# Patient Record
Sex: FEMALE | Race: WHITE | NOT HISPANIC OR LATINO | Employment: UNEMPLOYED | URBAN - METROPOLITAN AREA
[De-identification: names, ages, dates, MRNs, and addresses within clinical notes are randomized per-mention and may not be internally consistent; named-entity substitution may affect disease eponyms.]

---

## 2022-09-25 ENCOUNTER — TRANSFERRED RECORDS (OUTPATIENT)
Dept: HEALTH INFORMATION MANAGEMENT | Facility: CLINIC | Age: 47
End: 2022-09-25

## 2022-09-27 ENCOUNTER — TRANSFERRED RECORDS (OUTPATIENT)
Dept: HEALTH INFORMATION MANAGEMENT | Facility: CLINIC | Age: 47
End: 2022-09-27

## 2022-09-29 ENCOUNTER — TRANSFERRED RECORDS (OUTPATIENT)
Dept: HEALTH INFORMATION MANAGEMENT | Facility: CLINIC | Age: 47
End: 2022-09-29

## 2022-09-29 ENCOUNTER — MEDICAL CORRESPONDENCE (OUTPATIENT)
Dept: HEALTH INFORMATION MANAGEMENT | Facility: CLINIC | Age: 47
End: 2022-09-29

## 2022-10-03 ENCOUNTER — CARE COORDINATION (OUTPATIENT)
Dept: CARDIOLOGY | Facility: CLINIC | Age: 47
End: 2022-10-03

## 2022-10-03 DIAGNOSIS — I50.21 ACUTE SYSTOLIC HEART FAILURE (H): ICD-10-CM

## 2022-10-03 PROBLEM — I21.3 STEMI (ST ELEVATION MYOCARDIAL INFARCTION) (H): Status: ACTIVE | Noted: 2022-09-25

## 2022-10-03 PROBLEM — E04.9 ENLARGED THYROID: Status: ACTIVE | Noted: 2020-10-08

## 2022-10-03 PROBLEM — M54.42 CHRONIC LEFT-SIDED LOW BACK PAIN WITH LEFT-SIDED SCIATICA: Status: ACTIVE | Noted: 2021-01-20

## 2022-10-03 PROBLEM — G89.29 CHRONIC LEFT-SIDED LOW BACK PAIN WITH LEFT-SIDED SCIATICA: Status: ACTIVE | Noted: 2021-01-20

## 2022-10-03 PROBLEM — F17.200 TOBACCO USE DISORDER: Status: ACTIVE | Noted: 2020-10-08

## 2022-10-03 PROBLEM — S76.011A STRAIN OF FLEXOR MUSCLE OF HIP, RIGHT, INITIAL ENCOUNTER: Status: ACTIVE | Noted: 2021-10-13

## 2022-10-03 RX ORDER — FENOFIBRATE 160 MG/1
160 TABLET ORAL DAILY
COMMUNITY
Start: 2022-09-30 | End: 2024-07-02

## 2022-10-03 RX ORDER — ATORVASTATIN CALCIUM 80 MG/1
1 TABLET, FILM COATED ORAL DAILY
COMMUNITY
Start: 2022-01-12

## 2022-10-03 RX ORDER — NITROGLYCERIN 0.4 MG/1
0.4 TABLET SUBLINGUAL EVERY 5 MIN PRN
COMMUNITY
Start: 2022-09-29 | End: 2024-02-05

## 2022-10-03 RX ORDER — LOSARTAN POTASSIUM 25 MG/1
25 TABLET ORAL DAILY
Status: ON HOLD | COMMUNITY
Start: 2022-09-30 | End: 2022-12-09

## 2022-10-03 RX ORDER — ACETAMINOPHEN AND CODEINE PHOSPHATE 300; 30 MG/1; MG/1
1 TABLET ORAL EVERY 6 HOURS PRN
COMMUNITY
Start: 2022-09-29 | End: 2022-10-13

## 2022-10-03 RX ORDER — METOPROLOL SUCCINATE 25 MG/1
25 TABLET, EXTENDED RELEASE ORAL DAILY
COMMUNITY
Start: 2022-09-29 | End: 2023-01-09

## 2022-10-03 RX ORDER — LIRAGLUTIDE 6 MG/ML
INJECTION SUBCUTANEOUS
COMMUNITY
Start: 2022-09-29 | End: 2024-02-10

## 2022-10-03 RX ORDER — VENLAFAXINE 100 MG/1
1 TABLET ORAL 2 TIMES DAILY
COMMUNITY
Start: 2022-01-12

## 2022-10-03 RX ORDER — BUSPIRONE HYDROCHLORIDE 10 MG/1
10 TABLET ORAL 2 TIMES DAILY
COMMUNITY
Start: 2022-04-13 | End: 2024-07-02

## 2022-10-03 RX ORDER — CYCLOBENZAPRINE HCL 10 MG
10 TABLET ORAL 3 TIMES DAILY PRN
Status: ON HOLD | COMMUNITY
Start: 2022-09-16 | End: 2023-05-11

## 2022-10-03 RX ORDER — NICOTINE 21 MG/24HR
21 PATCH, TRANSDERMAL 24 HOURS TRANSDERMAL DAILY
COMMUNITY
Start: 2022-09-29 | End: 2023-10-04

## 2022-10-03 RX ORDER — FUROSEMIDE 20 MG
20 TABLET ORAL DAILY
COMMUNITY
Start: 2022-09-29 | End: 2024-07-02

## 2022-10-03 NOTE — PROGRESS NOTES
S/B:  Patient referred by Betsy Tran CNP [facility].    Patient was recently hospitalized at [facility]. REferring reached out to Dr. Guerin for a consult and Dr. Guerin requested that a video appt be made soon to discuss options. Sending to HF RN to review    Updated demographics and pt will call back with insurance info, she said she has [employer].       Plan:  Checking with Dr. Guerin's care team for availability -  sent  October 6, 2022 - Sent email for scheduling recommendations.     Offer 10/18 at noon per Truong garcia.

## 2022-10-10 ENCOUNTER — TELEPHONE (OUTPATIENT)
Dept: CARDIOLOGY | Facility: CLINIC | Age: 47
End: 2022-10-10

## 2022-10-10 ENCOUNTER — TRANSCRIBE ORDERS (OUTPATIENT)
Dept: OTHER | Age: 47
End: 2022-10-10

## 2022-10-10 DIAGNOSIS — Z79.4 TYPE 2 DIABETES MELLITUS WITHOUT COMPLICATION, WITH LONG-TERM CURRENT USE OF INSULIN (H): ICD-10-CM

## 2022-10-10 DIAGNOSIS — I21.3 STEMI (ST ELEVATION MYOCARDIAL INFARCTION) (H): Primary | ICD-10-CM

## 2022-10-10 DIAGNOSIS — I50.21 ACUTE SYSTOLIC HEART FAILURE (H): ICD-10-CM

## 2022-10-10 DIAGNOSIS — Z95.5 STENTED CORONARY ARTERY: ICD-10-CM

## 2022-10-10 DIAGNOSIS — G89.29 CHRONIC LEFT-SIDED LOW BACK PAIN WITH LEFT-SIDED SCIATICA: ICD-10-CM

## 2022-10-10 DIAGNOSIS — M54.42 CHRONIC LEFT-SIDED LOW BACK PAIN WITH LEFT-SIDED SCIATICA: ICD-10-CM

## 2022-10-10 DIAGNOSIS — E11.9 TYPE 2 DIABETES MELLITUS WITHOUT COMPLICATION, WITH LONG-TERM CURRENT USE OF INSULIN (H): ICD-10-CM

## 2022-10-10 DIAGNOSIS — I20.9 ANGINA PECTORIS (H): ICD-10-CM

## 2022-10-10 NOTE — TELEPHONE ENCOUNTER
Health Call Center    Phone Message    May a detailed message be left on voicemail: yes     Reason for Call: Appointment Intake    Referring Provider Name: Dr Desi Orozco @ [place]  Diagnosis and/or Symptoms: STEMI (ST elevation myocardial infarction) (H) [I21.3]  Acute systolic heart failure (H) [I50.21]  Chronic left-sided low back pain with left-sided sciatica [M54.42, G89.29]  Angina pectoris (H) [I20.9]  Type 2 diabetes mellitus without complication, with long-term current use of ins...  Stented coronary artery [Z95.5]      Per order req for Dr. Guerin    Action Taken: Message routed to:  [facility] ([facility] Cardiology    Travel Screening: Not Applicable

## 2022-10-12 NOTE — TELEPHONE ENCOUNTER
New heart failure care coordination encounter has been opened and referral is being reviewed by heart failure referral team.

## 2022-10-17 NOTE — PROGRESS NOTES
Unfortunately patient was not able to make the video working this as this was a phone discussion with her.  Platform was [employer]  October 18, 2022  Dear Colleagues.  I had the pleasure of talking to Cailin Burns  in the [facility]. As you know she is a 47 y/o F with DM, HLD, obesity, tobacco use disorder, sciatica who was recently admitted to [facility] with STEMI. She initially presented to urgent care with new onset chest pain, was found to have STEMI and was transferred to [facility] for immediate further evaluation and angiogram on 9/25/22. It was significant for 3 vessel disease. Case was immediately discussed with Dr Leal for potential urgent CABG considerations but ultimately the decision was made to proceed with PCI due to poor bypass targets. Intervention to LCx was made. Prior to further PCI PET viability was pursued prior to considering further interventions. Unfortunately viability did not show enough and thus medical management was recommended. She did have short runs of NSVT while inpatient. ECHO showed EF 20% and thus she was discharged with LifeVest. She was discharged on mimimal medical therapy due to associated hypotension and not being able to tolerate higher doses. She also has PRN lasix for weight gain and PRN nitro. She is following up at the CHF clinic at [place] and was also referred to [facility] for potential future advanced heart failure therapies evaluation.   Today I am talking to her over video due to the pandemic and patient distance from our center.  Over the phone she did confirm the above story and noted that she did have episodes of back pain and lower extremity pain for quite some time and has been seen in the emergency room and urgent cares quite a few times.  However last time she went to the urgent care she did have somewhat different complaints with numbness in both arms.  This is when the STEMI was diagnosed.  Since interventions she feels better she does not  have any further episodes of chest pain or similar equivalents.  She takes her medication as prescribed however has not seen any cardiologist since due to scheduling difficulties.  No medication titration could be made yet.  No other complaints     PAST MEDICAL HISTORY:    Diabetes mellitus without complication (HCC)     Depression with anxiety     Dyslipidemia     Preventative health care     Obesity (BMI 30.0-34.9)     Hyperlipidemia     Tobacco use disorder     Enlarged thyroid     Leg pain, lateral, left     Chronic left-sided low back pain with left-sided sciatica     PND (post-nasal drip)     Strain of flexor muscle of hip, right, initial encounter     STEMI (ST elevation myocardial infarction) ([facility]) 9/25/2022    FAMILY HISTORY:  No relevant past family history    SOCIAL HISTORY:     CURRENT MEDICATIONS:  Current Outpatient Medications   Medication     aspirin (ASA) 81 MG EC tablet     atorvastatin (LIPITOR) 80 MG tablet     blood glucose (CONTOUR NEXT TEST) test strip     busPIRone (BUSPAR) 10 MG tablet     cyclobenzaprine (FLEXERIL) 10 MG tablet     fenofibrate (TRIGLIDE/LOFIBRA) 160 MG tablet     furosemide (LASIX) 20 MG tablet     insulin glargine (LANTUS PEN) 100 UNIT/ML pen     liraglutide (VICTOZA PEN) 18 MG/3ML solution     losartan (COZAAR) 25 MG tablet     metFORMIN (GLUCOPHAGE) 1000 MG tablet     metoprolol succinate ER (TOPROL XL) 25 MG 24 hr tablet     nicotine (NICODERM CQ) 21 MG/24HR 24 hr patch     nicotine (NICORETTE) 4 MG lozenge     nicotine (NICOTROL) 10 MG inhaler     nitroGLYcerin (NITROSTAT) 0.4 MG sublingual tablet     ticagrelor (BRILINTA) 90 MG tablet     venlafaxine (EFFEXOR) 100 MG tablet     No current facility-administered medications for this visit.     ROS:   Constitutional: No fever, chills, or sweats.   ENT: No visual disturbance, ear ache, epistaxis, sore throat.   Allergies/Immunologic: Negative.   Respiratory: No cough, hemoptysis.   Cardiovascular: As per HPI.   GI: No  nausea, vomiting, hematemesis, melena, or hematochezia.   : No urinary frequency, dysuria, or hematuria.   Integument: Negative.   Psychiatric: Pleasant, no major depression noted  Neuro: No focal neurological deficits noted  Endocrinology: Negative.   Musculoskeletal: As per HPI.      EXAM:  Unable to do a thorough physical examination given that this was a phone encounter primarily.     Labs: All available osh labs reviewed. Renal function normal. Troonin peak at 64, normal sensitivity. Blood group 0. A1C 7.9.     [facility] - 9/25/22: significant 3VD,  - LCx - mid, 99%, culprit - s/p PCI with a 2.75*26 mm Israel MOHAN  - LAD - diffusely diseased, small caliber, 90% mid & distal - not amenable to PCI due to small caliber  - RCA - mid, 100%, , fills distally with R-R collaterals, small caliber.   Discussed presentation with Dr Leal for consideration for CABG, before proceeding with PCI, it was decided to proceed with PCI-LCx & assess for CABG later.    TTE 9/25/22:     Left Ventricle: The left ventricle is dilated. Severely reduced left  ventricular systolic function. The EF is visually estimated to be 20%. No  thrombus seen -contrast images also reviewed See diagram for wall motion details.      Right Ventricle: The right ventricle appears normal in size. The tricuspid jet envelope definition is inadequate for estimation of RV systolic pressure.      Mitral Valve: The leaflets are thickened. There is mild regurgitation.      Tricuspid Valve: There is no regurgitation.      This is an abnormal study.     PET viability 9/28/22:     Metabolic Viability Conclusion: The left ventricle has potentially viable myocardium.  The total viable myocardium is 39%, total scar burden is 32%      Resting Perfusion Details: Large perfusion defect at rest involving the entire anteroseptal, inferoseptal, inferior and inferolateral segments.   This includes the entire RCA territory and parts of the LAD and possibly LCx territories.       Metabolic Viability Defect: The mid inferoseptal, mid inferior, mid inferolateral, distal septal and distal inferior segments are nonviable. The LCx territory appears to be mostly viable at 95% total viability. The LAD territory is predominantly viable with 75% viability. The RCA territory appears to be 50% viable with no perfusion at rest    ASSESSMENT AND PLAN:  In summary, patient is a 46 year old lady with a past medical history including diabetes which is reasonably better controlled at the moment as well as recent STEMI back in September 2022 in the setting of atypical symptoms with high peak and troponin at around 63.  She does have multivessel coronary artery disease only the LCx vessel was intervened however she has significant disease in the small LAD as well as  of the RCA.  She was found to be not a surgical candidate in [place].  She was started on medications now the blood pressure is better at 120/80.  Patient at home and as such losartan was started.  At this time we will not change her medications however as per our discussion we will have our cardiac surgeons and interventional cardiologist reviewed images whether there is anything we can do.  She does have significant viability in all territories and as such it might be reasonable to try to intervene on some of these vessels.  We might recommend doing a cardiac MRI just to see and further define viability in every single diarrhea.  She is willing to do that down the road.  I will have again team look at the images and make any recommendations whether surgery would be feasible.  I think that would be in her best interest if not then we can consider P further PCI's to protect the myocardium however she is asymptomatic and seems to be asymptomatic at this time.  She can also titrate medications afterwards however given that he might of surgery I did not want to adjust this.  [facility] would [facility] the losartan in the near  future we will start at low-dose.  In addition we can further titrate her beta-blocker as well as add a SGLT2 inhibitor.    Will get images as soon as possible hopefully later this week and get back to her.  She was also asking that she has a tooth infection at this time and was wondering how the tooth could be pulled or how this could be fixed.  From my standpoint it is reasonable to pull the tooth however the Brilinta she is on at the moment for platelet inhibition should not be stopped given the significant underlying coronary artery disease and recent stent placement in the setting of significant STEMI.  As long as the aspirin and Brilinta can be continued she is okay to have the tooth pulled.  In fact this might be actually reasonable to do especially if we are considering bypass surgery.     I appreciate the opportunity to participate in the care of Cailin Burns . Please do not hesitate to contact me with any further questions.    Sincerely,   Kurt Guerin MD  [facility] Division of Cardiology

## 2022-10-18 ENCOUNTER — VIRTUAL VISIT (OUTPATIENT)
Dept: CARDIOLOGY | Facility: CLINIC | Age: 47
End: 2022-10-18
Attending: INTERNAL MEDICINE
Payer: COMMERCIAL

## 2022-10-18 DIAGNOSIS — E78.2 MIXED HYPERLIPIDEMIA: ICD-10-CM

## 2022-10-18 DIAGNOSIS — I50.20 HEART FAILURE WITH REDUCED EJECTION FRACTION, NYHA CLASS III (H): ICD-10-CM

## 2022-10-18 DIAGNOSIS — I25.5 ISCHEMIC CARDIOMYOPATHY: ICD-10-CM

## 2022-10-18 DIAGNOSIS — I10 BENIGN ESSENTIAL HYPERTENSION: Primary | ICD-10-CM

## 2022-10-18 PROCEDURE — 99205 OFFICE O/P NEW HI 60 MIN: CPT | Mod: 95 | Performed by: INTERNAL MEDICINE

## 2022-10-18 RX ORDER — NICOTINE 21 MG/24HR
1 PATCH, TRANSDERMAL 24 HOURS TRANSDERMAL EVERY 24 HOURS
COMMUNITY
Start: 2022-10-14

## 2022-10-18 NOTE — LETTER
10/18/2022      RE: Cailin Burns  [address]  [address]  [place] [address]       Dear Colleague,    Thank you for the opportunity to participate in the care of your patient, Cailin Burns, at the [facility] [place] at [facility]. Please see a copy of my visit note below.    Unfortunately patient was not able to make the video working this as this was a phone discussion with her.  Platform was [employer]  October 18, 2022  Dear Colleagues.  I had the pleasure of talking to Cailin Burns  in the [facility] Failure [facility]. As you know she is a 47 y/o F with DM, HLD, obesity, tobacco use disorder, sciatica who was recently admitted to [place] ICU with STEMI. She initially presented to urgent care with new onset chest pain, was found to have STEMI and was transferred to [facility] for immediate further evaluation and angiogram on 9/25/22. It was significant for 3 vessel disease. Case was immediately discussed with Dr Leal for potential urgent CABG considerations but ultimately the decision was made to proceed with PCI due to poor bypass targets. Intervention to LCx was made. Prior to further PCI PET viability was pursued prior to considering further interventions. Unfortunately viability did not show enough and thus medical management was recommended. She did have short runs of NSVT while inpatient. ECHO showed EF 20% and thus she was discharged with LifeVest. She was discharged on mimimal medical therapy due to associated hypotension and not being able to tolerate higher doses. She also has PRN lasix for weight gain and PRN nitro. She is following up at the CHF clinic at [place] and was also referred to [facility] for potential future advanced heart failure therapies evaluation.   Today I am talking to her over video due to the pandemic and patient distance from our center.  Over the phone she did confirm the above story and noted that she did  have episodes of back pain and lower extremity pain for quite some time and has been seen in the emergency room and urgent cares quite a few times.  However last time she went to the urgent care she did have somewhat different complaints with numbness in both arms.  This is when the STEMI was diagnosed.  Since interventions she feels better she does not have any further episodes of chest pain or similar equivalents.  She takes her medication as prescribed however has not seen any cardiologist since due to scheduling difficulties.  No medication titration could be made yet.  No other complaints     PAST MEDICAL HISTORY:    Diabetes mellitus without complication (HCC)     Depression with anxiety     Dyslipidemia     Preventative health care     Obesity (BMI 30.0-34.9)     Hyperlipidemia     Tobacco use disorder     Enlarged thyroid     Leg pain, lateral, left     Chronic left-sided low back pain with left-sided sciatica     PND (post-nasal drip)     Strain of flexor muscle of hip, right, initial encounter     STEMI (ST elevation myocardial infarction) ([facility]) 9/25/2022    FAMILY HISTORY:  No relevant past family history    SOCIAL HISTORY:     CURRENT MEDICATIONS:  Current Outpatient Medications   Medication     aspirin (ASA) 81 MG EC tablet     atorvastatin (LIPITOR) 80 MG tablet     blood glucose (CONTOUR NEXT TEST) test strip     busPIRone (BUSPAR) 10 MG tablet     cyclobenzaprine (FLEXERIL) 10 MG tablet     fenofibrate (TRIGLIDE/LOFIBRA) 160 MG tablet     furosemide (LASIX) 20 MG tablet     insulin glargine (LANTUS PEN) 100 UNIT/ML pen     liraglutide (VICTOZA PEN) 18 MG/3ML solution     losartan (COZAAR) 25 MG tablet     metFORMIN (GLUCOPHAGE) 1000 MG tablet     metoprolol succinate ER (TOPROL XL) 25 MG 24 hr tablet     nicotine (NICODERM CQ) 21 MG/24HR 24 hr patch     nicotine (NICORETTE) 4 MG lozenge     nicotine (NICOTROL) 10 MG inhaler     nitroGLYcerin (NITROSTAT) 0.4 MG sublingual tablet     ticagrelor  (BRILINTA) 90 MG tablet     venlafaxine (EFFEXOR) 100 MG tablet     No current facility-administered medications for this visit.     ROS:   Constitutional: No fever, chills, or sweats.   ENT: No visual disturbance, ear ache, epistaxis, sore throat.   Allergies/Immunologic: Negative.   Respiratory: No cough, hemoptysis.   Cardiovascular: As per HPI.   GI: No nausea, vomiting, hematemesis, melena, or hematochezia.   : No urinary frequency, dysuria, or hematuria.   Integument: Negative.   Psychiatric: Pleasant, no major depression noted  Neuro: No focal neurological deficits noted  Endocrinology: Negative.   Musculoskeletal: As per HPI.      EXAM:  Unable to do a thorough physical examination given that this was a phone encounter primarily.     Labs: All available osh labs reviewed. Renal function normal. Troonin peak at 64, normal sensitivity. Blood group 0. A1C 7.9.     [facility] - 9/25/22: significant 3VD,  - LCx - mid, 99%, culprit - s/p PCI with a 2.75*26 mm Israel MOHAN  - LAD - diffusely diseased, small caliber, 90% mid & distal - not amenable to PCI due to small caliber  - RCA - mid, 100%, , fills distally with R-R collaterals, small caliber.   Discussed presentation with Dr Leal for consideration for CABG, before proceeding with PCI, it was decided to proceed with PCI-LCx & assess for CABG later.    TTE 9/25/22:     Left Ventricle: The left ventricle is dilated. Severely reduced left  ventricular systolic function. The EF is visually estimated to be 20%. No  thrombus seen -contrast images also reviewed See diagram for wall motion details.      Right Ventricle: The right ventricle appears normal in size. The tricuspid jet envelope definition is inadequate for estimation of RV systolic pressure.      Mitral Valve: The leaflets are thickened. There is mild regurgitation.      Tricuspid Valve: There is no regurgitation.      This is an abnormal study.     PET viability 9/28/22:     Metabolic Viability Conclusion: The  left ventricle has potentially viable myocardium.  The total viable myocardium is 39%, total scar burden is 32%      Resting Perfusion Details: Large perfusion defect at rest involving the entire anteroseptal, inferoseptal, inferior and inferolateral segments.   This includes the entire RCA territory and parts of the LAD and possibly LCx territories.      Metabolic Viability Defect: The mid inferoseptal, mid inferior, mid inferolateral, distal septal and distal inferior segments are nonviable. The LCx territory appears to be mostly viable at 95% total viability. The LAD territory is predominantly viable with 75% viability. The RCA territory appears to be 50% viable with no perfusion at rest    ASSESSMENT AND PLAN:  In summary, patient is a 46 year old lady with a past medical history including diabetes which is reasonably better controlled at the moment as well as recent STEMI back in September 2022 in the setting of atypical symptoms with high peak and troponin at around 63.  She does have multivessel coronary artery disease only the LCx vessel was intervened however she has significant disease in the small LAD as well as  of the RCA.  She was found to be not a surgical candidate in [place].  She was started on medications now the blood pressure is better at 120/80.  Patient at home and as such losartan was started.  At this time we will not change her medications however as per our discussion we will have our cardiac surgeons and interventional cardiologist reviewed images whether there is anything we can do.  She does have significant viability in all territories and as such it might be reasonable to try to intervene on some of these vessels.  We might recommend doing a cardiac MRI just to see and further define viability in every single diarrhea.  She is willing to do that down the road.  I will have again team look at the images and make any recommendations whether surgery would be feasible.  I think that  would be in her best interest if not then we can consider P further PCI's to protect the myocardium however she is asymptomatic and seems to be asymptomatic at this time.  She can also titrate medications afterwards however given that he might of surgery I did not want to adjust this.  She would benefit going over to [facility] from the losartan in the near future we will start at low-dose.  In addition we can further titrate her beta-blocker as well as add a SGLT2 inhibitor.    Will get images as soon as possible hopefully later this week and get back to her.  She was also asking that she has a tooth infection at this time and was wondering how the tooth could be pulled or how this could be fixed.  From my standpoint it is reasonable to pull the tooth however the Brilinta she is on at the moment for platelet inhibition should not be stopped given the significant underlying coronary artery disease and recent stent placement in the setting of significant STEMI.  As long as the aspirin and Brilinta can be continued she is okay to have the tooth pulled.  In fact this might be actually reasonable to do especially if we are considering bypass surgery.     I appreciate the opportunity to participate in the care of Cailin Burns . Please do not hesitate to contact me with any further questions.    Sincerely,   Kurt Guerin MD  [facility] Division of Cardiology

## 2022-10-31 ENCOUNTER — TELEPHONE (OUTPATIENT)
Dept: CARDIOLOGY | Facility: CLINIC | Age: 47
End: 2022-10-31

## 2022-10-31 NOTE — TELEPHONE ENCOUNTER
"Jaqui sent me a message asking for someone to call her.     She stated she is waiting to here back from Dr. Guerin regarding the possibility of a  \"Surgery that will help\". She thought womeone would get back to her last week.    "

## 2022-11-17 ENCOUNTER — CARE COORDINATION (OUTPATIENT)
Dept: CARDIOLOGY | Facility: CLINIC | Age: 47
End: 2022-11-17

## 2022-11-17 DIAGNOSIS — I25.10 CAD (CORONARY ARTERY DISEASE): ICD-10-CM

## 2022-11-17 DIAGNOSIS — I50.20 HEART FAILURE WITH REDUCED EJECTION FRACTION, NYHA CLASS III (H): ICD-10-CM

## 2022-11-17 DIAGNOSIS — I50.21 ACUTE SYSTOLIC HEART FAILURE (H): Primary | ICD-10-CM

## 2022-11-17 RX ORDER — LIDOCAINE 40 MG/G
CREAM TOPICAL
Status: CANCELLED | OUTPATIENT
Start: 2022-11-17

## 2022-11-17 RX ORDER — POTASSIUM CHLORIDE 1500 MG/1
40 TABLET, EXTENDED RELEASE ORAL
Status: CANCELLED | OUTPATIENT
Start: 2022-11-17

## 2022-11-17 RX ORDER — ASPIRIN 81 MG/1
243 TABLET, CHEWABLE ORAL ONCE
Status: CANCELLED | OUTPATIENT
Start: 2022-11-17

## 2022-11-17 RX ORDER — SODIUM CHLORIDE 9 MG/ML
INJECTION, SOLUTION INTRAVENOUS CONTINUOUS
Status: CANCELLED | OUTPATIENT
Start: 2022-11-17

## 2022-11-17 RX ORDER — POTASSIUM CHLORIDE 1500 MG/1
20 TABLET, EXTENDED RELEASE ORAL
Status: CANCELLED | OUTPATIENT
Start: 2022-11-17

## 2022-11-17 RX ORDER — ASPIRIN 325 MG
325 TABLET ORAL ONCE
Status: CANCELLED | OUTPATIENT
Start: 2022-11-17 | End: 2022-11-17

## 2022-11-17 NOTE — PROGRESS NOTES
Date: 11/17/2022    Time of Call: 12:33 PM     Diagnosis:  Cad     [ VORB ] Ordering provider: Dr. Guerin  Order: coronary angio and RCA  with Dr. Bernardo       Order received by: Donovan Rendon RN     Follow-up/additional notes: Orders placed, procedure scheduled. Patient called and notified and preparation reviewed with patient. Reviewed bellow information:  Patient Education    1. Your arrival time is 8:30 on December 9th..  Location is [address]  2. Please plan on being at the hospital all day.  3. At any time, emergencies and/or urgent cases may come up which could delay the start of your procedure.    COVID Testing Instructions  *Mandatory COVID Testing:   We require COVID testing prior to their procedure regardless of vaccination status.     This can be completed via PCR or Rapid (at lab or at home).  If you are doing an at home test please complete 1-2 days prior.  If you are completing a PCR Lab, it must be completed no more than 4 days prior.     If you are staying overnight a PCR completed at a LAB is required and a Rapid will not be accepted.    To schedule COVID testing at a [place] lab, please call [phone] ([phone])    If completing the COVID Test at an Outside facility please have them fax the results to [phone]    Please bring in a picture of your results if a rapid is completed at home.    If you are running into and issues please call us.     Pre-procedure instructions - Coronary Angiogram  - Shower in the evening before or the morning of the procedure  - Take your temperature in the morning prior to coming in.  If your temperature is 100 F please call [phone] (Opt[address] 1) and notify them.  If you do not have access to a thermometer at home, please come in for testing.  If you are running a temperature your procedure may be rescheduled.  - Nothing to eat or drink after  midnight  - You can take your morning medications (except for diabetic and blood thinners) with sips of water.  - Take 325 mg of Asprin 24 hours prior to the procedure and the morning of procedure.   - You will need to arrange a ride to drop you off and pick you up, as you will be unable to drive home.  Prior to discharge you may be required to lay flat for approximately 2-4 hours in the recovery unit to ensure proper clotting of the artery.              Diabetic Medication Instructions  ? DO NOT take any oral diabetic medication, short-acting diabetes medications/insulin, humalog or regular insulin the morning of your test  ? Take   dose of long-acting insulin (Lantus, Levemir) the day of your test  ? Hold Oral Diabetic on the day of the procedure and for 48 hours after IV contrast given  ? Remember to  bring your glucometer and insulin with you to take after your test if needed              Anticoagulation Medication Instructions   enoxaparin (LOVENOX) - Hold 12 hours prior to procedure if on every 12 hour dosing and NA

## 2022-12-09 ENCOUNTER — APPOINTMENT (OUTPATIENT)
Dept: MEDSURG UNIT | Facility: CLINIC | Age: 47
End: 2022-12-09
Attending: INTERNAL MEDICINE
Payer: COMMERCIAL

## 2022-12-09 ENCOUNTER — APPOINTMENT (OUTPATIENT)
Dept: LAB | Facility: CLINIC | Age: 47
End: 2022-12-09
Attending: INTERNAL MEDICINE
Payer: COMMERCIAL

## 2022-12-09 ENCOUNTER — HOSPITAL ENCOUNTER (OUTPATIENT)
Facility: CLINIC | Age: 47
Discharge: HOME | End: 2022-12-09
Attending: INTERNAL MEDICINE | Admitting: INTERNAL MEDICINE
Payer: COMMERCIAL

## 2022-12-09 VITALS
WEIGHT: 172.4 LBS | OXYGEN SATURATION: 98 % | HEART RATE: 77 BPM | SYSTOLIC BLOOD PRESSURE: 109 MMHG | TEMPERATURE: 97.8 F | DIASTOLIC BLOOD PRESSURE: 57 MMHG | BODY MASS INDEX: 31.73 KG/M2 | HEIGHT: 62 IN | RESPIRATION RATE: 14 BRPM

## 2022-12-09 DIAGNOSIS — I50.20 HEART FAILURE WITH REDUCED EJECTION FRACTION, NYHA CLASS III (H): ICD-10-CM

## 2022-12-09 DIAGNOSIS — I25.10 CAD (CORONARY ARTERY DISEASE): ICD-10-CM

## 2022-12-09 DIAGNOSIS — I50.21 ACUTE SYSTOLIC HEART FAILURE (H): ICD-10-CM

## 2022-12-09 DIAGNOSIS — I25.10 CORONARY ARTERY DISEASE INVOLVING NATIVE CORONARY ARTERY OF NATIVE HEART WITHOUT ANGINA PECTORIS: Primary | ICD-10-CM

## 2022-12-09 LAB
ACT BLD: 360 SECONDS (ref 74–150)
ACT BLD: 377 SECONDS (ref 74–150)
ANION GAP SERPL CALCULATED.3IONS-SCNC: 8 MMOL/L (ref 7–15)
APTT PPP: 25 SECONDS (ref 22–38)
BUN SERPL-MCNC: 10.2 MG/DL (ref 6–20)
CALCIUM SERPL-MCNC: 8.3 MG/DL (ref 8.6–10)
CHLORIDE SERPL-SCNC: 108 MMOL/L (ref 98–107)
CREAT SERPL-MCNC: 0.65 MG/DL (ref 0.51–0.95)
DEPRECATED HCO3 PLAS-SCNC: 22 MMOL/L (ref 22–29)
ERYTHROCYTE [DISTWIDTH] IN BLOOD BY AUTOMATED COUNT: 14.6 % (ref 10–15)
GFR SERPL CREATININE-BSD FRML MDRD: >90 ML/MIN/1.73M2
GLUCOSE BLDC GLUCOMTR-MCNC: 105 MG/DL (ref 70–99)
GLUCOSE SERPL-MCNC: 110 MG/DL (ref 70–99)
HCG INTACT+B SERPL-ACNC: <1 MIU/ML
HCT VFR BLD AUTO: 40.9 % (ref 35–47)
HGB BLD-MCNC: 13.3 G/DL (ref 11.7–15.7)
INR PPP: 0.9 (ref 0.85–1.15)
MCH RBC QN AUTO: 29 PG (ref 26.5–33)
MCHC RBC AUTO-ENTMCNC: 32.5 G/DL (ref 31.5–36.5)
MCV RBC AUTO: 89 FL (ref 78–100)
PLATELET # BLD AUTO: 269 10E3/UL (ref 150–450)
POTASSIUM SERPL-SCNC: 4.5 MMOL/L (ref 3.4–5.3)
RBC # BLD AUTO: 4.59 10E6/UL (ref 3.8–5.2)
SODIUM SERPL-SCNC: 138 MMOL/L (ref 136–145)
WBC # BLD AUTO: 7.7 10E3/UL (ref 4–11)

## 2022-12-09 PROCEDURE — 250N000011 HC RX IP 250 OP 636: Performed by: INTERNAL MEDICINE

## 2022-12-09 PROCEDURE — C1725 CATH, TRANSLUMIN NON-LASER: HCPCS | Performed by: INTERNAL MEDICINE

## 2022-12-09 PROCEDURE — C1769 GUIDE WIRE: HCPCS | Performed by: INTERNAL MEDICINE

## 2022-12-09 PROCEDURE — 258N000003 HC RX IP 258 OP 636: Performed by: INTERNAL MEDICINE

## 2022-12-09 PROCEDURE — 250N000009 HC RX 250: Performed by: INTERNAL MEDICINE

## 2022-12-09 PROCEDURE — 36415 COLL VENOUS BLD VENIPUNCTURE: CPT | Performed by: INTERNAL MEDICINE

## 2022-12-09 PROCEDURE — C1874 STENT, COATED/COV W/DEL SYS: HCPCS | Performed by: INTERNAL MEDICINE

## 2022-12-09 PROCEDURE — 93005 ELECTROCARDIOGRAM TRACING: CPT

## 2022-12-09 PROCEDURE — 85730 THROMBOPLASTIN TIME PARTIAL: CPT | Performed by: INTERNAL MEDICINE

## 2022-12-09 PROCEDURE — 272N000002 HC OR SUPPLY OTHER OPNP: Performed by: INTERNAL MEDICINE

## 2022-12-09 PROCEDURE — 250N000013 HC RX MED GY IP 250 OP 250 PS 637: Performed by: INTERNAL MEDICINE

## 2022-12-09 PROCEDURE — 99152 MOD SED SAME PHYS/QHP 5/>YRS: CPT | Mod: GC | Performed by: INTERNAL MEDICINE

## 2022-12-09 PROCEDURE — 93010 ELECTROCARDIOGRAM REPORT: CPT | Mod: 76 | Performed by: INTERNAL MEDICINE

## 2022-12-09 PROCEDURE — 999N000142 HC STATISTIC PROCEDURE PREP ONLY

## 2022-12-09 PROCEDURE — 99152 MOD SED SAME PHYS/QHP 5/>YRS: CPT | Performed by: INTERNAL MEDICINE

## 2022-12-09 PROCEDURE — 93454 CORONARY ARTERY ANGIO S&I: CPT | Mod: 26 | Performed by: INTERNAL MEDICINE

## 2022-12-09 PROCEDURE — 250N000013 HC RX MED GY IP 250 OP 250 PS 637: Performed by: STUDENT IN AN ORGANIZED HEALTH CARE EDUCATION/TRAINING PROGRAM

## 2022-12-09 PROCEDURE — 82310 ASSAY OF CALCIUM: CPT | Performed by: INTERNAL MEDICINE

## 2022-12-09 PROCEDURE — C9607 PERC D-E COR REVASC CHRO SIN: HCPCS | Performed by: INTERNAL MEDICINE

## 2022-12-09 PROCEDURE — 85014 HEMATOCRIT: CPT | Performed by: INTERNAL MEDICINE

## 2022-12-09 PROCEDURE — 99153 MOD SED SAME PHYS/QHP EA: CPT | Performed by: INTERNAL MEDICINE

## 2022-12-09 PROCEDURE — 84702 CHORIONIC GONADOTROPIN TEST: CPT | Performed by: STUDENT IN AN ORGANIZED HEALTH CARE EDUCATION/TRAINING PROGRAM

## 2022-12-09 PROCEDURE — C1760 CLOSURE DEV, VASC: HCPCS | Performed by: INTERNAL MEDICINE

## 2022-12-09 PROCEDURE — 82962 GLUCOSE BLOOD TEST: CPT

## 2022-12-09 PROCEDURE — 272N000001 HC OR GENERAL SUPPLY STERILE: Performed by: INTERNAL MEDICINE

## 2022-12-09 PROCEDURE — C1894 INTRO/SHEATH, NON-LASER: HCPCS | Performed by: INTERNAL MEDICINE

## 2022-12-09 PROCEDURE — 999N000134 HC STATISTIC PP CARE STAGE 3

## 2022-12-09 PROCEDURE — 85610 PROTHROMBIN TIME: CPT | Performed by: INTERNAL MEDICINE

## 2022-12-09 PROCEDURE — 999N000054 HC STATISTIC EKG NON-CHARGEABLE

## 2022-12-09 PROCEDURE — 85347 COAGULATION TIME ACTIVATED: CPT

## 2022-12-09 PROCEDURE — 92943 PRQ TRLUML REVSC CH OCC ANT: CPT | Mod: RC | Performed by: INTERNAL MEDICINE

## 2022-12-09 PROCEDURE — C1887 CATHETER, GUIDING: HCPCS | Performed by: INTERNAL MEDICINE

## 2022-12-09 PROCEDURE — 92929 PR PRQ TRLUML CORONARY BM STENT W/ANGIO ADDL ART/BRNCH: CPT | Mod: XS | Performed by: INTERNAL MEDICINE

## 2022-12-09 DEVICE — STENT CORONARY SYNERGY XD MR US 2.75X48MM H7493941848270: Type: IMPLANTABLE DEVICE | Status: FUNCTIONAL

## 2022-12-09 DEVICE — STENT CORONARY DES SYNERGY XD MR US 2.25X38MM H7493941838220: Type: IMPLANTABLE DEVICE | Status: FUNCTIONAL

## 2022-12-09 DEVICE — STENT RESOLUTE ONYX DE 2.7FR 2.00X18MM RONYX DE20018UX: Type: IMPLANTABLE DEVICE | Status: FUNCTIONAL

## 2022-12-09 RX ORDER — NALOXONE HYDROCHLORIDE 0.4 MG/ML
0.4 INJECTION, SOLUTION INTRAMUSCULAR; INTRAVENOUS; SUBCUTANEOUS
Status: DISCONTINUED | OUTPATIENT
Start: 2022-12-09 | End: 2022-12-09 | Stop reason: HOSPADM

## 2022-12-09 RX ORDER — ACETAMINOPHEN 325 MG/1
325 TABLET ORAL EVERY 4 HOURS PRN
Status: DISCONTINUED | OUTPATIENT
Start: 2022-12-09 | End: 2022-12-09 | Stop reason: HOSPADM

## 2022-12-09 RX ORDER — ASPIRIN 81 MG/1
243 TABLET, CHEWABLE ORAL ONCE
Status: COMPLETED | OUTPATIENT
Start: 2022-12-09 | End: 2022-12-09

## 2022-12-09 RX ORDER — ONDANSETRON 2 MG/ML
4 INJECTION INTRAMUSCULAR; INTRAVENOUS EVERY 6 HOURS PRN
Status: DISCONTINUED | OUTPATIENT
Start: 2022-12-09 | End: 2022-12-09 | Stop reason: HOSPADM

## 2022-12-09 RX ORDER — NALOXONE HYDROCHLORIDE 0.4 MG/ML
0.2 INJECTION, SOLUTION INTRAMUSCULAR; INTRAVENOUS; SUBCUTANEOUS
Status: DISCONTINUED | OUTPATIENT
Start: 2022-12-09 | End: 2022-12-09 | Stop reason: HOSPADM

## 2022-12-09 RX ORDER — ATROPINE SULFATE 0.1 MG/ML
0.5 INJECTION INTRAVENOUS
Status: DISCONTINUED | OUTPATIENT
Start: 2022-12-09 | End: 2022-12-09 | Stop reason: HOSPADM

## 2022-12-09 RX ORDER — NITROGLYCERIN 5 MG/ML
VIAL (ML) INTRAVENOUS
Status: DISCONTINUED | OUTPATIENT
Start: 2022-12-09 | End: 2022-12-09 | Stop reason: HOSPADM

## 2022-12-09 RX ORDER — HYDRALAZINE HYDROCHLORIDE 20 MG/ML
10 INJECTION INTRAMUSCULAR; INTRAVENOUS EVERY 4 HOURS PRN
Status: DISCONTINUED | OUTPATIENT
Start: 2022-12-09 | End: 2022-12-09 | Stop reason: HOSPADM

## 2022-12-09 RX ORDER — SODIUM CHLORIDE 9 MG/ML
INJECTION, SOLUTION INTRAVENOUS CONTINUOUS
Status: DISCONTINUED | OUTPATIENT
Start: 2022-12-09 | End: 2022-12-09 | Stop reason: HOSPADM

## 2022-12-09 RX ORDER — ASPIRIN 81 MG/1
81 TABLET ORAL DAILY
Status: DISCONTINUED | OUTPATIENT
Start: 2022-12-10 | End: 2022-12-09 | Stop reason: HOSPADM

## 2022-12-09 RX ORDER — ASPIRIN 325 MG
325 TABLET ORAL ONCE
Status: COMPLETED | OUTPATIENT
Start: 2022-12-09 | End: 2022-12-09

## 2022-12-09 RX ORDER — OXYCODONE HYDROCHLORIDE 10 MG/1
10 TABLET ORAL EVERY 4 HOURS PRN
Status: DISCONTINUED | OUTPATIENT
Start: 2022-12-09 | End: 2022-12-09 | Stop reason: HOSPADM

## 2022-12-09 RX ORDER — IOPAMIDOL 755 MG/ML
INJECTION, SOLUTION INTRAVASCULAR
Status: DISCONTINUED | OUTPATIENT
Start: 2022-12-09 | End: 2022-12-09 | Stop reason: HOSPADM

## 2022-12-09 RX ORDER — HEPARIN SODIUM 1000 [USP'U]/ML
INJECTION, SOLUTION INTRAVENOUS; SUBCUTANEOUS
Status: DISCONTINUED | OUTPATIENT
Start: 2022-12-09 | End: 2022-12-09 | Stop reason: HOSPADM

## 2022-12-09 RX ORDER — FENTANYL CITRATE 50 UG/ML
INJECTION, SOLUTION INTRAMUSCULAR; INTRAVENOUS
Status: DISCONTINUED | OUTPATIENT
Start: 2022-12-09 | End: 2022-12-09 | Stop reason: HOSPADM

## 2022-12-09 RX ORDER — POTASSIUM CHLORIDE 750 MG/1
20 TABLET, EXTENDED RELEASE ORAL
Status: DISCONTINUED | OUTPATIENT
Start: 2022-12-09 | End: 2022-12-09 | Stop reason: HOSPADM

## 2022-12-09 RX ORDER — FENTANYL CITRATE 50 UG/ML
25 INJECTION, SOLUTION INTRAMUSCULAR; INTRAVENOUS
Status: DISCONTINUED | OUTPATIENT
Start: 2022-12-09 | End: 2022-12-09 | Stop reason: HOSPADM

## 2022-12-09 RX ORDER — OXYCODONE HYDROCHLORIDE 5 MG/1
5 TABLET ORAL EVERY 4 HOURS PRN
Status: DISCONTINUED | OUTPATIENT
Start: 2022-12-09 | End: 2022-12-09 | Stop reason: HOSPADM

## 2022-12-09 RX ORDER — ONDANSETRON 4 MG/1
4 TABLET, ORALLY DISINTEGRATING ORAL EVERY 6 HOURS PRN
Status: DISCONTINUED | OUTPATIENT
Start: 2022-12-09 | End: 2022-12-09 | Stop reason: HOSPADM

## 2022-12-09 RX ORDER — NITROGLYCERIN 0.4 MG/1
0.4 TABLET SUBLINGUAL EVERY 5 MIN PRN
Status: DISCONTINUED | OUTPATIENT
Start: 2022-12-09 | End: 2022-12-09 | Stop reason: HOSPADM

## 2022-12-09 RX ORDER — ASPIRIN 81 MG/1
81 TABLET, CHEWABLE ORAL ONCE
Status: DISCONTINUED | OUTPATIENT
Start: 2022-12-09 | End: 2022-12-09 | Stop reason: HOSPADM

## 2022-12-09 RX ORDER — FLUMAZENIL 0.1 MG/ML
0.2 INJECTION, SOLUTION INTRAVENOUS
Status: DISCONTINUED | OUTPATIENT
Start: 2022-12-09 | End: 2022-12-09 | Stop reason: HOSPADM

## 2022-12-09 RX ORDER — LIDOCAINE 40 MG/G
CREAM TOPICAL
Status: DISCONTINUED | OUTPATIENT
Start: 2022-12-09 | End: 2022-12-09 | Stop reason: HOSPADM

## 2022-12-09 RX ORDER — POTASSIUM CHLORIDE 750 MG/1
40 TABLET, EXTENDED RELEASE ORAL
Status: DISCONTINUED | OUTPATIENT
Start: 2022-12-09 | End: 2022-12-09 | Stop reason: HOSPADM

## 2022-12-09 RX ORDER — METOPROLOL TARTRATE 1 MG/ML
5 INJECTION, SOLUTION INTRAVENOUS
Status: DISCONTINUED | OUTPATIENT
Start: 2022-12-09 | End: 2022-12-09 | Stop reason: HOSPADM

## 2022-12-09 RX ADMIN — ACETAMINOPHEN 325 MG: 325 TABLET, FILM COATED ORAL at 17:21

## 2022-12-09 RX ADMIN — SODIUM CHLORIDE: 9 INJECTION, SOLUTION INTRAVENOUS at 10:29

## 2022-12-09 ASSESSMENT — ACTIVITIES OF DAILY LIVING (ADL)
ADLS_ACUITY_SCORE: 35

## 2022-12-09 NOTE — Clinical Note
Prepped: groin and left radial. Prepped with: ChloraPrep. The patient was draped. .Pre-procedure site marking:N/A

## 2022-12-09 NOTE — Clinical Note
dry, intact, no bleeding and no hematoma. 8Fr sheath removed from RFA. 8Fr angioseal placed. CDI with tegaderm over site.   6Fr sheath removed from RRA. TR band in place with 13cc in balloon.

## 2022-12-09 NOTE — PROGRESS NOTES
D/I/A: Pt roomed on 3C in bay 31.  Arrived via litter and accompanied by cath lab RN on monitor.  VSSA.  Rhythm upon arrival NSR on monitor.  Denies pain or sob.  Reviewed activity restrictions and when to notify RN, ie-changes to breathing or increased chest pressure or chest pain.  CCL access:  R groin covered with primapore, site c/d/i with no bleeding or hematoma. R radial site with TR band in place, will start deflating at 1600. Pt on bedrest for 4 hours until 1800.  will  pt closer to discharge time.   P: Continue to monitor status.  Discharge to home once meeting criteria.

## 2022-12-09 NOTE — Clinical Note
The first balloon was inserted into the right coronary artery and proximal right coronary artery.Max pressure = 12 kathleen.

## 2022-12-09 NOTE — Clinical Note
The first balloon was inserted into the right coronary artery and distal right coronary artery.Max pressure = 8 kathleen.

## 2022-12-09 NOTE — Clinical Note
The first balloon was inserted into the right coronary artery and middle right coronary artery.Max pressure = 12 kathleen.     Max pressure = 12 kathleen.

## 2022-12-09 NOTE — PROGRESS NOTES
Patient arrives to 2A in room nine. Patient arrives alone,  will transport patient home after procedure. VSS, AOx4. Awaiting hcg result and consent, prep otherwise completed. Will continue to monitor patient until take to procedure.

## 2022-12-09 NOTE — Clinical Note
Report called to Ramakrishna BLANCO on 3C. VSS. All belongings and paperwork sent up to 3C with pt.

## 2022-12-09 NOTE — Clinical Note
Potential access sites were evaluated for patency using ultrasound.   The left femoral artery was selected. Access was obtained under with Sonosite and Fluoroscopic guidance using a standard 18 guage needle with direct visualization of needle entry.

## 2022-12-09 NOTE — Clinical Note
The first balloon was inserted into the right coronary artery and distal right coronary artery.Max pressure = 9 kathleen.     Max pressure = 12 kathleen. Balloon reinflated a fourth time: Max pressure = 12 kathleen.

## 2022-12-09 NOTE — Clinical Note
The first balloon was inserted into the right coronary artery and proximal right coronary artery.Max pressure = 12 kathleen.     Max pressure = 12 kathleen.

## 2022-12-09 NOTE — DISCHARGE INSTRUCTIONS
Going Home after an Angioplasty or Stent Placement (Cardiac)        After you go home:  Have an adult stay with you for 24 hours.  Drink plenty of fluids.  You may eat your normal diet, unless your doctor tells you otherwise.  For 24 hours:  - Relax and take it easy.  - Do NOT smoke.  - Do NOT make any important or legal decisions.  - Do NOT drive or operate machines at home or at work.  - Do NOT drink alcohol.    Remove the Band-Aid after 24 hours. If there is minor oozing, apply another Band-aid and remove it after 12 hours.  For 2 days, do NOT have sex or do any heavy exercise.  Do NOT take a bath, or use a hot tub or pool for at least 3 days. You may shower.    Care of groin site  It is normal to have a small bruise or lump at the site.  Do NOT scrub the site.  For the first 2 days, when you cough, sneeze or move your bowels, hold your hand over the puncture site and press gently.  Do NOT lift more than 10 pounds for at least 3 to 5 days.  Do not use lotion or powder near the puncture site for 3 days.  If you start bleeding from the site in your groin, lie down flat and press firmly on the site. Call  your doctor as soon as you can.    Care of wrist or arm site  It is normal to have soreness at the puncture site and mild tingling in your hand for up to 3 days.  For 2 days, do not use your hand or arm to support your weight (such as rising from a chair) or bend your wrist (such as lifting a garage door).  For 2 days, do not lift more than 5 pounds or exercise your arm (tennis, golf or bowling).      If you start bleeding from the site in your arm:  Sit down and press firmly on the site with your fingers for 10 minutes. Call your doctor as soon as you can.  If the bleeding stops, sit still and keep your wrist straight for 2 hours.  Medicines  If you have begun Plavix or Effient (with a stent), do not stop taking it until you talk to your heart doctor (cardiologist).  If you are on metformin (Glucophage), do not  restart it until you have blood tests (within 2 to 3 days after discharge). When your doctor tells you it is safe, you may restart the metformin.  Call your doctor if:  You have a large or growing hard lump around the site.    The site is red, swollen, hot or tender.  Blood or fluid is draining from the site.  You have chills or a fever greater than 101 F (38 C).  Your leg or arm turns bluish, feels numb or cool.  You have hives, a rash or unusual itching.  Call 911 right away if you have:   Bleeding that does not stop.   Heavy bleeding.  [facility] [place]:  [phone] (7 days a week)

## 2022-12-09 NOTE — Clinical Note
Stent deployed in the distal right coronary artery. Max pressure = 12 kathleen. Total duration = 5 seconds. Balloon reinflated a second time: Max pressure = 12 kathleen. Total duration = 4 seconds. Balloon reinflated a third time: Max pressure = 12 kathleen. Total duration = 5 seconds.

## 2022-12-09 NOTE — Clinical Note
The first balloon was inserted into the right coronary artery and middle right coronary artery.Max pressure = 12 kathleen.

## 2022-12-09 NOTE — Clinical Note
8 Fr Angio-Seal utilized for closure of site.  Manual pressure applied by scrub person; hemostasis achieved.

## 2022-12-10 NOTE — PROGRESS NOTES
Pt completed 4 hours bedrest. R radial and R groin sites c/d/i with no bleeding or hematoma. CMS intact. Pt tolerated po intake, voided and ambulated in the paz. VSS. R radial site mildly sore, tylenol given. Pt declined anything stronger. Discharge instructions reviewed with pt. Pt has no further questions. Pt put her lifeVest back on. IV removed. Discharged to her 's car via wheelchair accompanied by RN.

## 2022-12-12 LAB
ATRIAL RATE - MUSE: 66 BPM
ATRIAL RATE - MUSE: 82 BPM
DIASTOLIC BLOOD PRESSURE - MUSE: NORMAL MMHG
DIASTOLIC BLOOD PRESSURE - MUSE: NORMAL MMHG
INTERPRETATION ECG - MUSE: NORMAL
INTERPRETATION ECG - MUSE: NORMAL
P AXIS - MUSE: -24 DEGREES
P AXIS - MUSE: -4 DEGREES
PR INTERVAL - MUSE: 156 MS
PR INTERVAL - MUSE: 170 MS
QRS DURATION - MUSE: 118 MS
QRS DURATION - MUSE: 122 MS
QT - MUSE: 402 MS
QT - MUSE: 436 MS
QTC - MUSE: 457 MS
QTC - MUSE: 469 MS
R AXIS - MUSE: 65 DEGREES
R AXIS - MUSE: 74 DEGREES
SYSTOLIC BLOOD PRESSURE - MUSE: NORMAL MMHG
SYSTOLIC BLOOD PRESSURE - MUSE: NORMAL MMHG
T AXIS - MUSE: 76 DEGREES
T AXIS - MUSE: 79 DEGREES
VENTRICULAR RATE- MUSE: 66 BPM
VENTRICULAR RATE- MUSE: 82 BPM

## 2022-12-13 DIAGNOSIS — I50.21 ACUTE SYSTOLIC HEART FAILURE (H): Primary | ICD-10-CM

## 2022-12-13 DIAGNOSIS — I25.10 CAD (CORONARY ARTERY DISEASE): ICD-10-CM

## 2022-12-13 DIAGNOSIS — I25.5 ISCHEMIC CARDIOMYOPATHY: ICD-10-CM

## 2022-12-13 RX ORDER — POTASSIUM CHLORIDE 1500 MG/1
20 TABLET, EXTENDED RELEASE ORAL
Status: CANCELLED | OUTPATIENT
Start: 2022-12-13

## 2022-12-13 RX ORDER — POTASSIUM CHLORIDE 1500 MG/1
40 TABLET, EXTENDED RELEASE ORAL
Status: CANCELLED | OUTPATIENT
Start: 2022-12-13

## 2022-12-13 RX ORDER — ASPIRIN 325 MG
325 TABLET ORAL ONCE
Status: CANCELLED | OUTPATIENT
Start: 2022-12-13 | End: 2022-12-13

## 2022-12-13 RX ORDER — ASPIRIN 81 MG/1
243 TABLET, CHEWABLE ORAL ONCE
Status: CANCELLED | OUTPATIENT
Start: 2022-12-13

## 2022-12-13 RX ORDER — SODIUM CHLORIDE 9 MG/ML
INJECTION, SOLUTION INTRAVENOUS CONTINUOUS
Status: CANCELLED | OUTPATIENT
Start: 2022-12-13

## 2022-12-19 ENCOUNTER — TELEPHONE (OUTPATIENT)
Dept: CARDIOLOGY | Facility: CLINIC | Age: 47
End: 2022-12-19

## 2022-12-19 NOTE — LETTER
December 19, 2022      TO: Whom it may concern         In regards to:  Cailin Burns  [address]  [address]  [facility] [address]    Cailin Burns is scheduled for a coronary angiogram and stent placement to her left circumflex and left anterior descending arteries with Dr. Liam Bernardo on January 2, 2023. Her arrival time is at 11:00 am. Patient will stay over night after the procedure prior to heading home the following day.    Sincerely,      Anny Feng RN, BSN, CVN  Interventional Cardiology Coordinator for Dr. Liam Bernardo  [phone]

## 2022-12-19 NOTE — TELEPHONE ENCOUNTER
Pre-procedure instructions - Coronary Angiogram  Patient Education    1. Your arrival time is 11:00.  Location is [facility] - [address] [place] [address] Waiting Room  2. Please plan on being at the hospital all day.  3. At any time, emergencies and/or urgent cases may come up which could delay the start of your procedure.    Pre-procedure instructions - Coronary Angiogram  - Shower in the evening before or the morning of the procedure  - Nothing to eat or drink after midnight  - You can take your morning medications (except for diabetic and blood thinners) with sips of water.  - Take 325 mg of Asprin 24 hours prior to the procedure and the morning of procedure.   - You will need to arrange a ride to drop you off and pick you up, as you will be unable to drive home.  Prior to discharge you may be required to lay flat for approximately 2-4 hours in the recovery unit to ensure proper clotting of the artery.              Diabetic Medication Instructions  ? Hold oral diabetic medication in morning of your procedure and for 48 hours after IV contrast is given  ? Typical instructions for insulin diabetic medication holding are below. However, please reach out to your Primary Care Provider or Endocrinologist for specific instructions  ? DO NOT take any oral diabetic medication, short-acting diabetes medications/insulin, humalog or regular insulin the morning of your test  ? Take   dose of long-acting insulin (Lantus, Levemir) the day of your test  ? Remember to bring your glucometer and insulin with you to take after your test if needed              Anticoagulation Medication Instructions   NA

## 2022-12-24 ENCOUNTER — HEALTH MAINTENANCE LETTER (OUTPATIENT)
Age: 47
End: 2022-12-24

## 2023-01-02 ENCOUNTER — APPOINTMENT (OUTPATIENT)
Dept: MEDSURG UNIT | Facility: CLINIC | Age: 48
End: 2023-01-02
Attending: INTERNAL MEDICINE
Payer: COMMERCIAL

## 2023-01-02 ENCOUNTER — APPOINTMENT (OUTPATIENT)
Dept: LAB | Facility: CLINIC | Age: 48
End: 2023-01-02
Attending: INTERNAL MEDICINE
Payer: COMMERCIAL

## 2023-01-02 ENCOUNTER — HOSPITAL ENCOUNTER (OUTPATIENT)
Facility: CLINIC | Age: 48
Discharge: HOME | End: 2023-01-02
Attending: INTERNAL MEDICINE | Admitting: INTERNAL MEDICINE
Payer: COMMERCIAL

## 2023-01-02 VITALS
OXYGEN SATURATION: 98 % | RESPIRATION RATE: 16 BRPM | SYSTOLIC BLOOD PRESSURE: 96 MMHG | WEIGHT: 168 LBS | TEMPERATURE: 97.9 F | HEIGHT: 62 IN | DIASTOLIC BLOOD PRESSURE: 56 MMHG | HEART RATE: 82 BPM | BODY MASS INDEX: 30.91 KG/M2

## 2023-01-02 DIAGNOSIS — Z98.890 STATUS POST CORONARY ANGIOGRAM: Primary | ICD-10-CM

## 2023-01-02 DIAGNOSIS — Z95.5 STATUS POST INSERTION OF DRUG ELUTING CORONARY ARTERY STENT: ICD-10-CM

## 2023-01-02 DIAGNOSIS — I50.21 ACUTE SYSTOLIC HEART FAILURE (H): ICD-10-CM

## 2023-01-02 DIAGNOSIS — I25.10 CAD (CORONARY ARTERY DISEASE): ICD-10-CM

## 2023-01-02 DIAGNOSIS — I25.5 ISCHEMIC CARDIOMYOPATHY: ICD-10-CM

## 2023-01-02 LAB
ACT BLD: 275 SECONDS (ref 74–150)
ACT BLD: 356 SECONDS (ref 74–150)
ANION GAP SERPL CALCULATED.3IONS-SCNC: 10 MMOL/L (ref 7–15)
APTT PPP: 25 SECONDS (ref 22–38)
BUN SERPL-MCNC: 13.3 MG/DL (ref 6–20)
CALCIUM SERPL-MCNC: 8.7 MG/DL (ref 8.6–10)
CHLORIDE SERPL-SCNC: 107 MMOL/L (ref 98–107)
CHOLEST SERPL-MCNC: 134 MG/DL
CREAT SERPL-MCNC: 0.57 MG/DL (ref 0.51–0.95)
DEPRECATED HCO3 PLAS-SCNC: 18 MMOL/L (ref 22–29)
ERYTHROCYTE [DISTWIDTH] IN BLOOD BY AUTOMATED COUNT: 14.4 % (ref 10–15)
GFR SERPL CREATININE-BSD FRML MDRD: >90 ML/MIN/1.73M2
GLUCOSE BLDC GLUCOMTR-MCNC: 104 MG/DL (ref 70–99)
GLUCOSE BLDC GLUCOMTR-MCNC: 117 MG/DL (ref 70–99)
GLUCOSE SERPL-MCNC: 120 MG/DL (ref 70–99)
HCG INTACT+B SERPL-ACNC: <1 MIU/ML
HCT VFR BLD AUTO: 41.5 % (ref 35–47)
HDLC SERPL-MCNC: 36 MG/DL
HGB BLD-MCNC: 12.7 G/DL (ref 11.7–15.7)
INR PPP: 0.89 (ref 0.85–1.15)
LDLC SERPL CALC-MCNC: 77 MG/DL
MCH RBC QN AUTO: 28.2 PG (ref 26.5–33)
MCHC RBC AUTO-ENTMCNC: 30.6 G/DL (ref 31.5–36.5)
MCV RBC AUTO: 92 FL (ref 78–100)
NONHDLC SERPL-MCNC: 98 MG/DL
PLATELET # BLD AUTO: 349 10E3/UL (ref 150–450)
POTASSIUM SERPL-SCNC: 4.5 MMOL/L (ref 3.4–5.3)
RBC # BLD AUTO: 4.51 10E6/UL (ref 3.8–5.2)
SODIUM SERPL-SCNC: 135 MMOL/L (ref 136–145)
TRIGL SERPL-MCNC: 107 MG/DL
WBC # BLD AUTO: 9.8 10E3/UL (ref 4–11)

## 2023-01-02 PROCEDURE — 999N000054 HC STATISTIC EKG NON-CHARGEABLE

## 2023-01-02 PROCEDURE — 272N000001 HC OR GENERAL SUPPLY STERILE: Performed by: INTERNAL MEDICINE

## 2023-01-02 PROCEDURE — 250N000011 HC RX IP 250 OP 636: Performed by: INTERNAL MEDICINE

## 2023-01-02 PROCEDURE — 80048 BASIC METABOLIC PNL TOTAL CA: CPT | Performed by: INTERNAL MEDICINE

## 2023-01-02 PROCEDURE — 99153 MOD SED SAME PHYS/QHP EA: CPT | Performed by: INTERNAL MEDICINE

## 2023-01-02 PROCEDURE — 999N000134 HC STATISTIC PP CARE STAGE 3

## 2023-01-02 PROCEDURE — 85730 THROMBOPLASTIN TIME PARTIAL: CPT | Performed by: INTERNAL MEDICINE

## 2023-01-02 PROCEDURE — 99152 MOD SED SAME PHYS/QHP 5/>YRS: CPT | Performed by: INTERNAL MEDICINE

## 2023-01-02 PROCEDURE — 92921 PR PRQ TRLUML CORONARY ANGIOPLASTY ADDL BRANCH: CPT | Mod: LD | Performed by: INTERNAL MEDICINE

## 2023-01-02 PROCEDURE — 80061 LIPID PANEL: CPT | Performed by: INTERNAL MEDICINE

## 2023-01-02 PROCEDURE — 93005 ELECTROCARDIOGRAM TRACING: CPT

## 2023-01-02 PROCEDURE — 250N000009 HC RX 250: Performed by: INTERNAL MEDICINE

## 2023-01-02 PROCEDURE — 999N000142 HC STATISTIC PROCEDURE PREP ONLY

## 2023-01-02 PROCEDURE — 85014 HEMATOCRIT: CPT | Performed by: INTERNAL MEDICINE

## 2023-01-02 PROCEDURE — C1874 STENT, COATED/COV W/DEL SYS: HCPCS | Performed by: INTERNAL MEDICINE

## 2023-01-02 PROCEDURE — 82962 GLUCOSE BLOOD TEST: CPT

## 2023-01-02 PROCEDURE — 85347 COAGULATION TIME ACTIVATED: CPT

## 2023-01-02 PROCEDURE — 93454 CORONARY ARTERY ANGIO S&I: CPT | Performed by: INTERNAL MEDICINE

## 2023-01-02 PROCEDURE — C1887 CATHETER, GUIDING: HCPCS | Performed by: INTERNAL MEDICINE

## 2023-01-02 PROCEDURE — C9600 PERC DRUG-EL COR STENT SING: HCPCS | Performed by: INTERNAL MEDICINE

## 2023-01-02 PROCEDURE — 92921 HC PRQ TRLUML CORONARY ANGIOPLASTY ADDL BRANCH: CPT | Mod: LD

## 2023-01-02 PROCEDURE — 36415 COLL VENOUS BLD VENIPUNCTURE: CPT | Performed by: INTERNAL MEDICINE

## 2023-01-02 PROCEDURE — C1769 GUIDE WIRE: HCPCS | Performed by: INTERNAL MEDICINE

## 2023-01-02 PROCEDURE — 93010 ELECTROCARDIOGRAM REPORT: CPT | Mod: 76 | Performed by: INTERNAL MEDICINE

## 2023-01-02 PROCEDURE — C1725 CATH, TRANSLUMIN NON-LASER: HCPCS | Performed by: INTERNAL MEDICINE

## 2023-01-02 PROCEDURE — C1894 INTRO/SHEATH, NON-LASER: HCPCS | Performed by: INTERNAL MEDICINE

## 2023-01-02 PROCEDURE — 84702 CHORIONIC GONADOTROPIN TEST: CPT | Performed by: INTERNAL MEDICINE

## 2023-01-02 PROCEDURE — 99152 MOD SED SAME PHYS/QHP 5/>YRS: CPT | Mod: GC | Performed by: INTERNAL MEDICINE

## 2023-01-02 PROCEDURE — 92928 PRQ TCAT PLMT NTRAC ST 1 LES: CPT | Mod: 76 | Performed by: INTERNAL MEDICINE

## 2023-01-02 PROCEDURE — 85610 PROTHROMBIN TIME: CPT | Performed by: INTERNAL MEDICINE

## 2023-01-02 DEVICE — STENT CORONARY DES SYNERGY XD MR US 2.75X16MM H7493941816270: Type: IMPLANTABLE DEVICE | Status: FUNCTIONAL

## 2023-01-02 DEVICE — STENT CORONARY DES SYNERGY XD MR US 2.50X32MM H7493941832250: Type: IMPLANTABLE DEVICE | Status: FUNCTIONAL

## 2023-01-02 RX ORDER — POTASSIUM CHLORIDE 750 MG/1
40 TABLET, EXTENDED RELEASE ORAL
Status: DISCONTINUED | OUTPATIENT
Start: 2023-01-02 | End: 2023-01-02 | Stop reason: HOSPADM

## 2023-01-02 RX ORDER — NITROGLYCERIN 5 MG/ML
VIAL (ML) INTRAVENOUS
Status: DISCONTINUED | OUTPATIENT
Start: 2023-01-02 | End: 2023-01-02 | Stop reason: HOSPADM

## 2023-01-02 RX ORDER — NITROGLYCERIN 0.4 MG/1
0.4 TABLET SUBLINGUAL EVERY 5 MIN PRN
Status: DISCONTINUED | OUTPATIENT
Start: 2023-01-02 | End: 2023-01-02 | Stop reason: HOSPADM

## 2023-01-02 RX ORDER — POTASSIUM CHLORIDE 750 MG/1
20 TABLET, EXTENDED RELEASE ORAL
Status: DISCONTINUED | OUTPATIENT
Start: 2023-01-02 | End: 2023-01-02 | Stop reason: HOSPADM

## 2023-01-02 RX ORDER — METOPROLOL TARTRATE 1 MG/ML
5 INJECTION, SOLUTION INTRAVENOUS
Status: DISCONTINUED | OUTPATIENT
Start: 2023-01-02 | End: 2023-01-02 | Stop reason: HOSPADM

## 2023-01-02 RX ORDER — ASPIRIN 81 MG/1
243 TABLET, CHEWABLE ORAL ONCE
Status: COMPLETED | OUTPATIENT
Start: 2023-01-02 | End: 2023-01-02

## 2023-01-02 RX ORDER — HEPARIN SODIUM 1000 [USP'U]/ML
INJECTION, SOLUTION INTRAVENOUS; SUBCUTANEOUS
Status: DISCONTINUED | OUTPATIENT
Start: 2023-01-02 | End: 2023-01-02 | Stop reason: HOSPADM

## 2023-01-02 RX ORDER — ASPIRIN 325 MG
325 TABLET ORAL ONCE
Status: COMPLETED | OUTPATIENT
Start: 2023-01-02 | End: 2023-01-02

## 2023-01-02 RX ORDER — SODIUM CHLORIDE 9 MG/ML
INJECTION, SOLUTION INTRAVENOUS CONTINUOUS
Status: DISCONTINUED | OUTPATIENT
Start: 2023-01-02 | End: 2023-01-02 | Stop reason: HOSPADM

## 2023-01-02 RX ORDER — ACETAMINOPHEN 325 MG/1
650 TABLET ORAL EVERY 4 HOURS PRN
Status: DISCONTINUED | OUTPATIENT
Start: 2023-01-02 | End: 2023-01-02 | Stop reason: HOSPADM

## 2023-01-02 RX ORDER — FENTANYL CITRATE 50 UG/ML
INJECTION, SOLUTION INTRAMUSCULAR; INTRAVENOUS
Status: DISCONTINUED | OUTPATIENT
Start: 2023-01-02 | End: 2023-01-02 | Stop reason: HOSPADM

## 2023-01-02 RX ORDER — ASPIRIN 81 MG/1
81 TABLET ORAL DAILY
Status: DISCONTINUED | OUTPATIENT
Start: 2023-01-03 | End: 2023-01-02 | Stop reason: HOSPADM

## 2023-01-02 RX ORDER — IOPAMIDOL 755 MG/ML
INJECTION, SOLUTION INTRAVASCULAR
Status: DISCONTINUED | OUTPATIENT
Start: 2023-01-02 | End: 2023-01-02 | Stop reason: HOSPADM

## 2023-01-02 RX ORDER — ASPIRIN 81 MG/1
81 TABLET, CHEWABLE ORAL ONCE
Status: DISCONTINUED | OUTPATIENT
Start: 2023-01-02 | End: 2023-01-02 | Stop reason: HOSPADM

## 2023-01-02 RX ORDER — HYDRALAZINE HYDROCHLORIDE 20 MG/ML
10 INJECTION INTRAMUSCULAR; INTRAVENOUS EVERY 4 HOURS PRN
Status: DISCONTINUED | OUTPATIENT
Start: 2023-01-02 | End: 2023-01-02 | Stop reason: HOSPADM

## 2023-01-02 RX ORDER — NOREPINEPHRINE BITARTRATE 0.06 MG/ML
INJECTION, SOLUTION INTRAVENOUS CONTINUOUS PRN
Status: DISCONTINUED | OUTPATIENT
Start: 2023-01-02 | End: 2023-01-02 | Stop reason: HOSPADM

## 2023-01-02 RX ORDER — PHENYLEPHRINE HYDROCHLORIDE 10 MG/ML
INJECTION INTRAVENOUS
Status: DISCONTINUED | OUTPATIENT
Start: 2023-01-02 | End: 2023-01-02 | Stop reason: HOSPADM

## 2023-01-02 ASSESSMENT — ACTIVITIES OF DAILY LIVING (ADL)
ADLS_ACUITY_SCORE: 35

## 2023-01-02 NOTE — PROGRESS NOTES
D/I/A: Pt roomed on 3C in bay 32.  Arrived via litter and accompanied by CL RN. On/Off: On monitor.  VSSA.  Rhythm upon arrival SR on monitor.  Denies pain or sob.  Reviewed activity restrictions and when to notify RN, ie-changes to breathing or increased chest pressure or chest pain.  CCL access:  R TR band with 13 ml of air. CMS intact.   P: Continue to monitor status.  Discharge to home once meeting criteria.

## 2023-01-02 NOTE — Clinical Note
Potential access sites were evaluated for patency using ultrasound.   The right radial artery  was selected. Access was obtained under with Sonosite and Fluoroscopic guidance using a micropuncture 21 gauge needle with direct visualization of needle entry.

## 2023-01-02 NOTE — PROGRESS NOTES
Arrived from home for a CORS with intervention.  VSS.  Denies pain currently; did need a nitroglycerin tablet at 1am today.  PIV placed.  Labs resulted.  Needs consent.

## 2023-01-02 NOTE — Clinical Note
The first balloon was inserted into the circumflex and middle circumflex.Max pressure = 12 kathleen.

## 2023-01-02 NOTE — Clinical Note
The first balloon was inserted into the circumflex and middle circumflex.Max pressure = 12 kathleen.     Max pressure = 12 kathleen.    Balloon reinflated a second time: Max pressure = 12 kathleen.

## 2023-01-02 NOTE — Clinical Note
The first balloon was inserted into the left anterior descending and ostium left anterior descending diagonal.Max pressure = 12 kathleen.

## 2023-01-02 NOTE — Clinical Note
The first balloon was inserted into the left anterior descending and proximal left anterior descending.Max pressure = 12 kathleen.

## 2023-01-02 NOTE — DISCHARGE INSTRUCTIONS
Going Home after an Angiogram/Angioplasty (Cardiac)  ______________________________________________      After you go home:  Have an adult stay with you for 24 hours.  Drink plenty of fluids.  You may eat your normal diet, unless your doctor tells you otherwise.  For 24 hours:  Relax and take it easy.  Do NOT smoke.  Do NOT make any important or legal decisions.  Do NOT drive or operate machines at home or at work.  Do NOT drink alcohol.  Remove the Band-Aid after 24 hours. If there is minor oozing, apply another Band-aid and remove it after 12 hours.  For 2 days, do NOT have sex or do any heavy exercise.  Do NOT take a bath, or use a hot tub or pool for at least 3 days. You may shower.    Care of wrist or arm site  It is normal to have soreness at the puncture site and mild tingling in your hand for up to 3 days.  For 2 days, do not use your hand or arm to support your weight (such as rising from a chair) or bend your wrist (such as lifting a garage door).  For 2 days, do not lift more than 5 pounds or exercise your arm (tennis, golf or bowling).    If you start bleeding from the site in your arm:  Sit down and press firmly on the site with your fingers for 10 minutes. Call your doctor as soon as you can.  If the bleeding stops, sit still and keep your wrist straight for 2 hours.    Medicines  If you have started taking Plavix or Effient, do not stop taking it until you talk to your heart doctor (cardiologist).  If you are on metformin (Glucophage), do not restart it until you have blood tests (within 2 to 3 days after discharge). When your doctor tells you it is safe, you may restart the metformin.  If you have stopped any other medicines, check with your nurse or provider about when to restart them.    Call 911 right away if you have bleeding that is heavy or does not stop.    Call your doctor if:  You have a large or growing hard lump around the site.  The site is red, swollen, hot or tender.  Blood or fluid  is draining from the site.  You have chills or a fever greater than 101 F (38 C).  Your arm feels numb or cool.  You have hives, a rash or unusual itching.      [facility] at [place]:  [phone] (7 days a week)

## 2023-01-02 NOTE — Clinical Note
Multiple balloon inflation. The first balloon was inserted into the left anterior descending and proximal left anterior descending.Max pressure = 12 kathleen.     The second balloon was inserted into the Left Anterior Descending and ostium left anterior descending diagonal. Max pressure = 12 kathleen.   Max pressure = 12 kathleen.

## 2023-01-03 LAB
ATRIAL RATE - MUSE: 80 BPM
DIASTOLIC BLOOD PRESSURE - MUSE: NORMAL MMHG
INTERPRETATION ECG - MUSE: NORMAL
P AXIS - MUSE: 16 DEGREES
PR INTERVAL - MUSE: 184 MS
QRS DURATION - MUSE: 130 MS
QT - MUSE: 394 MS
QTC - MUSE: 454 MS
R AXIS - MUSE: 73 DEGREES
SYSTOLIC BLOOD PRESSURE - MUSE: NORMAL MMHG
T AXIS - MUSE: 127 DEGREES
VENTRICULAR RATE- MUSE: 80 BPM

## 2023-01-03 NOTE — PROGRESS NOTES
Right wrist site remains soft to palpation with no bleeding or hematoma. Previous RN mentioned that radial pulse felt weak to palpation but good with doppler. Upon reassessing radial pulse with arm board and coban off, radial pulse was palpable. Cailin is alert and oriented x 4 and able to make needs known, she is eager to discharge, discharge instructions reviewed by previous RN; this RN asked Cailin if she has any questions, she denies having any. She has had this procedure done before and remembers the wrist restrictions. PIV removed. Ambulated to main entrance with all belongings and accompanied by this RN to meet  with car for discharge back to the hotel.

## 2023-01-04 LAB
ATRIAL RATE - MUSE: 77 BPM
DIASTOLIC BLOOD PRESSURE - MUSE: NORMAL MMHG
INTERPRETATION ECG - MUSE: NORMAL
P AXIS - MUSE: -2 DEGREES
PR INTERVAL - MUSE: 176 MS
QRS DURATION - MUSE: 128 MS
QT - MUSE: 416 MS
QTC - MUSE: 470 MS
R AXIS - MUSE: 70 DEGREES
SYSTOLIC BLOOD PRESSURE - MUSE: NORMAL MMHG
T AXIS - MUSE: 101 DEGREES
VENTRICULAR RATE- MUSE: 77 BPM

## 2023-01-09 ENCOUNTER — VIRTUAL VISIT (OUTPATIENT)
Dept: CARDIOLOGY | Facility: CLINIC | Age: 48
End: 2023-01-09
Attending: INTERNAL MEDICINE
Payer: COMMERCIAL

## 2023-01-09 DIAGNOSIS — I10 BENIGN ESSENTIAL HYPERTENSION: Primary | ICD-10-CM

## 2023-01-09 DIAGNOSIS — E78.2 MIXED HYPERLIPIDEMIA: ICD-10-CM

## 2023-01-09 DIAGNOSIS — I25.5 ISCHEMIC CARDIOMYOPATHY: ICD-10-CM

## 2023-01-09 DIAGNOSIS — I50.20 HEART FAILURE WITH REDUCED EJECTION FRACTION, NYHA CLASS III (H): ICD-10-CM

## 2023-01-09 DIAGNOSIS — I25.10 CORONARY ARTERY DISEASE INVOLVING NATIVE CORONARY ARTERY OF NATIVE HEART WITHOUT ANGINA PECTORIS: ICD-10-CM

## 2023-01-09 DIAGNOSIS — Z95.5 STATUS POST INSERTION OF DRUG ELUTING CORONARY ARTERY STENT: ICD-10-CM

## 2023-01-09 PROCEDURE — G0463 HOSPITAL OUTPT CLINIC VISIT: HCPCS | Mod: PN,GT | Performed by: INTERNAL MEDICINE

## 2023-01-09 PROCEDURE — 99214 OFFICE O/P EST MOD 30 MIN: CPT | Mod: 95 | Performed by: INTERNAL MEDICINE

## 2023-01-09 RX ORDER — METOPROLOL SUCCINATE 25 MG/1
25 TABLET, EXTENDED RELEASE ORAL DAILY
Qty: 90 TABLET | Refills: 3 | Status: SHIPPED | OUTPATIENT
Start: 2023-01-09 | End: 2023-07-25

## 2023-01-09 ASSESSMENT — ENCOUNTER SYMPTOMS
PANIC: 1
PARALYSIS: 0
SEIZURES: 0
SPEECH CHANGE: 0
TREMORS: 0
PALPITATIONS: 0
DIZZINESS: 1
TINGLING: 1
LIGHT-HEADEDNESS: 1
INSOMNIA: 1
HYPOTENSION: 0
SLEEP DISTURBANCES DUE TO BREATHING: 0
EXERCISE INTOLERANCE: 0
MEMORY LOSS: 1
WEAKNESS: 1
LEG PAIN: 1
SYNCOPE: 0
HEADACHES: 1
DECREASED CONCENTRATION: 1
NUMBNESS: 1
LOSS OF CONSCIOUSNESS: 0
DISTURBANCES IN COORDINATION: 0
DEPRESSION: 1
NERVOUS/ANXIOUS: 1
ORTHOPNEA: 0
HYPERTENSION: 0

## 2023-01-09 NOTE — NURSING NOTE
Chief Complaint   Patient presents with     Follow Up     Pt states having some dizziness and feels like HR is slow, has also had to take some nitroglycerin      Patient declined individual allergy and medication review by support staff because patient denies any changes since echeck-in completion and states all information entered during echeck-in remains accurate.    Krystal Anguiano, VF/CMA

## 2023-01-09 NOTE — PROGRESS NOTES
Cailin is a 47 year old who is being evaluated via a billable video visit.      Pt states in [place] for visit.     How would you like to obtain your AVS? MyChart  If the video visit is dropped, the invitation should be resent by: Text to cell phone: [phone]  Will anyone else be joining your video visit?   pts    Krystal Anguiano, VF/CMA      Video-Visit Details    Type of service:  Video Visit   Video Start Time: 3:42pm  Video End Time:4:05pm    Originating Location (pt. Location): Home    Distant Location (provider location):  On-site  Platform used for Video Visit: Amanda    January 9, 2023  Dear Colleagues,  I had the pleasure of talking to Cailin Burns  in the [facility]. As you know she is a 46 y/o F with DM, HLD, obesity, tobacco use disorder, sciatica who was recently admitted to [facility] with STEMI. She initially presented to urgent care with new onset chest pain, was found to have STEMI and was transferred to [facility] for immediate further evaluation and angiogram on 9/25/22. It was significant for 3 vessel disease. Case was immediately discussed with Dr Leal for potential urgent CABG considerations but ultimately the decision was made to proceed with PCI due to poor bypass targets. Intervention to LCx was made. Prior to further PCI PET viability was pursued prior to considering further interventions. Unfortunately viability did not show enough and thus medical management was recommended. She did have short runs of NSVT while inpatient. ECHO showed EF 20% and thus she was discharged with LifeVest. She was discharged on mimimal medical therapy due to associated hypotension and not being able to tolerate higher doses. She also has PRN lasix for weight gain and PRN nitro. She is following up at the CHF clinic at [place] and was also referred to [facility] for potential future advanced heart failure therapies evaluation.   After discussion with her and the interventional and surgical  teams at [facility], the decision was made to proceed with staged PCI of the CTOs. These were performed recently and she presents for follow-up through video visit.  Overall she continues to do relatively well without any new complaints.  She tolerated the procedures well and exercise and feels well without any issues.  Takes her medications as prescribed.  No further episodes of chest pain dizziness lightheadedness palpitations.  She thinks her heart rate is on regular bit low and was wondering whether she can decrease the metoprolol XL from 50 mg to 25 mg daily.  No other issues.     PAST MEDICAL HISTORY:    Diabetes mellitus without complication (HCC)     Depression with anxiety     Dyslipidemia     Preventative health care     Obesity (BMI 30.0-34.9)     Hyperlipidemia     Tobacco use disorder     Enlarged thyroid     Leg pain, lateral, left     Chronic left-sided low back pain with left-sided sciatica     PND (post-nasal drip)     Strain of flexor muscle of hip, right, initial encounter     STEMI (ST elevation myocardial infarction) ([facility]) 2022     FAMILY HISTORY:  No relevant past family history    SOCIAL HISTORY:  Social History     Socioeconomic History     Marital status:    Tobacco Use     Smoking status: Former     Types: Cigarettes     Quit date: 2022     Years since quittin.3     Smokeless tobacco: Never   Substance and Sexual Activity     Alcohol use: Not Currently     Drug use: Never     CURRENT MEDICATIONS:  Current Outpatient Medications   Medication     Acetaminophen-Codeine (TYLENOL WITH CODEINE #3 PO)     ANTIPLATELET MEDICATION NOT PRESCRIBED, INTENTIONAL,     ANTIPLATELET MEDICATION NOT PRESCRIBED, INTENTIONAL,     aspirin (ASA) 81 MG EC tablet     atorvastatin (LIPITOR) 80 MG tablet     blood glucose (CONTOUR NEXT TEST) test strip     busPIRone (BUSPAR) 10 MG tablet     cyclobenzaprine (FLEXERIL) 10 MG tablet     dapagliflozin (FARXIGA) tablet     fenofibrate (TRIGLIDE/LOFIBRA)  160 MG tablet     furosemide (LASIX) 20 MG tablet     insulin glargine (LANTUS PEN) 100 UNIT/ML pen     liraglutide (VICTOZA PEN) 18 MG/3ML solution     metFORMIN (GLUCOPHAGE) 1000 MG tablet     metoprolol succinate ER (TOPROL XL) 25 MG 24 hr tablet     nicotine (NICODERM CQ) 21 MG/24HR 24 hr patch     nicotine (NICODERM CQ) 7 MG/24HR 24 hr patch     nicotine (NICORETTE) 4 MG lozenge     nicotine (NICOTROL) 10 MG inhaler     nitroGLYcerin (NITROSTAT) 0.4 MG sublingual tablet     sacubitril-valsartan (ENTRESTO) 24-26 MG per tablet     ticagrelor (BRILINTA) 90 MG tablet     venlafaxine (EFFEXOR) 100 MG tablet     No current facility-administered medications for this visit.     ROS:   Constitutional: No fever, chills, or sweats.   ENT: No visual disturbance, ear ache, epistaxis, sore throat.   Allergies/Immunologic: Negative.   Respiratory: No cough, hemoptysis.   Cardiovascular: As per HPI.   GI: No nausea, vomiting, hematemesis, melena, or hematochezia.   : No urinary frequency, dysuria, or hematuria.   Integument: Negative.   Psychiatric: Pleasant, no major depression noted  Neuro: No focal neurological deficits noted  Endocrinology: Negative.   Musculoskeletal: As per HPI.      EXAM:  She notes that her blood pressure is running around 100 mmHg systolic and her heart rate is in the low 50s.  General: appears comfortable, alert and oriented  Head: normocephalic, atraumatic  Eyes: anicteric sclera, EOMI , PERRL  Neck: no adenopathy  Orophyarynx: moist mucosa, no lesions noted  Heart: regular, S1/S2, no murmurs, rubs or gallop. Estimated JVP at 5 cmH2O  Lungs: CTAB, No wheezing.   Abdomen: soft, non-tender, bowel sounds present, no hepatosplenomegaly  Extremities: No LE edema today  Skin: no open lesions noted  Neuro: grossly non-focal     Labs:  Lab Results   Component Value Date    WBC 9.8 01/02/2023    HGB 12.7 01/02/2023    HCT 41.5 01/02/2023     01/02/2023     (L) 01/02/2023    POTASSIUM 4.5  01/02/2023    CHLORIDE 107 01/02/2023    CO2 18 (L) 01/02/2023    BUN 13.3 01/02/2023    CR 0.57 01/02/2023     (H) 01/02/2023    INR 0.89 01/02/2023     Labs: All available osh labs reviewed. Renal function normal. Troonin peak at 64, normal sensitivity. Blood group 0. A1C 7.9.      [facility] - 9/25/22: significant 3VD,  - LCx - mid, 99%, culprit - s/p PCI with a 2.75*26 mm [place] MOHAN  - LAD - diffusely diseased, small caliber, 90% mid & distal - not amenable to PCI due to small caliber  - RCA - mid, 100%, , fills distally with R-R collaterals, small caliber.   Discussed presentation with Dr Leal for consideration for CABG, before proceeding with PCI, it was decided to proceed with PCI-LCx & assess for CABG later.     TTE 9/25/22:     Left Ventricle: The left ventricle is dilated. Severely reduced left  ventricular systolic function. The EF is visually estimated to be 20%. No  thrombus seen -contrast images also reviewed See diagram for wall motion details.      Right Ventricle: The right ventricle appears normal in size. The tricuspid jet envelope definition is inadequate for estimation of RV systolic pressure.      Mitral Valve: The leaflets are thickened. There is mild regurgitation.      Tricuspid Valve: There is no regurgitation.      This is an abnormal study.      PET viability 9/28/22:     Metabolic Viability Conclusion: The left ventricle has potentially viable myocardium.  The total viable myocardium is 39%, total scar burden is 32%      Resting Perfusion Details: Large perfusion defect at rest involving the entire anteroseptal, inferoseptal, inferior and inferolateral segments.   This includes the entire RCA territory and parts of the LAD and possibly LCx territories.      Metabolic Viability Defect: The mid inferoseptal, mid inferior, mid inferolateral, distal septal and distal inferior segments are nonviable. The LCx territory appears to be mostly viable at 95% total viability. The LAD territory is  predominantly viable with 75% viability. The RCA territory appears to be 50% viable with no perfusion at rest    Coronary angiogram 12/9//2022:  Severe three vessel coronary artery disease (mRCA , pLAD, mLcx stenosis)    Coronary angiogram 1/2/2023:  Severe three vessel CAD status post PCI to the RCA  and residual LAD and LCx disease.  Successful PCI of midLAD with MOHAN x1 (2.5 x 32 mm Synergy MOHAN) and mid-LCx (2.75 x 16 mm Synergy MOHAN).    ASSESSMENT AND PLAN:  In summary, patient is a 47 year old lady with a past medical history including diabetes which is reasonably better controlled at the moment as well as recent STEMI back in September 2022 in the setting of atypical symptoms with high peak and troponin at around 63.  She does have multivessel coronary artery disease only the LCx vessel was intervened however she has significant disease in the small LAD as well as  of the RCA.  She was found to be not a surgical candidate in [place].  She was started on medications now the blood pressure is better at 120/80.  Patient at home and as such losartan was started.  Images were reviewed and options discussed extensively with surgical as well as interventional colleagues at [facility]. Ultimately the decision was made to proceed with staged PCI of the CTOs. She did have this done early December 2022 and then January 2023 and tolerated the procedures well. Today she is here for follow-up.  Overall she is feeling well without any issues.  She takes all medications as prescribed.  Tolerated the procedures well and there is no evidence of bleeding or any hematomas.  She feels well continues to use the LifeVest at this time.  I agree that it is reasonable to decrease her metoprolol dosing to 25 mg daily and as such we will decrease the metoprolol XL to 25 mg once a day.  We will make no other medication changes.  She is going to continue aspirin as well as the Brilinta 90 mg, potentially lifelong unless there is a  complication.  Her blood pressure is well controlled and will not make any adjustment at this time.  I do think it is reasonable to postpone the echocardiogram for a couple of weeks given that she just had revascularization.  I would suggest a repeat echocardiogram end of month so she has about 3-month after the procedures.  I will see her back over video visit after that.  We will make a decision with regards to ICD/cardiac MRI to reevaluate viability/LifeVest at the time     I appreciate the opportunity to participate in the care of Cailin Burns . Please do not hesitate to contact me with any further questions.     Sincerely,   Kurt Guerin MD  [facility] Division of Cardiology

## 2023-01-09 NOTE — LETTER
Date:January 10, 2023      Patient was self referred, no letter generated. Do not send.        [facility] Health Information

## 2023-01-09 NOTE — PATIENT INSTRUCTIONS
Dr. Guerin recommends:    Decrease Metoprolol XL to 25 MG once daily.    Echo to be done locally in 3 months.    Follow up virtual visit with Dr. Guerin at the end of March.    Thank you for your visit today.  Please call me with any questions or concerns.   Donovan Rendon RN  Cardiology Care Coordinator  [phone]

## 2023-01-09 NOTE — LETTER
1/9/2023      RE: Cailin Burns  [address]  [address]  [place] [address]       Dear Colleague,    Thank you for the opportunity to participate in the care of your patient, Cailin Burns, at the [facility] [place] [facility]. Please see a copy of my visit note below.    Cailin is a 47 year old who is being evaluated via a billable video visit.      [place] in [place] for [place].     How would you like to obtain your AVS? MyChart  If the video visit is dropped, the invitation should be resent by: Text to cell phone: [phone]  Will anyone else be joining your video visit?   pts    Krystal Anguiano, GIORGIO/MALENA      Video-Visit Details    Type of service:  Video Visit   Video Start Time: 3:42pm  Video End Time:4:05pm    Originating Location (pt. Location): Home    Distant Location (provider location):  On-site  Platform used for Video Visit: Amanda    January 9, 2023  Dear Colleagues,  I had the pleasure of talking to Cailin Burns  in the [facility]. As you know she is a 48 y/o F with DM, HLD, obesity, tobacco use disorder, sciatica who was recently admitted to [place] ICU with STEMI. She initially presented to urgent care with new onset chest pain, was found to have STEMI and was transferred to [facility] for immediate further evaluation and angiogram on 9/25/22. It was significant for 3 vessel disease. Case was immediately discussed with Dr Leal for potential urgent CABG considerations but ultimately the decision was made to proceed with PCI due to poor bypass targets. Intervention to LCx was made. Prior to further PCI PET viability was pursued prior to considering further interventions. Unfortunately viability did not show enough and thus medical management was recommended. She did have short runs of NSVT while inpatient. ECHO showed EF 20% and thus she was discharged with LifeVest. She was discharged on  mimimal medical therapy due to associated hypotension and not being able to tolerate higher doses. She also has PRN lasix for weight gain and PRN nitro. She is following up at the CHF clinic at [place] and was also referred to [facility] for potential future advanced heart failure therapies evaluation.   After discussion with her and the interventional and surgical teams at [facility], the decision was made to proceed with staged PCI of the CTOs. These were performed recently and she presents for follow-up through video visit.  Overall she continues to do relatively well without any new complaints.  She tolerated the procedures well and exercise and feels well without any issues.  Takes her medications as prescribed.  No further episodes of chest pain dizziness lightheadedness palpitations.  She thinks her heart rate is on regular bit low and was wondering whether she can decrease the metoprolol XL from 50 mg to 25 mg daily.  No other issues.     PAST MEDICAL HISTORY:    Diabetes mellitus without complication (HCC)     Depression with anxiety     Dyslipidemia     Preventative health care     Obesity (BMI 30.0-34.9)     Hyperlipidemia     Tobacco use disorder     Enlarged thyroid     Leg pain, lateral, left     Chronic left-sided low back pain with left-sided sciatica     PND (post-nasal drip)     Strain of flexor muscle of hip, right, initial encounter     STEMI (ST elevation myocardial infarction) (HCC) 2022     FAMILY HISTORY:  No relevant past family history    SOCIAL HISTORY:  Social History     Socioeconomic History     Marital status:    Tobacco Use     Smoking status: Former     Types: Cigarettes     Quit date: 2022     Years since quittin.3     Smokeless tobacco: Never   Substance and Sexual Activity     Alcohol use: Not Currently     Drug use: Never     CURRENT MEDICATIONS:  Current Outpatient Medications   Medication     Acetaminophen-Codeine (TYLENOL WITH CODEINE #3 PO)     ANTIPLATELET  MEDICATION NOT PRESCRIBED, INTENTIONAL,     ANTIPLATELET MEDICATION NOT PRESCRIBED, INTENTIONAL,     aspirin (ASA) 81 MG EC tablet     atorvastatin (LIPITOR) 80 MG tablet     blood glucose (CONTOUR NEXT TEST) test strip     busPIRone (BUSPAR) 10 MG tablet     cyclobenzaprine (FLEXERIL) 10 MG tablet     dapagliflozin (FARXIGA) tablet     fenofibrate (TRIGLIDE/LOFIBRA) 160 MG tablet     furosemide (LASIX) 20 MG tablet     insulin glargine (LANTUS PEN) 100 UNIT/ML pen     liraglutide (VICTOZA PEN) 18 MG/3ML solution     metFORMIN (GLUCOPHAGE) 1000 MG tablet     metoprolol succinate ER (TOPROL XL) 25 MG 24 hr tablet     nicotine (NICODERM CQ) 21 MG/24HR 24 hr patch     nicotine (NICODERM CQ) 7 MG/24HR 24 hr patch     nicotine (NICORETTE) 4 MG lozenge     nicotine (NICOTROL) 10 MG inhaler     nitroGLYcerin (NITROSTAT) 0.4 MG sublingual tablet     sacubitril-valsartan (ENTRESTO) 24-26 MG per tablet     ticagrelor (BRILINTA) 90 MG tablet     venlafaxine (EFFEXOR) 100 MG tablet     No current facility-administered medications for this visit.     ROS:   Constitutional: No fever, chills, or sweats.   ENT: No visual disturbance, ear ache, epistaxis, sore throat.   Allergies/Immunologic: Negative.   Respiratory: No cough, hemoptysis.   Cardiovascular: As per HPI.   GI: No nausea, vomiting, hematemesis, melena, or hematochezia.   : No urinary frequency, dysuria, or hematuria.   Integument: Negative.   Psychiatric: Pleasant, no major depression noted  Neuro: No focal neurological deficits noted  Endocrinology: Negative.   Musculoskeletal: As per HPI.      EXAM:  She notes that her blood pressure is running around 100 mmHg systolic and her heart rate is in the low 50s.  General: appears comfortable, alert and oriented  Head: normocephalic, atraumatic  Eyes: anicteric sclera, EOMI , PERRL  Neck: no adenopathy  Orophyarynx: moist mucosa, no lesions noted  Heart: regular, S1/S2, no murmurs, rubs or gallop. Estimated JVP at 5  cmH2O  Lungs: CTAB, No wheezing.   Abdomen: soft, non-tender, bowel sounds present, no hepatosplenomegaly  Extremities: No LE edema today  Skin: no open lesions noted  Neuro: grossly non-focal     Labs:  Lab Results   Component Value Date    WBC 9.8 01/02/2023    HGB 12.7 01/02/2023    HCT 41.5 01/02/2023     01/02/2023     (L) 01/02/2023    POTASSIUM 4.5 01/02/2023    CHLORIDE 107 01/02/2023    CO2 18 (L) 01/02/2023    BUN 13.3 01/02/2023    CR 0.57 01/02/2023     (H) 01/02/2023    INR 0.89 01/02/2023     Labs: All available osh labs reviewed. Renal function normal. Troonin peak at 64, normal sensitivity. Blood group 0. A1C 7.9.      [facility] - 9/25/22: significant 3VD,  - LCx - mid, 99%, culprit - s/p PCI with a 2.75*26 mm [place] MOHAN  - LAD - diffusely diseased, small caliber, 90% mid & distal - not amenable to PCI due to small caliber  - RCA - mid, 100%, , fills distally with R-R collaterals, small caliber.   Discussed presentation with Dr Leal for consideration for CABG, before proceeding with PCI, it was decided to proceed with PCI-LCx & assess for CABG later.     TTE 9/25/22:     Left Ventricle: The left ventricle is dilated. Severely reduced left  ventricular systolic function. The EF is visually estimated to be 20%. No  thrombus seen -contrast images also reviewed See diagram for wall motion details.      Right Ventricle: The right ventricle appears normal in size. The tricuspid jet envelope definition is inadequate for estimation of RV systolic pressure.      Mitral Valve: The leaflets are thickened. There is mild regurgitation.      Tricuspid Valve: There is no regurgitation.      This is an abnormal study.      PET viability 9/28/22:     Metabolic Viability Conclusion: The left ventricle has potentially viable myocardium.  The total viable myocardium is 39%, total scar burden is 32%      Resting Perfusion Details: Large perfusion defect at rest involving the entire anteroseptal,  inferoseptal, inferior and inferolateral segments.   This includes the entire RCA territory and parts of the LAD and possibly LCx territories.      Metabolic Viability Defect: The mid inferoseptal, mid inferior, mid inferolateral, distal septal and distal inferior segments are nonviable. The LCx territory appears to be mostly viable at 95% total viability. The LAD territory is predominantly viable with 75% viability. The RCA territory appears to be 50% viable with no perfusion at rest    Coronary angiogram 12/9//2022:  Severe three vessel coronary artery disease (mRCA , pLAD, mLcx stenosis)    Coronary angiogram 1/2/2023:  Severe three vessel CAD status post PCI to the RCA  and residual LAD and LCx disease.  Successful PCI of midLAD with MOHAN x1 (2.5 x 32 mm Synergy MOHAN) and mid-LCx (2.75 x 16 mm Synergy MOHAN).    ASSESSMENT AND PLAN:  In summary, patient is a 47 year old lady with a past medical history including diabetes which is reasonably better controlled at the moment as well as recent STEMI back in September 2022 in the setting of atypical symptoms with high peak and troponin at around 63.  She does have multivessel coronary artery disease only the LCx vessel was intervened however she has significant disease in the small LAD as well as  of the RCA.  She was found to be not a surgical candidate in [place].  She was started on medications now the blood pressure is better at 120/80.  Patient at home and as such losartan was started.  Images were reviewed and options discussed extensively with surgical as well as interventional colleagues at [facility]. Ultimately the decision was made to proceed with staged PCI of the CTOs. She did have this done early December 2022 and then January 2023 and tolerated the procedures well. Today she is here for follow-up.  Overall she is feeling well without any issues.  She takes all medications as prescribed.  Tolerated the procedures well and there is no evidence of bleeding  or any hematomas.  She feels well continues to use the LifeVest at this time.  I agree that it is reasonable to decrease her metoprolol dosing to 25 mg daily and as such we will decrease the metoprolol XL to 25 mg once a day.  We will make no other medication changes.  She is going to continue aspirin as well as the Brilinta 90 mg, potentially lifelong unless there is a complication.  Her blood pressure is well controlled and will not make any adjustment at this time.  I do think it is reasonable to postpone the echocardiogram for a couple of weeks given that she just had revascularization.  I would suggest a repeat echocardiogram end of month so she has about 3-month after the procedures.  I will see her back over video visit after that.  We will make a decision with regards to ICD/cardiac MRI to reevaluate viability/LifeVest at the time     I appreciate the opportunity to participate in the care of Cailin Burns . Please do not hesitate to contact me with any further questions.     Sincerely,   Kurt Guerin MD  [facility] Division of Cardiology       Please do not hesitate to contact me if you have any questions/concerns.     Sincerely,     Kurt Guerin MD

## 2023-04-11 ASSESSMENT — ENCOUNTER SYMPTOMS
INCREASED ENERGY: 1
COUGH DISTURBING SLEEP: 1
BRUISES/BLEEDS EASILY: 1
HEARTBURN: 1
HYPOTENSION: 1
MUSCLE WEAKNESS: 1
MEMORY LOSS: 1
SPEECH CHANGE: 0
BACK PAIN: 1
DIFFICULTY URINATING: 0
DEPRESSION: 1
FATIGUE: 1
EXERCISE INTOLERANCE: 0
NECK PAIN: 0
RECTAL PAIN: 0
POLYDIPSIA: 0
SYNCOPE: 0
PALPITATIONS: 0
WEAKNESS: 1
SHORTNESS OF BREATH: 1
FLANK PAIN: 0
ORTHOPNEA: 1
INSOMNIA: 1
DIZZINESS: 1
BOWEL INCONTINENCE: 0
ALTERED TEMPERATURE REGULATION: 1
COUGH: 1
FEVER: 0
SLEEP DISTURBANCES DUE TO BREATHING: 1
CHILLS: 1
TREMORS: 0
SEIZURES: 0
SWOLLEN GLANDS: 0
PANIC: 1
HEMOPTYSIS: 0
BLOATING: 1
NUMBNESS: 1
JOINT SWELLING: 0
ARTHRALGIAS: 0
WHEEZING: 1
LOSS OF CONSCIOUSNESS: 0
POLYPHAGIA: 0
NIGHT SWEATS: 0
HEADACHES: 0
DYSPNEA ON EXERTION: 1
HEMATURIA: 0
HALLUCINATIONS: 0
ABDOMINAL PAIN: 0
POOR WOUND HEALING: 1
JAUNDICE: 0
POSTURAL DYSPNEA: 1
CONSTIPATION: 1
DIARRHEA: 0
VOMITING: 0
SPUTUM PRODUCTION: 0
SNORES LOUDLY: 0
NERVOUS/ANXIOUS: 1
TINGLING: 1
WEIGHT LOSS: 1
BLOOD IN STOOL: 0
DECREASED APPETITE: 0
NAUSEA: 0
MUSCLE CRAMPS: 1
DECREASED CONCENTRATION: 1
LEG PAIN: 1
STIFFNESS: 0
HYPERTENSION: 0
WEIGHT GAIN: 1
LIGHT-HEADEDNESS: 1
DYSURIA: 1
PARALYSIS: 0
DISTURBANCES IN COORDINATION: 1
MYALGIAS: 1

## 2023-04-15 ENCOUNTER — HEALTH MAINTENANCE LETTER (OUTPATIENT)
Age: 48
End: 2023-04-15

## 2023-04-17 NOTE — PROGRESS NOTES
Virtual Visit Details    Type of service:  Video Visit   Video Start Time: 3p  Video End Time: 3:25p    Originating Location (pt. Location): Home  Distant Location (provider location):  On-site  Platform used for Video Visit: Amanda    April 18, 2023     Dear Colleagues,     I had the pleasure of talking to Cailin Burns  in the [facility]. As you know she is a 46 y/o F with DM, HLD, obesity, tobacco use disorder, sciatica who was recently admitted to [place] ICU with STEMI. She initially presented to urgent care with new onset chest pain, was found to have STEMI and was transferred to [place] for immediate further evaluation and angiogram on 9/25/22. It was significant for 3 vessel disease. Case was immediately discussed with Dr Leal for potential urgent CABG considerations but ultimately the decision was made to proceed with PCI due to poor bypass targets. Intervention to LCx was made. Prior to further PCI PET viability was pursued prior to considering further interventions. Unfortunately viability did not show enough and thus medical management was recommended. She did have short runs of NSVT while inpatient. ECHO showed EF 20% and thus she was discharged with LifeVest. She was discharged on mimimal medical therapy due to associated hypotension and not being able to tolerate higher doses. She also has PRN lasix for weight gain and PRN nitro. She is following up at the CHF clinic at [place] and was also referred to [facility] for potential future advanced heart failure therapies evaluation.      After discussion with her and the interventional and surgical teams at [facility], the decision was made to proceed with staged PCI of the CTOs. These were performed recently. She also follows in [place] and recently attempts were made to increase XL to 50mg daily. In addition, she continued to have the LifeVest and potential referral for device was discussed.  Overall she continues to feel well without any new  complaints.  She follows closely with cardiology  very appropriate medication changes have been made recently.  She tolerated this well blood pressure is stable and labs look stable as well.  Primary prevention ICD has been brought up to her.  She was told that the bedtime is about 6 months and was wondering if he could do potentially sooner.  Otherwise no complaints.  No dizziness lightheadedness chest pain palpitations.  Tolerates her medications well     PAST MEDICAL HISTORY:    Diabetes mellitus without complication (HCC)     Depression with anxiety     Dyslipidemia     Preventative health care     Obesity (BMI 30.0-34.9)     Hyperlipidemia     Tobacco use disorder     Enlarged thyroid     Leg pain, lateral, left     Chronic left-sided low back pain with left-sided sciatica     PND (post-nasal drip)     Strain of flexor muscle of hip, right, initial encounter     STEMI (ST elevation myocardial infarction) ([facility]) 2022     FAMILY HISTORY:  No relevant past family history    SOCIAL HISTORY:  Social History     Socioeconomic History     Marital status:    Tobacco Use     Smoking status: Some Days     Types: Cigarettes     Last attempt to quit: 2022     Years since quittin.6     Smokeless tobacco: Never     Tobacco comments:     Trying to quit    Substance and Sexual Activity     Alcohol use: Not Currently     Drug use: Never     CURRENT MEDICATIONS:  Current Outpatient Medications   Medication     Acetaminophen-Codeine (TYLENOL WITH CODEINE #3 PO)     ANTIPLATELET MEDICATION NOT PRESCRIBED, INTENTIONAL,     ANTIPLATELET MEDICATION NOT PRESCRIBED, INTENTIONAL,     aspirin (ASA) 81 MG EC tablet     atorvastatin (LIPITOR) 80 MG tablet     blood glucose (CONTOUR NEXT TEST) test strip     busPIRone (BUSPAR) 10 MG tablet     cyclobenzaprine (FLEXERIL) 10 MG tablet     dapagliflozin (FARXIGA) tablet     fenofibrate (TRIGLIDE/LOFIBRA) 160 MG tablet     furosemide (LASIX) 20 MG tablet     insulin glargine  (LANTUS PEN) 100 UNIT/ML pen     liraglutide (VICTOZA PEN) 18 MG/3ML solution     metFORMIN (GLUCOPHAGE) 1000 MG tablet     metoprolol succinate ER (TOPROL XL) 25 MG 24 hr tablet     nicotine (NICODERM CQ) 21 MG/24HR 24 hr patch     nicotine (NICODERM CQ) 7 MG/24HR 24 hr patch     nicotine (NICORETTE) 4 MG lozenge     nicotine (NICOTROL) 10 MG inhaler     nitroGLYcerin (NITROSTAT) 0.4 MG sublingual tablet     sacubitril-valsartan (ENTRESTO) 24-26 MG per tablet     ticagrelor (BRILINTA) 90 MG tablet     venlafaxine (EFFEXOR) 100 MG tablet     No current facility-administered medications for this visit.     ROS:   Constitutional: No fever, chills, or sweats.   ENT: No visual disturbance, ear ache, epistaxis, sore throat.   Allergies/Immunologic: Negative.   Respiratory: No cough, hemoptysis.   Cardiovascular: As per HPI.   GI: No nausea, vomiting, hematemesis, melena, or hematochezia.   : No urinary frequency, dysuria, or hematuria.   Integument: Negative.   Psychiatric: Pleasant, no major depression noted  Neuro: No focal neurological deficits noted  Endocrinology: Negative.   Musculoskeletal: As per HPI.      EXAM:  General: appears comfortable, alert and oriented  Head: normocephalic, atraumatic  Eyes: anicteric sclera, EOMI , PERRL  Neck: no adenopathy  Orophyarynx: moist mucosa, no lesions noted  Heart: regular, S1/S2, no murmurs, rubs or gallop. Estimated JVP at 5 cmH2O  Lungs: CTAB, No wheezing.   Abdomen: soft, non-tender, bowel sounds present, no hepatosplenomegaly  Extremities: No LE edema today  Skin: no open lesions noted  Neuro: grossly non-focal     Labs:  Lab Results   Component Value Date    WBC 9.8 01/02/2023    HGB 12.7 01/02/2023    HCT 41.5 01/02/2023     01/02/2023     (L) 01/02/2023    POTASSIUM 4.5 01/02/2023    CHLORIDE 107 01/02/2023    CO2 18 (L) 01/02/2023    BUN 13.3 01/02/2023    CR 0.57 01/02/2023     (H) 01/02/2023    INR 0.89 01/02/2023     Labs: All available osh  labs reviewed. Renal function normal. Troonin peak at 64, normal sensitivity. Blood group 0. A1C 7.9.      [facility] - 9/25/22: significant 3VD,  - LCx - mid, 99%, culprit - s/p PCI with a 2.75*26 mm Israel MOHAN  - LAD - diffusely diseased, small caliber, 90% mid & distal - not amenable to PCI due to small caliber  - RCA - mid, 100%, , fills distally with R-R collaterals, small caliber.   Discussed presentation with Dr Leal for consideration for CABG, before proceeding with PCI, it was decided to proceed with PCI-LCx & assess for CABG later.     TTE 9/25/22:     Left Ventricle: The left ventricle is dilated. Severely reduced left  ventricular systolic function. The EF is visually estimated to be 20%. No  thrombus seen -contrast images also reviewed See diagram for wall motion details.      Right Ventricle: The right ventricle appears normal in size. The tricuspid jet envelope definition is inadequate for estimation of RV systolic pressure.      Mitral Valve: The leaflets are thickened. There is mild regurgitation.      Tricuspid Valve: There is no regurgitation.      This is an abnormal study.      PET viability 9/28/22:     Metabolic Viability Conclusion: The left ventricle has potentially viable myocardium.  The total viable myocardium is 39%, total scar burden is 32%      Resting Perfusion Details: Large perfusion defect at rest involving the entire anteroseptal, inferoseptal, inferior and inferolateral segments.   This includes the entire RCA territory and parts of the LAD and possibly LCx territories.      Metabolic Viability Defect: The mid inferoseptal, mid inferior, mid inferolateral, distal septal and distal inferior segments are nonviable. The LCx territory appears to be mostly viable at 95% total viability. The LAD territory is predominantly viable with 75% viability. The RCA territory appears to be 50% viable with no perfusion at rest     Coronary angiogram 12/9//2022:  Severe three vessel coronary artery  disease (mRCA , pLAD, mLcx stenosis)     Coronary angiogram 1/2/2023:  Severe three vessel CAD status post PCI to the RCA  and residual LAD and LCx disease.  Successful PCI of midLAD with MOHAN x1 (2.5 x 32 mm Synergy MOHAN) and mid-LCx (2.75 x 16 mm Synergy MOHAN).     TTE 4/11/23     Left Ventricle: The left ventricle is dilated. Severely reduced left  ventricular systolic function. The EF is visually estimated to be 25-30%. Global hypokinesis of the left ventricle with akinetic apical segments.      Right Ventricle: The right ventricle appears normal in size. Systolic function is normal.      Aortic Valve: There is mild-to-moderate regurgitation.     ASSESSMENT AND PLAN:  In summary, patient is a 47 year old lady with a past medical history including diabetes which is reasonably better controlled at the moment as well as recent STEMI back in September 2022 in the setting of atypical symptoms with high peak and troponin at around 64.  She does have multivessel coronary artery disease only the LCx vessel was intervened however she has significant disease in the small LAD as well as  of the RCA.  She was found to be not a surgical candidate in [place].  She was started on medications now the blood pressure is better at 120/80.  Patient at home and as such losartan was started.  Images were reviewed and options discussed extensively with surgical as well as interventional colleagues at [facility]. Ultimately the decision was made to proceed with staged PCI of the CTOs. She did have this done early December 2022 and then January 2023 and tolerated the procedures well.   Overall continues to feel well.  No new issues.  I do agree with continued prevention ICD given that the ejection fraction remains low at this point.  She is on good medical therapy.  I would continue medication titration as able and I would also go ahead with the ICD.  She does have a 6-month plus wait time.  Patient informed 1 was wondering if we could do  this potentially sooner.  I am looking to this and will let her know whether we could potentially implant an ICD earlier.  We will let her know.  No other medication changes will be with titration.  We will see her back in 3 months or sooner as needed.  Virtual is fine.     I appreciate the opportunity to participate in the care of Cailin Burns . Please do not hesitate to contact me with any further questions.     Sincerely,   Kurt Guerin MD  [facility] Division of Cardiology

## 2023-04-18 ENCOUNTER — CARE COORDINATION (OUTPATIENT)
Dept: CARDIOLOGY | Facility: CLINIC | Age: 48
End: 2023-04-18

## 2023-04-18 ENCOUNTER — VIRTUAL VISIT (OUTPATIENT)
Dept: CARDIOLOGY | Facility: CLINIC | Age: 48
End: 2023-04-18
Attending: INTERNAL MEDICINE
Payer: COMMERCIAL

## 2023-04-18 DIAGNOSIS — I25.10 CORONARY ARTERY DISEASE INVOLVING NATIVE CORONARY ARTERY OF NATIVE HEART WITHOUT ANGINA PECTORIS: ICD-10-CM

## 2023-04-18 DIAGNOSIS — M54.42 CHRONIC LEFT-SIDED LOW BACK PAIN WITH LEFT-SIDED SCIATICA: ICD-10-CM

## 2023-04-18 DIAGNOSIS — G89.29 CHRONIC LEFT-SIDED LOW BACK PAIN WITH LEFT-SIDED SCIATICA: ICD-10-CM

## 2023-04-18 DIAGNOSIS — Z95.5 STENTED CORONARY ARTERY: ICD-10-CM

## 2023-04-18 DIAGNOSIS — I50.21 ACUTE SYSTOLIC HEART FAILURE (H): ICD-10-CM

## 2023-04-18 DIAGNOSIS — I20.9 ANGINA PECTORIS (H): ICD-10-CM

## 2023-04-18 DIAGNOSIS — I25.5 ISCHEMIC CARDIOMYOPATHY: Primary | ICD-10-CM

## 2023-04-18 DIAGNOSIS — E11.9 TYPE 2 DIABETES MELLITUS WITHOUT COMPLICATION, WITH LONG-TERM CURRENT USE OF INSULIN (H): ICD-10-CM

## 2023-04-18 DIAGNOSIS — Z79.4 TYPE 2 DIABETES MELLITUS WITHOUT COMPLICATION, WITH LONG-TERM CURRENT USE OF INSULIN (H): ICD-10-CM

## 2023-04-18 DIAGNOSIS — I21.3 ST ELEVATION MYOCARDIAL INFARCTION (STEMI), UNSPECIFIED ARTERY (H): ICD-10-CM

## 2023-04-18 DIAGNOSIS — Z95.5 STATUS POST INSERTION OF DRUG ELUTING CORONARY ARTERY STENT: ICD-10-CM

## 2023-04-18 DIAGNOSIS — Z95.810 ICD (IMPLANTABLE CARDIOVERTER-DEFIBRILLATOR), SINGLE, IN SITU: ICD-10-CM

## 2023-04-18 PROCEDURE — G0463 HOSPITAL OUTPT CLINIC VISIT: HCPCS | Mod: PN,GT | Performed by: INTERNAL MEDICINE

## 2023-04-18 PROCEDURE — 99214 OFFICE O/P EST MOD 30 MIN: CPT | Mod: VID | Performed by: INTERNAL MEDICINE

## 2023-04-18 RX ORDER — LIDOCAINE 40 MG/G
CREAM TOPICAL
Status: CANCELLED | OUTPATIENT
Start: 2023-04-18

## 2023-04-18 RX ORDER — CEFAZOLIN SODIUM 2 G/50ML
2 SOLUTION INTRAVENOUS
Status: CANCELLED | OUTPATIENT
Start: 2023-04-18

## 2023-04-18 RX ORDER — SODIUM CHLORIDE 9 MG/ML
INJECTION, SOLUTION INTRAVENOUS CONTINUOUS
Status: CANCELLED | OUTPATIENT
Start: 2023-04-18

## 2023-04-18 NOTE — NURSING NOTE
Is the patient currently in the state of [place]? YES    Visit mode:VIDEO    If the visit is dropped, the patient can be reconnected by: VIDEO VISIT: Text to cell phone: [phone]    Will anyone else be joining the visit? Pts        How would you like to obtain your AVS? MyChart    Are changes needed to the allergy or medication list? NO    Reason for visit: Follow Up (Additional pt reported vitals placed in nursing note/)    Additional pt reported vitals from last week:     Sunday: 69/53 HR: 74    169 lbs  Monday: 103/62 HR: 82  Tuesday: 99/60 HR: 84  Wednesday: 88/62  HR: 81  Thursday: 116/79 HR: 93      GIORGIO Jain/CMA

## 2023-04-18 NOTE — PATIENT INSTRUCTIONS
Dr. Guerin recommends:    Follow up virtual visit with Dr. Guerin in 3 months.    Thank you for your visit today.  Please call me with any questions or concerns.   Donovan Rendon RN  Cardiology Care Coordinator  [phone]

## 2023-04-18 NOTE — PROGRESS NOTES
Date: 4/18/2023    Time of Call: 4:47 PM     Diagnosis:  ICM on lifevest, continued low EF despite revascularization in 1/2023.     [ TORB ] Ordering provider: Leanne Bearden NP   Order:   See Dr Garcia to discuss ICD implant per Dr Guerin     Order received by: Ailyn Mcgregor RN     Follow-up/additional notes:   Scheduled clinic visit on 4/26 at noon (virtual). Tentatively schedule ICD implant 5/11.   EP  to arrange.

## 2023-04-18 NOTE — Clinical Note
4/18/2023      RE: Cailin Burns  [address]  [address]  [place] [address]       Dear Colleague,    Thank you for the opportunity to participate in the care of your patient, Cailin Burns, at the [facility] [place] at [facility]. Please see a copy of my visit note below.    No notes on file    Please do not hesitate to contact me if you have any questions/concerns.     Sincerely,     Kurt Guerin MD

## 2023-04-20 NOTE — TELEPHONE ENCOUNTER
RECORDS RECEIVED FROM:    DATE RECEIVED:    NOTES STATUS DETAILS   OFFICE NOTE from referring provider  Internal Dr. Guerin 4-18-23   OFFICE NOTE from other cardiologists  Internal Dr. Guerin 4-18-23   RECORDS from hospital/ED Internal 12/9/22   MEDICATION LIST Internal    GENERAL CARDIO RECORDS   (ALL APPOINTMENT TYPES)     LABS (CBC,BMP,CMP, TSH) Internal    EKG (STRIPS & REPORTS) Internal 4-18-23    MONITORS (STRIPS & REPORTS) N/A    ECHOS (IMAGES AND REPORTS) In process 4-11-23 Requested   STRESS TESTS (IMAGES AND REPORTS) N/A    cMRI (IMAGES AND REPORTS) N/A    CT/CTA (IMAGES AND REPORTS) Received 9-28-22 PETCT   NEW EP     ICD/PACEMAKER IMPLANT No    CARDIOVERSION N/A    TILT TABLE STUDIES N/A      Action 4/20/23 [place]  Fax #[phone]   Action Taken Requested echo 4-11-23, all EKGs viewable in CE    Resolved echo in PACS 4/21

## 2023-04-24 NOTE — PROGRESS NOTES
Virtual Visit Details    Type of service:  Video Visit   Video Start Time: 12:30  Video End Time:1:00    Originating Location (pt. Location): Home    Distant Location (provider location):  On-site  Platform used for Video Visit: [facility]        ELECTROPHYSIOLOGY VIRTUAL CLINIC VISIT    Assessment/Recommendations   Assessment/Plan:    Ms. Burns is a 47 year old female who has a medical history significant for STEMI, CAD s/p PCI RCA 12/9/22 and mLAD 1/2/2023, ICM LVEF 25-30%, DM , HLD, obesity, sciatica, and tobacco abuse.     The patient has ischemic cardiomyopathy with an ejection fraction less than 35% despite optimal medical therapy.  She does have a class I indication for an ICD for primary prevention of sudden cardiac arrest.  Her EKG shows an IVCD with a QRS of 130 ms.  She does not qualify currently for a CRT-D.  We explained the procedure to the patient in details and answered all questions.  Potential complications include pericardial effusion and tamponade, hemothorax or pneumothorax, pocket hematoma, or infection.  We discussed with the patient the rationale for ICD placement, alternative therapies,  technical aspects of the surgical procedure, risks/benefits of therapy and need for long-term follow-up in the Device Clinic.   All question/concerns were addressed. After our discussion, the patient verbalized understanding and wishes to proceed with ICD implant.      Follow up 3 months after implant.       History of Present Illness/Subjective    Ms. Cailin Burns is a 47 year old female who comes in today for EP consultation of consideration for ICD.    Ms. Burns is a 47 year old female who has a medical history significant for STEMI, CAD s/p PCI RCA 12/9/22 and mLAD 1/2/2023, ICM LVEF 25-30%, DM , HLD, obesity, sciatica, and tobacco abuse.     She initially presented to an Urgent care with chest pain and was found to be having a STEMI. She was urgently transferred to [place] and had coronary angiogram  which demonstrated significant 3V CAD. Discussion was had about possible urgent CABG,but she was felt to have poor bypass targets and she had PCI to LCx. Prior to further interventions, she had PET viability which did not demonstrate significant viability and medical management was pursued. She had some short runs of NSVT while inpatient. Echo at that time showed LVEF 20%. She was discharged on a LifeVest and with GMDT. She had somy hypotension which limited up titration of her neurohormonal agents. She was referred to [facility] for advanced HF therapies and established with Dr. Guerin. After discussion with interventional and surgical teams here at [facility], decision was made to proceed with staged PCI of CTOs. She ahd first procedure on 12/2/22 and then the other on 1/2/23. An echo from 4/11/23 showed LVEF 25-30% with global hypokinesis and akinetic apical segments, normal RV size/function, mild/moderate AR, and mild MR. She was seen locally for consideration for ICD and was told wait times were 6 months or so. She requested referral here for consideration of ICD.     She reports feeling well. She denies chest discomfort, palpitations, abdominal fullness/bloating or peripheral edema, paroxysmal nocturnal dyspnea, orthopnea, lightheadedness, dizziness, pre-syncope, or syncope. Her last 12 lead ECG shows NSR Vent Rate 80 bpm,  ms,  ms IVCD, QTc 450 ms. Current cardiac medications include: Toprol XL, ASA, Fenofibrate, Lasix, Tricagrelor, Lipitor, Entresto, and Farxiga.     I have reviewed and updated the patient's Past Medical History, Social History, Family History and Medication List.     Cardiographics (Personally Reviewed) :   4/11/23 Echo:      Left Ventricle: The left ventricle is dilated. Severely reduced left   ventricular systolic function. The EF is visually estimated to be 25-30%.   Global hypokinesis of the left ventricle with akinetic apical segments.      Right Ventricle: The right ventricle appears  normal in size. Systolic   function is normal.      Aortic Valve: There is mild-to-moderate regurgitation.     9/2022 PET Viability:      Metabolic Viability Conclusion: The left ventricle has potentially   viable myocardium.  The total viable myocardium is 39%, total scar burden   is 32%      Resting Perfusion Details: Large perfusion defect at rest involving the   entire anteroseptal, inferoseptal, inferior and inferolateral segments.     This includes the entire RCA territory and parts of the LAD and possibly   LCx territories.      Metabolic Viability Defect: The mid inferoseptal, mid inferior, mid   inferolateral, distal septal and distal inferior segments are nonviable.   The LCx territory appears to be mostly viable at 95% total viability. The   LAD territory is predominantly viable with 75% viability. The RCA   territory appears to be 50% viable with no perfusion at rest    1/2/23 Coronary Angiogram:  Conclusion    Severe three vessel CAD status post PCI to the RCA  and residual LAD and LCx disease.  Successful PCI of midLAD with MOHAN x1 (2.5 x 32 mm Synergy MOHAN) and mid-LCx (2.75 x 16 mm Synergy MOHAN).  12/9/22 Coronary Angiogram:  Conclusion    Severe three vessel coronary artery disease (mRCA , pLAD, mLcx stenosis)   PCI of RCA with DESx3 (Synergy 2.75x48 mm , Synergy 2.25x38 mm, Resolute [place] 2.00x18 mm)           Physical Examination   There were no vitals taken for this visit.  Wt Readings from Last 3 Encounters:   01/02/23 76.2 kg (168 lb)   12/09/22 78.2 kg (172 lb 6.4 oz)     The rest of a comprehensive physical examination is deferred due to nature of video visit restrictions.  CONSITUTIONAL: no acute distress  HEENT: no icterus, no redness or discharge, neck supple  CV: no visible edema of visualized extremities. No JVD.   RESPIRATORY: respirations nonlabored, no cough  NEURO: AA&Ox3, speech fluent/appropriate, motor grossly nonfocal  PSYCH: cooperative, affect appropriate  DERM: no rashes  on visualized face/neck/upper extremities       Medications  Allergies   Current Outpatient Medications   Medication Sig Dispense Refill     Acetaminophen-Codeine (TYLENOL WITH CODEINE #3 PO) Take by mouth as needed (Patient not taking: Reported on 4/18/2023)       ANTIPLATELET MEDICATION NOT PRESCRIBED, INTENTIONAL, Please choose reason not prescribed from choices below.       ANTIPLATELET MEDICATION NOT PRESCRIBED, INTENTIONAL, Please choose reason not prescribed from choices below.       aspirin (ASA) 81 MG EC tablet Take 81 mg by mouth daily       atorvastatin (LIPITOR) 80 MG tablet Take 1 tablet by mouth daily       blood glucose (CONTOUR NEXT TEST) test strip To test BG values three times daily - uses insulin       busPIRone (BUSPAR) 10 MG tablet Take 10 mg by mouth 2 times daily       cyclobenzaprine (FLEXERIL) 10 MG tablet Take 10 mg by mouth 3 times daily as needed for muscle spasms       dapagliflozin (FARXIGA) tablet Take by mouth daily       fenofibrate (TRIGLIDE/LOFIBRA) 160 MG tablet Take 160 mg by mouth daily       furosemide (LASIX) 20 MG tablet Take 10 mg by mouth daily as needed weight gain of >2lbs in a day or >5lbs in a week       insulin glargine (LANTUS PEN) 100 UNIT/ML pen Inject 30 Units Subcutaneous daily       liraglutide (VICTOZA PEN) 18 MG/3ML solution INJECT 0.6MG SUBCUTANEOUSLY ONCE DAILY for 1 week, then 1.8 mg subcutaneously daily       metFORMIN (GLUCOPHAGE) 1000 MG tablet Take 1,000 mg by mouth 2 times daily (with meals)       metoprolol succinate ER (TOPROL XL) 25 MG 24 hr tablet Take 1 tablet (25 mg) by mouth daily 90 tablet 3     nicotine (NICODERM CQ) 21 MG/24HR 24 hr patch Place 21 mg onto the skin daily       nicotine (NICODERM CQ) 7 MG/24HR 24 hr patch To use it along with Nicotine 21 mg patch daily.       nicotine (NICORETTE) 4 MG lozenge Take 4 mg by mouth every 2 hours as needed For smoking cessation       nicotine (NICOTROL) 10 MG inhaler Recommended usage is 6 to 16  cartridges per day. Insert cartridge into inhaler and push hard until it pops into place. Replace mouthpiece and twist the top and bottom so that markings do not line up. Inhale deeply into the back of the throat or puff in short breaths. Nicotine in cartridge is used up after about 20 minutes of active puffing. Clean mouthpiece regularly with soap and water. (Patient not taking: Reported on 4/18/2023)       nitroGLYcerin (NITROSTAT) 0.4 MG sublingual tablet Place 0.4 mg under the tongue every 5 minutes as needed       omeprazole (PRILOSEC) 20 MG DR capsule Take 20 mg by mouth       sacubitril-valsartan (ENTRESTO) 24-26 MG per tablet Take 1 tablet by mouth 2 times daily       ticagrelor (BRILINTA) 90 MG tablet Take 90 mg by mouth 2 times daily       venlafaxine (EFFEXOR) 100 MG tablet Take 1 tablet by mouth 2 times daily      Allergies   Allergen Reactions     Phenytoin Nausea and Vomiting     Rosuvastatin Muscle Pain (Myalgia) and Other (See Comments)     Myalgias       Valproic Acid Unknown         Lab Results (Personally Reviewed)    Chemistry/lipid CBC Cardiac Enzymes/BNP/TSH/INR   Lab Results   Component Value Date    BUN 13.3 01/02/2023     (L) 01/02/2023    CO2 18 (L) 01/02/2023     Creatinine   Date Value Ref Range Status   01/02/2023 0.57 0.51 - 0.95 mg/dL Final       Lab Results   Component Value Date    CHOL 134 01/02/2023    HDL 36 (L) 01/02/2023    LDL 77 01/02/2023      Lab Results   Component Value Date    WBC 9.8 01/02/2023    HGB 12.7 01/02/2023    HCT 41.5 01/02/2023    MCV 92 01/02/2023     01/02/2023    Lab Results   Component Value Date    INR 0.89 01/02/2023            Miky Garcia MD [facility]  Cardiology - Electrophysiology      Total time spent on patient visit, reviewing notes, imaging, labs, orders, and completing necessary documentation: 60 minutes.

## 2023-04-26 ENCOUNTER — VIRTUAL VISIT (OUTPATIENT)
Dept: CARDIOLOGY | Facility: CLINIC | Age: 48
End: 2023-04-26
Attending: INTERNAL MEDICINE
Payer: COMMERCIAL

## 2023-04-26 ENCOUNTER — PRE VISIT (OUTPATIENT)
Dept: CARDIOLOGY | Facility: CLINIC | Age: 48
End: 2023-04-26
Payer: COMMERCIAL

## 2023-04-26 DIAGNOSIS — I25.5 ISCHEMIC CARDIOMYOPATHY: Primary | ICD-10-CM

## 2023-04-26 DIAGNOSIS — I21.3 ST ELEVATION MYOCARDIAL INFARCTION (STEMI), UNSPECIFIED ARTERY (H): ICD-10-CM

## 2023-04-26 DIAGNOSIS — I50.20 HEART FAILURE WITH REDUCED EJECTION FRACTION, NYHA CLASS III (H): ICD-10-CM

## 2023-04-26 PROCEDURE — 99215 OFFICE O/P EST HI 40 MIN: CPT | Mod: 95 | Performed by: INTERNAL MEDICINE

## 2023-04-26 PROCEDURE — G0463 HOSPITAL OUTPT CLINIC VISIT: HCPCS | Mod: GT,PN | Performed by: INTERNAL MEDICINE

## 2023-04-26 NOTE — NURSING NOTE
Is the patient currently in the state of [place]? YES    Visit mode:VIDEO    If the visit is dropped, the patient can be reconnected by: VIDEO VISIT: Text to cell phone: [phone]    Will anyone else be joining the visit? Possibly pts       How would you like to obtain your AVS? MyChart    Are changes needed to the allergy or medication list? YES: Pt flagged medications for removal during e check in, please remove, pt states also not taking tylenol with codeine      Patient declined individual allergy and medication review by support staff because patient denies any changes since echeck-in completion and states all information entered during echeck-in remains accurate.    Additional pt reported vitals:     4/24: 103/62 HR: 82  4/25: 99/60 HR: 84      Reason for visit: Consult    GIORGIO Jain/MALENA

## 2023-04-26 NOTE — LETTER
4/26/2023      RE: Cailin Burns  [address]  [address]  [facility] [address]       Dear Colleague,    Thank you for the opportunity to participate in the care of your patient, Cailin Burns, at the [facility] [place] at [facility]. Please see a copy of my visit note below.    Virtual Visit Details    Type of service:  Video Visit   Video Start Time: 12:30  Video End Time:1:00    Originating Location (pt. Location): Home    Distant Location (provider location):  On-site  Platform used for Video Visit: [facility]        ELECTROPHYSIOLOGY VIRTUAL CLINIC VISIT    Assessment/Recommendations   Assessment/Plan:    Ms. Burns is a 47 year old female who has a medical history significant for STEMI, CAD s/p PCI RCA 12/9/22 and mLAD 1/2/2023, ICM LVEF 25-30%, DM , HLD, obesity, sciatica, and tobacco abuse.     The patient has ischemic cardiomyopathy with an ejection fraction less than 35% despite optimal medical therapy.  She does have a class I indication for an ICD for primary prevention of sudden cardiac arrest.  Her EKG shows an IVCD with a QRS of 130 ms.  She does not qualify currently for a CRT-D.  We explained the procedure to the patient in details and answered all questions.  Potential complications include pericardial effusion and tamponade, hemothorax or pneumothorax, pocket hematoma, or infection.  We discussed with the patient the rationale for ICD placement, alternative therapies,  technical aspects of the surgical procedure, risks/benefits of therapy and need for long-term follow-up in the Device Clinic.   All question/concerns were addressed. After our discussion, the patient verbalized understanding and wishes to proceed with ICD implant.      Follow up 3 months after implant.       History of Present Illness/Subjective    Ms. Cailin Burns is a 47 year old female who comes in today for EP consultation of consideration for  ICD.    Ms. Burns is a 47 year old female who has a medical history significant for STEMI, CAD s/p PCI RCA 12/9/22 and mLAD 1/2/2023, ICM LVEF 25-30%, DM , HLD, obesity, sciatica, and tobacco abuse.     She initially presented to an Urgent care with chest pain and was found to be having a STEMI. She was urgently transferred to [facility] and had coronary angiogram which demonstrated significant 3V CAD. Discussion was had about possible urgent CABG,but she was felt to have poor bypass targets and she had PCI to LCx. Prior to further interventions, she had PET viability which did not demonstrate significant viability and medical management was pursued. She had some short runs of NSVT while inpatient. Echo at that time showed LVEF 20%. She was discharged on a LifeVest and with GMDT. She had somy hypotension which limited up titration of her neurohormonal agents. She was referred to [facility] for advanced HF therapies and established with Dr. Guerin. After discussion with interventional and surgical teams here at [facility], decision was made to proceed with staged PCI of CTOs. She ahd first procedure on 12/2/22 and then the other on 1/2/23. An echo from 4/11/23 showed LVEF 25-30% with global hypokinesis and akinetic apical segments, normal RV size/function, mild/moderate AR, and mild MR. She was seen locally for consideration for ICD and was told wait times were 6 months or so. She requested referral here for consideration of ICD.     She reports feeling well. She denies chest discomfort, palpitations, abdominal fullness/bloating or peripheral edema, paroxysmal nocturnal dyspnea, orthopnea, lightheadedness, dizziness, pre-syncope, or syncope. Her last 12 lead ECG shows NSR Vent Rate 80 bpm,  ms,  ms IVCD, QTc 450 ms. Current cardiac medications include: Toprol XL, ASA, Fenofibrate, Lasix, Tricagrelor, Lipitor, Entresto, and Farxiga.     I have reviewed and updated the patient's Past Medical History, Social History,  Family History and Medication List.     Cardiographics (Personally Reviewed) :   4/11/23 Echo:      Left Ventricle: The left ventricle is dilated. Severely reduced left   ventricular systolic function. The EF is visually estimated to be 25-30%.   Global hypokinesis of the left ventricle with akinetic apical segments.      Right Ventricle: The right ventricle appears normal in size. Systolic   function is normal.      Aortic Valve: There is mild-to-moderate regurgitation.     9/2022 PET Viability:      Metabolic Viability Conclusion: The left ventricle has potentially   viable myocardium.  The total viable myocardium is 39%, total scar burden   is 32%      Resting Perfusion Details: Large perfusion defect at rest involving the   entire anteroseptal, inferoseptal, inferior and inferolateral segments.     This includes the entire RCA territory and parts of the LAD and possibly   LCx territories.      Metabolic Viability Defect: The mid inferoseptal, mid inferior, mid   inferolateral, distal septal and distal inferior segments are nonviable.   The LCx territory appears to be mostly viable at 95% total viability. The   LAD territory is predominantly viable with 75% viability. The RCA   territory appears to be 50% viable with no perfusion at rest    1/2/23 Coronary Angiogram:  Conclusion    Severe three vessel CAD status post PCI to the RCA  and residual LAD and LCx disease.  Successful PCI of midLAD with MOHAN x1 (2.5 x 32 mm Synergy MOHAN) and mid-LCx (2.75 x 16 mm Synergy MOHAN).  12/9/22 Coronary Angiogram:  Conclusion    Severe three vessel coronary artery disease (mRCA , pLAD, mLcx stenosis)   PCI of RCA with DESx3 (Synergy 2.75x48 mm , Synergy 2.25x38 mm, Resolute [place] 2.00x18 mm)           Physical Examination   There were no vitals taken for this visit.  Wt Readings from Last 3 Encounters:   01/02/23 76.2 kg (168 lb)   12/09/22 78.2 kg (172 lb 6.4 oz)     The rest of a comprehensive physical examination is  deferred due to nature of video visit restrictions.  CONSITUTIONAL: no acute distress  HEENT: no icterus, no redness or discharge, neck supple  CV: no visible edema of visualized extremities. No JVD.   RESPIRATORY: respirations nonlabored, no cough  NEURO: AA&Ox3, speech fluent/appropriate, motor grossly nonfocal  PSYCH: cooperative, affect appropriate  DERM: no rashes on visualized face/neck/upper extremities       Medications  Allergies   Current Outpatient Medications   Medication Sig Dispense Refill     Acetaminophen-Codeine (TYLENOL WITH CODEINE #3 PO) Take by mouth as needed (Patient not taking: Reported on 4/18/2023)       ANTIPLATELET MEDICATION NOT PRESCRIBED, INTENTIONAL, Please choose reason not prescribed from choices below.       ANTIPLATELET MEDICATION NOT PRESCRIBED, INTENTIONAL, Please choose reason not prescribed from choices below.       aspirin (ASA) 81 MG EC tablet Take 81 mg by mouth daily       atorvastatin (LIPITOR) 80 MG tablet Take 1 tablet by mouth daily       blood glucose (CONTOUR NEXT TEST) test strip To test BG values three times daily - uses insulin       busPIRone (BUSPAR) 10 MG tablet Take 10 mg by mouth 2 times daily       cyclobenzaprine (FLEXERIL) 10 MG tablet Take 10 mg by mouth 3 times daily as needed for muscle spasms       dapagliflozin (FARXIGA) tablet Take by mouth daily       fenofibrate (TRIGLIDE/LOFIBRA) 160 MG tablet Take 160 mg by mouth daily       furosemide (LASIX) 20 MG tablet Take 10 mg by mouth daily as needed weight gain of >2lbs in a day or >5lbs in a week       insulin glargine (LANTUS PEN) 100 UNIT/ML pen Inject 30 Units Subcutaneous daily       liraglutide (VICTOZA PEN) 18 MG/3ML solution INJECT 0.6MG SUBCUTANEOUSLY ONCE DAILY for 1 week, then 1.8 mg subcutaneously daily       metFORMIN (GLUCOPHAGE) 1000 MG tablet Take 1,000 mg by mouth 2 times daily (with meals)       metoprolol succinate ER (TOPROL XL) 25 MG 24 hr tablet Take 1 tablet (25 mg) by mouth daily  90 tablet 3     nicotine (NICODERM CQ) 21 MG/24HR 24 hr patch Place 21 mg onto the skin daily       nicotine (NICODERM CQ) 7 MG/24HR 24 hr patch To use it along with Nicotine 21 mg patch daily.       nicotine (NICORETTE) 4 MG lozenge Take 4 mg by mouth every 2 hours as needed For smoking cessation       nicotine (NICOTROL) 10 MG inhaler Recommended usage is 6 to 16 cartridges per day. Insert cartridge into inhaler and push hard until it pops into place. Replace mouthpiece and twist the top and bottom so that markings do not line up. Inhale deeply into the back of the throat or puff in short breaths. Nicotine in cartridge is used up after about 20 minutes of active puffing. Clean mouthpiece regularly with soap and water. (Patient not taking: Reported on 4/18/2023)       nitroGLYcerin (NITROSTAT) 0.4 MG sublingual tablet Place 0.4 mg under the tongue every 5 minutes as needed       omeprazole (PRILOSEC) 20 MG DR capsule Take 20 mg by mouth       sacubitril-valsartan (ENTRESTO) 24-26 MG per tablet Take 1 tablet by mouth 2 times daily       ticagrelor (BRILINTA) 90 MG tablet Take 90 mg by mouth 2 times daily       venlafaxine (EFFEXOR) 100 MG tablet Take 1 tablet by mouth 2 times daily      Allergies   Allergen Reactions     Phenytoin Nausea and Vomiting     Rosuvastatin Muscle Pain (Myalgia) and Other (See Comments)     Myalgias       Valproic Acid Unknown         Lab Results (Personally Reviewed)    Chemistry/lipid CBC Cardiac Enzymes/BNP/TSH/INR   Lab Results   Component Value Date    BUN 13.3 01/02/2023     (L) 01/02/2023    CO2 18 (L) 01/02/2023     Creatinine   Date Value Ref Range Status   01/02/2023 0.57 0.51 - 0.95 mg/dL Final       Lab Results   Component Value Date    CHOL 134 01/02/2023    HDL 36 (L) 01/02/2023    LDL 77 01/02/2023      Lab Results   Component Value Date    WBC 9.8 01/02/2023    HGB 12.7 01/02/2023    HCT 41.5 01/02/2023    MCV 92 01/02/2023     01/02/2023    Lab Results    Component Value Date    INR 0.89 01/02/2023            Miky Garcia MD [facility]  Cardiology - Electrophysiology      Total time spent on patient visit, reviewing notes, imaging, labs, orders, and completing necessary documentation: 60 minutes.                      Please do not hesitate to contact me if you have any questions/concerns.     Sincerely,     Miky Garcia MD

## 2023-04-26 NOTE — PATIENT INSTRUCTIONS
Plan:    ICD implant as scheduled      Your Care Team:  EP Cardiology   Telephone Number     Ailyn Mcgregor RN [phone]    After business hours: [phone], ask for cardiologist on-call   Non-procedure scheduling:    Letha DEVINE   [phone]   Procedure scheduling:    Herminia Lockett   [phone]   Device Clinic (Pacemakers, ICDs, Loop Recorders)    During business hours: [phone]  After business hours:   [phone]- select option 4 and ask for job code [id number].       Cardiovascular Clinic:   [address] [place] [address]. [place] [address]      As always, thank you for trusting us with your health care needs!

## 2023-05-09 ENCOUNTER — PATIENT OUTREACH (OUTPATIENT)
Dept: CARDIOLOGY | Facility: CLINIC | Age: 48
End: 2023-05-09
Payer: COMMERCIAL

## 2023-05-09 NOTE — PROGRESS NOTES
Called and spoke to patient to review pre-procedure instructions for upcoming ICD implant procedure on 5/11/23. Patient received letter, writer reviewed medication holds and how to use SEE wipes. Patient verbalized understanding and is in agreement to the plan.

## 2023-05-11 ENCOUNTER — APPOINTMENT (OUTPATIENT)
Dept: GENERAL RADIOLOGY | Facility: CLINIC | Age: 48
End: 2023-05-11
Attending: INTERNAL MEDICINE
Payer: COMMERCIAL

## 2023-05-11 ENCOUNTER — HOSPITAL ENCOUNTER (OUTPATIENT)
Facility: CLINIC | Age: 48
Discharge: HOME | End: 2023-05-11
Attending: INTERNAL MEDICINE | Admitting: INTERNAL MEDICINE
Payer: COMMERCIAL

## 2023-05-11 ENCOUNTER — APPOINTMENT (OUTPATIENT)
Dept: LAB | Facility: CLINIC | Age: 48
End: 2023-05-11
Attending: INTERNAL MEDICINE
Payer: COMMERCIAL

## 2023-05-11 ENCOUNTER — APPOINTMENT (OUTPATIENT)
Dept: MEDSURG UNIT | Facility: CLINIC | Age: 48
End: 2023-05-11
Attending: INTERNAL MEDICINE
Payer: COMMERCIAL

## 2023-05-11 VITALS
OXYGEN SATURATION: 96 % | SYSTOLIC BLOOD PRESSURE: 78 MMHG | HEIGHT: 62 IN | RESPIRATION RATE: 13 BRPM | BODY MASS INDEX: 31.83 KG/M2 | TEMPERATURE: 98.5 F | WEIGHT: 173 LBS | DIASTOLIC BLOOD PRESSURE: 54 MMHG | HEART RATE: 80 BPM

## 2023-05-11 DIAGNOSIS — Z95.810 ICD (IMPLANTABLE CARDIOVERTER-DEFIBRILLATOR) IN PLACE: Primary | ICD-10-CM

## 2023-05-11 DIAGNOSIS — I25.5 ISCHEMIC CARDIOMYOPATHY: ICD-10-CM

## 2023-05-11 LAB
ANION GAP SERPL CALCULATED.3IONS-SCNC: 11 MMOL/L (ref 7–15)
BUN SERPL-MCNC: 12.1 MG/DL (ref 6–20)
CALCIUM SERPL-MCNC: 8.8 MG/DL (ref 8.6–10)
CHLORIDE SERPL-SCNC: 105 MMOL/L (ref 98–107)
CREAT SERPL-MCNC: 0.59 MG/DL (ref 0.51–0.95)
DEPRECATED HCO3 PLAS-SCNC: 20 MMOL/L (ref 22–29)
ERYTHROCYTE [DISTWIDTH] IN BLOOD BY AUTOMATED COUNT: 14.9 % (ref 10–15)
GFR SERPL CREATININE-BSD FRML MDRD: >90 ML/MIN/1.73M2
GLUCOSE SERPL-MCNC: 146 MG/DL (ref 70–99)
HCG INTACT+B SERPL-ACNC: <1 MIU/ML
HCT VFR BLD AUTO: 43.5 % (ref 35–47)
HGB BLD-MCNC: 14.1 G/DL (ref 11.7–15.7)
MCH RBC QN AUTO: 28.8 PG (ref 26.5–33)
MCHC RBC AUTO-ENTMCNC: 32.4 G/DL (ref 31.5–36.5)
MCV RBC AUTO: 89 FL (ref 78–100)
PLATELET # BLD AUTO: 368 10E3/UL (ref 150–450)
POTASSIUM SERPL-SCNC: 4.6 MMOL/L (ref 3.4–5.3)
RBC # BLD AUTO: 4.89 10E6/UL (ref 3.8–5.2)
SODIUM SERPL-SCNC: 136 MMOL/L (ref 136–145)
WBC # BLD AUTO: 10.6 10E3/UL (ref 4–11)

## 2023-05-11 PROCEDURE — C1722 AICD, SINGLE CHAMBER: HCPCS | Performed by: INTERNAL MEDICINE

## 2023-05-11 PROCEDURE — 80048 BASIC METABOLIC PNL TOTAL CA: CPT | Performed by: INTERNAL MEDICINE

## 2023-05-11 PROCEDURE — 999N000134 HC STATISTIC PP CARE STAGE 3

## 2023-05-11 PROCEDURE — 93010 ELECTROCARDIOGRAM REPORT: CPT | Mod: XU | Performed by: INTERNAL MEDICINE

## 2023-05-11 PROCEDURE — 99152 MOD SED SAME PHYS/QHP 5/>YRS: CPT | Mod: GC | Performed by: INTERNAL MEDICINE

## 2023-05-11 PROCEDURE — 258N000003 HC RX IP 258 OP 636: Performed by: INTERNAL MEDICINE

## 2023-05-11 PROCEDURE — 999N000142 HC STATISTIC PROCEDURE PREP ONLY

## 2023-05-11 PROCEDURE — 85018 HEMOGLOBIN: CPT | Performed by: INTERNAL MEDICINE

## 2023-05-11 PROCEDURE — 999N000054 HC STATISTIC EKG NON-CHARGEABLE

## 2023-05-11 PROCEDURE — 93005 ELECTROCARDIOGRAM TRACING: CPT

## 2023-05-11 PROCEDURE — 999N000065 XR CHEST PORT 1 VIEW

## 2023-05-11 PROCEDURE — C1894 INTRO/SHEATH, NON-LASER: HCPCS | Performed by: INTERNAL MEDICINE

## 2023-05-11 PROCEDURE — C1779 LEAD, PMKR, TRANSVENOUS VDD: HCPCS | Performed by: INTERNAL MEDICINE

## 2023-05-11 PROCEDURE — 99153 MOD SED SAME PHYS/QHP EA: CPT | Performed by: INTERNAL MEDICINE

## 2023-05-11 PROCEDURE — 250N000011 HC RX IP 250 OP 636: Performed by: INTERNAL MEDICINE

## 2023-05-11 PROCEDURE — 250N000009 HC RX 250: Performed by: INTERNAL MEDICINE

## 2023-05-11 PROCEDURE — 272N000001 HC OR GENERAL SUPPLY STERILE: Performed by: INTERNAL MEDICINE

## 2023-05-11 PROCEDURE — 250N000013 HC RX MED GY IP 250 OP 250 PS 637

## 2023-05-11 PROCEDURE — 99152 MOD SED SAME PHYS/QHP 5/>YRS: CPT | Performed by: INTERNAL MEDICINE

## 2023-05-11 PROCEDURE — 33249 INSJ/RPLCMT DEFIB W/LEAD(S): CPT | Performed by: INTERNAL MEDICINE

## 2023-05-11 PROCEDURE — 84702 CHORIONIC GONADOTROPIN TEST: CPT | Performed by: INTERNAL MEDICINE

## 2023-05-11 PROCEDURE — 71045 X-RAY EXAM CHEST 1 VIEW: CPT | Mod: 26 | Performed by: RADIOLOGY

## 2023-05-11 PROCEDURE — 33249 INSJ/RPLCMT DEFIB W/LEAD(S): CPT | Mod: GC | Performed by: INTERNAL MEDICINE

## 2023-05-11 PROCEDURE — 36415 COLL VENOUS BLD VENIPUNCTURE: CPT | Performed by: INTERNAL MEDICINE

## 2023-05-11 DEVICE — DEFIB ICD COBALT XT TITANIUM 64X51X13MM DVPA2D4: Type: IMPLANTABLE DEVICE | Status: FUNCTIONAL

## 2023-05-11 DEVICE — LEAD SPRINT QUATTRO SECURE S 55 6935M55: Type: IMPLANTABLE DEVICE | Status: FUNCTIONAL

## 2023-05-11 RX ORDER — OXYCODONE AND ACETAMINOPHEN 5; 325 MG/1; MG/1
1 TABLET ORAL EVERY 4 HOURS PRN
Status: DISCONTINUED | OUTPATIENT
Start: 2023-05-11 | End: 2023-05-11 | Stop reason: HOSPADM

## 2023-05-11 RX ORDER — CEFAZOLIN SODIUM 2 G/100ML
2 INJECTION, SOLUTION INTRAVENOUS
Status: DISCONTINUED | OUTPATIENT
Start: 2023-05-11 | End: 2023-05-11 | Stop reason: HOSPADM

## 2023-05-11 RX ORDER — LIDOCAINE 40 MG/G
CREAM TOPICAL
Status: DISCONTINUED | OUTPATIENT
Start: 2023-05-11 | End: 2023-05-11 | Stop reason: HOSPADM

## 2023-05-11 RX ORDER — CEFAZOLIN SODIUM 2 G/100ML
INJECTION, SOLUTION INTRAVENOUS CONTINUOUS PRN
Status: COMPLETED | OUTPATIENT
Start: 2023-05-11 | End: 2023-05-11

## 2023-05-11 RX ORDER — DIPHENHYDRAMINE HYDROCHLORIDE 50 MG/ML
INJECTION INTRAMUSCULAR; INTRAVENOUS
Status: DISCONTINUED | OUTPATIENT
Start: 2023-05-11 | End: 2023-05-11 | Stop reason: HOSPADM

## 2023-05-11 RX ORDER — NALOXONE HYDROCHLORIDE 0.4 MG/ML
0.4 INJECTION, SOLUTION INTRAMUSCULAR; INTRAVENOUS; SUBCUTANEOUS
Status: DISCONTINUED | OUTPATIENT
Start: 2023-05-11 | End: 2023-05-11 | Stop reason: HOSPADM

## 2023-05-11 RX ORDER — NALOXONE HYDROCHLORIDE 0.4 MG/ML
0.2 INJECTION, SOLUTION INTRAMUSCULAR; INTRAVENOUS; SUBCUTANEOUS
Status: DISCONTINUED | OUTPATIENT
Start: 2023-05-11 | End: 2023-05-11 | Stop reason: HOSPADM

## 2023-05-11 RX ORDER — FLUMAZENIL 0.1 MG/ML
0.2 INJECTION, SOLUTION INTRAVENOUS
Status: DISCONTINUED | OUTPATIENT
Start: 2023-05-11 | End: 2023-05-11 | Stop reason: HOSPADM

## 2023-05-11 RX ORDER — SODIUM CHLORIDE 9 MG/ML
INJECTION, SOLUTION INTRAVENOUS CONTINUOUS
Status: DISCONTINUED | OUTPATIENT
Start: 2023-05-11 | End: 2023-05-11 | Stop reason: HOSPADM

## 2023-05-11 RX ORDER — CEPHALEXIN 500 MG/1
500 CAPSULE ORAL 3 TIMES DAILY
Qty: 15 CAPSULE | Refills: 0 | Status: SHIPPED | OUTPATIENT
Start: 2023-05-11 | End: 2023-05-16

## 2023-05-11 RX ORDER — IOPAMIDOL 755 MG/ML
INJECTION, SOLUTION INTRAVASCULAR
Status: DISCONTINUED | OUTPATIENT
Start: 2023-05-11 | End: 2023-05-11 | Stop reason: HOSPADM

## 2023-05-11 RX ORDER — LIDOCAINE HYDROCHLORIDE 20 MG/ML
INJECTION, SOLUTION INFILTRATION; PERINEURAL
Status: DISCONTINUED | OUTPATIENT
Start: 2023-05-11 | End: 2023-05-11 | Stop reason: HOSPADM

## 2023-05-11 RX ORDER — FENTANYL CITRATE 50 UG/ML
25-50 INJECTION, SOLUTION INTRAMUSCULAR; INTRAVENOUS
Status: DISCONTINUED | OUTPATIENT
Start: 2023-05-11 | End: 2023-05-11 | Stop reason: HOSPADM

## 2023-05-11 RX ORDER — ATROPINE SULFATE 0.1 MG/ML
0.5 INJECTION INTRAVENOUS EVERY 5 MIN PRN
Status: DISCONTINUED | OUTPATIENT
Start: 2023-05-11 | End: 2023-05-11 | Stop reason: HOSPADM

## 2023-05-11 RX ORDER — FENTANYL CITRATE 50 UG/ML
INJECTION, SOLUTION INTRAMUSCULAR; INTRAVENOUS
Status: DISCONTINUED | OUTPATIENT
Start: 2023-05-11 | End: 2023-05-11 | Stop reason: HOSPADM

## 2023-05-11 RX ADMIN — SODIUM CHLORIDE: 9 INJECTION, SOLUTION INTRAVENOUS at 14:03

## 2023-05-11 RX ADMIN — OXYCODONE HYDROCHLORIDE AND ACETAMINOPHEN 1 TABLET: 5; 325 TABLET ORAL at 18:20

## 2023-05-11 ASSESSMENT — ACTIVITIES OF DAILY LIVING (ADL)
ADLS_ACUITY_SCORE: 35

## 2023-05-11 NOTE — PROGRESS NOTES
"BP (!) 78/54   Pulse 80   Temp 98.5  F (36.9  C) (Oral)   Resp 13   Ht 1.575 m (5' 2\")   Wt 78.5 kg (173 lb)   SpO2 96%   BMI 31.64 kg/m    Condition is stable s/p ICD placement. Vital Signs are stable/WNL. Left upper chest site clean, dry and intact, cms intact.  Discharge instructions reviewed with patient and questions answered. Patient verbalizes understanding. IV removed. Pain under control. Patient is ambulating and voiding spontaneously. Patient is tolerating regular diet and denies any N/V. Patient to be discharged to home/hotel via  [facility]. Patient has all belongings.    "

## 2023-05-11 NOTE — PROGRESS NOTES
Pt prepped for ICD placement.PIV placed on left where placement of the ICD is going to be.IV contrast discharge instructions reviewed and signed and a copy given to pt.Pt stated that she did the live wipes scrub last night and again this morning.Consent signed and questions answered.

## 2023-05-11 NOTE — Clinical Note
PER THE CHECKOUT FORM FROM HER VISIT YESTERDAY DR. HUNTER WANTED TO SEE THE PATIENT IN 2 WEEKS There were no immediate complications during the procedure.

## 2023-05-11 NOTE — PROGRESS NOTES
D/I/A: Pt roomed on 3C in bay [address].  Arrived via litter and accompanied by CCL RN On/Off: Off monitor.  VSSA.  Rhythm upon arrival SR on monitor.  Denies pain or sob.  Reviewed activity restrictions and when to notify RN, ie-changes to breathing or increased chest pressure or chest pain.  CCL access:  Left upper chest.  P: Continue to monitor status.  Discharge to home once meeting criteria.

## 2023-05-11 NOTE — DISCHARGE INSTRUCTIONS
Home Care after an ICD implant    Wound care:  Keep your incision (surgery wound) dry for 3 days.  After 3 days, you may remove the outer bandage.  Keep the strips of tape on.  They will be removed at your clinic visit.  Check for signs of infection each day.  These include increased redness, swelling, drainage or a fever over 101 F (38.3 C).  Call us immediately if you see any of these signs.  If there are no signs of infection, you may shower in 3 days.  Do not submerge the incision (in a bath tub, hot tub, or swimming pool) until fully healed.  Pain:   You may have mild to moderate pain for 3 to 5 days.  Take acetaminophen (Tylenol) or ibuprofen (Advil) for the pain.  Call us if the pain is severe or lasts more than 5 days.    Activity:  You should slowly go back to your normal activities after 24 hours.  Healing will take 4 to 6 weeks.  No driving for 3 days  Avoid climbing a ladder alone.  It is best to stay within 4 feet of the ground.  Avoid anything that may cause rough contact or a hard hit to your chest.  This includes football, hockey, and other contact sports.  Do not go swimming or boating alone.      For at least 4 weeks:  Do not raise your affected arm above your shoulder.  Do not use your affected arm to push, pull, or lift anything over 10 pounds.  Avoid repetitive upper body activities for 6 weeks (ie: golf, swimming, and weight lifting)    Follow Up Visits:  Return to the clinic in 7 to 10 days to have your device and wound checked.    Telling others about your device:  Before you have any medical tests or treatments, tell the doctors, dentists, and other care providers about your device.  There are a few tests and treatments that may interfere with your device.  (These include MRI, radiation therapy, electrocautery, and others.)  Your care team may need to take special steps to keep you safe.  Before you leave the hospital, you will receive a temporary ID card.  A permanent card will be mailed  to you about 6 to 8 weeks later.  Always carry the ID card with you.  It has important details about your device.  You should also get a MedicAlert ID.  Please ask us for a MedicAlert brochure, or go to [email or web address].    Safety near electrical equipment:  All of these are safe to use when in good repair:  Microwaves  Radios  Cordless phone  Remote controls  Small electrical tools  Cell phones: Keep cell phones at least 6 inches from your device.  Do not carry the phone in a pocket near your device.  Security mary: It is okay to walk through security mary at the airports and department stores.  Tell airport security that you have a defibrillator.  They should keep the screening wand at least 6 inches from your device.  Full-body scanners are safe.    Avoid the following:   MRI tests in the hospital unless you have a MRI safe defibrillator.   Arc welding, chain saws and high-powered industrial or commercial tools.   Power lines, power plants and large power generators.   Electric body fat scales.   Magnetic mattress pads or pillow.    What to do after a shock from your ICD:  Stop what you re doing and rest.  If you feel fine before and after the shock, call the device clinic.  If you feel unwell or receive more than one shock, call 911 or go to the emergency room.  A shock could mean that your condition has changed and you may need to see a doctor.    Questions?  Please call [facility]   Device Nurse:          Business Hours:  [phone]    After Hours:  [phone]   Choose option 4, then ask for the on-call device nurse at job code [id number].    Your next device clinic appointment is scheduled on:     ___________________________ at _____________.                                                 [facility] - [facility] [address]  [address] [place] [address]

## 2023-05-11 NOTE — Clinical Note
Prepped: chest. Prepped with: ChloraPrep. The patient was draped. .Pre-procedure site marking:N/ACHG wipes

## 2023-05-14 LAB
ATRIAL RATE - MUSE: 73 BPM
ATRIAL RATE - MUSE: 74 BPM
DIASTOLIC BLOOD PRESSURE - MUSE: NORMAL MMHG
DIASTOLIC BLOOD PRESSURE - MUSE: NORMAL MMHG
INTERPRETATION ECG - MUSE: NORMAL
INTERPRETATION ECG - MUSE: NORMAL
P AXIS - MUSE: -4 DEGREES
P AXIS - MUSE: 7 DEGREES
PR INTERVAL - MUSE: 184 MS
PR INTERVAL - MUSE: 192 MS
QRS DURATION - MUSE: 122 MS
QRS DURATION - MUSE: 126 MS
QT - MUSE: 410 MS
QT - MUSE: 418 MS
QTC - MUSE: 455 MS
QTC - MUSE: 460 MS
R AXIS - MUSE: 55 DEGREES
R AXIS - MUSE: 67 DEGREES
SYSTOLIC BLOOD PRESSURE - MUSE: NORMAL MMHG
SYSTOLIC BLOOD PRESSURE - MUSE: NORMAL MMHG
T AXIS - MUSE: 110 DEGREES
T AXIS - MUSE: 118 DEGREES
VENTRICULAR RATE- MUSE: 73 BPM
VENTRICULAR RATE- MUSE: 74 BPM

## 2023-05-15 ENCOUNTER — TELEPHONE (OUTPATIENT)
Dept: CARDIOLOGY | Facility: CLINIC | Age: 48
End: 2023-05-15
Payer: COMMERCIAL

## 2023-05-15 NOTE — TELEPHONE ENCOUNTER
Health Call Center    Phone Message    May a detailed message be left on voicemail: yes     Reason for Call: Other: [place] with [facility] in [place], [place] called wanting to speak with someone on the care team. Patient was discharged from hospital and stated they were to do a post op check with [facility]. Odessa is needing clarifications and information to be sent over in regards to what the post op check should be, and if patient has a device what kind of device it is. Please fax information over to [phone], or call [phone] if there are any questions.     Action Taken: Other: Cardiology    Travel Screening: Not Applicable     Thank you!  [facility]

## 2023-05-16 NOTE — TELEPHONE ENCOUNTER
"Faxed over op note and included this on cover sheet:    \"Patient to have incision check done locally 7-10 days post ICD implant on 5/11/23. Please see op notes for your request of device information.  Please call [phone] with any additional questions.\"    "

## 2023-05-17 ENCOUNTER — ANCILLARY PROCEDURE (OUTPATIENT)
Dept: CARDIOLOGY | Facility: CLINIC | Age: 48
End: 2023-05-17
Attending: INTERNAL MEDICINE
Payer: COMMERCIAL

## 2023-05-17 DIAGNOSIS — Z95.810 ICD (IMPLANTABLE CARDIOVERTER-DEFIBRILLATOR), SINGLE, IN SITU: ICD-10-CM

## 2023-05-17 PROCEDURE — 93296 REM INTERROG EVL PM/IDS: CPT

## 2023-05-17 PROCEDURE — 93295 DEV INTERROG REMOTE 1/2/MLT: CPT | Performed by: INTERNAL MEDICINE

## 2023-05-22 LAB
MDC_IDC_LEAD_IMPLANT_DT: NORMAL
MDC_IDC_LEAD_LOCATION: NORMAL
MDC_IDC_LEAD_LOCATION_DETAIL_1: NORMAL
MDC_IDC_LEAD_MFG: NORMAL
MDC_IDC_LEAD_MODEL: NORMAL
MDC_IDC_LEAD_POLARITY_TYPE: NORMAL
MDC_IDC_LEAD_SERIAL: NORMAL
MDC_IDC_LEAD_SPECIAL_FUNCTION: NORMAL
MDC_IDC_MSMT_BATTERY_DTM: NORMAL
MDC_IDC_MSMT_BATTERY_REMAINING_LONGEVITY: 168 MO
MDC_IDC_MSMT_BATTERY_RRT_TRIGGER: NORMAL
MDC_IDC_MSMT_BATTERY_VOLTAGE: 3.12 V
MDC_IDC_MSMT_CAP_CHARGE_ENERGY: 40 J
MDC_IDC_MSMT_CAP_CHARGE_TIME: 0 S
MDC_IDC_MSMT_CAP_CHARGE_TYPE: NORMAL
MDC_IDC_MSMT_LEADCHNL_RV_IMPEDANCE_VALUE: 399 OHM
MDC_IDC_MSMT_LEADCHNL_RV_IMPEDANCE_VALUE: 475 OHM
MDC_IDC_MSMT_LEADCHNL_RV_PACING_THRESHOLD_AMPLITUDE: 0.62 V
MDC_IDC_MSMT_LEADCHNL_RV_PACING_THRESHOLD_PULSEWIDTH: 0.4 MS
MDC_IDC_MSMT_LEADCHNL_RV_SENSING_INTR_AMPL: 24.5 MV
MDC_IDC_PG_IMPLANT_DTM: NORMAL
MDC_IDC_PG_MFG: NORMAL
MDC_IDC_PG_MODEL: NORMAL
MDC_IDC_PG_SERIAL: NORMAL
MDC_IDC_PG_TYPE: NORMAL
MDC_IDC_SESS_CLINIC_NAME: NORMAL
MDC_IDC_SESS_DTM: NORMAL
MDC_IDC_SESS_TYPE: NORMAL
MDC_IDC_SET_BRADY_HYSTRATE: NORMAL
MDC_IDC_SET_BRADY_LOWRATE: 40 {BEATS}/MIN
MDC_IDC_SET_BRADY_MODE: NORMAL
MDC_IDC_SET_LEADCHNL_RV_PACING_AMPLITUDE: 3.5 V
MDC_IDC_SET_LEADCHNL_RV_PACING_ANODE_ELECTRODE_1: NORMAL
MDC_IDC_SET_LEADCHNL_RV_PACING_ANODE_LOCATION_1: NORMAL
MDC_IDC_SET_LEADCHNL_RV_PACING_CAPTURE_MODE: NORMAL
MDC_IDC_SET_LEADCHNL_RV_PACING_CATHODE_ELECTRODE_1: NORMAL
MDC_IDC_SET_LEADCHNL_RV_PACING_CATHODE_LOCATION_1: NORMAL
MDC_IDC_SET_LEADCHNL_RV_PACING_POLARITY: NORMAL
MDC_IDC_SET_LEADCHNL_RV_PACING_PULSEWIDTH: 0.4 MS
MDC_IDC_SET_LEADCHNL_RV_SENSING_ANODE_ELECTRODE_1: NORMAL
MDC_IDC_SET_LEADCHNL_RV_SENSING_ANODE_LOCATION_1: NORMAL
MDC_IDC_SET_LEADCHNL_RV_SENSING_CATHODE_ELECTRODE_1: NORMAL
MDC_IDC_SET_LEADCHNL_RV_SENSING_CATHODE_LOCATION_1: NORMAL
MDC_IDC_SET_LEADCHNL_RV_SENSING_POLARITY: NORMAL
MDC_IDC_SET_LEADCHNL_RV_SENSING_SENSITIVITY: 0.3 MV
MDC_IDC_SET_ZONE_DETECTION_BEATS_DENOMINATOR: 40 {BEATS}
MDC_IDC_SET_ZONE_DETECTION_BEATS_NUMERATOR: 30 {BEATS}
MDC_IDC_SET_ZONE_DETECTION_INTERVAL: 270 MS
MDC_IDC_SET_ZONE_DETECTION_INTERVAL: 320 MS
MDC_IDC_SET_ZONE_DETECTION_INTERVAL: 400 MS
MDC_IDC_SET_ZONE_TYPE: NORMAL
MDC_IDC_STAT_AT_BURDEN_PERCENT: 0 %
MDC_IDC_STAT_AT_DTM_END: NORMAL
MDC_IDC_STAT_AT_DTM_START: NORMAL
MDC_IDC_STAT_BRADY_DTM_END: NORMAL
MDC_IDC_STAT_BRADY_DTM_START: NORMAL
MDC_IDC_STAT_BRADY_RV_PERCENT_PACED: 0 %
MDC_IDC_STAT_CRT_DTM_END: NORMAL
MDC_IDC_STAT_CRT_DTM_START: NORMAL
MDC_IDC_STAT_EPISODE_RECENT_COUNT: 0
MDC_IDC_STAT_EPISODE_RECENT_COUNT_DTM_END: NORMAL
MDC_IDC_STAT_EPISODE_RECENT_COUNT_DTM_START: NORMAL
MDC_IDC_STAT_EPISODE_TOTAL_COUNT: 0
MDC_IDC_STAT_EPISODE_TOTAL_COUNT_DTM_END: NORMAL
MDC_IDC_STAT_EPISODE_TOTAL_COUNT_DTM_START: NORMAL
MDC_IDC_STAT_EPISODE_TYPE: NORMAL
MDC_IDC_STAT_TACHYTHERAPY_ATP_DELIVERED_RECENT: 0
MDC_IDC_STAT_TACHYTHERAPY_ATP_DELIVERED_TOTAL: 0
MDC_IDC_STAT_TACHYTHERAPY_RECENT_DTM_END: NORMAL
MDC_IDC_STAT_TACHYTHERAPY_RECENT_DTM_START: NORMAL
MDC_IDC_STAT_TACHYTHERAPY_SHOCKS_ABORTED_RECENT: 0
MDC_IDC_STAT_TACHYTHERAPY_SHOCKS_ABORTED_TOTAL: 0
MDC_IDC_STAT_TACHYTHERAPY_SHOCKS_DELIVERED_RECENT: 0
MDC_IDC_STAT_TACHYTHERAPY_SHOCKS_DELIVERED_TOTAL: 0
MDC_IDC_STAT_TACHYTHERAPY_TOTAL_DTM_END: NORMAL
MDC_IDC_STAT_TACHYTHERAPY_TOTAL_DTM_START: NORMAL

## 2023-06-02 ENCOUNTER — HEALTH MAINTENANCE LETTER (OUTPATIENT)
Age: 48
End: 2023-06-02

## 2023-07-25 ENCOUNTER — VIRTUAL VISIT (OUTPATIENT)
Dept: CARDIOLOGY | Facility: CLINIC | Age: 48
End: 2023-07-25
Attending: INTERNAL MEDICINE
Payer: COMMERCIAL

## 2023-07-25 VITALS — WEIGHT: 176 LBS | BODY MASS INDEX: 32.39 KG/M2 | HEIGHT: 62 IN

## 2023-07-25 DIAGNOSIS — I50.21 ACUTE SYSTOLIC HEART FAILURE (H): ICD-10-CM

## 2023-07-25 DIAGNOSIS — I25.10 CORONARY ARTERY DISEASE INVOLVING NATIVE CORONARY ARTERY OF NATIVE HEART WITHOUT ANGINA PECTORIS: ICD-10-CM

## 2023-07-25 DIAGNOSIS — I25.5 ISCHEMIC CARDIOMYOPATHY: Primary | ICD-10-CM

## 2023-07-25 DIAGNOSIS — Z95.5 STATUS POST INSERTION OF DRUG ELUTING CORONARY ARTERY STENT: ICD-10-CM

## 2023-07-25 PROCEDURE — 99214 OFFICE O/P EST MOD 30 MIN: CPT | Mod: 95 | Performed by: INTERNAL MEDICINE

## 2023-07-25 RX ORDER — METOPROLOL SUCCINATE 25 MG/1
100 TABLET, EXTENDED RELEASE ORAL DAILY
Qty: 90 TABLET | Refills: 3 | Status: ON HOLD | OUTPATIENT
Start: 2023-07-25 | End: 2024-02-05

## 2023-07-25 ASSESSMENT — PAIN SCALES - GENERAL: PAINLEVEL: NO PAIN (0)

## 2023-07-25 NOTE — PATIENT INSTRUCTIONS
Dr. Guerin recommends:    Follow up video or clinic visit in 3 months. If patient chooses in person, please schedule labs prior to appointment same day.    Thank you for your visit today.  Please call me with any questions or concerns.   Donovan Rendon RN  Cardiology Care Coordinator  [phone]

## 2023-07-25 NOTE — NURSING NOTE
Patient confirms medications and allergies are accurate via patients echeck in completion, and or denies any changes since last reviewed/verified.       Lawanda Foote, Virtual Facilitator  Is the patient currently in the state of [place]? YES    Visit mode:TELEPHONE    If the visit is dropped, the patient can be reconnected by: TELEPHONE VISIT: Phone number: [phone]    Will anyone else be joining the visit? NO      How would you like to obtain your AVS? MyChart    Are changes needed to the allergy or medication list? YES: Metoprolol  100mg daily    Reason for visit: RECHECK

## 2023-07-25 NOTE — PROGRESS NOTES
Virtual Visit Details    Type of service:  Video Visit   Video Start Time: 12:31p  Video End Time:12:49p    Originating Location (pt. Location): Home    Distant Location (provider location):  On-site  Platform used for Video Visit: Amanda    July 25, 2023     Dear Colleagues,     I had the pleasure of talking to Cailin Burns  in the [facility]. As you know she is a 46 y/o F with DM, HLD, obesity, tobacco use disorder, sciatica who was recently admitted to [place] ICU with STEMI. She initially presented to urgent care with new onset chest pain, was found to have STEMI and was transferred to [facility] for immediate further evaluation and angiogram on 9/25/22. It was significant for 3 vessel disease. Case was immediately discussed with Dr Leal for potential urgent CABG considerations but ultimately the decision was made to proceed with PCI due to poor bypass targets. Intervention to LCx was made. Prior to further PCI PET viability was pursued prior to considering further interventions. Unfortunately viability did not show enough and thus medical management was recommended. She did have short runs of NSVT while inpatient. ECHO showed EF 20% and thus she was discharged with LifeVest. She was discharged on mimimal medical therapy due to associated hypotension and not being able to tolerate higher doses. She also has PRN lasix for weight gain and PRN nitro. She is following up at the CHF clinic at [place] and was also referred to [facility] for potential future advanced heart failure therapies evaluation.      After discussion with her and the interventional and surgical teams at [facility], the decision was made to proceed with staged PCI of the CTOs. These were performed recently. She also follows in [place] and recently attempts were made to increase XL to 50mg daily. In addition, she continued to have the LifeVest and potential referral for device was discussed. ICD had been implanted 5/11/23.     She has been  doing very well from the cardiac standpoint without any issues.  She does have some peripheral rectal disease and does have lower extremity pain on ambulation especially on the right side.  She did have segmental pressures done and it showed that there is some disease and now she is got a vascular appointment coming up.  Left side looks fine.  No other complaints overall takes all medications as prescribed.     PAST MEDICAL HISTORY:   Diabetes mellitus without complication (HCC)    Depression with anxiety    Dyslipidemia    Preventative health care    Obesity (BMI 30.0-34.9)    Hyperlipidemia    Tobacco use disorder    Enlarged thyroid    Leg pain, lateral, left    Chronic left-sided low back pain with left-sided sciatica    PND (post-nasal drip)    Strain of flexor muscle of hip, right, initial encounter    STEMI (ST elevation myocardial infarction) (HCC) 2022  ICD Implant 2023:   Device: [employer] [id number] Oxford XT VR MRI  Normal Device Function.  Mode: VVI 40 bpm     FAMILY HISTORY:  No relevant past family history    SOCIAL HISTORY:  Social History     Socioeconomic History    Marital status:    Tobacco Use    Smoking status: Some Days     Types: Cigarettes     Last attempt to quit: 2022     Years since quittin.9    Smokeless tobacco: Never    Tobacco comments:     Trying to quit, wearing patch    Substance and Sexual Activity    Alcohol use: Not Currently    Drug use: Never     CURRENT MEDICATIONS:  Current Outpatient Medications   Medication    Acetaminophen-Codeine (TYLENOL WITH CODEINE #3 PO)    ANTIPLATELET MEDICATION NOT PRESCRIBED, INTENTIONAL,    ANTIPLATELET MEDICATION NOT PRESCRIBED, INTENTIONAL,    aspirin (ASA) 81 MG EC tablet    atorvastatin (LIPITOR) 80 MG tablet    blood glucose (CONTOUR NEXT TEST) test strip    busPIRone (BUSPAR) 10 MG tablet    dapagliflozin (FARXIGA) tablet    fenofibrate (TRIGLIDE/LOFIBRA) 160 MG tablet    furosemide (LASIX) 20 MG tablet    insulin  glargine (LANTUS PEN) 100 UNIT/ML pen    liraglutide (VICTOZA PEN) 18 MG/3ML solution    metFORMIN (GLUCOPHAGE) 1000 MG tablet    metoprolol succinate ER (TOPROL XL) 25 MG 24 hr tablet    nicotine (NICODERM CQ) 21 MG/24HR 24 hr patch    nicotine (NICODERM CQ) 7 MG/24HR 24 hr patch    nicotine (NICOTROL) 10 MG inhaler    nitroGLYcerin (NITROSTAT) 0.4 MG sublingual tablet    sacubitril-valsartan (ENTRESTO) 24-26 MG per tablet    ticagrelor (BRILINTA) 90 MG tablet    venlafaxine (EFFEXOR) 100 MG tablet     No current facility-administered medications for this visit.     ROS:   Constitutional: No fever, chills, or sweats. Weight is 0 lbs 0 oz  ENT: No visual disturbance, ear ache, epistaxis, sore throat.   Allergies/Immunologic: Negative.   Respiratory: No cough, hemoptysis.   Cardiovascular: As per HPI.   GI: No nausea, vomiting, hematemesis, melena, or hematochezia.   : No urinary frequency, dysuria, or hematuria.   Integument: Negative.   Psychiatric: Pleasant, no major depression noted  Neuro: No focal neurological deficits noted  Endocrinology: Negative.   Musculoskeletal: As per HPI.      EXAM:  General: appears comfortable, alert and oriented  Head: normocephalic, atraumatic  Eyes: anicteric sclera, EOMI , PERRL  Neck: no adenopathy  Orophyarynx: moist mucosa, no lesions noted  Heart: regular, S1/S2, no murmurs, rubs or gallop. Estimated JVP at 5 cmH2O  Lungs: CTAB, No wheezing.   Abdomen: soft, non-tender, bowel sounds present, no hepatosplenomegaly  Extremities: No LE edema today  Skin: no open lesions noted  Neuro: grossly non-focal     Labs:  Lab Results   Component Value Date    WBC 10.6 05/11/2023    HGB 14.1 05/11/2023    HCT 43.5 05/11/2023     05/11/2023     05/11/2023    POTASSIUM 4.6 05/11/2023    CHLORIDE 105 05/11/2023    CO2 20 (L) 05/11/2023    BUN 12.1 05/11/2023    CR 0.59 05/11/2023     (H) 05/11/2023    INR 0.89 01/02/2023     Labs: All available osh labs reviewed. Renal  function normal. Troonin peak at 64, normal sensitivity. Blood group 0. A1C 7.9.      [facility] - 9/25/22: significant 3VD,  - LCx - mid, 99%, culprit - s/p PCI with a 2.75*26 mm Israel MOHAN  - LAD - diffusely diseased, small caliber, 90% mid & distal - not amenable to PCI due to small caliber  - RCA - mid, 100%, , fills distally with R-R collaterals, small caliber.   Discussed presentation with Dr Leal for consideration for CABG, before proceeding with PCI, it was decided to proceed with PCI-LCx & assess for CABG later.     TTE 9/25/22:    Left Ventricle: The left ventricle is dilated. Severely reduced left  ventricular systolic function. The EF is visually estimated to be 20%. No  thrombus seen -contrast images also reviewed See diagram for wall motion details.     Right Ventricle: The right ventricle appears normal in size. The tricuspid jet envelope definition is inadequate for estimation of RV systolic pressure.     Mitral Valve: The leaflets are thickened. There is mild regurgitation.     Tricuspid Valve: There is no regurgitation.     This is an abnormal study.      PET viability 9/28/22:    Metabolic Viability Conclusion: The left ventricle has potentially viable myocardium.  The total viable myocardium is 39%, total scar burden is 32%     Resting Perfusion Details: Large perfusion defect at rest involving the entire anteroseptal, inferoseptal, inferior and inferolateral segments.   This includes the entire RCA territory and parts of the LAD and possibly LCx territories.     Metabolic Viability Defect: The mid inferoseptal, mid inferior, mid inferolateral, distal septal and distal inferior segments are nonviable. The LCx territory appears to be mostly viable at 95% total viability. The LAD territory is predominantly viable with 75% viability. The RCA territory appears to be 50% viable with no perfusion at rest     Coronary angiogram 12/9//2022:  Severe three vessel coronary artery disease (mRCA , pLAD, mLcx  stenosis)     Coronary angiogram 1/2/2023:  Severe three vessel CAD status post PCI to the RCA  and residual LAD and LCx disease.  Successful PCI of midLAD with MOHAN x1 (2.5 x 32 mm Synergy MOHAN) and mid-LCx (2.75 x 16 mm Synergy MOHAN).     TTE 4/11/23    Left Ventricle: The left ventricle is dilated. Severely reduced left  ventricular systolic function. The EF is visually estimated to be 25-30%. Global hypokinesis of the left ventricle with akinetic apical segments.     Right Ventricle: The right ventricle appears normal in size. Systolic function is normal.     Aortic Valve: There is mild-to-moderate regurgitation.      ASSESSMENT AND PLAN:  In summary, patient is a 47 year old lady with a past medical history including diabetes which is reasonably better controlled at the moment as well as recent STEMI back in September 2022 in the setting of atypical symptoms with high peak and troponin at around 63.  She does have multivessel coronary artery disease only the LCx vessel was intervened however she has significant disease in the small LAD as well as  of the RCA.  She was found to be not a surgical candidate in [place].  She was started on medications now the blood pressure is better at 120/80.  Patient at home and as such losartan was started.  Images were reviewed and options discussed extensively with surgical as well as interventional colleagues at [facility]. Ultimately the decision was made to proceed with staged PCI of the CTOs. She did have this done early December 2022 and then January 2023 and tolerated the procedures well.      She continues to do well without any issues.  Her blood pressure is well controlled per her home measurements and takes her medications as prescribed.  She did get the ICD implanted without any complications, likely there were no ICD shocks and doing well from that standpoint.  No medication changes at this time.  I agree with vascular work-up and vascular ultrasound as ordered.  I  will see her back in 3 months potentially virtually.     I appreciate the opportunity to participate in the care of Cailin Burns . Please do not hesitate to contact me with any further questions.     Sincerely,   Kurt Guerin MD  [facility] Division of Cardiology

## 2023-07-25 NOTE — LETTER
7/25/2023      RE: Cailin Burns  [address]  [address]  [place] [address]       Dear Colleague,    Thank you for the opportunity to participate in the care of your patient, Cailin Burns, at the [facility] [place] at [facility]. Please see a copy of my visit note below.    Virtual Visit Details    Type of service:  Video Visit   Video Start Time: 12:31p  Video End Time:12:49p    Originating Location (pt. Location): Home  {PROVIDER LOCATION On-site should be selected for visits conducted from your clinic location or adjoining [facility] hospital, academic office, or other nearby [facility] building. Off-site should be selected for all other provider locations, including home:[id number]}  Distant Location (provider location):  On-site  Platform used for Video Visit: Amanda    July 25, 2023     Dear Colleagues,     I had the pleasure of talking to Cailin Burns  in the [facility]. As you know she is a 46 y/o F with DM, HLD, obesity, tobacco use disorder, sciatica who was recently admitted to [place] ICU with STEMI. She initially presented to urgent care with new onset chest pain, was found to have STEMI and was transferred to [facility] for immediate further evaluation and angiogram on 9/25/22. It was significant for 3 vessel disease. Case was immediately discussed with Dr Leal for potential urgent CABG considerations but ultimately the decision was made to proceed with PCI due to poor bypass targets. Intervention to LCx was made. Prior to further PCI PET viability was pursued prior to considering further interventions. Unfortunately viability did not show enough and thus medical management was recommended. She did have short runs of NSVT while inpatient. ECHO showed EF 20% and thus she was discharged with LifeVest. She was discharged on mimimal medical therapy due to associated hypotension and not being able to tolerate higher doses.  She also has PRN lasix for weight gain and PRN nitro. She is following up at the CHF clinic at [place] and was also referred to [facility] for potential future advanced heart failure therapies evaluation.      After discussion with her and the interventional and surgical teams at [facility], the decision was made to proceed with staged PCI of the CTOs. These were performed recently. She also follows in [place] and recently attempts were made to increase XL to 50mg daily. In addition, she continued to have the LifeVest and potential referral for device was discussed. ICD had been implanted 23.     She has been doing very well from the cardiac standpoint without any issues.  She does have some peripheral rectal disease and does have lower extremity pain on ambulation especially on the right side.  She did have segmental pressures done and it showed that there is some disease and now she is got a vascular appointment coming up.  Left side looks fine.  No other complaints overall takes all medications as prescribed.     PAST MEDICAL HISTORY:    Diabetes mellitus without complication (HCC)     Depression with anxiety     Dyslipidemia     Preventative health care     Obesity (BMI 30.0-34.9)     Hyperlipidemia     Tobacco use disorder     Enlarged thyroid     Leg pain, lateral, left     Chronic left-sided low back pain with left-sided sciatica     PND (post-nasal drip)     Strain of flexor muscle of hip, right, initial encounter     STEMI (ST elevation myocardial infarction) (HCC) 2022  ICD Implant 2023:   Device: [employer] [id number] [place] XT VR MRI  Normal Device Function.  Mode: VVI 40 bpm     FAMILY HISTORY:  No relevant past family history    SOCIAL HISTORY:  Social History     Socioeconomic History     Marital status:    Tobacco Use     Smoking status: Some Days     Types: Cigarettes     Last attempt to quit: 2022     Years since quittin.9     Smokeless tobacco: Never     Tobacco comments:     Trying to  quit, wearing patch    Substance and Sexual Activity     Alcohol use: Not Currently     Drug use: Never     CURRENT MEDICATIONS:  Current Outpatient Medications   Medication     Acetaminophen-Codeine (TYLENOL WITH CODEINE #3 PO)     ANTIPLATELET MEDICATION NOT PRESCRIBED, INTENTIONAL,     ANTIPLATELET MEDICATION NOT PRESCRIBED, INTENTIONAL,     aspirin (ASA) 81 MG EC tablet     atorvastatin (LIPITOR) 80 MG tablet     blood glucose (CONTOUR NEXT TEST) test strip     busPIRone (BUSPAR) 10 MG tablet     dapagliflozin (FARXIGA) tablet     fenofibrate (TRIGLIDE/LOFIBRA) 160 MG tablet     furosemide (LASIX) 20 MG tablet     insulin glargine (LANTUS PEN) 100 UNIT/ML pen     liraglutide (VICTOZA PEN) 18 MG/3ML solution     metFORMIN (GLUCOPHAGE) 1000 MG tablet     metoprolol succinate ER (TOPROL XL) 25 MG 24 hr tablet     nicotine (NICODERM CQ) 21 MG/24HR 24 hr patch     nicotine (NICODERM CQ) 7 MG/24HR 24 hr patch     nicotine (NICOTROL) 10 MG inhaler     nitroGLYcerin (NITROSTAT) 0.4 MG sublingual tablet     sacubitril-valsartan (ENTRESTO) 24-26 MG per tablet     ticagrelor (BRILINTA) 90 MG tablet     venlafaxine (EFFEXOR) 100 MG tablet     No current facility-administered medications for this visit.     ROS:   Constitutional: No fever, chills, or sweats. Weight is 0 lbs 0 oz  ENT: No visual disturbance, ear ache, epistaxis, sore throat.   Allergies/Immunologic: Negative.   Respiratory: No cough, hemoptysis.   Cardiovascular: As per HPI.   GI: No nausea, vomiting, hematemesis, melena, or hematochezia.   : No urinary frequency, dysuria, or hematuria.   Integument: Negative.   Psychiatric: Pleasant, no major depression noted  Neuro: No focal neurological deficits noted  Endocrinology: Negative.   Musculoskeletal: As per HPI.      EXAM:  General: appears comfortable, alert and oriented  Head: normocephalic, atraumatic  Eyes: anicteric sclera, EOMI , PERRL  Neck: no adenopathy  Orophyarynx: moist mucosa, no lesions  noted  Heart: regular, S1/S2, no murmurs, rubs or gallop. Estimated JVP at 5 cmH2O  Lungs: CTAB, No wheezing.   Abdomen: soft, non-tender, bowel sounds present, no hepatosplenomegaly  Extremities: No LE edema today  Skin: no open lesions noted  Neuro: grossly non-focal     Labs:  Lab Results   Component Value Date    WBC 10.6 05/11/2023    HGB 14.1 05/11/2023    HCT 43.5 05/11/2023     05/11/2023     05/11/2023    POTASSIUM 4.6 05/11/2023    CHLORIDE 105 05/11/2023    CO2 20 (L) 05/11/2023    BUN 12.1 05/11/2023    CR 0.59 05/11/2023     (H) 05/11/2023    INR 0.89 01/02/2023     Labs: All available osh labs reviewed. Renal function normal. Troonin peak at 64, normal sensitivity. Blood group 0. A1C 7.9.      [facility] - 9/25/22: significant 3VD,  - LCx - mid, 99%, culprit - s/p PCI with a 2.75*26 mm Israel MOHAN  - LAD - diffusely diseased, small caliber, 90% mid & distal - not amenable to PCI due to small caliber  - RCA - mid, 100%, , fills distally with R-R collaterals, small caliber.   Discussed presentation with Dr Leal for consideration for CABG, before proceeding with PCI, it was decided to proceed with PCI-LCx & assess for CABG later.     TTE 9/25/22:     Left Ventricle: The left ventricle is dilated. Severely reduced left  ventricular systolic function. The EF is visually estimated to be 20%. No  thrombus seen -contrast images also reviewed See diagram for wall motion details.      Right Ventricle: The right ventricle appears normal in size. The tricuspid jet envelope definition is inadequate for estimation of RV systolic pressure.      Mitral Valve: The leaflets are thickened. There is mild regurgitation.      Tricuspid Valve: There is no regurgitation.      This is an abnormal study.      PET viability 9/28/22:     Metabolic Viability Conclusion: The left ventricle has potentially viable myocardium.  The total viable myocardium is 39%, total scar burden is 32%      Resting Perfusion Details:  Large perfusion defect at rest involving the entire anteroseptal, inferoseptal, inferior and inferolateral segments.   This includes the entire RCA territory and parts of the LAD and possibly LCx territories.      Metabolic Viability Defect: The mid inferoseptal, mid inferior, mid inferolateral, distal septal and distal inferior segments are nonviable. The LCx territory appears to be mostly viable at 95% total viability. The LAD territory is predominantly viable with 75% viability. The RCA territory appears to be 50% viable with no perfusion at rest     Coronary angiogram 12/9//2022:  Severe three vessel coronary artery disease (mRCA , pLAD, mLcx stenosis)     Coronary angiogram 1/2/2023:  Severe three vessel CAD status post PCI to the RCA  and residual LAD and LCx disease.  Successful PCI of midLAD with MOHAN x1 (2.5 x 32 mm Synergy MOHAN) and mid-LCx (2.75 x 16 mm Synergy MOHAN).     TTE 4/11/23     Left Ventricle: The left ventricle is dilated. Severely reduced left  ventricular systolic function. The EF is visually estimated to be 25-30%. Global hypokinesis of the left ventricle with akinetic apical segments.      Right Ventricle: The right ventricle appears normal in size. Systolic function is normal.      Aortic Valve: There is mild-to-moderate regurgitation.      ASSESSMENT AND PLAN:  In summary, patient is a 47 year old lady with a past medical history including diabetes which is reasonably better controlled at the moment as well as recent STEMI back in September 2022 in the setting of atypical symptoms with high peak and troponin at around 63.  She does have multivessel coronary artery disease only the LCx vessel was intervened however she has significant disease in the small LAD as well as  of the RCA.  She was found to be not a surgical candidate in [place].  She was started on medications now the blood pressure is better at 120/80.  Patient at home and as such losartan was started.  Images were reviewed  and options discussed extensively with surgical as well as interventional colleagues at [facility]. Ultimately the decision was made to proceed with staged PCI of the CTOs. She did have this done early December 2022 and then January 2023 and tolerated the procedures well.      She continues to do well without any issues.  Her blood pressure is well controlled per her home measurements and takes her medications as prescribed.  She did get the ICD implanted without any complications, likely there were no ICD shocks and doing well from that standpoint.  No medication changes at this time.  I agree with vascular work-up and vascular ultrasound as ordered.  I will see her back in 3 months potentially virtually.     I appreciate the opportunity to participate in the care of Cailin Burns . Please do not hesitate to contact me with any further questions.     Sincerely,   Kurt Guerin MD  [facility] Division of Cardiology         Please do not hesitate to contact me if you have any questions/concerns.     Sincerely,     Kurt Guerin MD

## 2023-08-14 ENCOUNTER — TRANSFERRED RECORDS (OUTPATIENT)
Dept: HEALTH INFORMATION MANAGEMENT | Facility: CLINIC | Age: 48
End: 2023-08-14

## 2023-08-14 ENCOUNTER — ANCILLARY PROCEDURE (OUTPATIENT)
Dept: CARDIOLOGY | Facility: CLINIC | Age: 48
End: 2023-08-14
Attending: INTERNAL MEDICINE
Payer: COMMERCIAL

## 2023-08-14 DIAGNOSIS — Z95.810 ICD (IMPLANTABLE CARDIOVERTER-DEFIBRILLATOR), SINGLE, IN SITU: ICD-10-CM

## 2023-08-14 PROCEDURE — 99207 CARDIAC DEVICE CHECK - REMOTE: CPT | Performed by: INTERNAL MEDICINE

## 2023-08-16 ENCOUNTER — VIRTUAL VISIT (OUTPATIENT)
Dept: CARDIOLOGY | Facility: CLINIC | Age: 48
End: 2023-08-16
Attending: NURSE PRACTITIONER
Payer: COMMERCIAL

## 2023-08-16 VITALS — BODY MASS INDEX: 31.71 KG/M2 | WEIGHT: 179 LBS | HEIGHT: 63 IN

## 2023-08-16 DIAGNOSIS — I25.5 ISCHEMIC CARDIOMYOPATHY: ICD-10-CM

## 2023-08-16 DIAGNOSIS — Z95.810 S/P ICD (INTERNAL CARDIAC DEFIBRILLATOR) PROCEDURE: Primary | ICD-10-CM

## 2023-08-16 PROCEDURE — 99212 OFFICE O/P EST SF 10 MIN: CPT | Mod: VID | Performed by: NURSE PRACTITIONER

## 2023-08-16 ASSESSMENT — PAIN SCALES - GENERAL: PAINLEVEL: SEVERE PAIN (6)

## 2023-08-16 ASSESSMENT — PATIENT HEALTH QUESTIONNAIRE - PHQ9: SUM OF ALL RESPONSES TO PHQ QUESTIONS 1-9: 18

## 2023-08-16 NOTE — LETTER
8/16/2023      RE: Cailin Burns  [address]  [address]  [facility] [address]       Dear Colleague,    Thank you for the opportunity to participate in the care of your patient, Cailin Burns, at the [facility] [place] [facility]. Please see a copy of my visit note below.          ELECTROPHYSIOLOGY VIRTUAL CLINIC VISIT    Assessment/Recommendations   Assessment/Plan:    Ms. Burns is a 47 year old female who has a medical history significant for STEMI, CAD s/p PCI RCA 12/9/22 and mLAD 1/2/2023, ICM LVEF 25-30%, s/p ICD 5/11/23, DM , HLD, obesity, sciatica, and tobacco abuse.     ICM LVEF 25-30%, NYHA II:  1. ACEi/ARB/ARNi: Continue Entresto.  2. BB: Continue Toprol XL   3. Aldosterone antagonist: not currently on.  4. STLG2i: Continue Farxiga  5.  SCD prophylaxis: s/p ICD primary prevention. Device site well healed, stable lead parameters. Continue routine device clinic follow-up.   6. Fluid status: appears euvolemic, Continue Lasix.  7. Etiology: ischemic, CAD        Follow up in 1 year or sooner if need arises.        History of Present Illness/Subjective    Ms. Cailin Burns is a 47 year old female who comes in today for EP consultation of consideration for ICD.    Ms. Burns is a 47 year old female who has a medical history significant for STEMI, CAD s/p PCI RCA 12/9/22 and mLAD 1/2/2023, ICM LVEF 25-30%, s/p ICD 5/11/23, DM , HLD, obesity, sciatica, and tobacco abuse.     She initially presented to an Urgent care with chest pain and was found to be having a STEMI. She was urgently transferred to [place] and had coronary angiogram which demonstrated significant 3V CAD. Discussion was had about possible urgent CABG,but she was felt to have poor bypass targets and she had PCI to LCx. Prior to further interventions, she had PET viability which did not demonstrate significant viability and medical management was pursued. She had some  short runs of NSVT while inpatient. Echo at that time showed LVEF 20%. She was discharged on a LifeVest and with GMDT. She had somy hypotension which limited up titration of her neurohormonal agents. She was referred to [facility] for advanced HF therapies and established with Dr. Guerin. After discussion with interventional and surgical teams here at [facility], decision was made to proceed with staged PCI of CTOs. She ahd first procedure on 12/2/22 and then the other on 1/2/23. An echo from 4/11/23 showed LVEF 25-30% with global hypokinesis and akinetic apical segments, normal RV size/function, mild/moderate AR, and mild MR. She was seen locally for consideration for ICD and was told wait times were 6 months or so. She requested referral here for consideration of ICD.     EP Visit 4/26/23: She reports feeling well. She denies chest discomfort, palpitations, abdominal fullness/bloating or peripheral edema, paroxysmal nocturnal dyspnea, orthopnea, lightheadedness, dizziness, pre-syncope, or syncope. Her last 12 lead ECG shows NSR Vent Rate 80 bpm,  ms,  ms IVCD, QTc 450 ms. Current cardiac medications include: Toprol XL, ASA, Fenofibrate, Lasix, Tricagrelor, Lipitor, Entresto, and Farxiga.     She presents today for follow up. She had ICD implanted on 5/11/23. Device site well healed. She reports feeling well. She denies chest discomfort, palpitations, abdominal fullness/bloating or peripheral edema, shortness of breath, paroxysmal nocturnal dyspnea, orthopnea, lightheadedness, dizziness, pre-syncope, or syncope. Device transmission had shown normal device function, stable lead parameters, and no arrhyhmias recorded. Current cardiac medications include: Toprol XL, ASA, Fenofibrate, Lasix, Tricagrelor, Lipitor, Entresto, and Farxiga. .     I have reviewed and updated the patient's Past Medical History, Social History, Family History and Medication List.     Cardiographics (Personally Reviewed) :   4/11/23 Echo:      Left Ventricle: The left ventricle is dilated. Severely reduced left   ventricular systolic function. The EF is visually estimated to be 25-30%.   Global hypokinesis of the left ventricle with akinetic apical segments.     Right Ventricle: The right ventricle appears normal in size. Systolic   function is normal.     Aortic Valve: There is mild-to-moderate regurgitation.     9/2022 PET Viability:     Metabolic Viability Conclusion: The left ventricle has potentially   viable myocardium.  The total viable myocardium is 39%, total scar burden   is 32%     Resting Perfusion Details: Large perfusion defect at rest involving the   entire anteroseptal, inferoseptal, inferior and inferolateral segments.     This includes the entire RCA territory and parts of the LAD and possibly   LCx territories.     Metabolic Viability Defect: The mid inferoseptal, mid inferior, mid   inferolateral, distal septal and distal inferior segments are nonviable.   The LCx territory appears to be mostly viable at 95% total viability. The   LAD territory is predominantly viable with 75% viability. The RCA   territory appears to be 50% viable with no perfusion at rest    1/2/23 Coronary Angiogram:  Conclusion    Severe three vessel CAD status post PCI to the RCA  and residual LAD and LCx disease.  Successful PCI of midLAD with MOHAN x1 (2.5 x 32 mm Synergy MOHAN) and mid-LCx (2.75 x 16 mm Synergy MOHAN).  12/9/22 Coronary Angiogram:  Conclusion    Severe three vessel coronary artery disease (mRCA , pLAD, mLcx stenosis)   PCI of RCA with DESx3 (Synergy 2.75x48 mm , Synergy 2.25x38 mm, Resolute [place] 2.00x18 mm)           Physical Examination   There were no vitals taken for this visit.  Wt Readings from Last 3 Encounters:   07/25/23 79.8 kg (176 lb)   05/11/23 78.5 kg (173 lb)   01/02/23 76.2 kg (168 lb)     The rest of a comprehensive physical examination is deferred due to nature of video visit restrictions.  CONSITUTIONAL: no acute  distress  HEENT: no icterus, no redness or discharge, neck supple  CV: no visible edema of visualized extremities. No JVD.   RESPIRATORY: respirations nonlabored, no cough  NEURO: AA&Ox3, speech fluent/appropriate, motor grossly nonfocal  PSYCH: cooperative, affect appropriate  DERM: no rashes on visualized face/neck/upper extremities       Medications  Allergies   Current Outpatient Medications   Medication Sig Dispense Refill    Acetaminophen-Codeine (TYLENOL WITH CODEINE #3 PO) Take by mouth as needed (Patient not taking: Reported on 7/25/2023)      ANTIPLATELET MEDICATION NOT PRESCRIBED, INTENTIONAL, Please choose reason not prescribed from choices below.      ANTIPLATELET MEDICATION NOT PRESCRIBED, INTENTIONAL, Please choose reason not prescribed from choices below.      aspirin (ASA) 81 MG EC tablet Take 81 mg by mouth daily      atorvastatin (LIPITOR) 80 MG tablet Take 1 tablet by mouth daily      blood glucose (CONTOUR NEXT TEST) test strip To test BG values three times daily - uses insulin      busPIRone (BUSPAR) 10 MG tablet Take 10 mg by mouth 2 times daily      dapagliflozin (FARXIGA) tablet Take by mouth daily      fenofibrate (TRIGLIDE/LOFIBRA) 160 MG tablet Take 160 mg by mouth daily      furosemide (LASIX) 20 MG tablet Take 10 mg by mouth daily as needed weight gain of >2lbs in a day or >5lbs in a week      insulin glargine (LANTUS PEN) 100 UNIT/ML pen Inject 30 Units Subcutaneous daily      liraglutide (VICTOZA PEN) 18 MG/3ML solution INJECT 0.6MG SUBCUTANEOUSLY ONCE DAILY for 1 week, then 1.8 mg subcutaneously daily      metFORMIN (GLUCOPHAGE) 1000 MG tablet Take 1,000 mg by mouth 2 times daily (with meals)      metoprolol succinate ER (TOPROL XL) 25 MG 24 hr tablet Take 4 tablets (100 mg) by mouth daily 90 tablet 3    nicotine (NICODERM CQ) 21 MG/24HR 24 hr patch Place 21 mg onto the skin daily      nicotine (NICODERM CQ) 7 MG/24HR 24 hr patch To use it along with Nicotine 21 mg patch daily.       nicotine (NICOTROL) 10 MG inhaler Recommended usage is 6 to 16 cartridges per day. Insert cartridge into inhaler and push hard until it pops into place. Replace mouthpiece and twist the top and bottom so that markings do not line up. Inhale deeply into the back of the throat or puff in short breaths. Nicotine in cartridge is used up after about 20 minutes of active puffing. Clean mouthpiece regularly with soap and water. (Patient not taking: Reported on 4/18/2023)      nitroGLYcerin (NITROSTAT) 0.4 MG sublingual tablet Place 0.4 mg under the tongue every 5 minutes as needed      sacubitril-valsartan (ENTRESTO) 24-26 MG per tablet Take 1 tablet by mouth 2 times daily      ticagrelor (BRILINTA) 90 MG tablet Take 90 mg by mouth 2 times daily      venlafaxine (EFFEXOR) 100 MG tablet Take 1 tablet by mouth 2 times daily      Allergies   Allergen Reactions    Phenytoin Nausea and Vomiting    Rosuvastatin Muscle Pain (Myalgia) and Other (See Comments)     Myalgias      Valproic Acid Unknown         Lab Results (Personally Reviewed)    Chemistry/lipid CBC Cardiac Enzymes/BNP/TSH/INR   Lab Results   Component Value Date    BUN 12.1 05/11/2023     05/11/2023    CO2 20 (L) 05/11/2023     Creatinine   Date Value Ref Range Status   05/11/2023 0.59 0.51 - 0.95 mg/dL Final       Lab Results   Component Value Date    CHOL 134 01/02/2023    HDL 36 (L) 01/02/2023    LDL 77 01/02/2023      Lab Results   Component Value Date    WBC 10.6 05/11/2023    HGB 14.1 05/11/2023    HCT 43.5 05/11/2023    MCV 89 05/11/2023     05/11/2023    Lab Results   Component Value Date    INR 0.89 01/02/2023        Video-Visit Details    Type of service:  Video Visit   Video Start Time:  1015 AM  Video End Time:10:27 AM    Originating Location (pt. Location): Home  Distant Location (provider location):  On-site  Platform used for Video Visit: Amanda    I have reviewed the note as documented above.  This accurately captures the substance of my  virtual visit with the patient. The patient states understanding and is agreeable with the plan.   CARLOTTA Silvestre CNP  Electrophysiology Consult Service  Pager: Text Page

## 2023-08-16 NOTE — NURSING NOTE
Patient confirms medications and allergies are accurate via patients echeck in completion, and or denies any changes since last reviewed/verified.       Lawanda Foote, Virtual Facilitator  Is the patient currently in the state of [place]? YES    Visit mode:VIDEO    If the visit is dropped, the patient can be reconnected by: VIDEO VISIT: Text to cell phone: [phone]    Will anyone else be joining the visit? NO      How would you like to obtain your AVS? MyChart    Are changes needed to the allergy or medication list? YES: enstreto patient takes 1 tab in AM and 2 tabs in PM    Reason for visit: RECHECK      Depression Response    Patient completed the PHQ-9 assessment for depression and scored >9? Yes  Question 9 on the PHQ-9 was positive for suicidality? No  Does patient have current mental health provider? Yes  Yajaira Dennis    Is this a virtual visit? Yes   Does patient have suicidal ideation (positive question 9)? No - offer to place Mental Health Referral.  Patient declined referral/not needed

## 2023-08-16 NOTE — PROGRESS NOTES
ELECTROPHYSIOLOGY VIRTUAL CLINIC VISIT    Assessment/Recommendations   Assessment/Plan:    Ms. Burns is a 47 year old female who has a medical history significant for STEMI, CAD s/p PCI RCA 12/9/22 and mLAD 1/2/2023, ICM LVEF 25-30%, s/p ICD 5/11/23, DM , HLD, obesity, sciatica, and tobacco abuse.     ICM LVEF 25-30%, NYHA II:  1. ACEi/ARB/ARNi: Continue Entresto.  2. BB: Continue Toprol XL   3. Aldosterone antagonist: not currently on.  4. STLG2i: Continue Farxiga  5.  SCD prophylaxis: s/p ICD primary prevention. Device site well healed, stable lead parameters. Continue routine device clinic follow-up.   6. Fluid status: appears euvolemic, Continue Lasix.  7. Etiology: ischemic, CAD        Follow up in 1 year or sooner if need arises.        History of Present Illness/Subjective    Ms. Cailin Burns is a 47 year old female who comes in today for EP consultation of consideration for ICD.    Ms. Burns is a 47 year old female who has a medical history significant for STEMI, CAD s/p PCI RCA 12/9/22 and mLAD 1/2/2023, ICM LVEF 25-30%, s/p ICD 5/11/23, DM , HLD, obesity, sciatica, and tobacco abuse.     She initially presented to an Urgent care with chest pain and was found to be having a STEMI. She was urgently transferred to [place] and had coronary angiogram which demonstrated significant 3V CAD. Discussion was had about possible urgent CABG,but she was felt to have poor bypass targets and she had PCI to LCx. Prior to further interventions, she had PET viability which did not demonstrate significant viability and medical management was pursued. She had some short runs of NSVT while inpatient. Echo at that time showed LVEF 20%. She was discharged on a LifeVest and with GMDT. She had somy hypotension which limited up titration of her neurohormonal agents. She was referred to [facility] for advanced HF therapies and established with Dr. Guerin. After discussion with interventional and surgical teams here at [facility],  decision was made to proceed with staged PCI of CTOs. She ahd first procedure on 12/2/22 and then the other on 1/2/23. An echo from 4/11/23 showed LVEF 25-30% with global hypokinesis and akinetic apical segments, normal RV size/function, mild/moderate AR, and mild MR. She was seen locally for consideration for ICD and was told wait times were 6 months or so. She requested referral here for consideration of ICD.     EP Visit 4/26/23: She reports feeling well. She denies chest discomfort, palpitations, abdominal fullness/bloating or peripheral edema, paroxysmal nocturnal dyspnea, orthopnea, lightheadedness, dizziness, pre-syncope, or syncope. Her last 12 lead ECG shows NSR Vent Rate 80 bpm,  ms,  ms IVCD, QTc 450 ms. Current cardiac medications include: Toprol XL, ASA, Fenofibrate, Lasix, Tricagrelor, Lipitor, Entresto, and Farxiga.     She presents today for follow up. She had ICD implanted on 5/11/23. Device site well healed. She reports feeling well. She denies chest discomfort, palpitations, abdominal fullness/bloating or peripheral edema, shortness of breath, paroxysmal nocturnal dyspnea, orthopnea, lightheadedness, dizziness, pre-syncope, or syncope. Device transmission had shown normal device function, stable lead parameters, and no arrhyhmias recorded. Current cardiac medications include: Toprol XL, ASA, Fenofibrate, Lasix, Tricagrelor, Lipitor, Entresto, and Farxiga. .     I have reviewed and updated the patient's Past Medical History, Social History, Family History and Medication List.     Cardiographics (Personally Reviewed) :   4/11/23 Echo:      Left Ventricle: The left ventricle is dilated. Severely reduced left   ventricular systolic function. The EF is visually estimated to be 25-30%.   Global hypokinesis of the left ventricle with akinetic apical segments.      Right Ventricle: The right ventricle appears normal in size. Systolic   function is normal.      Aortic Valve: There is  mild-to-moderate regurgitation.     9/2022 PET Viability:      Metabolic Viability Conclusion: The left ventricle has potentially   viable myocardium.  The total viable myocardium is 39%, total scar burden   is 32%      Resting Perfusion Details: Large perfusion defect at rest involving the   entire anteroseptal, inferoseptal, inferior and inferolateral segments.     This includes the entire RCA territory and parts of the LAD and possibly   LCx territories.      Metabolic Viability Defect: The mid inferoseptal, mid inferior, mid   inferolateral, distal septal and distal inferior segments are nonviable.   The LCx territory appears to be mostly viable at 95% total viability. The   LAD territory is predominantly viable with 75% viability. The RCA   territory appears to be 50% viable with no perfusion at rest    1/2/23 Coronary Angiogram:  Conclusion    Severe three vessel CAD status post PCI to the RCA  and residual LAD and LCx disease.  Successful PCI of midLAD with MOHAN x1 (2.5 x 32 mm Synergy MOHAN) and mid-LCx (2.75 x 16 mm Synergy MOHAN).  12/9/22 Coronary Angiogram:  Conclusion    Severe three vessel coronary artery disease (mRCA , pLAD, mLcx stenosis)   PCI of RCA with DESx3 (Synergy 2.75x48 mm , Synergy 2.25x38 mm, Resolute [place] 2.00x18 mm)           Physical Examination   There were no vitals taken for this visit.  Wt Readings from Last 3 Encounters:   07/25/23 79.8 kg (176 lb)   05/11/23 78.5 kg (173 lb)   01/02/23 76.2 kg (168 lb)     The rest of a comprehensive physical examination is deferred due to nature of video visit restrictions.  CONSITUTIONAL: no acute distress  HEENT: no icterus, no redness or discharge, neck supple  CV: no visible edema of visualized extremities. No JVD.   RESPIRATORY: respirations nonlabored, no cough  NEURO: AA&Ox3, speech fluent/appropriate, motor grossly nonfocal  PSYCH: cooperative, affect appropriate  DERM: no rashes on visualized face/neck/upper extremities        Medications  Allergies   Current Outpatient Medications   Medication Sig Dispense Refill     Acetaminophen-Codeine (TYLENOL WITH CODEINE #3 PO) Take by mouth as needed (Patient not taking: Reported on 7/25/2023)       ANTIPLATELET MEDICATION NOT PRESCRIBED, INTENTIONAL, Please choose reason not prescribed from choices below.       ANTIPLATELET MEDICATION NOT PRESCRIBED, INTENTIONAL, Please choose reason not prescribed from choices below.       aspirin (ASA) 81 MG EC tablet Take 81 mg by mouth daily       atorvastatin (LIPITOR) 80 MG tablet Take 1 tablet by mouth daily       blood glucose (CONTOUR NEXT TEST) test strip To test BG values three times daily - uses insulin       busPIRone (BUSPAR) 10 MG tablet Take 10 mg by mouth 2 times daily       dapagliflozin (FARXIGA) tablet Take by mouth daily       fenofibrate (TRIGLIDE/LOFIBRA) 160 MG tablet Take 160 mg by mouth daily       furosemide (LASIX) 20 MG tablet Take 10 mg by mouth daily as needed weight gain of >2lbs in a day or >5lbs in a week       insulin glargine (LANTUS PEN) 100 UNIT/ML pen Inject 30 Units Subcutaneous daily       liraglutide (VICTOZA PEN) 18 MG/3ML solution INJECT 0.6MG SUBCUTANEOUSLY ONCE DAILY for 1 week, then 1.8 mg subcutaneously daily       metFORMIN (GLUCOPHAGE) 1000 MG tablet Take 1,000 mg by mouth 2 times daily (with meals)       metoprolol succinate ER (TOPROL XL) 25 MG 24 hr tablet Take 4 tablets (100 mg) by mouth daily 90 tablet 3     nicotine (NICODERM CQ) 21 MG/24HR 24 hr patch Place 21 mg onto the skin daily       nicotine (NICODERM CQ) 7 MG/24HR 24 hr patch To use it along with Nicotine 21 mg patch daily.       nicotine (NICOTROL) 10 MG inhaler Recommended usage is 6 to 16 cartridges per day. Insert cartridge into inhaler and push hard until it pops into place. Replace mouthpiece and twist the top and bottom so that markings do not line up. Inhale deeply into the back of the throat or puff in short breaths. Nicotine in  cartridge is used up after about 20 minutes of active puffing. Clean mouthpiece regularly with soap and water. (Patient not taking: Reported on 4/18/2023)       nitroGLYcerin (NITROSTAT) 0.4 MG sublingual tablet Place 0.4 mg under the tongue every 5 minutes as needed       sacubitril-valsartan (ENTRESTO) 24-26 MG per tablet Take 1 tablet by mouth 2 times daily       ticagrelor (BRILINTA) 90 MG tablet Take 90 mg by mouth 2 times daily       venlafaxine (EFFEXOR) 100 MG tablet Take 1 tablet by mouth 2 times daily      Allergies   Allergen Reactions     Phenytoin Nausea and Vomiting     Rosuvastatin Muscle Pain (Myalgia) and Other (See Comments)     Myalgias       Valproic Acid Unknown         Lab Results (Personally Reviewed)    Chemistry/lipid CBC Cardiac Enzymes/BNP/TSH/INR   Lab Results   Component Value Date    BUN 12.1 05/11/2023     05/11/2023    CO2 20 (L) 05/11/2023     Creatinine   Date Value Ref Range Status   05/11/2023 0.59 0.51 - 0.95 mg/dL Final       Lab Results   Component Value Date    CHOL 134 01/02/2023    HDL 36 (L) 01/02/2023    LDL 77 01/02/2023      Lab Results   Component Value Date    WBC 10.6 05/11/2023    HGB 14.1 05/11/2023    HCT 43.5 05/11/2023    MCV 89 05/11/2023     05/11/2023    Lab Results   Component Value Date    INR 0.89 01/02/2023        Video-Visit Details    Type of service:  Video Visit   Video Start Time:  1015 AM  Video End Time:10:27 AM    Originating Location (pt. Location): Home  Distant Location (provider location):  On-site  Platform used for Video Visit: Amanda    I have reviewed the note as documented above.  This accurately captures the substance of my virtual visit with the patient. The patient states understanding and is agreeable with the plan.   CARLOTTA Silvestre CNP  Electrophysiology Consult Service  Pager: Text Page

## 2023-08-17 LAB
MDC_IDC_LEAD_IMPLANT_DT: NORMAL
MDC_IDC_LEAD_LOCATION: NORMAL
MDC_IDC_LEAD_LOCATION_DETAIL_1: NORMAL
MDC_IDC_LEAD_MFG: NORMAL
MDC_IDC_LEAD_MODEL: NORMAL
MDC_IDC_LEAD_POLARITY_TYPE: NORMAL
MDC_IDC_LEAD_SERIAL: NORMAL
MDC_IDC_LEAD_SPECIAL_FUNCTION: NORMAL
MDC_IDC_MSMT_BATTERY_DTM: NORMAL
MDC_IDC_MSMT_BATTERY_REMAINING_LONGEVITY: 164 MO
MDC_IDC_MSMT_BATTERY_RRT_TRIGGER: NORMAL
MDC_IDC_MSMT_BATTERY_VOLTAGE: 3.07 V
MDC_IDC_MSMT_CAP_CHARGE_DTM: NORMAL
MDC_IDC_MSMT_CAP_CHARGE_ENERGY: 18 J
MDC_IDC_MSMT_CAP_CHARGE_TIME: 3.6 S
MDC_IDC_MSMT_CAP_CHARGE_TYPE: NORMAL
MDC_IDC_MSMT_LEADCHNL_RV_IMPEDANCE_VALUE: 399 OHM
MDC_IDC_MSMT_LEADCHNL_RV_IMPEDANCE_VALUE: 475 OHM
MDC_IDC_MSMT_LEADCHNL_RV_PACING_THRESHOLD_AMPLITUDE: 0.75 V
MDC_IDC_MSMT_LEADCHNL_RV_PACING_THRESHOLD_PULSEWIDTH: 0.4 MS
MDC_IDC_MSMT_LEADCHNL_RV_SENSING_INTR_AMPL: 26.8 MV
MDC_IDC_PG_IMPLANT_DTM: NORMAL
MDC_IDC_PG_MFG: NORMAL
MDC_IDC_PG_MODEL: NORMAL
MDC_IDC_PG_SERIAL: NORMAL
MDC_IDC_PG_TYPE: NORMAL
MDC_IDC_SESS_CLINIC_NAME: NORMAL
MDC_IDC_SESS_DTM: NORMAL
MDC_IDC_SESS_TYPE: NORMAL
MDC_IDC_SET_BRADY_HYSTRATE: NORMAL
MDC_IDC_SET_BRADY_LOWRATE: 40 {BEATS}/MIN
MDC_IDC_SET_BRADY_MODE: NORMAL
MDC_IDC_SET_LEADCHNL_RV_PACING_AMPLITUDE: 2 V
MDC_IDC_SET_LEADCHNL_RV_PACING_ANODE_ELECTRODE_1: NORMAL
MDC_IDC_SET_LEADCHNL_RV_PACING_ANODE_LOCATION_1: NORMAL
MDC_IDC_SET_LEADCHNL_RV_PACING_CAPTURE_MODE: NORMAL
MDC_IDC_SET_LEADCHNL_RV_PACING_CATHODE_ELECTRODE_1: NORMAL
MDC_IDC_SET_LEADCHNL_RV_PACING_CATHODE_LOCATION_1: NORMAL
MDC_IDC_SET_LEADCHNL_RV_PACING_POLARITY: NORMAL
MDC_IDC_SET_LEADCHNL_RV_PACING_PULSEWIDTH: 0.4 MS
MDC_IDC_SET_LEADCHNL_RV_SENSING_ANODE_ELECTRODE_1: NORMAL
MDC_IDC_SET_LEADCHNL_RV_SENSING_ANODE_LOCATION_1: NORMAL
MDC_IDC_SET_LEADCHNL_RV_SENSING_CATHODE_ELECTRODE_1: NORMAL
MDC_IDC_SET_LEADCHNL_RV_SENSING_CATHODE_LOCATION_1: NORMAL
MDC_IDC_SET_LEADCHNL_RV_SENSING_POLARITY: NORMAL
MDC_IDC_SET_LEADCHNL_RV_SENSING_SENSITIVITY: 0.3 MV
MDC_IDC_SET_ZONE_DETECTION_BEATS_DENOMINATOR: 40 {BEATS}
MDC_IDC_SET_ZONE_DETECTION_BEATS_NUMERATOR: 30 {BEATS}
MDC_IDC_SET_ZONE_DETECTION_INTERVAL: 270 MS
MDC_IDC_SET_ZONE_DETECTION_INTERVAL: 320 MS
MDC_IDC_SET_ZONE_DETECTION_INTERVAL: 400 MS
MDC_IDC_SET_ZONE_TYPE: NORMAL
MDC_IDC_STAT_AT_BURDEN_PERCENT: 0 %
MDC_IDC_STAT_AT_DTM_END: NORMAL
MDC_IDC_STAT_AT_DTM_START: NORMAL
MDC_IDC_STAT_BRADY_DTM_END: NORMAL
MDC_IDC_STAT_BRADY_DTM_START: NORMAL
MDC_IDC_STAT_BRADY_RV_PERCENT_PACED: 0 %
MDC_IDC_STAT_CRT_DTM_END: NORMAL
MDC_IDC_STAT_CRT_DTM_START: NORMAL
MDC_IDC_STAT_EPISODE_RECENT_COUNT: 0
MDC_IDC_STAT_EPISODE_RECENT_COUNT_DTM_END: NORMAL
MDC_IDC_STAT_EPISODE_RECENT_COUNT_DTM_START: NORMAL
MDC_IDC_STAT_EPISODE_TOTAL_COUNT: 0
MDC_IDC_STAT_EPISODE_TOTAL_COUNT_DTM_END: NORMAL
MDC_IDC_STAT_EPISODE_TOTAL_COUNT_DTM_START: NORMAL
MDC_IDC_STAT_EPISODE_TYPE: NORMAL
MDC_IDC_STAT_TACHYTHERAPY_ATP_DELIVERED_RECENT: 0
MDC_IDC_STAT_TACHYTHERAPY_ATP_DELIVERED_TOTAL: 0
MDC_IDC_STAT_TACHYTHERAPY_RECENT_DTM_END: NORMAL
MDC_IDC_STAT_TACHYTHERAPY_RECENT_DTM_START: NORMAL
MDC_IDC_STAT_TACHYTHERAPY_SHOCKS_ABORTED_RECENT: 0
MDC_IDC_STAT_TACHYTHERAPY_SHOCKS_ABORTED_TOTAL: 0
MDC_IDC_STAT_TACHYTHERAPY_SHOCKS_DELIVERED_RECENT: 0
MDC_IDC_STAT_TACHYTHERAPY_SHOCKS_DELIVERED_TOTAL: 0
MDC_IDC_STAT_TACHYTHERAPY_TOTAL_DTM_END: NORMAL
MDC_IDC_STAT_TACHYTHERAPY_TOTAL_DTM_START: NORMAL

## 2023-09-16 ENCOUNTER — HEALTH MAINTENANCE LETTER (OUTPATIENT)
Age: 48
End: 2023-09-16

## 2023-09-25 ENCOUNTER — MYC MEDICAL ADVICE (OUTPATIENT)
Dept: CARDIOLOGY | Facility: CLINIC | Age: 48
End: 2023-09-25
Payer: COMMERCIAL

## 2023-11-15 ENCOUNTER — ANCILLARY PROCEDURE (OUTPATIENT)
Dept: CARDIOLOGY | Facility: CLINIC | Age: 48
End: 2023-11-15
Attending: INTERNAL MEDICINE
Payer: COMMERCIAL

## 2023-11-15 DIAGNOSIS — Z95.810 ICD (IMPLANTABLE CARDIOVERTER-DEFIBRILLATOR), SINGLE, IN SITU: ICD-10-CM

## 2023-11-15 PROCEDURE — 93295 DEV INTERROG REMOTE 1/2/MLT: CPT | Performed by: INTERNAL MEDICINE

## 2023-11-15 PROCEDURE — 93296 REM INTERROG EVL PM/IDS: CPT

## 2023-11-28 LAB
MDC_IDC_LEAD_CONNECTION_STATUS: NORMAL
MDC_IDC_LEAD_IMPLANT_DT: NORMAL
MDC_IDC_LEAD_LOCATION: NORMAL
MDC_IDC_LEAD_LOCATION_DETAIL_1: NORMAL
MDC_IDC_LEAD_MFG: NORMAL
MDC_IDC_LEAD_MODEL: NORMAL
MDC_IDC_LEAD_POLARITY_TYPE: NORMAL
MDC_IDC_LEAD_SERIAL: NORMAL
MDC_IDC_LEAD_SPECIAL_FUNCTION: NORMAL
MDC_IDC_MSMT_BATTERY_DTM: NORMAL
MDC_IDC_MSMT_BATTERY_REMAINING_LONGEVITY: 162 MO
MDC_IDC_MSMT_BATTERY_RRT_TRIGGER: NORMAL
MDC_IDC_MSMT_BATTERY_VOLTAGE: 3.03 V
MDC_IDC_MSMT_CAP_CHARGE_DTM: NORMAL
MDC_IDC_MSMT_CAP_CHARGE_ENERGY: 18 J
MDC_IDC_MSMT_CAP_CHARGE_TIME: 3.8 S
MDC_IDC_MSMT_CAP_CHARGE_TYPE: NORMAL
MDC_IDC_MSMT_LEADCHNL_RV_IMPEDANCE_VALUE: 399 OHM
MDC_IDC_MSMT_LEADCHNL_RV_IMPEDANCE_VALUE: 456 OHM
MDC_IDC_MSMT_LEADCHNL_RV_PACING_THRESHOLD_AMPLITUDE: 0.62 V
MDC_IDC_MSMT_LEADCHNL_RV_PACING_THRESHOLD_PULSEWIDTH: 0.4 MS
MDC_IDC_MSMT_LEADCHNL_RV_SENSING_INTR_AMPL: 27.3 MV
MDC_IDC_PG_IMPLANT_DTM: NORMAL
MDC_IDC_PG_MFG: NORMAL
MDC_IDC_PG_MODEL: NORMAL
MDC_IDC_PG_SERIAL: NORMAL
MDC_IDC_PG_TYPE: NORMAL
MDC_IDC_SESS_CLINIC_NAME: NORMAL
MDC_IDC_SESS_DTM: NORMAL
MDC_IDC_SESS_TYPE: NORMAL
MDC_IDC_SET_BRADY_HYSTRATE: NORMAL
MDC_IDC_SET_BRADY_LOWRATE: 40 {BEATS}/MIN
MDC_IDC_SET_BRADY_MODE: NORMAL
MDC_IDC_SET_LEADCHNL_RV_PACING_AMPLITUDE: 2 V
MDC_IDC_SET_LEADCHNL_RV_PACING_ANODE_ELECTRODE_1: NORMAL
MDC_IDC_SET_LEADCHNL_RV_PACING_ANODE_LOCATION_1: NORMAL
MDC_IDC_SET_LEADCHNL_RV_PACING_CAPTURE_MODE: NORMAL
MDC_IDC_SET_LEADCHNL_RV_PACING_CATHODE_ELECTRODE_1: NORMAL
MDC_IDC_SET_LEADCHNL_RV_PACING_CATHODE_LOCATION_1: NORMAL
MDC_IDC_SET_LEADCHNL_RV_PACING_POLARITY: NORMAL
MDC_IDC_SET_LEADCHNL_RV_PACING_PULSEWIDTH: 0.4 MS
MDC_IDC_SET_LEADCHNL_RV_SENSING_ANODE_ELECTRODE_1: NORMAL
MDC_IDC_SET_LEADCHNL_RV_SENSING_ANODE_LOCATION_1: NORMAL
MDC_IDC_SET_LEADCHNL_RV_SENSING_CATHODE_ELECTRODE_1: NORMAL
MDC_IDC_SET_LEADCHNL_RV_SENSING_CATHODE_LOCATION_1: NORMAL
MDC_IDC_SET_LEADCHNL_RV_SENSING_POLARITY: NORMAL
MDC_IDC_SET_LEADCHNL_RV_SENSING_SENSITIVITY: 0.3 MV
MDC_IDC_SET_ZONE_DETECTION_BEATS_DENOMINATOR: 16 {BEATS}
MDC_IDC_SET_ZONE_DETECTION_BEATS_DENOMINATOR: 32 {BEATS}
MDC_IDC_SET_ZONE_DETECTION_BEATS_DENOMINATOR: 40 {BEATS}
MDC_IDC_SET_ZONE_DETECTION_BEATS_NUMERATOR: 16 {BEATS}
MDC_IDC_SET_ZONE_DETECTION_BEATS_NUMERATOR: 30 {BEATS}
MDC_IDC_SET_ZONE_DETECTION_BEATS_NUMERATOR: 32 {BEATS}
MDC_IDC_SET_ZONE_DETECTION_INTERVAL: 270 MS
MDC_IDC_SET_ZONE_DETECTION_INTERVAL: 320 MS
MDC_IDC_SET_ZONE_DETECTION_INTERVAL: 400 MS
MDC_IDC_SET_ZONE_STATUS: NORMAL
MDC_IDC_SET_ZONE_TYPE: NORMAL
MDC_IDC_SET_ZONE_VENDOR_TYPE: NORMAL
MDC_IDC_STAT_AT_BURDEN_PERCENT: 0 %
MDC_IDC_STAT_AT_DTM_END: NORMAL
MDC_IDC_STAT_AT_DTM_START: NORMAL
MDC_IDC_STAT_BRADY_DTM_END: NORMAL
MDC_IDC_STAT_BRADY_DTM_START: NORMAL
MDC_IDC_STAT_BRADY_RV_PERCENT_PACED: 0.1 %
MDC_IDC_STAT_CRT_DTM_END: NORMAL
MDC_IDC_STAT_CRT_DTM_START: NORMAL
MDC_IDC_STAT_EPISODE_RECENT_COUNT: 0
MDC_IDC_STAT_EPISODE_RECENT_COUNT_DTM_END: NORMAL
MDC_IDC_STAT_EPISODE_RECENT_COUNT_DTM_START: NORMAL
MDC_IDC_STAT_EPISODE_TOTAL_COUNT: 0
MDC_IDC_STAT_EPISODE_TOTAL_COUNT_DTM_END: NORMAL
MDC_IDC_STAT_EPISODE_TOTAL_COUNT_DTM_START: NORMAL
MDC_IDC_STAT_EPISODE_TYPE: NORMAL
MDC_IDC_STAT_TACHYTHERAPY_ATP_DELIVERED_RECENT: 0
MDC_IDC_STAT_TACHYTHERAPY_ATP_DELIVERED_TOTAL: 0
MDC_IDC_STAT_TACHYTHERAPY_RECENT_DTM_END: NORMAL
MDC_IDC_STAT_TACHYTHERAPY_RECENT_DTM_START: NORMAL
MDC_IDC_STAT_TACHYTHERAPY_SHOCKS_ABORTED_RECENT: 0
MDC_IDC_STAT_TACHYTHERAPY_SHOCKS_ABORTED_TOTAL: 0
MDC_IDC_STAT_TACHYTHERAPY_SHOCKS_DELIVERED_RECENT: 0
MDC_IDC_STAT_TACHYTHERAPY_SHOCKS_DELIVERED_TOTAL: 0
MDC_IDC_STAT_TACHYTHERAPY_TOTAL_DTM_END: NORMAL
MDC_IDC_STAT_TACHYTHERAPY_TOTAL_DTM_START: NORMAL

## 2024-01-16 ENCOUNTER — TELEPHONE (OUTPATIENT)
Dept: CARDIOLOGY | Facility: CLINIC | Age: 49
End: 2024-01-16

## 2024-01-16 ENCOUNTER — VIRTUAL VISIT (OUTPATIENT)
Dept: CARDIOLOGY | Facility: CLINIC | Age: 49
End: 2024-01-16
Attending: INTERNAL MEDICINE
Payer: COMMERCIAL

## 2024-01-16 ENCOUNTER — CARE COORDINATION (OUTPATIENT)
Dept: CARDIOLOGY | Facility: CLINIC | Age: 49
End: 2024-01-16

## 2024-01-16 VITALS — BODY MASS INDEX: 32.57 KG/M2 | WEIGHT: 177 LBS | HEIGHT: 62 IN

## 2024-01-16 DIAGNOSIS — I25.10 CORONARY ARTERY DISEASE INVOLVING NATIVE CORONARY ARTERY OF NATIVE HEART WITHOUT ANGINA PECTORIS: ICD-10-CM

## 2024-01-16 DIAGNOSIS — E11.9 TYPE 2 DIABETES MELLITUS WITHOUT COMPLICATION, WITH LONG-TERM CURRENT USE OF INSULIN (H): ICD-10-CM

## 2024-01-16 DIAGNOSIS — I50.20 HEART FAILURE WITH REDUCED EJECTION FRACTION, NYHA CLASS III (H): ICD-10-CM

## 2024-01-16 DIAGNOSIS — Z79.4 TYPE 2 DIABETES MELLITUS WITHOUT COMPLICATION, WITH LONG-TERM CURRENT USE OF INSULIN (H): ICD-10-CM

## 2024-01-16 DIAGNOSIS — I20.9 ANGINA PECTORIS (H): ICD-10-CM

## 2024-01-16 DIAGNOSIS — I50.21 ACUTE SYSTOLIC HEART FAILURE (H): ICD-10-CM

## 2024-01-16 DIAGNOSIS — Z95.810 S/P ICD (INTERNAL CARDIAC DEFIBRILLATOR) PROCEDURE: Primary | ICD-10-CM

## 2024-01-16 DIAGNOSIS — I21.3 ST ELEVATION MYOCARDIAL INFARCTION (STEMI), UNSPECIFIED ARTERY (H): ICD-10-CM

## 2024-01-16 DIAGNOSIS — I25.5 ISCHEMIC CARDIOMYOPATHY: ICD-10-CM

## 2024-01-16 DIAGNOSIS — Z95.5 STATUS POST INSERTION OF DRUG ELUTING CORONARY ARTERY STENT: ICD-10-CM

## 2024-01-16 PROCEDURE — 99215 OFFICE O/P EST HI 40 MIN: CPT | Mod: 95 | Performed by: INTERNAL MEDICINE

## 2024-01-16 ASSESSMENT — PAIN SCALES - GENERAL: PAINLEVEL: NO PAIN (0)

## 2024-01-16 NOTE — PATIENT INSTRUCTIONS
Dr. Guerin recommends:    Advanced work up started.    Thank you for your visit today.  Please call me with any questions or concerns.   Donovan Rendon RN  Cardiology Care Coordinator  [phone]

## 2024-01-16 NOTE — PROGRESS NOTES
Virtual Visit Details    Type of service:  Video Visit   Video Start Time: 8:29a  Video End Time:8:56a    Originating Location (pt. Location): Home  Distant Location (provider location):  On-site  Platform used for Video Visit: [facility]      Cardiology Clinic Note    January 16, 2024    HPI  Cailin Burns is a 48 year old female  with a past medical history notable for PMH of DM, HLD, obesity, tobacco use disorder, sciatica who was admitted to [place] ICU. She initially presented to urgent care with new onset chest pain, was found to have STEMI and was transferred to [facility] for immediate further evaluation and angiogram on 9/25/22. It was significant for 3 vessel disease. Case was immediately discussed with Dr Leal for potential urgent CABG considerations but ultimately the decision was made to proceed with PCI due to poor bypass targets. Intervention to LCx was made. Prior to further PCI PET viability was pursued prior to considering further interventions. Unfortunately viability did not show enough and thus medical management was recommended. She did have short runs of NSVT while inpatient. ECHO showed EF 20% and thus she was discharged with LifeVest. She was discharged on mimimal medical therapy due to associated hypotension and not being able to tolerate higher doses. She also has PRN lasix for weight gain and PRN nitro. She is following up at the CHF clinic at [place] and was also referred to [facility] for potential future advanced heart failure therapies evaluation.      After discussion with her and the interventional and surgical teams at [facility], the decision was made to proceed with staged PCI of the CTOs. These were performed recently. She also follows in [place] and recently attempts were made to increase XL to 50mg daily. In addition, she continued to have the LifeVest and potential referral for device was discussed. ICD had been implanted 5/11/23.  We saw her today virtually, she recently underwent a TTE which  demonstrated LVEF of 27%(similar to prior), preserved RV function and stable mild to moderate AR. She had a 6MWT for which she was only able to do 126 meters with no change in saturations. She notes her activity is limited from lower extremity weakness and shortness of breath. Since we last saw her she notes her level of activity has not declined or improved.     She continues to actively smoke cigarettes, however is making progress in trying to quit. She is on a nicotine replacement therapy and buspar.     PAST MEDICAL HISTORY:  As detailed above    CURRENT MEDICATIONS:  Current Outpatient Medications   Medication Sig Dispense Refill    Acetaminophen-Codeine (TYLENOL WITH CODEINE #3 PO) Take by mouth as needed (Patient not taking: Reported on 7/25/2023)      ANTIPLATELET MEDICATION NOT PRESCRIBED, INTENTIONAL, Please choose reason not prescribed from choices below.      ANTIPLATELET MEDICATION NOT PRESCRIBED, INTENTIONAL, Please choose reason not prescribed from choices below.      aspirin (ASA) 81 MG EC tablet Take 81 mg by mouth daily      atorvastatin (LIPITOR) 80 MG tablet Take 1 tablet by mouth daily      blood glucose (CONTOUR NEXT TEST) test strip To test BG values three times daily - uses insulin      busPIRone (BUSPAR) 10 MG tablet Take 10 mg by mouth 2 times daily      dapagliflozin (FARXIGA) tablet Take by mouth daily      fenofibrate (TRIGLIDE/LOFIBRA) 160 MG tablet Take 160 mg by mouth daily      furosemide (LASIX) 20 MG tablet Take 10 mg by mouth daily as needed weight gain of >2lbs in a day or >5lbs in a week      insulin glargine (LANTUS PEN) 100 UNIT/ML pen Inject 30 Units Subcutaneous daily      liraglutide (VICTOZA PEN) 18 MG/3ML solution INJECT 0.6MG SUBCUTANEOUSLY ONCE DAILY for 1 week, then 1.8 mg subcutaneously daily      metFORMIN (GLUCOPHAGE) 1000 MG tablet Take 1,000 mg by mouth 2 times daily (with meals)      metoprolol succinate ER (TOPROL XL) 25 MG 24 hr tablet Take 4 tablets (100 mg)  by mouth daily 90 tablet 3    nicotine (NICODERM CQ) 7 MG/24HR 24 hr patch To use it along with Nicotine 21 mg patch daily.      nicotine (NICOTROL) 10 MG inhaler Recommended usage is 6 to 16 cartridges per day. Insert cartridge into inhaler and push hard until it pops into place. Replace mouthpiece and twist the top and bottom so that markings do not line up. Inhale deeply into the back of the throat or puff in short breaths. Nicotine in cartridge is used up after about 20 minutes of active puffing. Clean mouthpiece regularly with soap and water. (Patient not taking: Reported on 2023)      nitroGLYcerin (NITROSTAT) 0.4 MG sublingual tablet Place 0.4 mg under the tongue every 5 minutes as needed      sacubitril-valsartan (ENTRESTO) 24-26 MG per tablet Take 3 tablets by mouth daily 1 Tab in AM, 2 Tabs in PM      ticagrelor (BRILINTA) 90 MG tablet Take 90 mg by mouth 2 times daily      venlafaxine (EFFEXOR) 100 MG tablet Take 1 tablet by mouth 2 times daily       FAMILY HISTORY:  No family history on file.    SOCIAL HISTORY:  Social History     Socioeconomic History    Marital status:    Tobacco Use    Smoking status: Some Days     Types: Cigarettes     Last attempt to quit: 2022     Years since quittin.3    Smokeless tobacco: Never    Tobacco comments:     Trying to quit, wearing patch    Substance and Sexual Activity    Alcohol use: Not Currently    Drug use: Never     ROS:   10 point ROS negative except HPI    EXAM:  This is a virtual appointment and no current vitals are available.  Previous ones were reviewed and blood pressure was in the 80s systolic.  General: appears comfortable, no distress   Head: normocephalic, atraumatic  Eyes: anicteric sclera, EOMI  Neck: no adenopathy  Orophyarynx: clear and moist   Heart:  normal rate  Lungs: clear, no rales or wheezing  Abdomen: soft, non-tender, non-distended, no hepatosplenomegaly  Extremities: no clubbing, cyanosis, edema  Neurological: normal  speech and affect, no gross motor deficits    Labs:  CBC RESULTS:  Lab Results   Component Value Date    WBC 10.6 05/11/2023    RBC 4.89 05/11/2023    HGB 14.1 05/11/2023    HCT 43.5 05/11/2023    MCV 89 05/11/2023    MCH 28.8 05/11/2023    MCHC 32.4 05/11/2023    RDW 14.9 05/11/2023     05/11/2023       CMP RESULTS:  Lab Results   Component Value Date     05/11/2023    POTASSIUM 4.6 05/11/2023    CHLORIDE 105 05/11/2023    CO2 20 (L) 05/11/2023    ANIONGAP 11 05/11/2023     (H) 05/11/2023     (H) 01/02/2023    BUN 12.1 05/11/2023    CR 0.59 05/11/2023    GFRESTIMATED >90 05/11/2023    RON 8.8 05/11/2023      Lab Results   Component Value Date    CHOL 134 01/02/2023     Lab Results   Component Value Date    HDL 36 01/02/2023     Lab Results   Component Value Date    LDL 77 01/02/2023     Lab Results   Component Value Date    TRIG 107 01/02/2023     LHC - 9/25/22: significant 3VD,  - LCx - mid, 99%, culprit - s/p PCI with a 2.75*26 mm [place] MOHAN  - LAD - diffusely diseased, small caliber, 90% mid & distal - not amenable to PCI due to small caliber  - RCA - mid, 100%, , fills distally with R-R collaterals, small caliber.   Discussed presentation with Dr Leal for consideration for CABG, before proceeding with PCI, it was decided to proceed with PCI-LCx & assess for CABG later.     TTE 9/25/22:    Left Ventricle: The left ventricle is dilated. Severely reduced left  ventricular systolic function. The EF is visually estimated to be 20%. No  thrombus seen -contrast images also reviewed See diagram for wall motion details.     Right Ventricle: The right ventricle appears normal in size. The tricuspid jet envelope definition is inadequate for estimation of RV systolic pressure.     Mitral Valve: The leaflets are thickened. There is mild regurgitation.     Tricuspid Valve: There is no regurgitation.     This is an abnormal study.      PET viability 9/28/22:    Metabolic Viability Conclusion: The  left ventricle has potentially viable myocardium.  The total viable myocardium is 39%, total scar burden is 32%     Resting Perfusion Details: Large perfusion defect at rest involving the entire anteroseptal, inferoseptal, inferior and inferolateral segments.   This includes the entire RCA territory and parts of the LAD and possibly LCx territories.     Metabolic Viability Defect: The mid inferoseptal, mid inferior, mid inferolateral, distal septal and distal inferior segments are nonviable. The LCx territory appears to be mostly viable at 95% total viability. The LAD territory is predominantly viable with 75% viability. The RCA territory appears to be 50% viable with no perfusion at rest     Coronary angiogram 12/9//2022:  Severe three vessel coronary artery disease (mRCA , pLAD, mLcx stenosis)     Coronary angiogram 1/2/2023:  Severe three vessel CAD status post PCI to the RCA  and residual LAD and LCx disease.  Successful PCI of midLAD with MOHAN x1 (2.5 x 32 mm Synergy MOHAN) and mid-LCx (2.75 x 16 mm Synergy MOHAN).     TTE 4/11/23    Left Ventricle: The left ventricle is dilated. Severely reduced left  ventricular systolic function. The EF is visually estimated to be 25-30%. Global hypokinesis of the left ventricle with akinetic apical segments.     Right Ventricle: The right ventricle appears normal in size. Systolic function is normal.     Aortic Valve: There is mild-to-moderate regurgitation.      TTE 1/4/2024:    Left Ventricle: Severely reduced left ventricular systolic function. LVEF 27%.    Right Ventricle: Systolic function is normal.     Aortic Valve: There is mild-to-moderate regurgitation.    ASSESSMENT AND PLAN:  47 year old lady with a  including DM which is reasonably better controlled at the moment as well as recent STEMI back in September 2022 in the setting of atypical symptoms with high peak and troponin at around 64. She does have multivessel coronary artery disease only the LCx vessel was  intervened however she has significant disease in the small LAD as well as  of the RCA.  She was found to be not a surgical candidate in [place].  She was started on medications now the blood pressure is better at 120/80.  Patient at home and as such losartan was started.  Images were reviewed and options discussed extensively with surgical as well as interventional colleagues at [facility]. Ultimately the decision was made to proceed with staged PCI of the CTOs. She did have this done early December 2022 and then January 2023 and tolerated the procedures well.   Overall she continues to do relatively poorly with low exercise tolerance and low energy.  She takes her medications as prescribed however she often becomes hypotensive.  Her 6-minute walk test was extremely low.  She noted she lives in a small apartment and she does have difficulty walking from 1 end to the other.  ICD in place at this point.  At this point we are going to decline down to [place] to proceed with advanced therapies evaluations.  She is currently waiting and she understands that this precludes her from being a heart transplant candidate however she is working on quitting.  As such we are going to have the LVAD team take primary on her evaluation.  It is possible that we need to wait however I think given how poorly she is doing we have to establish baseline at the very minimum.  We are trying to do this in the next couple of weeks.  She is in agreement.  I will see her when she is here.    Nasim Kessler MD  Cardiology Fellow    Plan Discussed with Attending Physician, Dr. Kurt Guerin MD  I spent a total of 47 minutes elevated troponin, reviewing the chart and documentation.  I have seen and evaluated the patient virtually and agree with assessment and plan as documented in his note.    Kurt Guerin MD

## 2024-01-16 NOTE — LETTER
1/16/2024      RE: Cailin Burns  [address]  [address]  [place] [address]       Dear Colleague,    Thank you for the opportunity to participate in the care of your patient, Cailin Burns, at the [facility] [place] [facility]. Please see a copy of my visit note below.    Virtual Visit Details    Type of service:  Video Visit   Video Start Time: 8:29a  Video End Time:8:56a    Originating Location (pt. Location): Home  Distant Location (provider location):  On-site  Platform used for Video Visit: [facility]      Cardiology Clinic Note    January 16, 2024    HPI  Cailin Burns is a 48 year old female  with a past medical history notable for PMH of DM, HLD, obesity, tobacco use disorder, sciatica who was admitted to [place] ICU. She initially presented to urgent care with new onset chest pain, was found to have STEMI and was transferred to [facility] for immediate further evaluation and angiogram on 9/25/22. It was significant for 3 vessel disease. Case was immediately discussed with Dr Leal for potential urgent CABG considerations but ultimately the decision was made to proceed with PCI due to poor bypass targets. Intervention to LCx was made. Prior to further PCI PET viability was pursued prior to considering further interventions. Unfortunately viability did not show enough and thus medical management was recommended. She did have short runs of NSVT while inpatient. ECHO showed EF 20% and thus she was discharged with LifeVest. She was discharged on mimimal medical therapy due to associated hypotension and not being able to tolerate higher doses. She also has PRN lasix for weight gain and PRN nitro. She is following up at the CHF clinic at [place] and was also referred to [facility] for potential future advanced heart failure therapies evaluation.      After discussion with her and the interventional and surgical teams at [facility], the decision was  made to proceed with staged PCI of the CTOs. These were performed recently. She also follows in [place] and recently attempts were made to increase XL to 50mg daily. In addition, she continued to have the LifeVest and potential referral for device was discussed. ICD had been implanted 5/11/23.  We saw her today virtually, she recently underwent a TTE which demonstrated LVEF of 27%(similar to prior), preserved RV function and stable mild to moderate AR. She had a 6MWT for which she was only able to do 126 meters with no change in saturations. She notes her activity is limited from lower extremity weakness and shortness of breath. Since we last saw her she notes her level of activity has not declined or improved.     She continues to actively smoke cigarettes, however is making progress in trying to quit. She is on a nicotine replacement therapy and buspar.     PAST MEDICAL HISTORY:  As detailed above    CURRENT MEDICATIONS:  Current Outpatient Medications   Medication Sig Dispense Refill     Acetaminophen-Codeine (TYLENOL WITH CODEINE #3 PO) Take by mouth as needed (Patient not taking: Reported on 7/25/2023)       ANTIPLATELET MEDICATION NOT PRESCRIBED, INTENTIONAL, Please choose reason not prescribed from choices below.       ANTIPLATELET MEDICATION NOT PRESCRIBED, INTENTIONAL, Please choose reason not prescribed from choices below.       aspirin (ASA) 81 MG EC tablet Take 81 mg by mouth daily       atorvastatin (LIPITOR) 80 MG tablet Take 1 tablet by mouth daily       blood glucose (CONTOUR NEXT TEST) test strip To test BG values three times daily - uses insulin       busPIRone (BUSPAR) 10 MG tablet Take 10 mg by mouth 2 times daily       dapagliflozin (FARXIGA) tablet Take by mouth daily       fenofibrate (TRIGLIDE/LOFIBRA) 160 MG tablet Take 160 mg by mouth daily       furosemide (LASIX) 20 MG tablet Take 10 mg by mouth daily as needed weight gain of >2lbs in a day or >5lbs in a week       insulin glargine (LANTUS  PEN) 100 UNIT/ML pen Inject 30 Units Subcutaneous daily       liraglutide (VICTOZA PEN) 18 MG/3ML solution INJECT 0.6MG SUBCUTANEOUSLY ONCE DAILY for 1 week, then 1.8 mg subcutaneously daily       metFORMIN (GLUCOPHAGE) 1000 MG tablet Take 1,000 mg by mouth 2 times daily (with meals)       metoprolol succinate ER (TOPROL XL) 25 MG 24 hr tablet Take 4 tablets (100 mg) by mouth daily 90 tablet 3     nicotine (NICODERM CQ) 7 MG/24HR 24 hr patch To use it along with Nicotine 21 mg patch daily.       nicotine (NICOTROL) 10 MG inhaler Recommended usage is 6 to 16 cartridges per day. Insert cartridge into inhaler and push hard until it pops into place. Replace mouthpiece and twist the top and bottom so that markings do not line up. Inhale deeply into the back of the throat or puff in short breaths. Nicotine in cartridge is used up after about 20 minutes of active puffing. Clean mouthpiece regularly with soap and water. (Patient not taking: Reported on 2023)       nitroGLYcerin (NITROSTAT) 0.4 MG sublingual tablet Place 0.4 mg under the tongue every 5 minutes as needed       sacubitril-valsartan (ENTRESTO) 24-26 MG per tablet Take 3 tablets by mouth daily 1 Tab in AM, 2 Tabs in PM       ticagrelor (BRILINTA) 90 MG tablet Take 90 mg by mouth 2 times daily       venlafaxine (EFFEXOR) 100 MG tablet Take 1 tablet by mouth 2 times daily       FAMILY HISTORY:  No family history on file.    SOCIAL HISTORY:  Social History     Socioeconomic History     Marital status:    Tobacco Use     Smoking status: Some Days     Types: Cigarettes     Last attempt to quit: 2022     Years since quittin.3     Smokeless tobacco: Never     Tobacco comments:     Trying to quit, wearing patch    Substance and Sexual Activity     Alcohol use: Not Currently     Drug use: Never     ROS:   10 point ROS negative except HPI    EXAM:  This is a virtual appointment and no current vitals are available.  Previous ones were reviewed and  blood pressure was in the 80s systolic.  General: appears comfortable, no distress   Head: normocephalic, atraumatic  Eyes: anicteric sclera, EOMI  Neck: no adenopathy  Orophyarynx: clear and moist   Heart:  normal rate  Lungs: clear, no rales or wheezing  Abdomen: soft, non-tender, non-distended, no hepatosplenomegaly  Extremities: no clubbing, cyanosis, edema  Neurological: normal speech and affect, no gross motor deficits    Labs:  CBC RESULTS:  Lab Results   Component Value Date    WBC 10.6 05/11/2023    RBC 4.89 05/11/2023    HGB 14.1 05/11/2023    HCT 43.5 05/11/2023    MCV 89 05/11/2023    MCH 28.8 05/11/2023    MCHC 32.4 05/11/2023    RDW 14.9 05/11/2023     05/11/2023       CMP RESULTS:  Lab Results   Component Value Date     05/11/2023    POTASSIUM 4.6 05/11/2023    CHLORIDE 105 05/11/2023    CO2 20 (L) 05/11/2023    ANIONGAP 11 05/11/2023     (H) 05/11/2023     (H) 01/02/2023    BUN 12.1 05/11/2023    CR 0.59 05/11/2023    GFRESTIMATED >90 05/11/2023    RON 8.8 05/11/2023      Lab Results   Component Value Date    CHOL 134 01/02/2023     Lab Results   Component Value Date    HDL 36 01/02/2023     Lab Results   Component Value Date    LDL 77 01/02/2023     Lab Results   Component Value Date    TRIG 107 01/02/2023     LHC - 9/25/22: significant 3VD,  - LCx - mid, 99%, culprit - s/p PCI with a 2.75*26 mm [place] MOHAN  - LAD - diffusely diseased, small caliber, 90% mid & distal - not amenable to PCI due to small caliber  - RCA - mid, 100%, , fills distally with R-R collaterals, small caliber.   Discussed presentation with Dr Leal for consideration for CABG, before proceeding with PCI, it was decided to proceed with PCI-LCx & assess for CABG later.     TTE 9/25/22:     Left Ventricle: The left ventricle is dilated. Severely reduced left  ventricular systolic function. The EF is visually estimated to be 20%. No  thrombus seen -contrast images also reviewed See diagram for wall motion  details.      Right Ventricle: The right ventricle appears normal in size. The tricuspid jet envelope definition is inadequate for estimation of RV systolic pressure.      Mitral Valve: The leaflets are thickened. There is mild regurgitation.      Tricuspid Valve: There is no regurgitation.      This is an abnormal study.      PET viability 9/28/22:     Metabolic Viability Conclusion: The left ventricle has potentially viable myocardium.  The total viable myocardium is 39%, total scar burden is 32%      Resting Perfusion Details: Large perfusion defect at rest involving the entire anteroseptal, inferoseptal, inferior and inferolateral segments.   This includes the entire RCA territory and parts of the LAD and possibly LCx territories.      Metabolic Viability Defect: The mid inferoseptal, mid inferior, mid inferolateral, distal septal and distal inferior segments are nonviable. The LCx territory appears to be mostly viable at 95% total viability. The LAD territory is predominantly viable with 75% viability. The RCA territory appears to be 50% viable with no perfusion at rest     Coronary angiogram 12/9//2022:  Severe three vessel coronary artery disease (mRCA , pLAD, mLcx stenosis)     Coronary angiogram 1/2/2023:  Severe three vessel CAD status post PCI to the RCA  and residual LAD and LCx disease.  Successful PCI of midLAD with MOHAN x1 (2.5 x 32 mm Synergy MOHAN) and mid-LCx (2.75 x 16 mm Synergy MOHAN).     TTE 4/11/23     Left Ventricle: The left ventricle is dilated. Severely reduced left  ventricular systolic function. The EF is visually estimated to be 25-30%. Global hypokinesis of the left ventricle with akinetic apical segments.      Right Ventricle: The right ventricle appears normal in size. Systolic function is normal.      Aortic Valve: There is mild-to-moderate regurgitation.      TTE 1/4/2024:     Left Ventricle: Severely reduced left ventricular systolic function. LVEF 27%.     Right Ventricle:  Systolic function is normal.      Aortic Valve: There is mild-to-moderate regurgitation.    ASSESSMENT AND PLAN:  47 year old lady with a  including DM which is reasonably better controlled at the moment as well as recent STEMI back in September 2022 in the setting of atypical symptoms with high peak and troponin at around 64. She does have multivessel coronary artery disease only the LCx vessel was intervened however she has significant disease in the small LAD as well as  of the RCA.  She was found to be not a surgical candidate in [place].  She was started on medications now the blood pressure is better at 120/80.  Patient at home and as such losartan was started.  Images were reviewed and options discussed extensively with surgical as well as interventional colleagues at [facility]. Ultimately the decision was made to proceed with staged PCI of the CTOs. She did have this done early December 2022 and then January 2023 and tolerated the procedures well.   Overall she continues to do relatively poorly with low exercise tolerance and low energy.  She takes her medications as prescribed however she often becomes hypotensive.  Her 6-minute walk test was extremely low.  She noted she lives in a small apartment and she does have difficulty walking from 1 end to the other.  ICD in place at this point.  At this point we are going to decline down to [place] to proceed with advanced therapies evaluations.  She is currently waiting and she understands that this precludes her from being a heart transplant candidate however she is working on quitting.  As such we are going to have the LVAD team take primary on her evaluation.  It is possible that we need to wait however I think given how poorly she is doing we have to establish baseline at the very minimum.  We are trying to do this in the next couple of weeks.  She is in agreement.  I will see her when she is here.    Nasim Kessler MD  Cardiology Fellow    Plan Discussed  with Attending Physician, Dr. Kurt Guerin MD  I spent a total of 47 minutes elevated troponin, reviewing the chart and documentation.  I have seen and evaluated the patient virtually and agree with assessment and plan as documented in his note.    Kurt Guerin MD      Please do not hesitate to contact me if you have any questions/concerns.     Sincerely,     Kurt Guerin MD

## 2024-01-16 NOTE — TELEPHONE ENCOUNTER
Emily Orellana received referral for possible VAD. Emily Orellana called patient and reviewed the following:   Introduced self and provided contact information  Informed patient that the provider ordered a VAD evaluation.   FRANCIS Champagne will call patient in the next 72 hours to discuss details of the evaluation, tests to be completed, and answer clinical questions.   Specific questions for the RN that the patient or family verbalized: EZIO rOellana will request records for most recent colonoscopy, dental exam, and pap/mammogram if applicable and update the checklist. Emily Orellana will have patient sign SAMUEL and CE Consent.    Insurance:  Up to date in chart? Yes

## 2024-01-16 NOTE — NURSING NOTE
Patient confirms medications and allergies are accurate via patients echeck in completion, and or denies any changes since last reviewed/verified.     Is the patient currently in the state of [place]? YES    Visit mode:VIDEO    If the visit is dropped, the patient can be reconnected by: VIDEO VISIT: Text to cell phone:   Telephone Information:   Mobile [phone]       Will anyone else be joining the visit? NO  (If patient encounters technical issues they should call [phone])    How would you like to obtain your AVS? MyChart    Are changes needed to the allergy or medication list? No    Reason for visit: RECHECK    Lawanda JUAREZ

## 2024-01-16 NOTE — PROGRESS NOTES
Attempted to make contact with patient to introduce LVAD as possible AHF therapy option/inform pt of evaluation referral. Left detailed message for pt to call this writer back as soon as she is able. Will attempt to make contact with pt again tomorrow.

## 2024-01-16 NOTE — PROGRESS NOTES
"Virtual Visit Details    Type of service:  Video Visit   Video Start Time: {video visit start/end time for provider to select:180548}  Video End Time:{video visit start/end time for provider to select:069973}    Originating Location (pt. Location): {video visit patient location:154214::\"Home\"}  {PROVIDER LOCATION On-site should be selected for visits conducted from your clinic location or adjoining [facility] hospital, academic office, or other nearby [facility] building. Off-site should be selected for all other provider locations, including home:990646}  Distant Location (provider location):  {virtual location provider:334466}  Platform used for Video Visit: {Virtual Visit Platforms:169383::\"CartiCure\"}    "

## 2024-01-19 ENCOUNTER — CARE COORDINATION (OUTPATIENT)
Dept: CARDIOLOGY | Facility: CLINIC | Age: 49
End: 2024-01-19
Payer: COMMERCIAL

## 2024-01-19 NOTE — PROGRESS NOTES
Left a detailed message with return contact information for pt to contact this writer regarding LVAD workup as soon as she is able. Will attempt to contact patient again next week.

## 2024-01-29 ENCOUNTER — CARE COORDINATION (OUTPATIENT)
Dept: CARDIOLOGY | Facility: CLINIC | Age: 49
End: 2024-01-29
Payer: COMMERCIAL

## 2024-01-29 NOTE — PROGRESS NOTES
Pt left a message for this writer on 1/28, attempted to return phone call. Left detailed message for patient to call back at her earliest convenience.

## 2024-01-29 NOTE — PROGRESS NOTES
S/B:  Pt referred for VAD evaluation. Called patient and introduced the VAD Team. Spoke with patient to discuss the evaluation process for VAD evaluation and to make a plan for further follow up and education.      A:  Discussed:    1:  Tests, scans, and consults required for VAD evaluation.  Pt would like to have testing done at [facility]      2.  The need for a caregiver to be present at VAD PVE, Palliative and SW visits.     3.  The need for patient to have Health Maintenance exams up to date.  Pt's current Health Maintenance status is:    A.  Last Dental Visit: Instructed patient to schedule dental appointment.  B.  Last Colonoscopy: Has never had a colonoscopy. Will need an order to complete during inpatient stay.  C.  Last PAP:  Was done at [facility] on 9/13/23.  D.  Last Mammo:  Was done at [facility] on 1/26/23     R:   Patient verbalized understanding and is in agreement with the plan for evaluation. Was engaged and forthcoming with information. Asked appropriate questions in regard to this process. Patient denied any further VAD related questions and/or concerned.   Patient given the contact information to the VAD office and understands to contact VAD Coordinator with any further questions or concerns.  Will send to VAD  to get evaluation scheduled in 2-3 weeks.

## 2024-01-30 ENCOUNTER — TELEPHONE (OUTPATIENT)
Dept: TRANSPLANT | Facility: CLINIC | Age: 49
End: 2024-01-30
Payer: COMMERCIAL

## 2024-01-30 NOTE — PROGRESS NOTES
Pre-VAD Social Work Services Phone Call      Data: Received phone call from patient asking for return call. Per EMR, patient needing to go through evaluation for VAD/Tx. VAD coordinator has asked patient to come down this week for hospital admission for inpatient evaluation. Per patient, she can't come down until Monday of next week. She reported that she is familiar with working with her county when she needs hotel accommodations for medical appointments. She has approval to come Monday and has a hotel starting Monday night for her or for her  if/when she gets admitted. She expressed worry over the expense of living in the [facility] for  30 days post-surgery.  Intervention: Called and spoke with patient via phone. Reported that during evaluation, we talk about all of the options for temporary relocation. Emailed Jaqui a list of discounted hotels nearby the hospital, as well as information on the [employer]. Discussed funding through the [facility] when medically necessary, but also through nikki assistance.  Assessment: Jaqui verbalized understanding toward options for temporary relocation and need for evaluation.  will be here for the evaluation so he can meet with staff and be part of the evaluation process. Patient is also hoping that her Dtr (who lives in [place]) can participate in some of the evaluation process.  Education provided by SW: Role of SW within the VAD program, temporary relocation options, process of getting through evaluation first to determine timeline and pathway of intervention  Plan:  SW will remain available if needed. Will plan on seeing inpatient for evaluation next week.

## 2024-02-01 ENCOUNTER — CARE COORDINATION (OUTPATIENT)
Dept: CARDIOLOGY | Facility: CLINIC | Age: 49
End: 2024-02-01
Payer: COMMERCIAL

## 2024-02-01 NOTE — PROGRESS NOTES
Pt called to inform me that she has been admitted at [facility] for heart failure exacerbation. Per pt, cardiology team is considering transferring pt to [facility] for further workup. Pt had been planning on presenting to [facility] on 2/5 for planned admission to complete LVAD evaluation per her primary cardiologist, Dr. Guerin.     Spoke with Dr. Guerin, who requested that patient be directly transferred to our facility for completion of AHF therapy evaluation. Called pt's bedside RN to inform her of Dr. Guerin's transfer request and to provide contact information for this writer and our facility's patient placement team to initiate transfer. Informed Dr. Chopra, who is the Cards 2 attending on service of pt's impending transfer.

## 2024-02-03 ENCOUNTER — HEALTH MAINTENANCE LETTER (OUTPATIENT)
Age: 49
End: 2024-02-03

## 2024-02-04 ENCOUNTER — HOSPITAL ENCOUNTER (INPATIENT)
Facility: CLINIC | Age: 49
LOS: 5 days | Discharge: HOME | End: 2024-02-09
Attending: INTERNAL MEDICINE | Admitting: INTERNAL MEDICINE
Payer: COMMERCIAL

## 2024-02-04 ENCOUNTER — APPOINTMENT (OUTPATIENT)
Dept: GENERAL RADIOLOGY | Facility: CLINIC | Age: 49
End: 2024-02-04
Payer: COMMERCIAL

## 2024-02-04 DIAGNOSIS — E11.9 DIABETES MELLITUS WITHOUT COMPLICATION (H): ICD-10-CM

## 2024-02-04 DIAGNOSIS — I50.9 ACUTE HEART FAILURE, UNSPECIFIED HEART FAILURE TYPE (H): Primary | ICD-10-CM

## 2024-02-04 DIAGNOSIS — I50.21 ACUTE SYSTOLIC HEART FAILURE (H): ICD-10-CM

## 2024-02-04 DIAGNOSIS — F17.200 TOBACCO USE DISORDER: ICD-10-CM

## 2024-02-04 DIAGNOSIS — I50.23 ACUTE ON CHRONIC SYSTOLIC HEART FAILURE (H): ICD-10-CM

## 2024-02-04 DIAGNOSIS — K59.03 DRUG-INDUCED CONSTIPATION: ICD-10-CM

## 2024-02-04 DIAGNOSIS — I73.9 PAD (PERIPHERAL ARTERY DISEASE) (H): ICD-10-CM

## 2024-02-04 DIAGNOSIS — R07.89 OTHER CHEST PAIN: ICD-10-CM

## 2024-02-04 LAB
ALBUMIN SERPL BCG-MCNC: 3.8 G/DL (ref 3.5–5.2)
ALP SERPL-CCNC: 56 U/L (ref 40–150)
ALT SERPL W P-5'-P-CCNC: 23 U/L (ref 0–50)
ANION GAP SERPL CALCULATED.3IONS-SCNC: 9 MMOL/L (ref 7–15)
APTT PPP: 27 SECONDS (ref 22–38)
AST SERPL W P-5'-P-CCNC: 26 U/L (ref 0–45)
BASE EXCESS BLDV CALC-SCNC: 0.9 MMOL/L (ref -3–3)
BILIRUB SERPL-MCNC: 0.2 MG/DL
BUN SERPL-MCNC: 12.1 MG/DL (ref 6–20)
CALCIUM SERPL-MCNC: 8.7 MG/DL (ref 8.6–10)
CHLORIDE SERPL-SCNC: 102 MMOL/L (ref 98–107)
CREAT SERPL-MCNC: 0.73 MG/DL (ref 0.51–0.95)
CREAT SERPL-MCNC: 0.73 MG/DL (ref 0.51–0.95)
DEPRECATED HCO3 PLAS-SCNC: 25 MMOL/L (ref 22–29)
EGFRCR SERPLBLD CKD-EPI 2021: >90 ML/MIN/1.73M2
EGFRCR SERPLBLD CKD-EPI 2021: >90 ML/MIN/1.73M2
ERYTHROCYTE [DISTWIDTH] IN BLOOD BY AUTOMATED COUNT: 14 % (ref 10–15)
FLUAV RNA SPEC QL NAA+PROBE: NEGATIVE
FLUBV RNA RESP QL NAA+PROBE: NEGATIVE
GLUCOSE BLDC GLUCOMTR-MCNC: 212 MG/DL (ref 70–99)
GLUCOSE BLDC GLUCOMTR-MCNC: 237 MG/DL (ref 70–99)
GLUCOSE BLDC GLUCOMTR-MCNC: 59 MG/DL (ref 70–99)
GLUCOSE SERPL-MCNC: 173 MG/DL (ref 70–99)
HCO3 BLDV-SCNC: 28 MMOL/L (ref 21–28)
HCT VFR BLD AUTO: 41 % (ref 35–47)
HGB BLD-MCNC: 13.6 G/DL (ref 11.7–15.7)
INR PPP: 0.98 (ref 0.85–1.15)
MCH RBC QN AUTO: 29.5 PG (ref 26.5–33)
MCHC RBC AUTO-ENTMCNC: 33.2 G/DL (ref 31.5–36.5)
MCV RBC AUTO: 89 FL (ref 78–100)
NT-PROBNP SERPL-MCNC: 368 PG/ML (ref 0–450)
O2/TOTAL GAS SETTING VFR VENT: 21 %
OXYHGB MFR BLDV: 26 % (ref 70–75)
PCO2 BLDV: 53 MM HG (ref 40–50)
PH BLDV: 7.33 [PH] (ref 7.32–7.43)
PLATELET # BLD AUTO: 290 10E3/UL (ref 150–450)
PO2 BLDV: 19 MM HG (ref 25–47)
POTASSIUM SERPL-SCNC: 4.1 MMOL/L (ref 3.4–5.3)
PROT SERPL-MCNC: 6.4 G/DL (ref 6.4–8.3)
RBC # BLD AUTO: 4.61 10E6/UL (ref 3.8–5.2)
RSV RNA SPEC NAA+PROBE: NEGATIVE
SAO2 % BLDV: 26 % (ref 70–75)
SARS-COV-2 RNA RESP QL NAA+PROBE: NEGATIVE
SODIUM SERPL-SCNC: 136 MMOL/L (ref 135–145)
TROPONIN T SERPL HS-MCNC: 10 NG/L
WBC # BLD AUTO: 8.3 10E3/UL (ref 4–11)

## 2024-02-04 PROCEDURE — 99222 1ST HOSP IP/OBS MODERATE 55: CPT | Mod: 25 | Performed by: INTERNAL MEDICINE

## 2024-02-04 PROCEDURE — 83880 ASSAY OF NATRIURETIC PEPTIDE: CPT

## 2024-02-04 PROCEDURE — 36415 COLL VENOUS BLD VENIPUNCTURE: CPT

## 2024-02-04 PROCEDURE — 84484 ASSAY OF TROPONIN QUANT: CPT

## 2024-02-04 PROCEDURE — 120N000003 HC R&B IMCU UMMC

## 2024-02-04 PROCEDURE — 85027 COMPLETE CBC AUTOMATED: CPT

## 2024-02-04 PROCEDURE — 80053 COMPREHEN METABOLIC PANEL: CPT

## 2024-02-04 PROCEDURE — 93005 ELECTROCARDIOGRAM TRACING: CPT

## 2024-02-04 PROCEDURE — 87637 SARSCOV2&INF A&B&RSV AMP PRB: CPT

## 2024-02-04 PROCEDURE — 93010 ELECTROCARDIOGRAM REPORT: CPT | Performed by: INTERNAL MEDICINE

## 2024-02-04 PROCEDURE — 82805 BLOOD GASES W/O2 SATURATION: CPT

## 2024-02-04 PROCEDURE — 250N000011 HC RX IP 250 OP 636

## 2024-02-04 PROCEDURE — 250N000012 HC RX MED GY IP 250 OP 636 PS 637

## 2024-02-04 PROCEDURE — 71045 X-RAY EXAM CHEST 1 VIEW: CPT | Mod: 26 | Performed by: STUDENT IN AN ORGANIZED HEALTH CARE EDUCATION/TRAINING PROGRAM

## 2024-02-04 PROCEDURE — 71045 X-RAY EXAM CHEST 1 VIEW: CPT

## 2024-02-04 PROCEDURE — 85610 PROTHROMBIN TIME: CPT

## 2024-02-04 PROCEDURE — 250N000013 HC RX MED GY IP 250 OP 250 PS 637

## 2024-02-04 PROCEDURE — 85730 THROMBOPLASTIN TIME PARTIAL: CPT

## 2024-02-04 PROCEDURE — 82962 GLUCOSE BLOOD TEST: CPT

## 2024-02-04 RX ORDER — NICOTINE POLACRILEX 4 MG
15-30 LOZENGE BUCCAL
Status: DISCONTINUED | OUTPATIENT
Start: 2024-02-04 | End: 2024-02-09 | Stop reason: HOSPADM

## 2024-02-04 RX ORDER — ACETAMINOPHEN 650 MG/1
650 SUPPOSITORY RECTAL EVERY 4 HOURS PRN
Status: DISCONTINUED | OUTPATIENT
Start: 2024-02-04 | End: 2024-02-05

## 2024-02-04 RX ORDER — NALOXONE HYDROCHLORIDE 0.4 MG/ML
0.2 INJECTION, SOLUTION INTRAMUSCULAR; INTRAVENOUS; SUBCUTANEOUS
Status: DISCONTINUED | OUTPATIENT
Start: 2024-02-04 | End: 2024-02-09 | Stop reason: HOSPADM

## 2024-02-04 RX ORDER — BUSPIRONE HYDROCHLORIDE 5 MG/1
10 TABLET ORAL 2 TIMES DAILY
Status: DISCONTINUED | OUTPATIENT
Start: 2024-02-04 | End: 2024-02-09 | Stop reason: HOSPADM

## 2024-02-04 RX ORDER — RANOLAZINE 500 MG/1
500 TABLET, EXTENDED RELEASE ORAL 2 TIMES DAILY
Status: DISCONTINUED | OUTPATIENT
Start: 2024-02-04 | End: 2024-02-09 | Stop reason: HOSPADM

## 2024-02-04 RX ORDER — METOPROLOL SUCCINATE 100 MG/1
100 TABLET, EXTENDED RELEASE ORAL DAILY
Status: DISCONTINUED | OUTPATIENT
Start: 2024-02-05 | End: 2024-02-05

## 2024-02-04 RX ORDER — OXYCODONE HYDROCHLORIDE 5 MG/1
5 TABLET ORAL EVERY 6 HOURS PRN
Status: DISCONTINUED | OUTPATIENT
Start: 2024-02-04 | End: 2024-02-05

## 2024-02-04 RX ORDER — SPIRONOLACTONE 25 MG
12.5 TABLET ORAL DAILY
Status: DISCONTINUED | OUTPATIENT
Start: 2024-02-05 | End: 2024-02-08

## 2024-02-04 RX ORDER — DAPAGLIFLOZIN 10 MG/1
10 TABLET, FILM COATED ORAL DAILY
Status: DISCONTINUED | OUTPATIENT
Start: 2024-02-05 | End: 2024-02-09 | Stop reason: HOSPADM

## 2024-02-04 RX ORDER — ATORVASTATIN CALCIUM 80 MG/1
80 TABLET, FILM COATED ORAL DAILY
Status: DISCONTINUED | OUTPATIENT
Start: 2024-02-05 | End: 2024-02-09 | Stop reason: HOSPADM

## 2024-02-04 RX ORDER — NITROGLYCERIN 0.4 MG/1
0.4 TABLET SUBLINGUAL EVERY 5 MIN PRN
Status: DISCONTINUED | OUTPATIENT
Start: 2024-02-04 | End: 2024-02-09 | Stop reason: HOSPADM

## 2024-02-04 RX ORDER — NALOXONE HYDROCHLORIDE 0.4 MG/ML
0.4 INJECTION, SOLUTION INTRAMUSCULAR; INTRAVENOUS; SUBCUTANEOUS
Status: DISCONTINUED | OUTPATIENT
Start: 2024-02-04 | End: 2024-02-09 | Stop reason: HOSPADM

## 2024-02-04 RX ORDER — FUROSEMIDE 10 MG/ML
10 INJECTION INTRAMUSCULAR; INTRAVENOUS DAILY
Status: DISCONTINUED | OUTPATIENT
Start: 2024-02-05 | End: 2024-02-05

## 2024-02-04 RX ORDER — DEXTROSE MONOHYDRATE 25 G/50ML
25-50 INJECTION, SOLUTION INTRAVENOUS
Status: DISCONTINUED | OUTPATIENT
Start: 2024-02-04 | End: 2024-02-09 | Stop reason: HOSPADM

## 2024-02-04 RX ORDER — MAGNESIUM HYDROXIDE/ALUMINUM HYDROXICE/SIMETHICONE 120; 1200; 1200 MG/30ML; MG/30ML; MG/30ML
30 SUSPENSION ORAL EVERY 4 HOURS PRN
Status: DISCONTINUED | OUTPATIENT
Start: 2024-02-04 | End: 2024-02-09 | Stop reason: HOSPADM

## 2024-02-04 RX ORDER — LIDOCAINE 40 MG/G
CREAM TOPICAL
Status: DISCONTINUED | OUTPATIENT
Start: 2024-02-04 | End: 2024-02-09 | Stop reason: HOSPADM

## 2024-02-04 RX ORDER — ASPIRIN 81 MG/1
81 TABLET, CHEWABLE ORAL DAILY
Status: DISCONTINUED | OUTPATIENT
Start: 2024-02-05 | End: 2024-02-09 | Stop reason: HOSPADM

## 2024-02-04 RX ORDER — AMOXICILLIN 250 MG
1 CAPSULE ORAL
Status: DISCONTINUED | OUTPATIENT
Start: 2024-02-04 | End: 2024-02-06

## 2024-02-04 RX ORDER — NICOTINE 21 MG/24HR
1 PATCH, TRANSDERMAL 24 HOURS TRANSDERMAL DAILY
Status: DISCONTINUED | OUTPATIENT
Start: 2024-02-05 | End: 2024-02-09 | Stop reason: HOSPADM

## 2024-02-04 RX ORDER — ACETAMINOPHEN 325 MG/1
650 TABLET ORAL EVERY 4 HOURS PRN
Status: DISCONTINUED | OUTPATIENT
Start: 2024-02-04 | End: 2024-02-05

## 2024-02-04 RX ORDER — ENOXAPARIN SODIUM 100 MG/ML
40 INJECTION SUBCUTANEOUS EVERY EVENING
Status: DISCONTINUED | OUTPATIENT
Start: 2024-02-04 | End: 2024-02-09 | Stop reason: HOSPADM

## 2024-02-04 RX ORDER — VENLAFAXINE 25 MG/1
100 TABLET ORAL 2 TIMES DAILY
Status: DISCONTINUED | OUTPATIENT
Start: 2024-02-04 | End: 2024-02-09 | Stop reason: HOSPADM

## 2024-02-04 RX ORDER — POLYETHYLENE GLYCOL 3350 17 G
2 POWDER IN PACKET (EA) ORAL
Status: DISCONTINUED | OUTPATIENT
Start: 2024-02-04 | End: 2024-02-09 | Stop reason: HOSPADM

## 2024-02-04 RX ADMIN — NITROGLYCERIN 0.4 MG: 0.4 TABLET SUBLINGUAL at 21:35

## 2024-02-04 RX ADMIN — VENLAFAXINE 100 MG: 25 TABLET ORAL at 23:51

## 2024-02-04 RX ADMIN — DOCUSATE SODIUM 50 MG AND SENNOSIDES 8.6 MG 1 TABLET: 8.6; 5 TABLET, FILM COATED ORAL at 22:07

## 2024-02-04 RX ADMIN — NITROGLYCERIN 0.4 MG: 0.4 TABLET SUBLINGUAL at 21:30

## 2024-02-04 RX ADMIN — ENOXAPARIN SODIUM 40 MG: 40 INJECTION SUBCUTANEOUS at 23:54

## 2024-02-04 RX ADMIN — INSULIN GLARGINE 15 UNITS: 100 INJECTION, SOLUTION SUBCUTANEOUS at 23:50

## 2024-02-04 RX ADMIN — OXYCODONE HYDROCHLORIDE 5 MG: 5 TABLET ORAL at 22:07

## 2024-02-04 RX ADMIN — BUSPIRONE HYDROCHLORIDE 10 MG: 5 TABLET ORAL at 23:51

## 2024-02-04 RX ADMIN — INSULIN ASPART 2 UNITS: 100 INJECTION, SOLUTION INTRAVENOUS; SUBCUTANEOUS at 23:50

## 2024-02-04 RX ADMIN — NITROGLYCERIN 0.4 MG: 0.4 TABLET SUBLINGUAL at 21:13

## 2024-02-04 ASSESSMENT — ACTIVITIES OF DAILY LIVING (ADL)
ADLS_ACUITY_SCORE: 25
ADLS_ACUITY_SCORE: 35

## 2024-02-04 NOTE — Clinical Note
dry, intact, no bleeding and no hematoma. Right internal jugular vein 7 FR sheath removed to a manual hold, to hemostasis and a Primapore DSG.

## 2024-02-04 NOTE — Clinical Note
Reported to 6C RNIlene. Pt returned to 6C pt room per W/C, by transport team, in awake and stable condition.

## 2024-02-05 ENCOUNTER — APPOINTMENT (OUTPATIENT)
Dept: CT IMAGING | Facility: CLINIC | Age: 49
End: 2024-02-05
Attending: INTERNAL MEDICINE
Payer: COMMERCIAL

## 2024-02-05 ENCOUNTER — APPOINTMENT (OUTPATIENT)
Dept: CARDIOLOGY | Facility: CLINIC | Age: 49
End: 2024-02-05
Payer: COMMERCIAL

## 2024-02-05 ENCOUNTER — ANCILLARY PROCEDURE (OUTPATIENT)
Dept: CARDIOLOGY | Facility: CLINIC | Age: 49
End: 2024-02-05
Attending: INTERNAL MEDICINE
Payer: COMMERCIAL

## 2024-02-05 ENCOUNTER — APPOINTMENT (OUTPATIENT)
Dept: OCCUPATIONAL THERAPY | Facility: CLINIC | Age: 49
End: 2024-02-05
Payer: COMMERCIAL

## 2024-02-05 ENCOUNTER — APPOINTMENT (OUTPATIENT)
Dept: PHYSICAL THERAPY | Facility: CLINIC | Age: 49
End: 2024-02-05
Payer: COMMERCIAL

## 2024-02-05 LAB
AMPHETAMINES UR QL SCN: NORMAL
ANION GAP SERPL CALCULATED.3IONS-SCNC: 8 MMOL/L (ref 7–15)
ATRIAL RATE - MUSE: 60 BPM
ATRIAL RATE - MUSE: 60 BPM
BARBITURATES UR QL SCN: NORMAL
BASOPHILS # BLD AUTO: 0 10E3/UL (ref 0–0.2)
BASOPHILS NFR BLD AUTO: 1 %
BENZODIAZ UR QL SCN: NORMAL
BUN SERPL-MCNC: 11.9 MG/DL (ref 6–20)
BZE UR QL SCN: NORMAL
CALCIUM SERPL-MCNC: 8.6 MG/DL (ref 8.6–10)
CANNABINOIDS UR QL SCN: NORMAL
CHLORIDE SERPL-SCNC: 105 MMOL/L (ref 98–107)
CHOLEST SERPL-MCNC: 161 MG/DL
CREAT SERPL-MCNC: 0.65 MG/DL (ref 0.51–0.95)
CYSTATIN C (ROCHE): 0.8 MG/L (ref 0.6–1)
DEPRECATED HCO3 PLAS-SCNC: 24 MMOL/L (ref 22–29)
DIASTOLIC BLOOD PRESSURE - MUSE: NORMAL MMHG
DIASTOLIC BLOOD PRESSURE - MUSE: NORMAL MMHG
EGFRCR SERPLBLD CKD-EPI 2021: >90 ML/MIN/1.73M2
EOSINOPHIL # BLD AUTO: 0.1 10E3/UL (ref 0–0.7)
EOSINOPHIL NFR BLD AUTO: 1 %
ERYTHROCYTE [DISTWIDTH] IN BLOOD BY AUTOMATED COUNT: 13.9 % (ref 10–15)
FENTANYL UR QL: NORMAL
GFR SERPL CREATININE-BSD FRML MDRD: >90 ML/MIN/1.73M2
GLUCOSE BLDC GLUCOMTR-MCNC: 127 MG/DL (ref 70–99)
GLUCOSE BLDC GLUCOMTR-MCNC: 137 MG/DL (ref 70–99)
GLUCOSE BLDC GLUCOMTR-MCNC: 148 MG/DL (ref 70–99)
GLUCOSE BLDC GLUCOMTR-MCNC: 156 MG/DL (ref 70–99)
GLUCOSE BLDC GLUCOMTR-MCNC: 183 MG/DL (ref 70–99)
GLUCOSE BLDC GLUCOMTR-MCNC: 80 MG/DL (ref 70–99)
GLUCOSE SERPL-MCNC: 89 MG/DL (ref 70–99)
HBA1C MFR BLD: 10.6 %
HCT VFR BLD AUTO: 39.4 % (ref 35–47)
HDLC SERPL-MCNC: 33 MG/DL
HGB BLD-MCNC: 13.2 G/DL (ref 11.7–15.7)
IMM GRANULOCYTES # BLD: 0 10E3/UL
IMM GRANULOCYTES NFR BLD: 0 %
INTERPRETATION ECG - MUSE: NORMAL
INTERPRETATION ECG - MUSE: NORMAL
LDLC SERPL CALC-MCNC: 106 MG/DL
LVEF ECHO: NORMAL
LYMPHOCYTES # BLD AUTO: 2.8 10E3/UL (ref 0.8–5.3)
LYMPHOCYTES NFR BLD AUTO: 32 %
MAGNESIUM SERPL-MCNC: 2 MG/DL (ref 1.7–2.3)
MCH RBC QN AUTO: 30.3 PG (ref 26.5–33)
MCHC RBC AUTO-ENTMCNC: 33.5 G/DL (ref 31.5–36.5)
MCV RBC AUTO: 91 FL (ref 78–100)
MDC_IDC_LEAD_CONNECTION_STATUS: NORMAL
MDC_IDC_LEAD_IMPLANT_DT: NORMAL
MDC_IDC_LEAD_LOCATION: NORMAL
MDC_IDC_LEAD_LOCATION_DETAIL_1: NORMAL
MDC_IDC_LEAD_MFG: NORMAL
MDC_IDC_LEAD_MODEL: NORMAL
MDC_IDC_LEAD_POLARITY_TYPE: NORMAL
MDC_IDC_LEAD_SERIAL: NORMAL
MDC_IDC_LEAD_SPECIAL_FUNCTION: NORMAL
MDC_IDC_MSMT_BATTERY_DTM: NORMAL
MDC_IDC_MSMT_BATTERY_REMAINING_LONGEVITY: 159 MO
MDC_IDC_MSMT_BATTERY_RRT_TRIGGER: NORMAL
MDC_IDC_MSMT_BATTERY_VOLTAGE: 3.02 V
MDC_IDC_MSMT_CAP_CHARGE_DTM: NORMAL
MDC_IDC_MSMT_CAP_CHARGE_ENERGY: 18 J
MDC_IDC_MSMT_CAP_CHARGE_TIME: 3.8 S
MDC_IDC_MSMT_CAP_CHARGE_TYPE: NORMAL
MDC_IDC_MSMT_LEADCHNL_RV_IMPEDANCE_VALUE: 380 OHM
MDC_IDC_MSMT_LEADCHNL_RV_IMPEDANCE_VALUE: 456 OHM
MDC_IDC_MSMT_LEADCHNL_RV_PACING_THRESHOLD_AMPLITUDE: 0.62 V
MDC_IDC_MSMT_LEADCHNL_RV_PACING_THRESHOLD_AMPLITUDE: 0.75 V
MDC_IDC_MSMT_LEADCHNL_RV_PACING_THRESHOLD_PULSEWIDTH: 0.4 MS
MDC_IDC_MSMT_LEADCHNL_RV_PACING_THRESHOLD_PULSEWIDTH: 0.4 MS
MDC_IDC_MSMT_LEADCHNL_RV_SENSING_INTR_AMPL: 30 MV
MDC_IDC_PG_IMPLANT_DTM: NORMAL
MDC_IDC_PG_MFG: NORMAL
MDC_IDC_PG_MODEL: NORMAL
MDC_IDC_PG_SERIAL: NORMAL
MDC_IDC_PG_TYPE: NORMAL
MDC_IDC_SESS_CLINIC_NAME: NORMAL
MDC_IDC_SESS_DTM: NORMAL
MDC_IDC_SESS_TYPE: NORMAL
MDC_IDC_SET_BRADY_HYSTRATE: NORMAL
MDC_IDC_SET_BRADY_LOWRATE: 40 {BEATS}/MIN
MDC_IDC_SET_BRADY_MODE: NORMAL
MDC_IDC_SET_LEADCHNL_RV_PACING_AMPLITUDE: 2 V
MDC_IDC_SET_LEADCHNL_RV_PACING_ANODE_ELECTRODE_1: NORMAL
MDC_IDC_SET_LEADCHNL_RV_PACING_ANODE_LOCATION_1: NORMAL
MDC_IDC_SET_LEADCHNL_RV_PACING_CAPTURE_MODE: NORMAL
MDC_IDC_SET_LEADCHNL_RV_PACING_CATHODE_ELECTRODE_1: NORMAL
MDC_IDC_SET_LEADCHNL_RV_PACING_CATHODE_LOCATION_1: NORMAL
MDC_IDC_SET_LEADCHNL_RV_PACING_POLARITY: NORMAL
MDC_IDC_SET_LEADCHNL_RV_PACING_PULSEWIDTH: 0.4 MS
MDC_IDC_SET_LEADCHNL_RV_SENSING_ANODE_ELECTRODE_1: NORMAL
MDC_IDC_SET_LEADCHNL_RV_SENSING_ANODE_LOCATION_1: NORMAL
MDC_IDC_SET_LEADCHNL_RV_SENSING_CATHODE_ELECTRODE_1: NORMAL
MDC_IDC_SET_LEADCHNL_RV_SENSING_CATHODE_LOCATION_1: NORMAL
MDC_IDC_SET_LEADCHNL_RV_SENSING_POLARITY: NORMAL
MDC_IDC_SET_LEADCHNL_RV_SENSING_SENSITIVITY: 0.3 MV
MDC_IDC_SET_ZONE_DETECTION_BEATS_DENOMINATOR: 16 {BEATS}
MDC_IDC_SET_ZONE_DETECTION_BEATS_DENOMINATOR: 32 {BEATS}
MDC_IDC_SET_ZONE_DETECTION_BEATS_DENOMINATOR: 40 {BEATS}
MDC_IDC_SET_ZONE_DETECTION_BEATS_NUMERATOR: 16 {BEATS}
MDC_IDC_SET_ZONE_DETECTION_BEATS_NUMERATOR: 30 {BEATS}
MDC_IDC_SET_ZONE_DETECTION_BEATS_NUMERATOR: 32 {BEATS}
MDC_IDC_SET_ZONE_DETECTION_INTERVAL: 270 MS
MDC_IDC_SET_ZONE_DETECTION_INTERVAL: 320 MS
MDC_IDC_SET_ZONE_DETECTION_INTERVAL: 400 MS
MDC_IDC_SET_ZONE_STATUS: NORMAL
MDC_IDC_SET_ZONE_TYPE: NORMAL
MDC_IDC_SET_ZONE_VENDOR_TYPE: NORMAL
MDC_IDC_STAT_AT_BURDEN_PERCENT: 0 %
MDC_IDC_STAT_AT_DTM_END: NORMAL
MDC_IDC_STAT_AT_DTM_START: NORMAL
MDC_IDC_STAT_BRADY_DTM_END: NORMAL
MDC_IDC_STAT_BRADY_DTM_START: NORMAL
MDC_IDC_STAT_BRADY_RA_PERCENT_PACED: NORMAL
MDC_IDC_STAT_BRADY_RV_PERCENT_PACED: 0.01 %
MDC_IDC_STAT_CRT_DTM_END: NORMAL
MDC_IDC_STAT_CRT_DTM_START: NORMAL
MDC_IDC_STAT_EPISODE_RECENT_COUNT: 0
MDC_IDC_STAT_EPISODE_RECENT_COUNT_DTM_END: NORMAL
MDC_IDC_STAT_EPISODE_RECENT_COUNT_DTM_START: NORMAL
MDC_IDC_STAT_EPISODE_TOTAL_COUNT: 0
MDC_IDC_STAT_EPISODE_TOTAL_COUNT_DTM_END: NORMAL
MDC_IDC_STAT_EPISODE_TOTAL_COUNT_DTM_START: NORMAL
MDC_IDC_STAT_EPISODE_TYPE: NORMAL
MDC_IDC_STAT_TACHYTHERAPY_ATP_DELIVERED_RECENT: 0
MDC_IDC_STAT_TACHYTHERAPY_ATP_DELIVERED_TOTAL: 0
MDC_IDC_STAT_TACHYTHERAPY_RECENT_DTM_END: NORMAL
MDC_IDC_STAT_TACHYTHERAPY_RECENT_DTM_START: NORMAL
MDC_IDC_STAT_TACHYTHERAPY_SHOCKS_ABORTED_RECENT: 0
MDC_IDC_STAT_TACHYTHERAPY_SHOCKS_ABORTED_TOTAL: 0
MDC_IDC_STAT_TACHYTHERAPY_SHOCKS_DELIVERED_RECENT: 0
MDC_IDC_STAT_TACHYTHERAPY_SHOCKS_DELIVERED_TOTAL: 0
MDC_IDC_STAT_TACHYTHERAPY_TOTAL_DTM_END: NORMAL
MDC_IDC_STAT_TACHYTHERAPY_TOTAL_DTM_START: NORMAL
MONOCYTES # BLD AUTO: 0.6 10E3/UL (ref 0–1.3)
MONOCYTES NFR BLD AUTO: 6 %
NEUTROPHILS # BLD AUTO: 5.2 10E3/UL (ref 1.6–8.3)
NEUTROPHILS NFR BLD AUTO: 60 %
NONHDLC SERPL-MCNC: 128 MG/DL
NRBC # BLD AUTO: 0 10E3/UL
NRBC BLD AUTO-RTO: 0 /100
OPIATES UR QL SCN: NORMAL
P AXIS - MUSE: 1 DEGREES
P AXIS - MUSE: 20 DEGREES
PCP QUAL URINE (ROCHE): NORMAL
PHOSPHATE SERPL-MCNC: 2.6 MG/DL (ref 2.5–4.5)
PLATELET # BLD AUTO: 279 10E3/UL (ref 150–450)
POTASSIUM SERPL-SCNC: 3.9 MMOL/L (ref 3.4–5.3)
PR INTERVAL - MUSE: 188 MS
PR INTERVAL - MUSE: 188 MS
QRS DURATION - MUSE: 124 MS
QRS DURATION - MUSE: 132 MS
QT - MUSE: 442 MS
QT - MUSE: 462 MS
QTC - MUSE: 442 MS
QTC - MUSE: 462 MS
R AXIS - MUSE: 49 DEGREES
R AXIS - MUSE: 50 DEGREES
RBC # BLD AUTO: 4.35 10E6/UL (ref 3.8–5.2)
SODIUM SERPL-SCNC: 137 MMOL/L (ref 135–145)
SYSTOLIC BLOOD PRESSURE - MUSE: NORMAL MMHG
SYSTOLIC BLOOD PRESSURE - MUSE: NORMAL MMHG
T AXIS - MUSE: -49 DEGREES
T AXIS - MUSE: -58 DEGREES
TRIGL SERPL-MCNC: 112 MG/DL
TSH SERPL DL<=0.005 MIU/L-ACNC: 0.75 UIU/ML (ref 0.3–4.2)
VENTRICULAR RATE- MUSE: 60 BPM
VENTRICULAR RATE- MUSE: 60 BPM
WBC # BLD AUTO: 8.7 10E3/UL (ref 4–11)

## 2024-02-05 PROCEDURE — 250N000009 HC RX 250: Performed by: INTERNAL MEDICINE

## 2024-02-05 PROCEDURE — 250N000013 HC RX MED GY IP 250 OP 250 PS 637

## 2024-02-05 PROCEDURE — 84100 ASSAY OF PHOSPHORUS: CPT

## 2024-02-05 PROCEDURE — 999N000208 ECHOCARDIOGRAM COMPLETE

## 2024-02-05 PROCEDURE — 70486 CT MAXILLOFACIAL W/O DYE: CPT | Mod: 26 | Performed by: STUDENT IN AN ORGANIZED HEALTH CARE EDUCATION/TRAINING PROGRAM

## 2024-02-05 PROCEDURE — 97161 PT EVAL LOW COMPLEX 20 MIN: CPT | Mod: GP

## 2024-02-05 PROCEDURE — 84443 ASSAY THYROID STIM HORMONE: CPT

## 2024-02-05 PROCEDURE — 70486 CT MAXILLOFACIAL W/O DYE: CPT

## 2024-02-05 PROCEDURE — 71275 CT ANGIOGRAPHY CHEST: CPT | Mod: 26 | Performed by: RADIOLOGY

## 2024-02-05 PROCEDURE — 83735 ASSAY OF MAGNESIUM: CPT

## 2024-02-05 PROCEDURE — 93282 PRGRMG EVAL IMPLANTABLE DFB: CPT | Mod: 26 | Performed by: INTERNAL MEDICINE

## 2024-02-05 PROCEDURE — 70450 CT HEAD/BRAIN W/O DYE: CPT

## 2024-02-05 PROCEDURE — 97530 THERAPEUTIC ACTIVITIES: CPT | Mod: GO

## 2024-02-05 PROCEDURE — 83036 HEMOGLOBIN GLYCOSYLATED A1C: CPT

## 2024-02-05 PROCEDURE — 99232 SBSQ HOSP IP/OBS MODERATE 35: CPT | Mod: GC | Performed by: INTERNAL MEDICINE

## 2024-02-05 PROCEDURE — 80307 DRUG TEST PRSMV CHEM ANLYZR: CPT

## 2024-02-05 PROCEDURE — 120N000003 HC R&B IMCU UMMC

## 2024-02-05 PROCEDURE — 85025 COMPLETE CBC W/AUTO DIFF WBC: CPT

## 2024-02-05 PROCEDURE — 250N000011 HC RX IP 250 OP 636

## 2024-02-05 PROCEDURE — 250N000013 HC RX MED GY IP 250 OP 250 PS 637: Performed by: INTERNAL MEDICINE

## 2024-02-05 PROCEDURE — 93010 ELECTROCARDIOGRAM REPORT: CPT | Mod: XU | Performed by: INTERNAL MEDICINE

## 2024-02-05 PROCEDURE — 80061 LIPID PANEL: CPT

## 2024-02-05 PROCEDURE — 71250 CT THORAX DX C-: CPT

## 2024-02-05 PROCEDURE — 255N000002 HC RX 255 OP 636: Performed by: STUDENT IN AN ORGANIZED HEALTH CARE EDUCATION/TRAINING PROGRAM

## 2024-02-05 PROCEDURE — 93282 PRGRMG EVAL IMPLANTABLE DFB: CPT

## 2024-02-05 PROCEDURE — 999N000157 HC STATISTIC RCP TIME EA 10 MIN

## 2024-02-05 PROCEDURE — 93306 TTE W/DOPPLER COMPLETE: CPT | Mod: 26 | Performed by: STUDENT IN AN ORGANIZED HEALTH CARE EDUCATION/TRAINING PROGRAM

## 2024-02-05 PROCEDURE — 82610 CYSTATIN C: CPT

## 2024-02-05 PROCEDURE — 80048 BASIC METABOLIC PNL TOTAL CA: CPT

## 2024-02-05 PROCEDURE — 97165 OT EVAL LOW COMPLEX 30 MIN: CPT | Mod: GO

## 2024-02-05 PROCEDURE — 71275 CT ANGIOGRAPHY CHEST: CPT

## 2024-02-05 PROCEDURE — 250N000011 HC RX IP 250 OP 636: Performed by: INTERNAL MEDICINE

## 2024-02-05 PROCEDURE — 70450 CT HEAD/BRAIN W/O DYE: CPT | Mod: 26 | Performed by: STUDENT IN AN ORGANIZED HEALTH CARE EDUCATION/TRAINING PROGRAM

## 2024-02-05 PROCEDURE — C8929 TTE W OR WO FOL WCON,DOPPLER: HCPCS

## 2024-02-05 PROCEDURE — 97116 GAIT TRAINING THERAPY: CPT | Mod: GP

## 2024-02-05 PROCEDURE — 36415 COLL VENOUS BLD VENIPUNCTURE: CPT

## 2024-02-05 RX ORDER — LIDOCAINE 4 G/G
1 PATCH TOPICAL DAILY
Qty: 1 PATCH | Refills: 0 | Status: COMPLETED | OUTPATIENT
Start: 2024-02-05 | End: 2024-02-05

## 2024-02-05 RX ORDER — ISOSORBIDE DINITRATE 10 MG/1
10 TABLET ORAL
Status: DISCONTINUED | OUTPATIENT
Start: 2024-02-05 | End: 2024-02-09 | Stop reason: HOSPADM

## 2024-02-05 RX ORDER — ONDANSETRON 2 MG/ML
4 INJECTION INTRAMUSCULAR; INTRAVENOUS EVERY 6 HOURS PRN
Status: DISCONTINUED | OUTPATIENT
Start: 2024-02-05 | End: 2024-02-09 | Stop reason: HOSPADM

## 2024-02-05 RX ORDER — ONDANSETRON 4 MG/1
4 TABLET, ORALLY DISINTEGRATING ORAL EVERY 6 HOURS PRN
Status: DISCONTINUED | OUTPATIENT
Start: 2024-02-05 | End: 2024-02-09 | Stop reason: HOSPADM

## 2024-02-05 RX ORDER — METOPROLOL SUCCINATE 100 MG/1
150 TABLET, EXTENDED RELEASE ORAL DAILY
Status: ON HOLD | COMMUNITY
End: 2024-02-09

## 2024-02-05 RX ORDER — ACETAMINOPHEN 325 MG/1
975 TABLET ORAL 3 TIMES DAILY
Status: DISCONTINUED | OUTPATIENT
Start: 2024-02-05 | End: 2024-02-07

## 2024-02-05 RX ORDER — CLINDAMYCIN PHOSPHATE 11.9 MG/ML
SOLUTION TOPICAL 2 TIMES DAILY PRN
Status: ON HOLD | COMMUNITY
End: 2024-02-09

## 2024-02-05 RX ORDER — OXYCODONE HYDROCHLORIDE 5 MG/1
5 TABLET ORAL EVERY 4 HOURS PRN
Status: DISCONTINUED | OUTPATIENT
Start: 2024-02-05 | End: 2024-02-09 | Stop reason: HOSPADM

## 2024-02-05 RX ORDER — RANOLAZINE 500 MG/1
500 TABLET, EXTENDED RELEASE ORAL 2 TIMES DAILY
COMMUNITY

## 2024-02-05 RX ORDER — DOXYCYCLINE HYCLATE 100 MG/1
100 TABLET, DELAYED RELEASE ORAL 2 TIMES DAILY
COMMUNITY

## 2024-02-05 RX ORDER — METOPROLOL SUCCINATE 25 MG/1
25 TABLET, EXTENDED RELEASE ORAL DAILY
Status: DISCONTINUED | OUTPATIENT
Start: 2024-02-06 | End: 2024-02-09 | Stop reason: HOSPADM

## 2024-02-05 RX ORDER — FENOFIBRATE 160 MG/1
160 TABLET ORAL DAILY
Status: DISCONTINUED | OUTPATIENT
Start: 2024-02-05 | End: 2024-02-09 | Stop reason: HOSPADM

## 2024-02-05 RX ORDER — SPIRONOLACTONE 25 MG/1
12.5 TABLET ORAL DAILY
COMMUNITY

## 2024-02-05 RX ORDER — IOPAMIDOL 755 MG/ML
90 INJECTION, SOLUTION INTRAVASCULAR ONCE
Status: COMPLETED | OUTPATIENT
Start: 2024-02-05 | End: 2024-02-05

## 2024-02-05 RX ORDER — METHOCARBAMOL 500 MG/1
500 TABLET, FILM COATED ORAL 4 TIMES DAILY PRN
COMMUNITY

## 2024-02-05 RX ORDER — MAGNESIUM OXIDE 400 MG/1
400 TABLET ORAL EVERY 4 HOURS
Status: COMPLETED | OUTPATIENT
Start: 2024-02-05 | End: 2024-02-05

## 2024-02-05 RX ADMIN — SODIUM CHLORIDE, PRESERVATIVE FREE 90 ML: 5 INJECTION INTRAVENOUS at 17:31

## 2024-02-05 RX ADMIN — VENLAFAXINE 100 MG: 25 TABLET ORAL at 19:56

## 2024-02-05 RX ADMIN — TICAGRELOR 90 MG: 90 TABLET ORAL at 00:04

## 2024-02-05 RX ADMIN — HUMAN ALBUMIN MICROSPHERES AND PERFLUTREN 7 ML: 10; .22 INJECTION, SOLUTION INTRAVENOUS at 09:41

## 2024-02-05 RX ADMIN — RANOLAZINE 500 MG: 500 TABLET, FILM COATED, EXTENDED RELEASE ORAL at 19:56

## 2024-02-05 RX ADMIN — ASPIRIN 81 MG CHEWABLE TABLET 81 MG: 81 TABLET CHEWABLE at 07:59

## 2024-02-05 RX ADMIN — MAGNESIUM OXIDE TAB 400 MG (241.3 MG ELEMENTAL MG) 400 MG: 400 (241.3 MG) TAB at 07:59

## 2024-02-05 RX ADMIN — TICAGRELOR 90 MG: 90 TABLET ORAL at 19:56

## 2024-02-05 RX ADMIN — ONDANSETRON 4 MG: 2 INJECTION INTRAMUSCULAR; INTRAVENOUS at 02:48

## 2024-02-05 RX ADMIN — INSULIN ASPART 1 UNITS: 100 INJECTION, SOLUTION INTRAVENOUS; SUBCUTANEOUS at 16:57

## 2024-02-05 RX ADMIN — SPIRONOLACTONE 12.5 MG: 25 TABLET, FILM COATED ORAL at 08:00

## 2024-02-05 RX ADMIN — TICAGRELOR 90 MG: 90 TABLET ORAL at 07:59

## 2024-02-05 RX ADMIN — INSULIN ASPART 1 UNITS: 100 INJECTION, SOLUTION INTRAVENOUS; SUBCUTANEOUS at 12:38

## 2024-02-05 RX ADMIN — VENLAFAXINE 100 MG: 25 TABLET ORAL at 07:59

## 2024-02-05 RX ADMIN — RANOLAZINE 500 MG: 500 TABLET, FILM COATED, EXTENDED RELEASE ORAL at 07:59

## 2024-02-05 RX ADMIN — ISOSORBIDE DINITRATE 10 MG: 10 TABLET ORAL at 19:27

## 2024-02-05 RX ADMIN — INSULIN ASPART 1 UNITS: 100 INJECTION, SOLUTION INTRAVENOUS; SUBCUTANEOUS at 19:57

## 2024-02-05 RX ADMIN — BUSPIRONE HYDROCHLORIDE 10 MG: 5 TABLET ORAL at 07:59

## 2024-02-05 RX ADMIN — IOPAMIDOL 90 ML: 755 INJECTION, SOLUTION INTRAVENOUS at 17:31

## 2024-02-05 RX ADMIN — FENOFIBRATE 160 MG: 160 TABLET ORAL at 13:47

## 2024-02-05 RX ADMIN — INSULIN GLARGINE 15 UNITS: 100 INJECTION, SOLUTION SUBCUTANEOUS at 21:36

## 2024-02-05 RX ADMIN — OXYCODONE HYDROCHLORIDE 5 MG: 5 TABLET ORAL at 10:25

## 2024-02-05 RX ADMIN — ATORVASTATIN CALCIUM 80 MG: 80 TABLET, FILM COATED ORAL at 07:59

## 2024-02-05 RX ADMIN — ISOSORBIDE DINITRATE 10 MG: 10 TABLET ORAL at 13:47

## 2024-02-05 RX ADMIN — NICOTINE 1 PATCH: 21 PATCH, EXTENDED RELEASE TRANSDERMAL at 07:57

## 2024-02-05 RX ADMIN — MAGNESIUM OXIDE TAB 400 MG (241.3 MG ELEMENTAL MG) 400 MG: 400 (241.3 MG) TAB at 12:38

## 2024-02-05 RX ADMIN — OXYCODONE HYDROCHLORIDE 5 MG: 5 TABLET ORAL at 21:36

## 2024-02-05 RX ADMIN — BUSPIRONE HYDROCHLORIDE 10 MG: 5 TABLET ORAL at 19:56

## 2024-02-05 RX ADMIN — LIDOCAINE 1 PATCH: 4 PATCH TOPICAL at 00:59

## 2024-02-05 RX ADMIN — OXYCODONE HYDROCHLORIDE 5 MG: 5 TABLET ORAL at 04:18

## 2024-02-05 RX ADMIN — FUROSEMIDE 10 MG: 10 INJECTION, SOLUTION INTRAMUSCULAR; INTRAVENOUS at 08:00

## 2024-02-05 RX ADMIN — OXYCODONE HYDROCHLORIDE 5 MG: 5 TABLET ORAL at 17:05

## 2024-02-05 RX ADMIN — DAPAGLIFLOZIN 10 MG: 10 TABLET, FILM COATED ORAL at 07:59

## 2024-02-05 ASSESSMENT — ACTIVITIES OF DAILY LIVING (ADL)
ADLS_ACUITY_SCORE: 25
ADLS_ACUITY_SCORE: 25
ADLS_ACUITY_SCORE: 28
ADLS_ACUITY_SCORE: 25
ADLS_ACUITY_SCORE: 25
ADLS_ACUITY_SCORE: 27
ADLS_ACUITY_SCORE: 25
PREVIOUS_RESPONSIBILITIES: MEAL PREP;HOUSEKEEPING;LAUNDRY
ADLS_ACUITY_SCORE: 27

## 2024-02-05 NOTE — PROGRESS NOTES
[facility]    Cardiology Progress Note- Cardiology        Date of Admission:  2/4/2024     Assessment & Plan: HVSL    48y.o. with PMHx of HFpEF (27%) s/p ICD, CAD s/p PCI (PCI of RCA  and MOHAN x1 to mLAD and MOHAN x1 to mLCx on  01/2023), DM2, tobacco use who was admitted to OSH for ACS r/o and transferred to [facility] for LVAD/heart transplant work up.    Updates:   - TTE: EF 30-35%, apical wall akinesis, global RV function normal, IVC small  - Discussed with SW; patient has financial approval for LVAD workup   - Starting LVAD evaluation today, labs ordered   - Plan for RHC tomorrow as part of transplant workup, npo at midnight   - Stop IV diuretic, consider switch back to PTA furosemide 20mg PO tomorrow; given euvolemic   - Start 25mg metoprolol succinate every day  (PTA 100mg Toprol)  - Start isosorbide dinitrate 10mg TID for angina, continue ranolazine   - CTA ordered for eval of possible R subclavian stenosis        # Chronic HFrEF 2/2 Ischemic cardiomyopathy  (EF 27% TTE 01/04/2024)  NYHA Class 3  Follows with Dr. Guerin. Given her active smoking,  LVAD was thought to be a primary option.  BB: hold pta metoprolol 100mg d/t hypotension; start with 25mg metoprolol (increase as able)  ARNi hold pta entrest 97/103 mg BID d/t hypotension  SGLT2  continue pta dapagliflozin 10mg daily  MRA continue pta aldactone 12.5 mg daily  Duretics: consider starting back PTA furosemide 20mg PO tomorrow  Volume Status: appears euvolemic on exam  Device: ICD in place for primary prevention (implanted on 5/11/2023)  Has been transferred here for for LVAD workup, given smoking hx not able to be heart transplant candidate at this time. Given financial clearance have started LVAD workup given significant functional limitations. Will start with RHC on 2/6.   - K>4 and Mg>2 with replacement protocols in place  - BMP and Mg daily  - daily weights and strict I/Os  - NPO at MN, right heart cath  in the AM   - PT/OT    LVAD/transplant work up:   [] Labs (CBC, CMP, PT/INR, cystatin C, prealbumin, UA + micro)  [] Infectious (Hep A/B/C, HIV, treponema, HSV 1/2 IgG, CMV IgG, Toxo IgG, EBV IgG, varicella IgG, Quant gold, COVID vaccine/PCR)  [] Utox/nicotine and cotinine/PeTH   [] Immunocompatibility (last transfusion, ABO, HLA tissue typing, PRA)  [] CVTS consult (Plan for formal consult this upcoming week)  [] Social work evaluation  [] Palliative care evaluation  [] Neuropsych evaluation  [] Nutrition evaluation  [] CT Dental + evaluation  [] Abd US + doppler  [] Extremity US and ABIs  [] Carotid US (if DM or ICM or >51yo)  [] PFTs    [] CT head non-contrast: No acute pathology, wnl.  [] CT CAP non-contrast:   [] Colonoscopy(No record of actual report, PCP states due in 2022)  [] DEXA\  [] PSA - N/A     #Multivessel CAD with PCI of RCA  and MOHAN x1 to mLAD and MOHAN x1 to mLCx (Dec 2022 and Jan 2023)  #Peripheral artery disease  3V disease not appropriate for CABG due to poor bypass targets. So had staged PCI of CTOs in Dec 2022 and Jan 2023.Has ongoing chest pain managed with nitroglycerin and oxycodone/fentanyl at OSH.  Due to small vessels patient is a poor candidate for PCI. Plan for medical management. Troponin normal on admission.   - continue DAPT with Aspirin and Brilinta  - continue lipitor and fenofibrate   - continue ranexa (started at the OSH)  - Start isosorbide dinitrate 10 TID   - NTG prn and tylenol 975mg TID, lidocaine patch and oxycodone 5mg Q4H prn for chest pain       #Right subclavian stenosis and possible brachiocephalic stenosis.   Discrepancy of blood pressure, which is lower on the right side. Patient notes pain and tingling in the right hand. Upper arterial duplex shows right subclavian stenosis and possible brachiocephalic stenosis. She was recommended further evaluation with CTA/MRA   - Plan for CTA   - avoid using right arm for blood pressure measurement (patient care order  "placed)     #Diabetes, on Lantus and sliding scale. (A1C 6.8)  - hold pta glargine 30U qPM   - hold pta metformin and liraglutide  - MDSS  - hypoglycemia protocol   - lantus 15 qpm given NPO at MN     #Tobacco use disorder  Has been trying to quit.   - pta buspar  - nicotine patch 21mg daily   - nicotine lozenge q1hr PRN     #MDD  - pta Effexor.        Diet: 2 Gram Sodium Diet , NPO at midnight   DVT Prophylaxis: Enoxaparin (Lovenox) SQ  Cuevas Catheter: Not present  Cardiac Monitoring: ACTIVE order. Indication: Acute decompensated heart failure (48 hours)  Code Status: Full Code          Clinically Significant Risk Factors Present on Admission                # Drug Induced Platelet Defect: home medication list includes an antiplatelet medication    # End stage heart failure: Ventricular assist device (VAD) present     # Obesity: Estimated body mass index is 32.22 kg/m  as calculated from the following:    Height as of this encounter: 1.575 m (5' 2\").    Weight as of this encounter: 79.9 kg (176 lb 2.4 oz).        # ICD device present       Disposition Plan   Expected discharge: 4 - 7 days, recommended to prior living arrangement once advanced heart failure work-up completed.    Entered: Hue Tillman MD 02/05/2024, 11:05 AM   The patient's care was discussed with the Attending Physician, Dr. Valencia .      Hue Tillman MD  [facility]  ______________________________________________________________________    Interval History   Nursing notes reviewed. No acute events overnight. Reports intermittent chest pain at rest that is a 6/10. Denies any weight gain, leg swelling, or worsening SOB from baseline.  on the phone during exam.       Intake/Output Summary (Last 24 hours) at 2/5/2024 1127  Last data filed at 2/5/2024 1026  Gross per 24 hour   Intake 700 ml   Output 800 ml   Net -100 ml       Physical Exam   Vital Signs: Temp: 97.8  F (36.6  C) Temp src: Oral BP: " 94/51 Pulse: 58   Resp: 13 SpO2: 100 % O2 Device: None (Room air)    Weight: 176 lbs 2.36 oz    General Appearance: Chronically ill appearing, no acute distress, resting comfortably in bed  Respiratory:  clear to ascultation in the peripheral lung fields, no wheezes/crackles  Cardiovascular: RRR, no murmurs, no appreciable JVD   GI: Soft, non-distended, non-tender  Skin: No concerning skin lesions/rashes on exposed skin, no peripheral edema  Neuro: alert & oriented x3    Medical Decision Making       Please see A&P for additional details of medical decision making.      Data     I have personally reviewed the following data over the past 24 hrs:    8.7  \   13.2   / 279     137 105 11.9 /  183 (H)   3.9 24 0.65 \     ALT: 23 AST: 26 AP: 56 TBILI: 0.2   ALB: 3.8 TOT PROTEIN: 6.4 LIPASE: N/A     Trop: 10 BNP: 368     INR:  0.98 PTT:  27   D-dimer:  N/A Fibrinogen:  N/A       Imaging results reviewed over the past 24 hrs:   Recent Results (from the past 24 hour(s))   XR Chest Port 1 View    Narrative    Exam: XR CHEST PORT 1 VIEW  2024 10:23 PM     History:  new admission with decompensated heart failure       Comparison:  2023    Findings: Single view of the chest. Stable left chest wall ICD. The  cardiomediastinal silhouette is within normal limits. No significant  pleural effusion or pneumothorax. No focal airspace opacity.      Impression    Impression: No confluent consolidation.    I have personally reviewed the examination and initial interpretation  and I agree with the findings.    KEENAN ORTEGA MD         SYSTEM ID:  [id number]   Echo Complete   Result Value    LVEF  30-35% (moderately reduced)    Narrative    [id number]  [id number]  [id number]  [id number]^AGAKISHIEV^[facility],[place]  Echocardiography Laboratory  [address] [place] [id number]     Name: MARTHA SETHI  MRN: [id number]  : 1975  Study Date: 2024 08:52 AM  Age: 48 yrs  Gender:  Female  Patient Location: [facility]  Reason For Study: Heart Failure  Ordering Physician: FLAQUITA KATHLEEN  Referring Physician: QUINTON HICKS  Performed By: Kashif Beckford     BSA: 1.8 m2  Height: 62 in  Weight: 176 lb  HR: 58  ______________________________________________________________________________  Procedure  Complete Portable Echo Adult. Contrast Optison. Technically difficult  study.Extremely difficult acoustic windows despite the use of contrast for  endcardial border definition. Optison (NDC #5623-2941-03) given intravenously.  Patient was given 7 ml mixture of 3 ml Optison and 6 ml saline. 2 ml wasted.  ______________________________________________________________________________  Interpretation Summary  Technically difficult study.Extremely difficult acoustic windows despite the  use of contrast for endcardial border definition.  Left ventricular function is decreased. The ejection fraction is 30-35%  (moderately reduced).  Apical wall akinesis is present.  Global right ventricular function is normal.  Mild mitral insufficiency is present.  Mild aortic insufficiency is present.  Pulmonary artery systolic pressure cannot be assessed.  IVC diameter <2.1 cm collapsing >50% with sniff suggests a normal RA pressure  of 3 mmHg.  No pericardial effusion is present.  ______________________________________________________________________________  Left Ventricle  Left ventricular function is decreased. The ejection fraction is 30-35%  (moderately reduced). Apical wall akinesis is present. Inferolateral  (posterior) wall hypokinesis is present.     Right Ventricle  The right ventricle is normal size. Global right ventricular function is  normal.     Atria  The atria cannot be assessed.     Mitral Valve  Mild mitral insufficiency is present.     Aortic Valve  Mild aortic insufficiency is present.     Tricuspid Valve  Mild tricuspid insufficiency is present. Pulmonary artery systolic pressure  cannot be  assessed.     Vessels  The inferior vena cava was normal in size with preserved respiratory  variability. IVC diameter <2.1 cm collapsing >50% with sniff suggests a normal  RA pressure of 3 mmHg.     Pericardium  No pericardial effusion is present.     ______________________________________________________________________________  MMode/2D Measurements & Calculations  IVSd: 0.93 cm  LVIDd: 5.3 cm  LVIDs: 4.7 cm  LVPWd: 0.83 cm  FS: 11.5 %  LV mass(C)d: 170.1 grams  LV mass(C)dI: 94.0 grams/m2  RWT: 0.31  TAPSE: 2.0 cm     Doppler Measurements & Calculations  AI P1/2t: 681.9 msec     ______________________________________________________________________________  Report approved by: MD Casa Toney 02/05/2024 09:56 AM         Cardiac Device Check - Inpatient   Result Value    Date Time Interrogation Session [id number]    Implantable Pulse Generator  Medtronic    Implantable Pulse Generator Model [id number] Cobalt XT VR MRI    Implantable Pulse Generator Serial Number [id number]    Type Interrogation Session In Clinic    Clinic Name [facility]    Implantable Pulse Generator Type Defibrillator    Implantable Pulse Generator Implant Date 20230511    Implantable Lead  Medtronic    Implantable Lead Model 6935M Sprint Quattro Secure S MRI SureScan    Implantable Lead Serial Number [id number]    Implantable Lead Implant Date 20230511    Implantable Lead Polarity Type Tripolar Lead    Implantable Lead Location Detail 1 SEPTUM    Implantable Lead Special Function 55CM    Implantable Lead Location Right Ventricle    Implantable Lead Connection Status Connected    Tong Setting Mode (NBG Code) VVI    Tong Setting Lower Rate Limit 40    Tong Setting Hysterisis Rate Off    Lead Channel Setting Sensing Polarity Bipolar    Lead Channel Setting Sensing Anode Location Right Ventricle    Lead Channel Setting Sensing Anode Terminal Ring    Lead Channel Setting  Sensing Cathode Location Right Ventricle    Lead Channel Setting Sensing Cathode Terminal Tip    Lead Channel Setting Sensing Sensitivity 0.3    Lead Channel Setting Pacing Polarity Bipolar    Lead Channel Setting Pacing Anode Location Right Ventricle    Lead Channel Setting Pacing Anode Terminal Ring    Lead Channel Setting Sensing Cathode Location Right Ventricle    Lead Channel Setting Sensing Cathode Terminal Tip    Lead Channel Setting Pacing Pulse Width 0.4    Lead Channel Setting Pacing Amplitude 2.0    Lead Channel Setting Pacing Capture Mode Adaptive    Zone Setting Type Category VF    Zone Setting Vendor Type Category VF    Zone Setting Status Active    Zone Setting Detection Interval 270    Zone Setting Detection Beats Numerator 30    Zone Setting Detection Beats Denominator 40    Zone Setting Type Category VT    Zone Setting Vendor Type Category FastVT    Zone Setting Status Inactive    Zone Setting Type Category VT    Zone Setting Vendor Type Category VT    Zone Setting Status Active    Zone Setting Detection Interval 320    Zone Setting Detection Beats Numerator 16    Zone Setting Detection Beats Denominator 16    Zone Setting Type Category VT    Zone Setting Vendor Type Category MonVT    Zone Setting Status Monitor    Zone Setting Detection Interval 400    Zone Setting Detection Beats Numerator 32    Zone Setting Detection Beats Denominator 32    Lead Channel Impedance Value 380    Lead Channel Impedance Value 456    Lead Channel Sensing Intrinsic Amplitude 30.0    Lead Channel Pacing Threshold Amplitude 0.625    Lead Channel Pacing Threshold Pulse Width 0.4    Lead Channel Pacing Threshold Amplitude 0.75    Lead Channel Pacing Threshold Pulse Width 0.4    Battery Date Time of Measurements 96242229264624    Battery RRT Trigger 2.8 V    Battery Remaining Longevity 159    Battery Voltage 3.02    Capacitor Charge Type Shock    Capacitor Last Charge Date Time 97413938996311    Capacitor Charge Time 3.8     Capacitor Charge Energy 18.0    Tong Statistic Date Time Start 22916447993125    Tong Statistic Date Time End 84071478719102    Tong Statistic RA Percent Paced Invalid Value    Tong Statistic RV Percent Paced 0.01    CRT Statistic Date Time Start 41489909251077    CRT Statistic Date Time End 44477683968329    Atrial Tachy Statistic Date Time Start 33823383855074    Atrial Tachy Statistic Date Time End 02906793584320    Atrial Tachy Statistic AT/AF [place] Percent 0    Therapy Statistic Recent Shocks Delivered 0    Therapy Statistic Recent Shocks Aborted 0    Therapy Statistic Recent ATP Delivered 0    Therapy Statistic Recent Date Time Start 62398322524918    Therapy Statistic Recent Date Time End 97828943041114    Therapy Statistic Total Shocks Delivered 0    Therapy Statistic Total Shocks Aborted 0    Therapy Statistic Total ATP Delivered 0    Therapy Statistic Total  Date Time Start 39272151287727    Therapy Statistic Total  Date Time End 43447090668730    Episode Statistic Recent Count 0    Episode Statistic Type Category Patient Activated    Episode Statistic Recent Count 0    Episode Statistic Type Category SVT    Episode Statistic Recent Count 0    Episode Statistic Type Category VT    Episode Statistic Recent Count 0    Episode Statistic Type Category VF    Episode Statistic Recent Count 0    Episode Statistic Type Category VT    Episode Statistic Recent Count 0    Episode Statistic Type Category VT    Episode Statistic Recent Count 0    Episode Statistic Type Category VT    Episode Statistic Recent Date Time Start 63098520282045    Episode Statistic Recent Date Time End 55958312595736    Episode Statistic Recent Date Time Start 46494975980578    Episode Statistic Recent Date Time End 91615113649095    Episode Statistic Recent Date Time Start 48445372129603    Episode Statistic Recent Date Time End 55358004835569    Episode Statistic Recent Date Time Start 01818193483588    Episode Statistic Recent Date  Time End 64644097356742    Episode Statistic Recent Date Time Start 15994387708395    Episode Statistic Recent Date Time End 35172628660417    Episode Statistic Recent Date Time Start 89442556493635    Episode Statistic Recent Date Time End 12616106586937    Episode Statistic Recent Date Time Start 51267429329102    Episode Statistic Recent Date Time End 23051305607572    Episode Statistic Total Count 0    Episode Statistic Type Category Patient Activated    Episode Statistic Total Count 0    Episode Statistic Type Category SVT    Episode Statistic Total Count 0    Episode Statistic Type Category VT    Episode Statistic Total Count 0    Episode Statistic Type Category VF    Episode Statistic Total Count 0    Episode Statistic Type Category VT    Episode Statistic Total Count 0    Episode Statistic Type Category VT    Episode Statistic Total Count 0    Episode Statistic Type Category VT    Episode Statistic Total Date Time Start 43666022200548    Episode Statistic Total Date Time End 74320709977665    Episode Statistic Total Date Time Start 26241786875575    Episode Statistic Total Date Time End 77988085566285    Episode Statistic Total Date Time Start 42354798136381    Episode Statistic Total Date Time End 15097365975506    Episode Statistic Total Date Time Start 43491155353533    Episode Statistic Total Date Time End 56174396360664    Episode Statistic Total Date Time Start 22784565450109    Episode Statistic Total Date Time End 15344170162328    Episode Statistic Total Date Time Start 50791340788973    Episode Statistic Total Date Time End 91740284241920    Episode Statistic Total Date Time Start 02951673180131    Episode Statistic Total Date Time End 08144787426948    Narrative    Patient seen on 6B for evaluation and iterative programming of their ICD per MD orders.     Device: Medtronic [id number] [place] XT VR MRI  Normal device function  Mode: VVI 40 bpm  : <0.1%  Intrinsic rhythm: VS 70 bpm  Thoracic  Impedance: Below reference line suggesting possible intrathoracic fluid accumulation.  Short V-V intervals: 0  Lead Trends Appear Stable: Yes  Estimated battery longevity to RRT = 13.3 years  Atrial Arrhythmia: 0  Ventricular Arrhythmia: 0  Setting Changes: None  VENU Sanchez RN    Single lead ICD    I have reviewed and interpreted the device interrogation, settings, programming and nurse's summary. The device is functioning within normal device parameters. I agree with the current findings, assessment and plan.      I have reviewed today's vital signs, notes, medications, labs and imaging.  I have also seen and examined the patient and agree with the findings and plan as outlined above.   Pt with endstage heart failure with mild hypervolemia requiring addn'l diuresis and workup for LVAD.  Pt continues to use tobacco although she now states that she wants to quit.  LVAD workup in progress.     David Valencia MD, PhD  Professor, Heart Failure and Cardiac Transplantation  [facility]

## 2024-02-05 NOTE — PROGRESS NOTES
02/05/24 1536   Appointment Info   Signing Clinician's Name / Credentials (OT) Maria C Pop OTR/L   Living Environment   People in Home spouse   Current Living Arrangements apartment   Transportation Anticipated family or friend will provide   Living Environment Comments Pt reports living in an apartment with spouse on first-floor, daughter and grandchildren live above with ~16 stairs to reach their apartment.   Self-Care   Usual Activity Tolerance moderate   Current Activity Tolerance fair   Equipment Currently Used at Home walker, rolling;other (see comments)  (electric scooter)   Fall history within last six months no   Activity/Exercise/Self-Care Comment Pt reports ADL IND at baseline, no AD in apartment however recently has had to 'furniture walk' when ambulating due to fatigue. Uses 4WW in community and electric scoot for further distances. Pt reports 'recently been very limited of completing ADL tasks/mobility due to ongoing SOB/fatigue with minimal activity.'   Instrumental Activities of Daily Living (IADL)   Previous Responsibilities meal prep;housekeeping;laundry   IADL Comments Pt reports at baseline IND with IADL tasks, spouse can assist prn. Enjoys spending time with grandchildren.   General Information   Onset of Illness/Injury or Date of Surgery 02/04/24   Referring Physician Natalie Mccarty MD   Patient/Family Therapy Goal Statement (OT) Return home and to OF   Additional Occupational Profile Info/Pertinent History of Current Problem Per EMR, 48y.o.  with PMHx of HFpEF (27%) s/p ICD, CAD s/p PCI (PCI of RCA  and MOHAN x1 to mLAD and MOHAN x1 to mLCx on  01/2023), DM2, tobacco use who was admitted to OSH for ACS r/o and transferred to [facility] for LVAD/heart transplant work up.   Existing Precautions/Restrictions cardiac;fall   Left Upper Extremity (Weight-bearing Status) full weight-bearing (FWB)   Right Upper Extremity (Weight-bearing Status) full weight-bearing (FWB)   Left Lower Extremity  (Weight-bearing Status) full weight-bearing (FWB)   Right Lower Extremity (Weight-bearing Status) full weight-bearing (FWB)   General Observations and Info OT: Deconditioning, prolonged hospital stay   Cognitive Status Examination   Orientation Status orientation to person, place and time   Cognitive Status Comments Cognition appears intact   Visual Perception   Visual Impairment/Limitations WFL   Sensory   Sensory Quick Adds sensation intact   Pain Assessment   Patient Currently in Pain Yes, see Vital Sign flowsheet   Posture   Posture forward head position   Posture Comments intermittently in static standing, limited by fatigue, able to self-correct   Range of Motion Comprehensive   General Range of Motion bilateral upper extremity ROM WFL   Strength Comprehensive (MMT)   Comment, General Manual Muscle Testing (MMT) Assessment B UE strength grossly deconditioned, formal MMT not assessed this date   Muscle Tone Assessment   Muscle Tone Quick Adds No deficits were identified   Coordination   Upper Extremity Coordination No deficits were identified   Bed Mobility   Bed Mobility sit-supine   Sit-Supine Burt (Bed Mobility) modified independence   Transfers   Transfers sit-stand transfer;toilet transfer   Sit-Stand Transfer   Sit-Stand Burt (Transfers) supervision   Assistive Device (Sit-Stand Transfers) walker, front-wheeled   Sit/Stand Transfer Comments from EOB   Toilet Transfer   Type (Toilet Transfer) sit-stand   Burt Level (Toilet Transfer) supervision   Toilet Transfer Comments per clinical judgement   Balance   Balance Assessment sitting static balance   Balance Comments SBA at EOB   Activities of Daily Living   BADL Assessment/Intervention bathing;upper body dressing;lower body dressing;toileting   Bathing Assessment/Intervention   Burt Level (Bathing) modified independence   Comment, (Bathing) per clinical judgement   Assistive Devices (Bathing) grab bar, tub/shower;shower chair    Upper Body Dressing Assessment/Training   Comment, (Upper Body Dressing) per clinical judgement   [place] Level (Upper Body Dressing) independent   Lower Body Dressing Assessment/Training   Comment, (Lower Body Dressing) per clinical judgement   [place] Level (Lower Body Dressing) modified independence   Toileting   Comment, (Toileting) per clinical judgement   [place] Level (Toileting) modified independence   Clinical Impression   Criteria for Skilled Therapeutic Interventions Met (OT) Yes, treatment indicated   OT Diagnosis Decreased ADL IND/safety, cardiovascular endurance, functional mobility, activity tolerance, muscular strength   OT Problem List-Impairments impacting ADL problems related to;activity tolerance impaired;balance;mobility;range of motion (ROM);strength   Assessment of Occupational Performance 5 or more Performance Deficits   Identified Performance Deficits FB bathing, home mgmt tasks-cooking, cleaning, laundry, functional mobility, leisure, FB dressing   Planned Therapy Interventions (OT) ADL retraining;IADL retraining;balance training;ROM;strengthening;transfer training;progressive activity/exercise   Clinical Decision Making Complexity (OT) problem focused assessment/low complexity   Risk & Benefits of therapy have been explained evaluation/treatment results reviewed;care plan/treatment goals reviewed;risks/benefits reviewed;current/potential barriers reviewed;participants voiced agreement with care plan;participants included;patient   Clinical Impression Comments Pt below baseline function and will benefit from continued skilled OT during IP stay to progress IND/safety and return to PLOF.   OT Total Evaluation Time   OT Eval, Low Complexity Minutes (98866) 5   OT Goals   Therapy Frequency (OT) 4 times/week   OT Predicted Duration/Target Date for Goal Attainment 03/05/24   OT Goals Home Management;Cardiac Phase 1;OT Goal 1;OT Goal 2   OT: Home Management Modified  independent;with moderate demand household tasks;ambulatory level;using adaptive equipment   OT: Understanding of cardiac education to maximize quality of life, condition management, and health outcomes Patient;Caregiver;Verbalize;Demonstrate   OT: Functional/aerobic ambulation tolerance with stable cardiovascular response in order to return to home and community environment Modified independent;Greater than 300 feet  (no seated rest breaks)   OT: Navigation of stairs simulating home set up with stable cardiovascular response in order to return to home and community environment   (Defer to PT provider)   OT: Goal 1 Pt will be able to demonstrate and verbalize 3/3 EC/WS to utilize in regard to ADL/IADL tasks prior to dc home.   OT: Goal 2 Pt will be able to demonstrate B UE HEP while seated in chair with SBA prior to dc home.   OT Discharge Planning   OT Plan OT: NuStep, UE theraband exercises, functional mobility, standing ADLs   OT Discharge Recommendation (DC Rec) home with assist;home with outpatient cardiac rehab   OT Rationale for DC Rec Pt below baseline function for ADL tasks, functional mobility, overall cardiovascular endurance. Currently, pt is ambulating in hallway with SBA + FWW, fatigues rapidly, requiring increased seated rest breaks. Pt is work up for heart transplant vs LVAD. Would recommend home with assist and OP CR this date to progress IND/safety and cardiovascular function. Will monitor and update pending on surgical intervention .   OT Brief overview of current status SBA + FWW in-hallway   Total Session Time   Timed Code Treatment Minutes 24   Total Session Time (sum of timed and untimed services) 29

## 2024-02-05 NOTE — PLAN OF CARE
"Vitals:   Blood pressure 123/62, pulse 69, temperature 97.9  F (36.6  C), temperature source Oral, resp. rate 14, height 1.575 m (5' 2\"), weight 79.9 kg (176 lb 2.4 oz), SpO2 99%.  Neuro: A/Ox4. RUQ numbness/tingling   Cardiac: SR/SB on tele. Hypotensive after nitroglycerin tabs, 70's/40's, now stabilized.  9/10 chest pain, radiating to R side neck/shoulder/arm, pt believes this is the cause of RUQ numbness/tingling. Provider notified.   Respiratory: sating >92 on RA. denies SOB  Diet/appetite: NPO at midnight.  Endocrine: q4hr bg checks  GI/:  No BM this shift. Good UO. Prn iv Zofran for nausea effective.  Activity:  Up ind in room  Pain: 9/10 chest pain. Prn oxy 5mg x2 effective for short periods. Refused tylenol. Lidocaine patch and cold packs effective  Skin: wnl, abdominal bruising, L hand rash.  Lines: R PIV from OSH sl.    Plan: cards consult for potential LVAD/heart tx. Manage symptoms per care plan, notify team of changes in pt condition per care plan.    Problem: Adult Inpatient Plan of Care  Goal: Absence of Hospital-Acquired Illness or Injury  Outcome: Progressing  Intervention: Identify and Manage Fall Risk  Recent Flowsheet Documentation  Taken 2/4/2024 2340 by Candice Fong, RN  Safety Promotion/Fall Prevention:   activity supervised   clutter free environment maintained   assistive device/personal items within reach   patient and family education   room near nurse's station   safety round/check completed  Taken 2/4/2024 2100 by Candice Fong, RN  Safety Promotion/Fall Prevention:   activity supervised   clutter free environment maintained   assistive device/personal items within reach   patient and family education   room near nurse's station   safety round/check completed  Intervention: Prevent Skin Injury  Recent Flowsheet Documentation  Taken 2/4/2024 2340 by Candice Fong, RN  Body Position: position changed independently  Skin Protection: adhesive use limited  Device Skin Pressure Protection: " adhesive use limited  Taken 2/4/2024 2100 by Candice Fong RN  Body Position: position changed independently  Skin Protection: adhesive use limited  Device Skin Pressure Protection: adhesive use limited  Intervention: Prevent and Manage VTE (Venous Thromboembolism) Risk  Recent Flowsheet Documentation  Taken 2/4/2024 2340 by Candice Fong RN  VTE Prevention/Management: SCDs (sequential compression devices) off  Taken 2/4/2024 2100 by Candice Fong RN  VTE Prevention/Management: SCDs (sequential compression devices) off  Intervention: Prevent Infection  Recent Flowsheet Documentation  Taken 2/4/2024 2340 by Candice Fong RN  Infection Prevention:   cohorting utilized   environmental surveillance performed   equipment surfaces disinfected   hand hygiene promoted   personal protective equipment utilized   rest/sleep promoted   single patient room provided  Taken 2/4/2024 2100 by Candice Fong RN  Infection Prevention:   cohorting utilized   environmental surveillance performed   equipment surfaces disinfected   hand hygiene promoted   personal protective equipment utilized   rest/sleep promoted   single patient room provided     Problem: Adult Inpatient Plan of Care  Goal: Plan of Care Review  Description: The Plan of Care Review/Shift note should be completed every shift.  The Outcome Evaluation is a brief statement about your assessment that the patient is improving, declining, or no change.  This information will be displayed automatically on your shift  note.  Outcome: Progressing  Flowsheets (Taken 2/5/2024 0009)  Plan of Care Reviewed With: patient     Problem: Adult Inpatient Plan of Care  Goal: Optimal Comfort and Wellbeing  Outcome: Progressing  Intervention: Monitor Pain and Promote Comfort  Recent Flowsheet Documentation  Taken 2/4/2024 2340 by Candice Fong RN  Pain Management Interventions: medication (see MAR)  Taken 2/4/2024 2250 by Candice Fong RN  Pain Management Interventions: medication (see  MAR)  Taken 2/4/2024 2100 by Candice Fong RN  Pain Management Interventions:   repositioned   MD notified (comment)     Problem: Pain Acute  Goal: Optimal Pain Control and Function  Outcome: Progressing  Intervention: Develop Pain Management Plan  Recent Flowsheet Documentation  Taken 2/4/2024 2340 by Candice Fong RN  Pain Management Interventions: medication (see MAR)  Taken 2/4/2024 2250 by Candice Fong RN  Pain Management Interventions: medication (see MAR)  Taken 2/4/2024 2100 by Candice Fong RN  Pain Management Interventions:   repositioned   MD notified (comment)  Intervention: Prevent or Manage Pain  Recent Flowsheet Documentation  Taken 2/4/2024 2340 by Candice Fong RN  Sensory Stimulation Regulation:   care clustered   quiet environment promoted  Sleep/Rest Enhancement: regular sleep/rest pattern promoted  Bowel Elimination Promotion: adequate fluid intake promoted  Medication Review/Management:   medications reviewed   provider consulted  Taken 2/4/2024 2100 by Candice Fong RN  Sensory Stimulation Regulation:   care clustered   quiet environment promoted  Sleep/Rest Enhancement: regular sleep/rest pattern promoted  Bowel Elimination Promotion: adequate fluid intake promoted  Medication Review/Management:   medications reviewed   provider consulted  Intervention: Optimize Psychosocial Wellbeing  Recent Flowsheet Documentation  Taken 2/4/2024 2340 by Candice Fong RN  Supportive Measures:   decision-making supported   problem-solving facilitated   self-care encouraged   verbalization of feelings encouraged  Taken 2/4/2024 2100 by Candice Fong RN  Supportive Measures:   decision-making supported   problem-solving facilitated   self-care encouraged   verbalization of feelings encouraged     Problem: Comorbidity Management  Goal: Blood Glucose Levels Within Targeted Range  Outcome: Progressing  Intervention: Monitor and Manage Glycemia  Recent Flowsheet Documentation  Taken 2/4/2024 2340 by Candice Fong  RN  Glycemic Management: blood glucose monitored  Medication Review/Management:   medications reviewed   provider consulted  Taken 2/4/2024 2100 by Candice Fong RN  Glycemic Management: blood glucose monitored  Medication Review/Management:   medications reviewed   provider consulted     Problem: Comorbidity Management  Goal: Blood Pressure in Desired Range  Outcome: Progressing  Intervention: Maintain Blood Pressure Management  Recent Flowsheet Documentation  Taken 2/4/2024 2340 by Candice Fong RN  Medication Review/Management:   medications reviewed   provider consulted  Taken 2/4/2024 2100 by Candice Fong RN  Medication Review/Management:   medications reviewed   provider consulted

## 2024-02-05 NOTE — PROVIDER NOTIFICATION
Time of notification: 9:52 PM  Provider notified: Dr. Natalei Mccarty, Cards 2  Patient status: Last BP 87/47, MAP 61 after three doses of nitroglycerin.  Orders received: Hold nitroglycerin for now, oxycodone 5 mg Q6 PRN ordered for chest pain      Time of notification: 12:46 AM  Provider notified: Dr. Natalie Mccarty, Cards 2  Patient status: Patient still c/o 8/10 chest pain, radiating to her bilateral shoulders. Patient states this isn't the first time she felt this, not new. She is requesting lidocaine patch, can I get an order?  Orders received: STAT EKG, lidocaine patch

## 2024-02-05 NOTE — PLAN OF CARE
Neuro: A&Ox4.   Cardiac: SR/SB. VSS. Pacer   Respiratory: Sating 90s on RA.  GI/: Adequate urine output. BM- on shift  Diet/appetite: Tolerating 2g Na, no caffeine diet. Eating well.  Activity:  SBA, up to chair and in halls.   Pain: At acceptable level on current regimen. OXY Q4 5mg : chest pain (R) down R arm and hand  Skin: No new deficits noted. Abd bruising, L hand rash  LDA's: R PIV    Plan: Continue with POC. Notify primary team with changes.   Echo completed  LVAD/Heart txp workup started at end of shift  -R heart cath, NPO at midnight  -multiple different CT (completed tonight)  -labs, urine  -consults: SW, dental, pall, etc  -etc (more in orders)    Paged team this AM: regarding pain management and PTA meds: added in PTA meds and increased oxy Q6 to Q4

## 2024-02-05 NOTE — PHARMACY-ADMISSION MEDICATION HISTORY
Pharmacist Admission Medication History    Admission medication history is complete. The information provided in this note is only as accurate as the sources available at the time of the update.    Information Source(s): Patient, Hospital records, CareEverywhere/SureScripts, and Discharge summary and MAR via recent [facility] Admission   via     Pertinent Information:   -Pt manages own meds and is a reliable historian  -Pt was transferred from [facility] on 2/4/24  -Last doses were confirmed by pt MAR at OSH and interview for meds not present on MAR  -Pt's fenofibrate was continued at [facility]  -Pt has had trouble with filling VICTOZA pen as PCP was in the middle of switching her to MOUNJARO, but experiencing insurance difficulties. Hence, pt has not had control of BG and has needed to start using small amounts of insulin aspart to help control  -Venlafaxine IR is used and confirmed by pt, open to using ER if needed  -Doxy and Clindamycin soln were prescribed for a self-described cyst under pt's R armpit. Has not begun using yet as was hospitalized.  -Pt does have home supply of Estradiol crm if needed. Pt described that directions for this weren't clear upon prescription but was told to use every other day to outer part of vagina, rather than using the applicator.    Changes made to PTA medication list:  Added:   Spironolactone 25 mg tab  Methocarbamol 500 mg tab  Doxy 100 bid  Clindamycin 1% soln  Ranolazine 500 tab  Estradiol 0.01% crm  Insulin aspart  Deleted:   Nicotine inhaler  Changed:   Metoprolol 25-->100 mg tabs  ENTRESTO 24-26--> tabs    Allergies reviewed with patient and updates made in EHR: no    Medication History Completed By: Daron Almeida [facility] 2/5/2024 1:50 PM    PTA Med List   Medication Sig Last Dose    aspirin (ASA) 81 MG EC tablet Take 81 mg by mouth daily 2/3/2024 at AM    atorvastatin (LIPITOR) 80 MG tablet Take 1 tablet by mouth daily 2/3/2024 at PM    blood glucose  (CONTOUR NEXT TEST) test strip To test BG values three times daily - uses insulin 2/4/2024    clindamycin (CLEOCIN T) 1 % external solution Apply topically 2 times daily as needed (cystic irritation)  at Not Started Yet    dapagliflozin (FARXIGA) tablet Take 10 mg by mouth daily 1/31/2024 at AM    Doxycycline Hyclate 100 MG TBEC Take 100 mg by mouth 2 times daily  at Not started yet    ESTRADIOL 0.1MG/GM CREAM Place vaginally every other day 1/31/2024 at AM    fenofibrate (TRIGLIDE/LOFIBRA) 160 MG tablet Take 160 mg by mouth daily 2/4/2024 at 1005    furosemide (LASIX) 20 MG tablet Take 20 mg by mouth daily May also take 1 tablet 1 time per day as needed for weight gain of >2lbs in a day or >5lbs in a day 2/4/2024 at AM    insulin aspart (NOVOLOG PEN) 100 UNIT/ML pen Inject 5 Units Subcutaneous daily (before supper) 2/4/2024 at 1638    insulin glargine (LANTUS PEN) 100 UNIT/ML pen Inject 30 Units Subcutaneous at bedtime 2/4/2024 at 2129    liraglutide (VICTOZA PEN) 18 MG/3ML solution INJECT 0.6MG SUBCUTANEOUSLY ONCE DAILY for 1 week, then 1.8 mg subcutaneously daily 1/31/2024 at AM    metFORMIN (GLUCOPHAGE) 1000 MG tablet Take 1,000 mg by mouth 2 times daily (with meals) 1/31/2024 at AM    methocarbamol (ROBAXIN) 500 MG tablet Take 500 mg by mouth 4 times daily as needed for muscle spasms Past Month at PRN    metoprolol succinate ER (TOPROL XL) 100 MG 24 hr tablet Take 150 mg by mouth daily 2/4/2024 at 1005    nicotine (NICODERM CQ) 21 MG/24HR 24 hr patch Place 1 patch onto the skin every 24 hours 2/4/2024 at 1005    nitroGLYcerin (NITROSTAT) 0.4 MG sublingual tablet Place 0.4 mg under the tongue every 5 minutes as needed Past Month at PRN    ranolazine (RANEXA) 500 MG 12 hr tablet Take 500 mg by mouth 2 times daily 2/4/2024 at 1005    sacubitril-valsartan (ENTRESTO)  MG per tablet Take 1 tablet by mouth 2 times daily 2/4/2024 at 1005    spironolactone (ALDACTONE) 25 MG tablet Take 12.5 mg by mouth daily  2/4/2024 at 1005    ticagrelor (BRILINTA) 90 MG tablet Take 90 mg by mouth 2 times daily 2/4/2024 at 1005    venlafaxine (EFFEXOR) 100 MG tablet Take 1 tablet by mouth 2 times daily 2/4/2024 at 1005

## 2024-02-05 NOTE — PROGRESS NOTES
Admission          2/4/2024  7:59 PM  -----------------------------------------------------------  Reason for admission: LVAD/heart tx workup, chest pain  Primary team notified of pt arrival.  Admitted from: [facility]   Via: stretcher  Belongings: Placed in closet  Admission Profile: complete  Teaching: orientation to unit and call light- call light within reach, call don't fall, use of console, meal times, when to call for the RN, and enforced importance of safety   Access: R PIV  Telemetry:Placed on pt  Ht./Wt.: complete  Code Status verified on armband: yes  2 RN Skin Assessment Completed By: Shavonne CORTES RN, Kalee FUNK RN  Med Rec completed: yes  Suction/Ambu bag/Flowmeter at bedside: yes  Is patient having diarrhea upon admission- if YES fill out testing algorithm : no    C. Diff Testing Algorithm (MUST be marked YES)   3 or more loose stools in 24 hrs. [] Yes [] No       Additional symptoms:(At least ONE must be marked yes)   Abdominal pain/discomfort [] Yes [] No   Fever at least 38C (100.4 F) [] Yes [] No   Elevated WBC(>11,000) [] Yes [] No       Exclusion Criteria:  (MUST be marked YES)   Off laxatives for at least 48 hrs. [] Yes [] No       Pt status:    Blood pressure 110/55, pulse 62, temperature 98.2  F (36.8  C), temperature source Oral, SpO2 99%.

## 2024-02-05 NOTE — H&P
Cardiology History and Physical  Cailin Burns MRN: [id number]  Age: 48 year old, : 1975  Primary care provider: No Ref-Primary, Physician            Assessment and Plan:     48y.o.  with PMHx of HFpEF (27%) s/p ICD, CAD s/p PCI (PCI of RCA  and MOHAN x1 to mLAD and MOHAN x1 to mLCx on  2023), DM2, tobacco use who was admitted to OSH for ACS r/o and transferred to [facility] for LVAD/heart transplant work up.    # Chronic HFrEF 2/2 Ischemic cardiomyopathy  (EF 27% TTE 2024)  NYHA Class 3  Follows with Dr. Guerin. Given her active smoking,  LVAD was thought to be a primary option.  BB hold pta metoprolol 100mg d/t hypotension  ARNi hold pta entrest 97/103 mg BID d/t hypotension  SGLT2  continue pta dapagliflozin 10mg daily  MRA continue pta aldactone 12.5 mg daily  Duretics continue pta lasix 20mg IV daily  Volume Status: appears slightly hypervolemic on exam  Device: ICD in place for primary prevention (implanted on 2023)  Has been transferred here for for LVAD/heart transplant work up. Unclear if financial clearance has been obtained yet.  - K>4 and Mg>2 with replacement protocols in place  - BMP and Mg daily  - daily weights and strict I/Os  - TTE in am   - NPO at [facility]  - PT/OT  - repeat 6 minute walk test  - plan to confirm financial clearance for LVAD/Heart transplant evaluation before initiating evaluation      #Multivessel CAD with PCI of RCA  and MOHAN x1 to mLAD and MOHAN x1 to mLCx (Dec 2022 and 2023)  #Peripheral artery disease  3V disease not appropriate for CABG due to poor bypass targets. So had staged PCI of CTOs in Dec 2022 and 2023.  Has ongoing chest pain managed with nitroglycerin and oxycodone/fentanyl at OSH.  Due to small vessels patient is a poor candidate for PCI. Plan for medical management.  - continue DAPT with Aspirin and Brilinta  - continue lipitor   - continue ranexa (started at the OSH)  - NTG prn and tylenol 975mg TID, lidocaine patch and oxycodone 5mg  Q6H prn for chest pain  - EKG      #Right subclavian stenosis and possible brachiocephalic stenosis.   Discrepancy of blood pressure, which is lower on the right side. Patient notes pain and tingling in the right hand. Upper arterial duplex shows right subclavian stenosis and possible brachiocephalic stenosis. She was recommended further evaluation with CTA/MRA   - defer to day team regarding ordering CTA vs MRA  - avoid using right arm for blood pressure measurement (patient care order placed)    #Diabetes, on Lantus and sliding scale.  - hold pta glargine 30U qPM   - hold pta metformin and liraglutide  - MDSS  - hypoglycemia protocol   - lantus 15 qpm given NPO at [facility]    #Tobacco use disorder  Has been trying to quit.   - pta buspar  - nicotine patch 21mg daily   - nicotine lozenge q1hr PRN    #MDD  - pta Effexor.     FEN:  Cardiac diet and NPO at [facility]  PPX: lovenox  Code Status: full CODE     The patient will be formally staffed in the AM.     Natalie Mccarty, DO  Internal Medicine, PGY-2  [facility]            Chief Complaint:     Chest pain          History of Present Illness:     History is obtained from the patient and chart review.    Patient presented with chest pain to OSH on 01/31/2024. It felt like her previous MI and did not resolve with nitroglycerin.  Cardiology evaluated her and felt like the findings are not suggestive of acute myocardial injury.  They felt like she is a poor candidate for PCI given to small vessel diameter and she was managed medically.  She was resumed on her GDMT.  And was subsequently excepted to the [facility] on February 1 for LVAD/heart transplant workup.  On February 4 a bed became available and she was transferred.  Here she endorses the same dull chest pain.  She denies any shortness of breath, diaphoresis, nausea, hematuria, dysuria, diarrhea, hematochezia.  She denies any fevers, chills or infectious exposures.    Prior History:     She  initially presented to urgent care with new onset chest pain, was found to have STEMI and was transferred to [facility] for immediate further evaluation and angiogram on 9/25/22. It was significant for 3 vessel disease. Case was immediately discussed with Dr Leal for potential urgent CABG considerations but ultimately the decision was made to proceed with PCI due to poor bypass targets. Intervention to LCx was made. Prior to further PCI PET viability was pursued prior to considering further interventions. Unfortunately viability did not show enough and thus medical management was recommended. She did have short runs of NSVT while inpatient. ECHO showed EF 20% and thus she was discharged with LifeVest. She was discharged on mimimal medical therapy due to associated hypotension and not being able to tolerate higher doses. She also has PRN lasix for weight gain and PRN nitro. She is following up at the CHF clinic at [facility] and was also referred to [facility] for potential future advanced heart failure therapies evaluation. There she followed with Dr. Kurt Guerin.      After discussion with her and the interventional and surgical teams at [facility], the decision was made to proceed with staged PCI of the CTOs. These were performed recently. She also follows in [place] and recently attempts were made to increase XL to 50mg daily. In addition, she continued to have the LifeVest and potential referral for device was discussed. ICD had been implanted 5/11/23.   She continued to do relatively poorly with low exercise tolerance and low energy. She took her medications as prescribed however she would often become hypotensive. Her 6-minute walk test was extremely low. She noted she lives in a small apartment and she had difficulty walking from 1 end to the other. Given her active smoking, the advanced heart failure team decided to have the LVAD team take primary on her evaluation.            Past Medical History:     No past medical history on  file.           Past Surgical History:      Past Surgical History:   Procedure Laterality Date    CV CORONARY ANGIOGRAM N/A 2022    Procedure: Coronary Angiogram;  Surgeon: Liam Bernardo MD;  Location: [facility] CARDIAC CATH LAB    CV PCI N/A 2023    Procedure: Percutaneous Coronary Intervention of LCx and LAD;  Surgeon: Liam Bernardo MD;  Location: [facility] CARDIAC CATH LAB    CV PCI CHRONIC TOTAL OCCLUSION N/A 2022    Procedure: Percutaneous - Coronary Intervention Chronic Total Occlusion;  Surgeon: Liam Bernardo MD;  Location: [facility] CARDIAC CATH LAB    EP ICD INSERT SINGLE N/A 2023    Procedure: Implantable Cardioverter Defibrillator Device & Lead Implant Single or Dual;  Surgeon: Miky Garcia MD;  Location: [facility] CARDIAC CATH LAB              Social History:     Social History     Socioeconomic History    Marital status:      Spouse name: Not on file    Number of children: Not on file    Years of education: Not on file    Highest education level: Not on file   Occupational History    Not on file   Tobacco Use    Smoking status: Some Days     Types: Cigarettes     Last attempt to quit: 2022     Years since quittin.4    Smokeless tobacco: Never    Tobacco comments:     Trying to quit, wearing patch    Substance and Sexual Activity    Alcohol use: Not Currently    Drug use: Never    Sexual activity: Not on file   Other Topics Concern    Not on file   Social History Narrative    Not on file     Social Determinants of Health     Financial Resource Strain: Not on file   Food Insecurity: Not on file   Transportation Needs: Not on file   Physical Activity: Not on file   Stress: Not on file   Social Connections: Not on file   Interpersonal Safety: Not on file   Housing Stability: Not on file              Family History:     No family history on file.  Family history reviewed and updated in EPIC          Allergies:     Allergies    Allergen Reactions    Phenytoin Nausea and Vomiting    Rosuvastatin Muscle Pain (Myalgia) and Other (See Comments)     Myalgias      Valproic Acid Unknown              Medications:     Medications Prior to Admission   Medication Sig Dispense Refill Last Dose    Acetaminophen-Codeine (TYLENOL WITH CODEINE #3 PO) Take by mouth as needed   More than a month    ANTIPLATELET MEDICATION NOT PRESCRIBED, INTENTIONAL, Please choose reason not prescribed from choices below.   Unknown    ANTIPLATELET MEDICATION NOT PRESCRIBED, INTENTIONAL, Please choose reason not prescribed from choices below.   Unknown    aspirin (ASA) 81 MG EC tablet Take 81 mg by mouth daily   2/3/2024    atorvastatin (LIPITOR) 80 MG tablet Take 1 tablet by mouth daily   2/3/2024    blood glucose (CONTOUR NEXT TEST) test strip To test BG values three times daily - uses insulin   2/4/2024    liraglutide (VICTOZA PEN) 18 MG/3ML solution INJECT 0.6MG SUBCUTANEOUSLY ONCE DAILY for 1 week, then 1.8 mg subcutaneously daily   2/4/2024    metFORMIN (GLUCOPHAGE) 1000 MG tablet Take 1,000 mg by mouth 2 times daily (with meals)   Past Week    metoprolol succinate ER (TOPROL XL) 25 MG 24 hr tablet Take 4 tablets (100 mg) by mouth daily 90 tablet 3 2/4/2024    nicotine (NICODERM CQ) 7 MG/24HR 24 hr patch To use it along with Nicotine 21 mg patch daily.   2/4/2024    sacubitril-valsartan (ENTRESTO) 24-26 MG per tablet Take 3 tablets by mouth daily 1 Tab in AM, 2 Tabs in PM   Past Month    ticagrelor (BRILINTA) 90 MG tablet Take 90 mg by mouth 2 times daily   2/4/2024    venlafaxine (EFFEXOR) 100 MG tablet Take 1 tablet by mouth 2 times daily   2/4/2024    busPIRone (BUSPAR) 10 MG tablet Take 10 mg by mouth 2 times daily       dapagliflozin (FARXIGA) tablet Take by mouth daily       fenofibrate (TRIGLIDE/LOFIBRA) 160 MG tablet Take 160 mg by mouth daily       furosemide (LASIX) 20 MG tablet Take 10 mg by mouth daily as needed weight gain of >2lbs in a day or >5lbs in a  "week       insulin glargine (LANTUS PEN) 100 UNIT/ML pen Inject 30 Units Subcutaneous daily       nicotine (NICOTROL) 10 MG inhaler Recommended usage is 6 to 16 cartridges per day. Insert cartridge into inhaler and push hard until it pops into place. Replace mouthpiece and twist the top and bottom so that markings do not line up. Inhale deeply into the back of the throat or puff in short breaths. Nicotine in cartridge is used up after about 20 minutes of active puffing. Clean mouthpiece regularly with soap and water. (Patient not taking: Reported on 4/18/2023)       nitroGLYcerin (NITROSTAT) 0.4 MG sublingual tablet Place 0.4 mg under the tongue every 5 minutes as needed                       Physical Exam:     B/P: 110/55, T: 98.2, P: 62, R: Data Unavailable    Wt Readings from Last 4 Encounters:   01/16/24 80.3 kg (177 lb)   08/16/23 81.2 kg (179 lb)   07/25/23 79.8 kg (176 lb)   05/11/23 78.5 kg (173 lb)       No intake or output data in the 24 hours ending 02/04/24 2135    Gen: No acute distress, laying in bed propped up with pillows  HEENT: normocephalic, atraumatic, EOMI  PULM/THORAX: Normal work of breathing, on room air, Rales bilaterally diffusely  CV: Regular rate and rhythm, S1 and S2 appreciated, no JVD visible when at 70 degrees  ABD: Soft, NTND, bowel sounds present, no masses  EXT: Atender, pitting edema bilaterally  NEURO: CN II-XII grossly intact. A&Ox3            Data:     Labs Reviewed on Admission  Pertinent for:  No results found for: \"TROPI\", \"TROPONIN\", \"TROPR\", \"TROPN\"    Results for orders placed or performed during the hospital encounter of 02/04/24 (from the past 24 hour(s))   Glucose by meter   Result Value Ref Range    GLUCOSE BY METER POCT 59 (L) 70 - 99 mg/dL   EKG 12-lead, complete   Result Value Ref Range    Systolic Blood Pressure  mmHg    Diastolic Blood Pressure  mmHg    Ventricular Rate 60 BPM    Atrial Rate 60 BPM    PA Interval 188 ms    QRS Duration 132 ms     ms    QTc " 462 ms    P Axis 1 degrees    R AXIS 49 degrees    T Axis -58 degrees    Interpretation ECG       Sinus rhythm  Non-specific intra-ventricular conduction block  Minimal voltage criteria for LVH, may be normal variant ( Jaime product )  Inferior infarct (cited on or before 02-JAN-2023)  Abnormal ECG  When compared with ECG of 11-MAY-2023 17:27,  Nonspecific T wave abnormality, worse in Inferior leads     Troponin T, High Sensitivity   Result Value Ref Range    Troponin T, High Sensitivity 10 <=14 ng/L   Nt probnp inpatient   Result Value Ref Range    N terminal Pro BNP Inpatient 368 0 - 450 pg/mL   CBC with platelets   Result Value Ref Range    WBC Count 8.3 4.0 - 11.0 10e3/uL    RBC Count 4.61 3.80 - 5.20 10e6/uL    Hemoglobin 13.6 11.7 - 15.7 g/dL    Hematocrit 41.0 35.0 - 47.0 %    MCV 89 78 - 100 fL    MCH 29.5 26.5 - 33.0 pg    MCHC 33.2 31.5 - 36.5 g/dL    RDW 14.0 10.0 - 15.0 %    Platelet Count 290 150 - 450 10e3/uL   INR   Result Value Ref Range    INR 0.98 0.85 - 1.15   Partial thromboplastin time   Result Value Ref Range    aPTT 27 22 - 38 Seconds   Comprehensive metabolic panel   Result Value Ref Range    Sodium 136 135 - 145 mmol/L    Potassium 4.1 3.4 - 5.3 mmol/L    Carbon Dioxide (CO2) 25 22 - 29 mmol/L    Anion Gap 9 7 - 15 mmol/L    Urea Nitrogen 12.1 6.0 - 20.0 mg/dL    Creatinine 0.73 0.51 - 0.95 mg/dL    GFR Estimate >90 >60 mL/min/1.73m2    Calcium 8.7 8.6 - 10.0 mg/dL    Chloride 102 98 - 107 mmol/L    Glucose 173 (H) 70 - 99 mg/dL    Alkaline Phosphatase 56 40 - 150 U/L    AST 26 0 - 45 U/L    ALT 23 0 - 50 U/L    Protein Total 6.4 6.4 - 8.3 g/dL    Albumin 3.8 3.5 - 5.2 g/dL    Bilirubin Total 0.2 <=1.2 mg/dL   Blood gas venous   Result Value Ref Range    pH Venous 7.33 7.32 - 7.43    pCO2 Venous 53 (H) 40 - 50 mm Hg    pO2 Venous 19 (L) 25 - 47 mm Hg    Bicarbonate Venous 28 21 - 28 mmol/L    Base Excess/Deficit Venous 0.9 -3.0 - 3.0 mmol/L    FIO2 21     Oxyhemoglobin Venous 26 (L) 70 -  75 %    O2 Sat, Venous 26.0 (L) 70.0 - 75.0 %    Narrative    In healthy individuals, oxyhemoglobin (O2Hb) and oxygen saturation (SO2) are approximately equal. In the presence of dyshemoglobins, oxyhemoglobin can be considerably lower than oxygen saturation.   Glucose by meter   Result Value Ref Range    GLUCOSE BY METER POCT 237 (H) 70 - 99 mg/dL   Asymptomatic Influenza A/B, RSV, & SARS-CoV2 PCR (COVID-19) Nasopharyngeal    Specimen: Nasopharyngeal; Swab   Result Value Ref Range    Influenza A PCR Negative Negative    Influenza B PCR Negative Negative    RSV PCR Negative Negative    SARS CoV2 PCR Negative Negative    Narrative    Testing was performed using the Xpert Xpress CoV2/Flu/RSV Assay on the Cepheid GeneXpert Instrument. This test should be ordered for the detection of SARS-CoV-2, influenza, and RSV viruses in individuals who meet clinical and/or epidemiological criteria. Test performance is unknown in asymptomatic patients. This test is for in vitro diagnostic use under the FDA EUA for laboratories certified under CLIA to perform high or moderate complexity testing. This test has not been FDA cleared or approved. A negative result does not rule out the presence of PCR inhibitors in the specimen or target RNA in concentration below the limit of detection for the assay. If only one viral target is positive but coinfection with multiple targets is suspected, the sample should be re-tested with another FDA cleared, approved, or authorized test, if coinfection would change clinical management. This test was validated by the [facility]. These laboratories are certified under the Clinical Laboratory Improvement Amendments of 1988 (CLIA-88) as qualified to perform high complexity laboratory testing.   XR Chest Port 1 View    Impression    RESIDENT PRELIMINARY INTERPRETATION  Impression: No acute airspace disease.             Most Recent Imaging:     [facility] - 9/25/22: significant 3VD,  - LCx -  mid, 99%, culprit - s/p PCI with a 2.75*26 mm Israel MOHAN  - LAD - diffusely diseased, small caliber, 90% mid & distal - not amenable to PCI due to small caliber  - RCA - mid, 100%, , fills distally with R-R collaterals, small caliber.   Discussed presentation with Dr Leal for consideration for CABG, before proceeding with PCI, it was decided to proceed with PCI-LCx & assess for CABG later.     TTE 9/25/22:    Left Ventricle: The left ventricle is dilated. Severely reduced left  ventricular systolic function. The EF is visually estimated to be 20%. No  thrombus seen -contrast images also reviewed See diagram for wall motion details.     Right Ventricle: The right ventricle appears normal in size. The tricuspid jet envelope definition is inadequate for estimation of RV systolic pressure.     Mitral Valve: The leaflets are thickened. There is mild regurgitation.     Tricuspid Valve: There is no regurgitation.     This is an abnormal study.      PET viability 9/28/22:    Metabolic Viability Conclusion: The left ventricle has potentially viable myocardium.  The total viable myocardium is 39%, total scar burden is 32%     Resting Perfusion Details: Large perfusion defect at rest involving the entire anteroseptal, inferoseptal, inferior and inferolateral segments.   This includes the entire RCA territory and parts of the LAD and possibly LCx territories.     Metabolic Viability Defect: The mid inferoseptal, mid inferior, mid inferolateral, distal septal and distal inferior segments are nonviable. The LCx territory appears to be mostly viable at 95% total viability. The LAD territory is predominantly viable with 75% viability. The RCA territory appears to be 50% viable with no perfusion at rest     Coronary angiogram 12/9//2022:  Severe three vessel coronary artery disease (mRCA , pLAD, mLcx stenosis)     Coronary angiogram 1/2/2023:  Severe three vessel CAD status post PCI to the RCA  and residual LAD and LCx  disease.  Successful PCI of midLAD with MOHAN x1 (2.5 x 32 mm Synergy MOHAN) and mid-LCx (2.75 x 16 mm Synergy MOHAN).     TTE 4/11/23    Left Ventricle: The left ventricle is dilated. Severely reduced left  ventricular systolic function. The EF is visually estimated to be 25-30%. Global hypokinesis of the left ventricle with akinetic apical segments.     Right Ventricle: The right ventricle appears normal in size. Systolic function is normal.     Aortic Valve: There is mild-to-moderate regurgitation.      TTE 1/4/2024:    Left Ventricle: Severely reduced left ventricular systolic function. LVEF 27%.    Right Ventricle: Systolic function is normal.     Aortic Valve: There is mild-to-moderate regurgitation.                   I have reviewed today's vital signs, notes, medications, labs and imaging.  I have also seen and examined the patient and agree with the findings and plan as outlined above.   Pt is 49 yo WF with longstanding hx of tobacco use, severe CAD with poor distal targets and therefore not a surgical candidate. S/P PCI with LVEF < 30% now here for LVAD evaluation.  Pt currently at baseline but limited exertional capacity.      David Valencia MD, PhD  Professor, Heart Failure and Cardiac Transplantation  [facility]

## 2024-02-06 ENCOUNTER — APPOINTMENT (OUTPATIENT)
Dept: ULTRASOUND IMAGING | Facility: CLINIC | Age: 49
End: 2024-02-06
Attending: INTERNAL MEDICINE
Payer: COMMERCIAL

## 2024-02-06 ENCOUNTER — ANCILLARY PROCEDURE (OUTPATIENT)
Dept: CARDIOLOGY | Facility: CLINIC | Age: 49
End: 2024-02-06
Attending: INTERNAL MEDICINE
Payer: COMMERCIAL

## 2024-02-06 DIAGNOSIS — I25.5 ISCHEMIC CARDIOMYOPATHY: ICD-10-CM

## 2024-02-06 DIAGNOSIS — I25.5 ISCHEMIC CARDIOMYOPATHY: Primary | ICD-10-CM

## 2024-02-06 LAB
ABO/RH(D): NORMAL
ALBUMIN SERPL BCG-MCNC: 3.8 G/DL (ref 3.5–5.2)
ALBUMIN UR-MCNC: NEGATIVE MG/DL
ALP SERPL-CCNC: 55 U/L (ref 40–150)
ALT SERPL W P-5'-P-CCNC: 25 U/L (ref 0–50)
ANION GAP SERPL CALCULATED.3IONS-SCNC: 9 MMOL/L (ref 7–15)
ANTIBODY SCREEN: NEGATIVE
APPEARANCE UR: CLEAR
APTT PPP: 27 SECONDS (ref 22–38)
AST SERPL W P-5'-P-CCNC: 25 U/L (ref 0–45)
BASOPHILS # BLD AUTO: 0.1 10E3/UL (ref 0–0.2)
BASOPHILS NFR BLD AUTO: 1 %
BILIRUB SERPL-MCNC: 0.3 MG/DL
BILIRUB UR QL STRIP: NEGATIVE
BUN SERPL-MCNC: 15.5 MG/DL (ref 6–20)
CALCIUM SERPL-MCNC: 8.9 MG/DL (ref 8.6–10)
CHLORIDE SERPL-SCNC: 106 MMOL/L (ref 98–107)
CHOLEST SERPL-MCNC: 170 MG/DL
COLOR UR AUTO: ABNORMAL
CREAT SERPL-MCNC: 0.7 MG/DL (ref 0.51–0.95)
DEPRECATED HCO3 PLAS-SCNC: 23 MMOL/L (ref 22–29)
DRVVT SCREEN RATIO: 0.83
EGFRCR SERPLBLD CKD-EPI 2021: >90 ML/MIN/1.73M2
EOSINOPHIL # BLD AUTO: 0.2 10E3/UL (ref 0–0.7)
EOSINOPHIL NFR BLD AUTO: 2 %
ERYTHROCYTE [DISTWIDTH] IN BLOOD BY AUTOMATED COUNT: 14.1 % (ref 10–15)
FERRITIN SERPL-MCNC: 46 NG/ML (ref 6–175)
GLUCOSE BLDC GLUCOMTR-MCNC: 183 MG/DL (ref 70–99)
GLUCOSE BLDC GLUCOMTR-MCNC: 210 MG/DL (ref 70–99)
GLUCOSE BLDC GLUCOMTR-MCNC: 86 MG/DL (ref 70–99)
GLUCOSE BLDC GLUCOMTR-MCNC: 91 MG/DL (ref 70–99)
GLUCOSE BLDC GLUCOMTR-MCNC: 92 MG/DL (ref 70–99)
GLUCOSE BLDC GLUCOMTR-MCNC: 97 MG/DL (ref 70–99)
GLUCOSE SERPL-MCNC: 89 MG/DL (ref 70–99)
GLUCOSE UR STRIP-MCNC: >=1000 MG/DL
HCG SERPL QL: NEGATIVE
HCT VFR BLD AUTO: 40.4 % (ref 35–47)
HDLC SERPL-MCNC: 32 MG/DL
HGB BLD-MCNC: 13.1 G/DL (ref 11.7–15.7)
HGB UR QL STRIP: ABNORMAL
IMM GRANULOCYTES # BLD: 0 10E3/UL
IMM GRANULOCYTES NFR BLD: 0 %
INR PPP: 0.99 (ref 0.85–1.15)
IRON BINDING CAPACITY (ROCHE): 317 UG/DL (ref 240–430)
IRON SATN MFR SERPL: 33 % (ref 15–46)
IRON SERPL-MCNC: 105 UG/DL (ref 37–145)
KETONES UR STRIP-MCNC: NEGATIVE MG/DL
LA PPP-IMP: NEGATIVE
LDLC SERPL CALC-MCNC: 110 MG/DL
LEUKOCYTE ESTERASE UR QL STRIP: ABNORMAL
LUPUS INTERPRETATION: NORMAL
LYMPHOCYTES # BLD AUTO: 2.6 10E3/UL (ref 0.8–5.3)
LYMPHOCYTES NFR BLD AUTO: 36 %
MAGNESIUM SERPL-MCNC: 2.2 MG/DL (ref 1.7–2.3)
MCH RBC QN AUTO: 29.6 PG (ref 26.5–33)
MCHC RBC AUTO-ENTMCNC: 32.4 G/DL (ref 31.5–36.5)
MCV RBC AUTO: 91 FL (ref 78–100)
MONOCYTES # BLD AUTO: 0.5 10E3/UL (ref 0–1.3)
MONOCYTES NFR BLD AUTO: 6 %
MUCOUS THREADS #/AREA URNS LPF: PRESENT /LPF
NEUTROPHILS # BLD AUTO: 3.9 10E3/UL (ref 1.6–8.3)
NEUTROPHILS NFR BLD AUTO: 55 %
NITRATE UR QL: NEGATIVE
NONHDLC SERPL-MCNC: 138 MG/DL
NRBC # BLD AUTO: 0 10E3/UL
NRBC BLD AUTO-RTO: 0 /100
NT-PROBNP SERPL-MCNC: 236 PG/ML (ref 0–450)
PH UR STRIP: 6 [PH] (ref 5–7)
PHOSPHATE SERPL-MCNC: 3.8 MG/DL (ref 2.5–4.5)
PLATELET # BLD AUTO: 298 10E3/UL (ref 150–450)
POTASSIUM SERPL-SCNC: 4.1 MMOL/L (ref 3.4–5.3)
PREALB SERPL-MCNC: 16.1 MG/DL (ref 20–40)
PROT SERPL-MCNC: 6.4 G/DL (ref 6.4–8.3)
PTT RATIO: 0.97
RADIOLOGIST FLAGS: ABNORMAL
RBC # BLD AUTO: 4.42 10E6/UL (ref 3.8–5.2)
RBC URINE: 2 /HPF
SODIUM SERPL-SCNC: 138 MMOL/L (ref 135–145)
SP GR UR STRIP: 1.02 (ref 1–1.03)
SPECIMEN EXPIRATION DATE: NORMAL
SQUAMOUS EPITHELIAL: 6 /HPF
THROMBIN TIME: 18.8 SECONDS (ref 13–19)
TRANSFERRIN SERPL-MCNC: 262 MG/DL (ref 200–360)
TRIGL SERPL-MCNC: 142 MG/DL
TSH SERPL DL<=0.005 MIU/L-ACNC: 0.8 UIU/ML (ref 0.3–4.2)
UROBILINOGEN UR STRIP-MCNC: NORMAL MG/DL
WBC # BLD AUTO: 7.2 10E3/UL (ref 4–11)
WBC URINE: 13 /HPF

## 2024-02-06 PROCEDURE — 250N000013 HC RX MED GY IP 250 OP 250 PS 637

## 2024-02-06 PROCEDURE — 93922 UPR/L XTREMITY ART 2 LEVELS: CPT | Mod: 26 | Performed by: RADIOLOGY

## 2024-02-06 PROCEDURE — 84703 CHORIONIC GONADOTROPIN ASSAY: CPT

## 2024-02-06 PROCEDURE — 84466 ASSAY OF TRANSFERRIN: CPT

## 2024-02-06 PROCEDURE — 83735 ASSAY OF MAGNESIUM: CPT

## 2024-02-06 PROCEDURE — 85730 THROMBOPLASTIN TIME PARTIAL: CPT

## 2024-02-06 PROCEDURE — 76700 US EXAM ABDOM COMPLETE: CPT | Mod: 26 | Performed by: STUDENT IN AN ORGANIZED HEALTH CARE EDUCATION/TRAINING PROGRAM

## 2024-02-06 PROCEDURE — 272N000001 HC OR GENERAL SUPPLY STERILE: Performed by: INTERNAL MEDICINE

## 2024-02-06 PROCEDURE — 80321 ALCOHOLS BIOMARKERS 1OR 2: CPT

## 2024-02-06 PROCEDURE — 120N000003 HC R&B IMCU UMMC

## 2024-02-06 PROCEDURE — 82728 ASSAY OF FERRITIN: CPT

## 2024-02-06 PROCEDURE — 250N000011 HC RX IP 250 OP 636

## 2024-02-06 PROCEDURE — 85613 RUSSELL VIPER VENOM DILUTED: CPT

## 2024-02-06 PROCEDURE — 80061 LIPID PANEL: CPT

## 2024-02-06 PROCEDURE — 76700 US EXAM ABDOM COMPLETE: CPT

## 2024-02-06 PROCEDURE — 93451 RIGHT HEART CATH: CPT | Performed by: INTERNAL MEDICINE

## 2024-02-06 PROCEDURE — 93922 UPR/L XTREMITY ART 2 LEVELS: CPT

## 2024-02-06 PROCEDURE — 93282 PRGRMG EVAL IMPLANTABLE DFB: CPT

## 2024-02-06 PROCEDURE — 87086 URINE CULTURE/COLONY COUNT: CPT

## 2024-02-06 PROCEDURE — 85025 COMPLETE CBC W/AUTO DIFF WBC: CPT

## 2024-02-06 PROCEDURE — 4A023N6 MEASUREMENT OF CARDIAC SAMPLING AND PRESSURE, RIGHT HEART, PERCUTANEOUS APPROACH: ICD-10-PCS | Performed by: INTERNAL MEDICINE

## 2024-02-06 PROCEDURE — 84134 ASSAY OF PREALBUMIN: CPT

## 2024-02-06 PROCEDURE — 36415 COLL VENOUS BLD VENIPUNCTURE: CPT

## 2024-02-06 PROCEDURE — C1751 CATH, INF, PER/CENT/MIDLINE: HCPCS | Performed by: INTERNAL MEDICINE

## 2024-02-06 PROCEDURE — 83880 ASSAY OF NATRIURETIC PEPTIDE: CPT

## 2024-02-06 PROCEDURE — 84100 ASSAY OF PHOSPHORUS: CPT

## 2024-02-06 PROCEDURE — 93451 RIGHT HEART CATH: CPT | Mod: 26 | Performed by: INTERNAL MEDICINE

## 2024-02-06 PROCEDURE — 80053 COMPREHEN METABOLIC PANEL: CPT

## 2024-02-06 PROCEDURE — 85390 FIBRINOLYSINS SCREEN I&R: CPT | Mod: 26 | Performed by: PATHOLOGY

## 2024-02-06 PROCEDURE — 86900 BLOOD TYPING SEROLOGIC ABO: CPT

## 2024-02-06 PROCEDURE — 93282 PRGRMG EVAL IMPLANTABLE DFB: CPT | Mod: 26 | Performed by: INTERNAL MEDICINE

## 2024-02-06 PROCEDURE — 83550 IRON BINDING TEST: CPT

## 2024-02-06 PROCEDURE — 80307 DRUG TEST PRSMV CHEM ANLYZR: CPT

## 2024-02-06 PROCEDURE — 84443 ASSAY THYROID STIM HORMONE: CPT

## 2024-02-06 PROCEDURE — 81001 URINALYSIS AUTO W/SCOPE: CPT

## 2024-02-06 PROCEDURE — 93925 LOWER EXTREMITY STUDY: CPT

## 2024-02-06 PROCEDURE — 85610 PROTHROMBIN TIME: CPT

## 2024-02-06 PROCEDURE — 250N000009 HC RX 250: Performed by: INTERNAL MEDICINE

## 2024-02-06 PROCEDURE — 80365 DRUG SCREENING OXYCODONE: CPT

## 2024-02-06 PROCEDURE — 99232 SBSQ HOSP IP/OBS MODERATE 35: CPT | Mod: GC | Performed by: INTERNAL MEDICINE

## 2024-02-06 PROCEDURE — 93925 LOWER EXTREMITY STUDY: CPT | Mod: 26 | Performed by: RADIOLOGY

## 2024-02-06 RX ORDER — SALIVA STIMULANT COMB. NO.3
1 SPRAY, NON-AEROSOL (ML) MUCOUS MEMBRANE 4 TIMES DAILY PRN
Status: DISCONTINUED | OUTPATIENT
Start: 2024-02-06 | End: 2024-02-09 | Stop reason: HOSPADM

## 2024-02-06 RX ORDER — POLYETHYLENE GLYCOL 3350 17 G/17G
17 POWDER, FOR SOLUTION ORAL 2 TIMES DAILY
Status: DISCONTINUED | OUTPATIENT
Start: 2024-02-06 | End: 2024-02-09 | Stop reason: HOSPADM

## 2024-02-06 RX ORDER — AMOXICILLIN 250 MG
1 CAPSULE ORAL 2 TIMES DAILY PRN
Status: DISCONTINUED | OUTPATIENT
Start: 2024-02-06 | End: 2024-02-09 | Stop reason: HOSPADM

## 2024-02-06 RX ORDER — FUROSEMIDE 20 MG
20 TABLET ORAL DAILY
Status: DISCONTINUED | OUTPATIENT
Start: 2024-02-06 | End: 2024-02-09 | Stop reason: HOSPADM

## 2024-02-06 RX ADMIN — ISOSORBIDE DINITRATE 10 MG: 10 TABLET ORAL at 18:12

## 2024-02-06 RX ADMIN — INSULIN ASPART 1 UNITS: 100 INJECTION, SOLUTION INTRAVENOUS; SUBCUTANEOUS at 19:52

## 2024-02-06 RX ADMIN — SENNOSIDES AND DOCUSATE SODIUM 1 TABLET: 8.6; 5 TABLET ORAL at 11:31

## 2024-02-06 RX ADMIN — ONDANSETRON 4 MG: 2 INJECTION INTRAMUSCULAR; INTRAVENOUS at 10:59

## 2024-02-06 RX ADMIN — OXYCODONE HYDROCHLORIDE 5 MG: 5 TABLET ORAL at 12:59

## 2024-02-06 RX ADMIN — BUSPIRONE HYDROCHLORIDE 10 MG: 5 TABLET ORAL at 08:50

## 2024-02-06 RX ADMIN — FENOFIBRATE 160 MG: 160 TABLET ORAL at 08:58

## 2024-02-06 RX ADMIN — ISOSORBIDE DINITRATE 10 MG: 10 TABLET ORAL at 08:50

## 2024-02-06 RX ADMIN — DAPAGLIFLOZIN 10 MG: 10 TABLET, FILM COATED ORAL at 08:49

## 2024-02-06 RX ADMIN — OXYCODONE HYDROCHLORIDE 5 MG: 5 TABLET ORAL at 21:54

## 2024-02-06 RX ADMIN — FUROSEMIDE 20 MG: 20 TABLET ORAL at 14:55

## 2024-02-06 RX ADMIN — ASPIRIN 81 MG CHEWABLE TABLET 81 MG: 81 TABLET CHEWABLE at 08:50

## 2024-02-06 RX ADMIN — ISOSORBIDE DINITRATE 10 MG: 10 TABLET ORAL at 11:15

## 2024-02-06 RX ADMIN — RANOLAZINE 500 MG: 500 TABLET, FILM COATED, EXTENDED RELEASE ORAL at 08:48

## 2024-02-06 RX ADMIN — RANOLAZINE 500 MG: 500 TABLET, FILM COATED, EXTENDED RELEASE ORAL at 19:56

## 2024-02-06 RX ADMIN — INSULIN GLARGINE 15 UNITS: 100 INJECTION, SOLUTION SUBCUTANEOUS at 21:55

## 2024-02-06 RX ADMIN — Medication 1 SPRAY: at 16:22

## 2024-02-06 RX ADMIN — ATORVASTATIN CALCIUM 80 MG: 80 TABLET, FILM COATED ORAL at 08:50

## 2024-02-06 RX ADMIN — OXYCODONE HYDROCHLORIDE 5 MG: 5 TABLET ORAL at 17:19

## 2024-02-06 RX ADMIN — NICOTINE 1 PATCH: 21 PATCH, EXTENDED RELEASE TRANSDERMAL at 08:51

## 2024-02-06 RX ADMIN — VENLAFAXINE 100 MG: 25 TABLET ORAL at 08:49

## 2024-02-06 RX ADMIN — METOPROLOL SUCCINATE 25 MG: 25 TABLET, EXTENDED RELEASE ORAL at 08:50

## 2024-02-06 RX ADMIN — POLYETHYLENE GLYCOL 3350 17 G: 17 POWDER, FOR SOLUTION ORAL at 19:54

## 2024-02-06 RX ADMIN — TICAGRELOR 90 MG: 90 TABLET ORAL at 19:52

## 2024-02-06 RX ADMIN — VENLAFAXINE 100 MG: 25 TABLET ORAL at 19:52

## 2024-02-06 RX ADMIN — TICAGRELOR 90 MG: 90 TABLET ORAL at 08:50

## 2024-02-06 RX ADMIN — BUSPIRONE HYDROCHLORIDE 10 MG: 5 TABLET ORAL at 19:52

## 2024-02-06 RX ADMIN — SPIRONOLACTONE 12.5 MG: 25 TABLET, FILM COATED ORAL at 08:49

## 2024-02-06 RX ADMIN — OXYCODONE HYDROCHLORIDE 5 MG: 5 TABLET ORAL at 08:58

## 2024-02-06 RX ADMIN — OXYCODONE HYDROCHLORIDE 5 MG: 5 TABLET ORAL at 01:35

## 2024-02-06 ASSESSMENT — ACTIVITIES OF DAILY LIVING (ADL)
DEPENDENT_IADLS:: TRANSPORTATION
ADLS_ACUITY_SCORE: 26
ADLS_ACUITY_SCORE: 30
ADLS_ACUITY_SCORE: 30
ADLS_ACUITY_SCORE: 26
ADLS_ACUITY_SCORE: 30
ADLS_ACUITY_SCORE: 30
ADLS_ACUITY_SCORE: 26

## 2024-02-06 NOTE — PROGRESS NOTES
Patient YOB: 1975    Patient age:48    Patient gender: Female    Patient height: 5.2          Predicted values:     FEV1:2.69    FVC:3.12    PEF:6.44    FEV1/FVC:86    Patient values:    FEV1:1.90    FVC:2.58    PEF: 2.93    FEV1/FVC:73          Patient effort: Good

## 2024-02-06 NOTE — CONSULTS
Dental Hygiene Consult Service        Cailin Burns MRN# [id number]   YOB: 1975 Age: 48 year old     Date of Admission: 2/4/2024  PCP is No Ref-Primary, Physician  Date of Service: 2/6/2024           Reason for Consult:   Referring MD & Reason for Visit: I was asked by David Valencia MD, to see Cailin Burns for pre-VAD/transplant workup.            History of Present Illness:   This patient is a 48 year old female with a history of HFpEF (27%) s/p ICD, CAD s/p PCI (PCI of RCA  and MOHAN x1 to mLAD and MOHAN x1 to mLCx on  01/2023), DM2, tobacco use who was admitted to OSH for ACS r/o and transferred to [facility] for LVAD/heart transplant work up. .  Dental and oropharyngeal history is pertinent for pre-VAD/transplant workup.  The patient reports history of periodontal disease. She does see dentist every 6 months and had recent routine visit in November of 2023.She does not have any current dental pain, and has reported to have her periodontal disease under control. .             General Observations   Level of support Patient is fully independent   Patient currently in pain No   Patient wears dentures No   Current oral care routine - Pt brushes and flosses 2x/day   Patient has oral care products Toothbrush, Toothpaste, and Interdental Care (e.g., floss, floss picks)   Extraoral assessment   General appearance Symmetrical and Normal TMJ function   Lymph nodes Symmetrical, no abnormalities, non-tender   Oral Health Assessment Tool (OHAT)   Lips 0=Healthy: smooth, pink, moist   Tongue 1=Changes: (ie. patchy, fissured, red, coated ) - slightly coated tongue   Gums and Tissues 1=Changes: (ie. dry, shiny, rough, red, swollen, one ulcer/sore spot (under dentures)) - Recessed tissue   Saliva 1=Changes: (ie. dry, sticky tissues, little saliva present, resident thinks they have dry mouth )- slightly dry mucosa   Natural Teeth Yes/No 1=Changes: (ie. 1-3 decayed or broken teeth/roots or very worn down  teeth) - missing many molars, pt reports history of periodontal disease, extractions, and bone loss.   Dental CT revealed possible radiolucencies. Dr. Donovan Gil ordered dental PA's of #29 and #12. Pt consented to intraoral digital radiographs:           Cold tests were also instructed for teeth #12 and #29. Both teeth responded normally, with no lingering cold sensitivity.    Dentures Yes/No Patient does not wear dentures   Oral Cleanliness 0=Healthy: clean and no food particles or tartar in mouth or dentures   Dental Pain 0=Healthy: no behavioural, verbal, or physical signs of dental pain   OHAT Total Score (0-16) 4   Other Dental Concerns Pre-VAD/transplant workup    Care provided during assessment Oral Assessment, Dental radiograph(s) #12 and #29, and Patient education   Follow up DH assessments required? No   Connect with NC or SOD care? Yes: to determine any needs for patient.    For Dental Team Service Requesting Consult: Cards 2  Clearance/Reason: Pre-VAD/transplant  Dental CT status: Taken, read  Upcoming Sx date(s), if known: not established   Expected discharge date, if known: not known  Ability to transfer [facility]: yes  Pain reported, by pt: none  Swelling present: none  Current dental Rx regimen: none   Oral Care Plan - Brushing 2-3x/day, interdental care.   - Advised tongue scraping daily, after brushing  - Advised Biotene mouth rinse to aid with xerostomia relief. Please order rinse for patient.             Assessment and Plan:   Assessment:  This patient is a 48 year old female with a history of HFpEF (27%) s/p ICD, CAD s/p PCI (PCI of RCA  and MOHAN x1 to mLAD and MOHAN x1 to mLCx on  01/2023), DM2, tobacco use who was admitted to OSH for ACS r/o and transferred to [facility] for LVAD/heart transplant work up. .  Dental and oropharyngeal history is pertinent for pre-VAD/transplant workup.  The patient reports history of periodontal disease. She does see dentist every 6 months and had recent routine  visit in 2023.She does not have any current dental pain, and has reported to have her periodontal disease under control. .      Plan:   - Oral care plan as above.   Dr. Gelacio Maria, CARMEN will review DH note, imaging, and determine patient's dental needs.     NoemyMICHELLE Dobson  Pager: [phone]      Past Medical History   I have reviewed this patient's medical history and updated it with pertinent information if needed.  No past medical history on file.    Past Surgical History   I have reviewed this patient's surgical history and updated it with pertinent information if needed.  Past Surgical History:   Procedure Laterality Date    CV CORONARY ANGIOGRAM N/A 2022    Procedure: Coronary Angiogram;  Surgeon: Liam Bernardo MD;  Location: [facility] CARDIAC CATH LAB    CV PCI N/A 2023    Procedure: Percutaneous Coronary Intervention of LCx and LAD;  Surgeon: Liam Bernardo MD;  Location: [facility] CARDIAC CATH LAB    CV PCI CHRONIC TOTAL OCCLUSION N/A 2022    Procedure: Percutaneous - Coronary Intervention Chronic Total Occlusion;  Surgeon: Liam Bernardo MD;  Location: [facility] CARDIAC CATH LAB    EP ICD INSERT SINGLE N/A 2023    Procedure: Implantable Cardioverter Defibrillator Device & Lead Implant Single or Dual;  Surgeon: Miky Garcia MD;  Location: [facility] CARDIAC CATH LAB       Social History   I have reviewed this patient's social history and updated it with pertinent information if needed.  Social History     Tobacco Use    Smoking status: Some Days     Types: Cigarettes     Last attempt to quit: 2022     Years since quittin.4    Smokeless tobacco: Never    Tobacco comments:     Trying to quit, wearing patch    Substance Use Topics    Alcohol use: Not Currently    Drug use: Never     Prior to Admission Medications   Prior to Admission Medications   Prescriptions Last Dose Informant Patient Reported? Taking?   Doxycycline  Hyclate 100 MG TBEC  at Not started yet  Yes Yes   Sig: Take 100 mg by mouth 2 times daily   ESTRADIOL 0.1MG/GM CREAM 1/31/2024 at AM Self Yes Yes   Sig: Place vaginally every other day   aspirin (ASA) 81 MG EC tablet 2/3/2024 at AM Self Yes Yes   Sig: Take 81 mg by mouth daily   atorvastatin (LIPITOR) 80 MG tablet 2/3/2024 at PM Self Yes Yes   Sig: Take 1 tablet by mouth daily   blood glucose (CONTOUR NEXT TEST) test strip 2/4/2024 Self Yes Yes   Sig: To test BG values three times daily - uses insulin   busPIRone (BUSPAR) 10 MG tablet  Self Yes No   Sig: Take 10 mg by mouth 2 times daily   clindamycin (CLEOCIN T) 1 % external solution  at Not Started Yet Self Yes Yes   Sig: Apply topically 2 times daily as needed (cystic irritation)   dapagliflozin (FARXIGA) tablet 1/31/2024 at AM Self Yes Yes   Sig: Take 10 mg by mouth daily   fenofibrate (TRIGLIDE/LOFIBRA) 160 MG tablet 2/4/2024 at 1005 Self Yes Yes   Sig: Take 160 mg by mouth daily   furosemide (LASIX) 20 MG tablet 2/4/2024 at AM Self Yes Yes   Sig: Take 20 mg by mouth daily May also take 1 tablet 1 time per day as needed for weight gain of >2lbs in a day or >5lbs in a day   insulin aspart (NOVOLOG PEN) 100 UNIT/ML pen 2/4/2024 at 1638 Self Yes Yes   Sig: Inject 5 Units Subcutaneous daily (before supper)   insulin glargine (LANTUS PEN) 100 UNIT/ML pen 2/4/2024 at 2129 Self Yes Yes   Sig: Inject 30 Units Subcutaneous at bedtime   liraglutide (VICTOZA PEN) 18 MG/3ML solution 1/31/2024 at AM Self Yes Yes   Sig: INJECT 0.6MG SUBCUTANEOUSLY ONCE DAILY for 1 week, then 1.8 mg subcutaneously daily   metFORMIN (GLUCOPHAGE) 1000 MG tablet 1/31/2024 at AM Self Yes Yes   Sig: Take 1,000 mg by mouth 2 times daily (with meals)   methocarbamol (ROBAXIN) 500 MG tablet Past Month at PRN Self Yes Yes   Sig: Take 500 mg by mouth 4 times daily as needed for muscle spasms   metoprolol succinate ER (TOPROL XL) 100 MG 24 hr tablet 2/4/2024 at 1005 Self Yes Yes   Sig: Take 150 mg by  mouth daily   nicotine (NICODERM CQ) 21 MG/24HR 24 hr patch 2/4/2024 at 1005 Self Yes Yes   Sig: Place 1 patch onto the skin every 24 hours   nitroGLYcerin (NITROSTAT) 0.4 MG sublingual tablet Past Month at PRN Self Yes Yes   Sig: Place 0.4 mg under the tongue every 5 minutes as needed   ranolazine (RANEXA) 500 MG 12 hr tablet 2/4/2024 at 1005 Self Yes Yes   Sig: Take 500 mg by mouth 2 times daily   sacubitril-valsartan (ENTRESTO)  MG per tablet 2/4/2024 at 1005 Self Yes Yes   Sig: Take 1 tablet by mouth 2 times daily   spironolactone (ALDACTONE) 25 MG tablet 2/4/2024 at 1005 Self Yes Yes   Sig: Take 12.5 mg by mouth daily   ticagrelor (BRILINTA) 90 MG tablet 2/4/2024 at 1005 Self Yes Yes   Sig: Take 90 mg by mouth 2 times daily   venlafaxine (EFFEXOR) 100 MG tablet 2/4/2024 at 1005 Self Yes Yes   Sig: Take 1 tablet by mouth 2 times daily      Facility-Administered Medications: None     Allergies   Allergies   Allergen Reactions    Phenytoin Nausea and Vomiting    Rosuvastatin Muscle Pain (Myalgia) and Other (See Comments)     Myalgias      Valproic Acid Unknown     Physical Exam

## 2024-02-06 NOTE — PROGRESS NOTES
Spoke with patient via phone call and scheduled pre-VAD education for Thursday at 1pm. Pt confirms that her primary caregiver will be her , Dimas. Her alternate caregiver will be her daughter, Miranda. Both will be present for the PVE.

## 2024-02-06 NOTE — CONSULTS
CARDIOTHORACIC SURGERY CONSULT NOTE  2/6/2024      Reason for Consult: LVAD/transplant eval      ASSESSMENT/PLAN: Patient is a 48 year old female with a history of HFpEF (27%) s/p ICD, CAD s/p PCI (PCI of RCA  and MOHAN x1 to mLAD and MOHAN x1 to mLCx on 1/2023), DM2, current tobacco use who was admitted to OSH for ACS r/o and then transferred to [facility] for LVAD/heart transplant work-up. CVTS consulted for VAD/transplant work-up.    - Agree with LVAD/transplant work-up, will discuss case at weekly heart failure conference  - Briefly discussed both LVAD/transplant procedure and recovery.   - Current medications with special perioperative considerations:   * Farxiga   * Lovenox (DVT PPX)   * Brilinta as part of DAPT  - Other cares per primary team  - Thank you for the opportunity to participate in the care of this patient.    Patient and plan discussed with attending, Dr. Mulvihill.      Porsche Padilla, CNP, [facility]-  Cardiothoracic Surgery  [employer] Pager [phone]      ________________________________________________________________________________________________    HPI: Cailin Burns is a 48 year old, right-handed female with a history of HFpEF (27%) s/p ICD, CAD s/p PCI (PCI of RCA  and MOHAN x1 to mLAD and MOHAN x1 to mLCx on 1/2023), DM2 on long-term insulin, and current tobacco use who was admitted to OSH for ACS r/o and then transferred to [facility] for LVAD/heart transplant work-up. Patient currently admitted under heart failure service and CVTS was consulted for consideration of LVAD.    Denies PMH of asthma, COPD, sleep apnea, strokes, and bleeding/clotting problems.  Denies hx of significant chest/abd trauma.  PSH notable for ICD implant and uncomplicated lap appy.    FH remarkable for CAD/MI ~ age 40 in maternal grandfather.  Otherwise denies familial hx of CVA, bleeding/clotting, and anesthesia problems.  SH significant for current 1.5 ppd smoker since age 13.  Briefly experimented with vaping as a way to  reduce/transition away from smoking; denies alcohol and illicit drug use. Lives in a single level apartment on the first floor with  and pets.  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  Independent in ADL but activity tolerance <4 mets.        PMH:  Past Medical History:   Diagnosis Date    CAD (coronary artery disease)     Nicotine dependence     Type 2 diabetes mellitus with other circulatory complications (H)        PSH:  Past Surgical History:   Procedure Laterality Date    APPENDECTOMY      CV CORONARY ANGIOGRAM N/A 2022    Procedure: Coronary Angiogram;  Surgeon: Liam Bernardo MD;  Location:  HEART CARDIAC CATH LAB    CV PCI N/A 2023    Procedure: Percutaneous Coronary Intervention of LCx and LAD;  Surgeon: Liam Bernardo MD;  Location: [facility] CARDIAC CATH LAB    CV PCI CHRONIC TOTAL OCCLUSION N/A 2022    Procedure: Percutaneous - Coronary Intervention Chronic Total Occlusion;  Surgeon: Liam Bernardo MD;  Location:  HEART CARDIAC CATH LAB    CV RIGHT HEART CATH MEASUREMENTS RECORDED Left 2024    Procedure: Right Heart Catheterization FYI: there is C/f R subclavian stenosis;  Surgeon: Deja Navarro MD;  Location:  HEART CARDIAC CATH LAB    EP ICD INSERT SINGLE N/A 2023    Procedure: Implantable Cardioverter Defibrillator Device & Lead Implant Single or Dual;  Surgeon: Miky Garcia MD;  Location:  HEART CARDIAC CATH LAB       FH:  Family History   Problem Relation Age of Onset    Coronary Artery Disease Early Onset Maternal Grandfather        SH:  Social History     Socioeconomic History    Marital status:    Tobacco Use    Smoking status: Some Days     Types: Cigarettes     Last attempt to quit: 2022     Years since quittin.4    Smokeless tobacco: Never    Tobacco comments:     Trying to quit, wearing patch    Substance and Sexual Activity    Alcohol use: Not Currently    Drug use: Never        Home Meds:  Medications Prior to Admission   Medication Sig Dispense Refill Last Dose    aspirin (ASA) 81 MG EC tablet Take 81 mg by mouth daily   2/3/2024 at AM    atorvastatin (LIPITOR) 80 MG tablet Take 1 tablet by mouth daily   2/3/2024 at PM    blood glucose (CONTOUR NEXT TEST) test strip To test BG values three times daily - uses insulin   2/4/2024    clindamycin (CLEOCIN T) 1 % external solution Apply topically 2 times daily as needed (cystic irritation)    at Not Started Yet    dapagliflozin (FARXIGA) tablet Take 10 mg by mouth daily   1/31/2024 at AM    Doxycycline Hyclate 100 MG TBEC Take 100 mg by mouth 2 times daily    at Not started yet    ESTRADIOL 0.1MG/GM CREAM Place vaginally every other day   1/31/2024 at AM    fenofibrate (TRIGLIDE/LOFIBRA) 160 MG tablet Take 160 mg by mouth daily   2/4/2024 at 1005    furosemide (LASIX) 20 MG tablet Take 20 mg by mouth daily May also take 1 tablet 1 time per day as needed for weight gain of >2lbs in a day or >5lbs in a day   2/4/2024 at AM    insulin aspart (NOVOLOG PEN) 100 UNIT/ML pen Inject 5 Units Subcutaneous daily (before supper)   2/4/2024 at 1638    insulin glargine (LANTUS PEN) 100 UNIT/ML pen Inject 30 Units Subcutaneous at bedtime   2/4/2024 at 2129    liraglutide (VICTOZA PEN) 18 MG/3ML solution INJECT 0.6MG SUBCUTANEOUSLY ONCE DAILY for 1 week, then 1.8 mg subcutaneously daily   1/31/2024 at AM    metFORMIN (GLUCOPHAGE) 1000 MG tablet Take 1,000 mg by mouth 2 times daily (with meals)   1/31/2024 at AM    methocarbamol (ROBAXIN) 500 MG tablet Take 500 mg by mouth 4 times daily as needed for muscle spasms   Past Month at PRN    metoprolol succinate ER (TOPROL XL) 100 MG 24 hr tablet Take 150 mg by mouth daily   2/4/2024 at 1005    nicotine (NICODERM CQ) 21 MG/24HR 24 hr patch Place 1 patch onto the skin every 24 hours   2/4/2024 at 1005    nitroGLYcerin (NITROSTAT) 0.4 MG sublingual tablet Place 0.4 mg under the tongue every 5 minutes as needed    Past Month at PRN    ranolazine (RANEXA) 500 MG 12 hr tablet Take 500 mg by mouth 2 times daily   2/4/2024 at 1005    sacubitril-valsartan (ENTRESTO)  MG per tablet Take 1 tablet by mouth 2 times daily   2/4/2024 at 1005    spironolactone (ALDACTONE) 25 MG tablet Take 12.5 mg by mouth daily   2/4/2024 at 1005    ticagrelor (BRILINTA) 90 MG tablet Take 90 mg by mouth 2 times daily   2/4/2024 at 1005    venlafaxine (EFFEXOR) 100 MG tablet Take 1 tablet by mouth 2 times daily   2/4/2024 at 1005    busPIRone (BUSPAR) 10 MG tablet Take 10 mg by mouth 2 times daily        Allergies:  Allergies   Allergen Reactions    Phenytoin Nausea and Vomiting    Rosuvastatin Muscle Pain (Myalgia) and Other (See Comments)     Myalgias      Valproic Acid Unknown     ROS:  ROS: 10 point ROS neg other than the symptoms noted above in the HPI.    Physical Exam:  Temp:  [97.5  F (36.4  C)-98.2  F (36.8  C)] 97.7  F (36.5  C)  Pulse:  [58-79] 59  Resp:  [11-27] 16  BP: ()/(39-60) 128/60  SpO2:  [96 %-100 %] 96 %    Gen: NAD, sitting at edge of bed, conversational, pleasant, calm, cooperative on exam  HEENT: normocephalic, atraumatic cranium, EOMI, sclerae anicteric. Oral mucosa pink and moist, midline trachea  Lungs: Unlabored breathing on RA, no cough  CV: Warm, no dependent edema, L subclavicular IPG site well-healed  Abd: obese, SNTND, no obvious masses/hernias, no guarding/rebound tenderness  Musculoskeletal: grossly intact, GO, strength 5/5 upper and lower extremities  Neuro: AOx3, CN II-VII grossly intact, sensation/motor intact in upper and lower extremities, face symmetric, speech clear  Mental: normal mood and affect, regular rate of speech    Labs:  ABG No lab results found in last 7 days.  CBC  Recent Labs   Lab 02/06/24  0614 02/05/24  0536 02/04/24  2100   WBC 7.2 8.7 8.3   HGB 13.1 13.2 13.6    279 290     BMP  Recent Labs   Lab 02/06/24  0614 02/06/24  0455 02/05/24  2323 02/05/24  1950 02/05/24  0750  24  0536 24  2110 24  2100     --   --   --   --  137  --  136   POTASSIUM 4.1  --   --   --   --  3.9  --  4.1   CHLORIDE 106  --   --   --   --  105  --  102   CO2 23  --   --   --   --  24  --  25   BUN 15.5  --   --   --   --  11.9  --  12.1   CR 0.70  --   --   --   --  0.65  --  0.73  0.73   GLC 89 86 137* 156*   < > 89   < > 173*    < > = values in this interval not displayed.     LFT  Recent Labs   Lab 24  0614 24  2100   AST 25 26   ALT 25 23   ALKPHOS 55 56   BILITOTAL 0.3 0.2   ALBUMIN 3.8 3.8   INR 0.99 0.98     PancreasNo lab results found in last 7 days.    Imaging:  Recent Results (from the past 24 hour(s))   Echo Complete   Result Value    LVEF  30-35% (moderately reduced)    [facility]    [id number]  [id number]  [id number]  [id number]^HEVER^FLAQUITA     [facility],[place]  Echocardiography Laboratory  [address] [place] [id number]     Name: MARTHA SETHI  MRN: [id number]  : 1975  Study Date: 2024 08:52 AM  Age: 48 yrs  Gender: Female  Patient Location: [facility]  Reason For Study: Heart Failure  Ordering Physician: FLAQUITA KATHLEEN  Referring Physician: QUINTON HICKS  Performed By: Kashif Beckford     BSA: 1.8 m2  Height: 62 in  Weight: 176 lb  HR: 58  ______________________________________________________________________________  Procedure  Complete Portable Echo Adult. Contrast Optison. Technically difficult  study.Extremely difficult acoustic windows despite the use of contrast for  endcardial border definition. Optison (NDC #3109-8634-28) given intravenously.  Patient was given 7 ml mixture of 3 ml Optison and 6 ml saline. 2 ml wasted.  ______________________________________________________________________________  Interpretation Summary  Technically difficult study.Extremely difficult acoustic windows despite the  use of contrast for endcardial border definition.  Left ventricular function is decreased. The  ejection fraction is 30-35%  (moderately reduced).  Apical wall akinesis is present.  Global right ventricular function is normal.  Mild mitral insufficiency is present.  Mild aortic insufficiency is present.  Pulmonary artery systolic pressure cannot be assessed.  IVC diameter <2.1 cm collapsing >50% with sniff suggests a normal RA pressure  of 3 mmHg.  No pericardial effusion is present.  ______________________________________________________________________________  Left Ventricle  Left ventricular function is decreased. The ejection fraction is 30-35%  (moderately reduced). Apical wall akinesis is present. Inferolateral  (posterior) wall hypokinesis is present.     Right Ventricle  The right ventricle is normal size. Global right ventricular function is  normal.     Atria  The atria cannot be assessed.     Mitral Valve  Mild mitral insufficiency is present.     Aortic Valve  Mild aortic insufficiency is present.     Tricuspid Valve  Mild tricuspid insufficiency is present. Pulmonary artery systolic pressure  cannot be assessed.     Vessels  The inferior vena cava was normal in size with preserved respiratory  variability. IVC diameter <2.1 cm collapsing >50% with sniff suggests a normal  RA pressure of 3 mmHg.     Pericardium  No pericardial effusion is present.     ______________________________________________________________________________  MMode/2D Measurements & Calculations  IVSd: 0.93 cm  LVIDd: 5.3 cm  LVIDs: 4.7 cm  LVPWd: 0.83 cm  FS: 11.5 %  LV mass(C)d: 170.1 grams  LV mass(C)dI: 94.0 grams/m2  RWT: 0.31  TAPSE: 2.0 cm     Doppler Measurements & Calculations  AI P1/2t: 681.9 msec     ______________________________________________________________________________  Report approved by: MD Casa Toney 02/05/2024 09:56 AM         Cardiac Device Check - Inpatient   Result Value    Date Time Interrogation Session [id number]    Implantable Pulse Generator  Medtronic     Implantable Pulse Generator Model [id number] Cobalt XT VR MRI    Implantable Pulse Generator Serial Number [id number]    Type Interrogation Session In Clinic    Clinic Name [facility]    Implantable Pulse Generator Type Defibrillator    Implantable Pulse Generator Implant Date 20230511    Implantable Lead  Medtronic    Implantable Lead Model 6935M Sprint Quattro Secure S MRI SureScan    Implantable Lead Serial Number [id number]    Implantable Lead Implant Date 20230511    Implantable Lead Polarity Type Tripolar Lead    Implantable Lead Location Detail 1 SEPTUM    Implantable Lead Special Function 55CM    Implantable Lead Location Right Ventricle    Implantable Lead Connection Status Connected    Tong Setting Mode (NBG Code) VVI    Tong Setting Lower Rate Limit 40    Tong Setting Hysterisis Rate Off    Lead Channel Setting Sensing Polarity Bipolar    Lead Channel Setting Sensing Anode Location Right Ventricle    Lead Channel Setting Sensing Anode Terminal Ring    Lead Channel Setting Sensing Cathode Location Right Ventricle    Lead Channel Setting Sensing Cathode Terminal Tip    Lead Channel Setting Sensing Sensitivity 0.3    Lead Channel Setting Pacing Polarity Bipolar    Lead Channel Setting Pacing Anode Location Right Ventricle    Lead Channel Setting Pacing Anode Terminal Ring    Lead Channel Setting Sensing Cathode Location Right Ventricle    Lead Channel Setting Sensing Cathode Terminal Tip    Lead Channel Setting Pacing Pulse Width 0.4    Lead Channel Setting Pacing Amplitude 2.0    Lead Channel Setting Pacing Capture Mode Adaptive    Zone Setting Type Category VF    Zone Setting Vendor Type Category VF    Zone Setting Status Active    Zone Setting Detection Interval 270    Zone Setting Detection Beats Numerator 30    Zone Setting Detection Beats Denominator 40    Zone Setting Type Category VT    Zone Setting Vendor Type Category FastVT    Zone Setting Status Inactive     Zone Setting Type Category VT    Zone Setting Vendor Type Category VT    Zone Setting Status Active    Zone Setting Detection Interval 320    Zone Setting Detection Beats Numerator 16    Zone Setting Detection Beats Denominator 16    Zone Setting Type Category VT    Zone Setting Vendor Type Category MonVT    Zone Setting Status Monitor    Zone Setting Detection Interval 400    Zone Setting Detection Beats Numerator 32    Zone Setting Detection Beats Denominator 32    Lead Channel Impedance Value 380    Lead Channel Impedance Value 456    Lead Channel Sensing Intrinsic Amplitude 30.0    Lead Channel Pacing Threshold Amplitude 0.625    Lead Channel Pacing Threshold Pulse Width 0.4    Lead Channel Pacing Threshold Amplitude 0.75    Lead Channel Pacing Threshold Pulse Width 0.4    Battery Date Time of Measurements 33476603401623    Battery RRT Trigger 2.8 V    Battery Remaining Longevity 159    Battery Voltage 3.02    Capacitor Charge Type Shock    Capacitor Last Charge Date Time 61004517948289    Capacitor Charge Time 3.8    Capacitor Charge Energy 18.0    Tong Statistic Date Time Start 18620036413082    Tong Statistic Date Time End 89487234651744    Tong Statistic RA Percent Paced Invalid Value    Tong Statistic RV Percent Paced 0.01    CRT Statistic Date Time Start 71621112167129    CRT Statistic Date Time End 14095578418429    Atrial Tachy Statistic Date Time Start 09881978573431    Atrial Tachy Statistic Date Time End 21580277682792    Atrial Tachy Statistic AT/AF [place] Percent 0    Therapy Statistic Recent Shocks Delivered 0    Therapy Statistic Recent Shocks Aborted 0    Therapy Statistic Recent ATP Delivered 0    Therapy Statistic Recent Date Time Start 28352078763148    Therapy Statistic Recent Date Time End 80408019036834    Therapy Statistic Total Shocks Delivered 0    Therapy Statistic Total Shocks Aborted 0    Therapy Statistic Total ATP Delivered 0    Therapy Statistic Total  Date Time Start  20220796167966    Therapy Statistic Total  Date Time End 82894125596235    Episode Statistic Recent Count 0    Episode Statistic Type Category Patient Activated    Episode Statistic Recent Count 0    Episode Statistic Type Category SVT    Episode Statistic Recent Count 0    Episode Statistic Type Category VT    Episode Statistic Recent Count 0    Episode Statistic Type Category VF    Episode Statistic Recent Count 0    Episode Statistic Type Category VT    Episode Statistic Recent Count 0    Episode Statistic Type Category VT    Episode Statistic Recent Count 0    Episode Statistic Type Category VT    Episode Statistic Recent Date Time Start 28811006276626    Episode Statistic Recent Date Time End 76066945648494    Episode Statistic Recent Date Time Start 10653047257946    Episode Statistic Recent Date Time End 87883947916692    Episode Statistic Recent Date Time Start 35191613917747    Episode Statistic Recent Date Time End 06983826551555    Episode Statistic Recent Date Time Start 52174634148561    Episode Statistic Recent Date Time End 49050622587061    Episode Statistic Recent Date Time Start 49512918158506    Episode Statistic Recent Date Time End 60094078957210    Episode Statistic Recent Date Time Start 58100524858901    Episode Statistic Recent Date Time End 04290238798820    Episode Statistic Recent Date Time Start 81090796531990    Episode Statistic Recent Date Time End 99924207465513    Episode Statistic Total Count 0    Episode Statistic Type Category Patient Activated    Episode Statistic Total Count 0    Episode Statistic Type Category SVT    Episode Statistic Total Count 0    Episode Statistic Type Category VT    Episode Statistic Total Count 0    Episode Statistic Type Category VF    Episode Statistic Total Count 0    Episode Statistic Type Category VT    Episode Statistic Total Count 0    Episode Statistic Type Category VT    Episode Statistic Total Count 0    Episode Statistic Type Category VT     Episode Statistic Total Date Time Start 20737986901872    Episode Statistic Total Date Time End 20240205143600    Episode Statistic Total Date Time Start 20230511164113    Episode Statistic Total Date Time End 20240205143600    Episode Statistic Total Date Time Start 20230511164113    Episode Statistic Total Date Time End 20240205143600    Episode Statistic Total Date Time Start 12940927113635    Episode Statistic Total Date Time End 20240205143600    Episode Statistic Total Date Time Start 20230511164113    Episode Statistic Total Date Time End 20240205143600    Episode Statistic Total Date Time Start 20230511164113    Episode Statistic Total Date Time End 20240205143600    Episode Statistic Total Date Time Start 20230511164113    Episode Statistic Total Date Time End 20240205143600    Narrative    Patient seen on 6B for evaluation and iterative programming of their ICD   per MD orders.     Device: Medtronic [id number] [place] XT VR MRI  Normal device function  Mode: VVI 40 bpm  : <0.1%  Intrinsic rhythm: VS 70 bpm  Thoracic Impedance: Below reference line suggesting possible intrathoracic   fluid accumulation.  Short V-V intervals: 0  Lead Trends Appear Stable: Yes  Estimated battery longevity to RRT = 13.3 years  Atrial Arrhythmia: 0  Ventricular Arrhythmia: 0  Setting Changes: None  VENU Sanchez, RN    Single lead ICD    I have reviewed and interpreted the device interrogation, settings,   programming and nurse's summary. The device is functioning within normal   device parameters. I agree with the current findings, assessment and plan.   CT Head w/o Contrast    Narrative    CT HEAD W/O CONTRAST 2/5/2024 5:44 PM    Provided History: LVAD workup  ICD-10:    Comparison: None     Technique: Using multidetector thin collimation helical acquisition  technique, axial, coronal and sagittal CT images from the skull base  to the vertex were obtained without intravenous contrast.     Findings:  Mild asymmetric appearance of  the right lateral ventricle  likely constitutional in nature. Otherwise, the ventricles are  proportionate to the cerebral sulci. No evidence of hydrocephalus.  No intracranial hemorrhage, mass effect, or midline shift. . The gray  to white matter differentiation of the cerebral hemispheres is  preserved. The basal cisterns are patent.    The visualized paranasal sinuses are clear. The mastoid air cells are  clear.       Impression    Impression: No acute intracranial pathology. Minimal asymmetry of  right lateral ventricle, likely variational.    SHARONA ALEXANDER MD         SYSTEM ID:  [id number]   CT Dental wo Contrast    Narrative    CT DENTAL WO CONTRAST 2/5/2024 5:49 PM    History:  LVAD eval workup    Comparison:    TECHNIQUE: 3-D reconstruction by the technologists, with curved  multiplanar reformat of thin section imaging through the mandible and  maxilla obtained without intravenous contrast.    FINDINGS:  Right maxillary:  Third molar (1): absent  Second molar (2): absent  First molar (3): absent  Second premolar (4): absent  First premolar (5): Mild periapical lucency without cavity  Canine (6):  Small filling material.  Lateral incisor (7): Scattered filling material  Central incisor (8):Scattered filling material    Left maxillary:  Central incisor (9): Scattered filling material  Lateral incisor (10): Scattered filling material  Canine (11): Unremarkable  First premolar (12): Dental prosthesis. Minimal periapical lucency.  Second premolar (13): normal  First molar (14): Absent  Second molar (15): Absent  Third molar (16): Absent    Left mandibular:  Third molar (17):Absent  Second molar (18):Absent  First molar (19):Absent  Second premolar (20): Normal  First premolar (21): Normal  Canine (22):  Small defect in the anterior medial aspect   Lateral incisor (23):Normal  Central incisor (24):Normal    Right mandibular:  Central incisor (25):Normal  Lateral incisor (26):Normal  Canine (27): Normal  First  premolar (28):Normal  Second premolar (29): Small dental prosthesis with periapical lucency.  First molar (30): Superimposed streak artifacts. Likely normal in  nature.  Second molar (31): Absent  Third molar (32): Excellent    No significant soft tissue swelling or mass. Normal facial bone  alignment. No bony erosion.     Visualized portions of the paranasal sinuses are clear. Normal  temporomandibular joints.      Impression    IMPRESSION: Multiple tooth with by dental prosthesis and filling  material. Multiple absent teeth. Periapical lucency of unknown  significance in the tooth 12 and 29, may represent periapical  periodontitis. No large soft tissue abscess in the face.    SHARONA ALEXANDER MD         SYSTEM ID:  [id number]   CT Chest Abdomen Pelvis w/o Contrast    Impression    RESIDENT PRELIMINARY INTERPRETATION  IMPRESSION:  1. No acute finding in the chest, abdomen, or pelvis.  2. Several sub-4 mm solid pulmonary nodules in the left upper lobe,  unchanged compared to myocardial viability scan 9/28/2022 favoring  benign nodules.  3. Incidental and chronic findings detailed in the body of the report.       CTA Chest with Contrast    Impression    RESIDENT PRELIMINARY INTERPRETATION  Impression:  1. Short segment high-grade stenosis of the proximal brachiocephalic  artery.  2. Small volume nonocclusive thrombus versus fibrin sheath about the  implantable cardiac defibrillator lead within the SVC.    [Urgent Result: Impression point #1 and 2]    Finding was identified on 2/5/2024 8:35 PM.     FRANCIS Rodarte was contacted by Dr. Colunga at 2/5/2024 8:45 PM and  verbalized understanding of the urgent finding.

## 2024-02-06 NOTE — CONSULTS
Patient of Dr. Kurt Guerin seen in in the hospital on patient care unit 6B for psychosocial assessment. 60 minutes spent with patient. 100% of visit consisted of counseling related to issues surrounding Heart Transplant and LVAD.    Psychosocial Assessment   Name: Cailin Burns     MRN:  [id number]        Patient Name / Age / Race: Cailin Burns 48 year old    Source of Information: Patient   : Birgit Zamorano, [facility]   Interview Date: February 5, 2024   Reason for Referral: Heart Transplant and Mechanical Circulatory Support     Current Living Situation   Location ([facility]/State): [address] [place] [address] [address]  [address]   [facility] [id number]   With Whom: Living arrangements - the patient lives with their spouse.   Type of Home: 2 bedroom apartment with no stairs   Working Phone? Yes    Three Pronged Outlet? Yes    Distance from Hospital: 4-5 hours   Need to Relocate Post Surgery? Yes    Discussed? Yes -written material provided on local furnished apartments, hotels and extended stay suites     Vocational/Employment/Financial   Employment: Disabled since heart attack last February 2023   Occupation: Used to work as a [employer]   Education: 2 year degree in criminal justice   [place]? No   Income: SSD just started in November of 2023   Insurance: Medial Assistance through [employer]   Name of Private Insurance: N/A   Medication Coverage: Good coverage-$1 and $3 copays    In current situation, pt. can afford medication and supply costs:  Yes    Other Financial Concerns/Issues: Yes-live month to month. She and  are currently behind on rent, but landlord working with the and waiting for her to receive back pay from Social Security     Family/Social Support   Marital Status:    Partner Name: Andrzej   Length of Time : 28 years   Partner s Employment: Works part time for the [employer], but also in the process of applying for SSDI   Relationship: stable/supportive   Children: Has a Foster Dtr  -[place] who lives in the same apartment complex upstairs with 2 kids. Another Dtr-Miranda in [place], [place] and a son-in [place] who is autistic and lives in a group home. Has a step-son, but estranged from him.   Parents:    Siblings: Half Sister in [place]. Has a relationship, but not a close one   Other Family or Friends Close by: N/A   Support System: available, helpful   Primary Support Person:  and Dtr ([place])   Issues: None     Activities/Functional Ability   Current Level: ambulatory and independent with ADL's, but very fatigued and low energy. Has a wheeled walker with a seat and a scooter for long distances. Able to walk independently within the apartment.   Daily Activities: Cooks, crosswords, watches TV, uses light weight vacuum. Can't do the dishes anymore. In the Summer, tries to walk outside, but only able to walk 2 blocks.   Transportation: Other-Doesn't want to drive anymore due to health reasons.  provides all transportation     Medical Status   Patient s understanding of need for surgical intervention: Good   Advanced Directive? No    Details: Provided her with education on purpose of directive and how to complete   Adherence History: No documentation or report of non-compliance   Ability to Adhere to Complex Medical Regime: Yes     Behavioral   Chemical Dependency Issues: No-reports no THC, illicit substances and no alcohol.   Smoker? Yes -Has been smoking 1 pack per day. Quit Wednesday, 2024. Reports having had a lot of difficulty quitting, but aware that she should.   Psychiatric impairment: Yes -Has taken Effexor for 20 years for depression. Has been seeing a therapist every 3 weeks since her heart attack last year. No history of suicidal attempts or hospitalizations.   Coping Style/Strategies: Reports she has good coping skills and uses her sense of humor to get through stress. Good family support. Wants to feel well enough to spend more time with her  grandkids without feeling short or breath or feeling like she needs her scooter   Recent Legal History: No   Teaching Completed During Assessment Related To Transplant and Mechanical Circulatory Support: Housing and relocation needs post surgery.  Caregiver needs post surgery.  Financial issues related to surgery.  Risks of alcohol use post surgery.  Common psychosocial stressors pre/post surgery.  Support group availability.   Psychosocial Risks Reviewed Related To Transplant and Mechanical Circulatory Support: Increased stress related to your emotional, family, social, employment, or financial situation.  Affect on work and/or disability benefits.  Affect on future health and life insurance.  Outcome expectations may not be met.  Mental Health risks: anxiety, depression, PTSD, guilt, grief and chronic fatigue.     Observed Behavior   Well informed? Yes  Angry? No   Process info well? Yes  Hostile? No   Evasive? No Oriented X3? Yes    Cautious/Suspicious? No Motivated? Yes    Appropriate Behavior? Yes  Depressed? No   Appropriate Affect? Yes  Anxious? No   Interview Observations: Good eye contact and engaged in conversation. Appeared able to process information     Selection Criteria Met   Plan for Support Yes    Chemical Dependence Yes    Smoking No-for transplant, but yes for LVAD   Mental Health Yes    Adequate Finances Yes      Risk Issues:    -Inability to stop smoking after her heart attack last year-may make it difficult for her to obtain transplant.  -Finances to stay locally and come for follow-up; will need nikki support    Final Evaluation/Assessment:     Cailin was friendly and forthcoming with information during during writer's assessment. She reports that between her  and Dtr-Miranda, she will have adequate caregiver support post-surgery. Her  works part-time and has a very flexible schedule. Her Dtr does work, but familiar with [place] and will be able to help out some. The patient's   is currently staying with their Dtr in [place] this week. Both  and Dtr plan to be here for her evaluation appointments.    There does not seem to be any issues surrounding chemical dependency or mental health. She does have a history of depression, but well controlled on medications and engaged in therapy with no complicated or concerning issues.    She has adequate medical insurance to provide coverage for surgery and the supplies, along with medications. She may struggle with an extended relocation or travel post-surgery for follow-up appointments. She and her  may need nikki support. She also has [facility] eligibility for relocation and gas reimbursement.    There is no documentation regarding non-compliance and she reports that she is adherent to medical directives except for the difficulty she has has had with no smoking. She has continued to smoke this last year, but reports today that he last cigarette was on Wednesday, 1/31 and plans to remain abstinent.  She is aware that she will need 3-6 months of no smoking prior to getting on the transplant list.    No psychosocial barriers identified toward pursuing VAD. Will need 3-6 months of no smoking prior to being approved for heart transplant listing.    Frailty Score = 5    Patient understands risks and benefits of Heart Transplant and Mechanical Circulatory Support: Yes     Recommendation:    Acceptable for VAD. Conditional for transplant; requires 3-6 months of abstinence from tobacco    Signature: Birgit Zamorano, [facility]     Title: Licensed Independent Clinical   [phone]               Interview Date: February 5, 2024

## 2024-02-06 NOTE — PLAN OF CARE
Neuro: A&Ox4.   Cardiac: SR w/ BBB. HR 60s. SBP 100s. Afebrile.   Respiratory: Sating >92% on RA.  GI/: Adequate urine output. No BM today but patient starting to pass gas. Bowel regimen started.   Diet/appetite: Tolerating 2g Na+ diet. Eating well.  Activity:  SBA/Ad dheeraj, up to chair and bathroom.   Pain: At acceptable level on current regimen. Oxycodone given per MAR for chest pain.   Skin: No new deficits noted.  LDA's: R PIV x1 (SL)     Plan: Continue with POC. Notify primary team with changes.     Goal Outcome Evaluation:      Plan of Care Reviewed With: patient    Overall Patient Progress: improvingOverall Patient Progress: improving    Outcome Evaluation: Patient completed abd US and right heart cath today.      Problem: Chest Pain  Goal: Resolution of Chest Pain Symptoms  Intervention: Manage Acute Chest Pain  Recent Flowsheet Documentation  Taken 2/6/2024 1515 by Ilene Sylvester RN  Chest Pain Intervention: cardiac monitoring continued  Taken 2/6/2024 1200 by Ilene Sylvester RN  Chest Pain Intervention: cardiac monitoring continued  Taken 2/6/2024 0800 by Ilene Sylvester, RN  Chest Pain Intervention: cardiac monitoring continued

## 2024-02-06 NOTE — PLAN OF CARE
Goal Outcome Evaluation:         Neuro: A&Ox4.   Cardiac: SR/SB. HR 50s-70s. Pacer. Afebrile.   Respiratory: Sating above 95% on RA. Denies SOB while up and walking to BR.   GI/: Adequate urine output.No BM this shift.   Diet/appetite: Tolerating 2g Na, no caffeine diet. Eating well. NPO at midnight.   Activity:  Independent in room, pt moves well over short distances.   Pain: At acceptable level on current regimen. PRN oxycodone given for R chest pain down right arm and hand, team aware.   Skin: No new deficits noted.   LDA's: R PIV    Plan: Continue with POC. Notify primary team with changes.       Provider notification:  Received call from radiology, have you been updated on results? Thanks  -Call back with provider, no orders at this time.

## 2024-02-06 NOTE — PROGRESS NOTES
[facility]    Cardiology Progress Note- Cardiology        Date of Admission:  2/4/2024     Assessment & Plan: HVSL    48y.o. with PMHx of HFpEF (27%) s/p ICD, CAD s/p PCI (PCI of RCA  and MOHAN x1 to mLAD and MOHAN x1 to mLCx on 01/2023), DM2, tobacco use who was admitted to OSH for ACS r/o and transferred to [facility] for LVAD/heart transplant work up.    Updates:   - TTE: EF 30-35%, apical wall akinesis, global RV function normal, IVC small  - Discussed with SW; patient has financial approval for LVAD workup   - Starting LVAD evaluation 2/5, labs ordered   - Plan for RHC today as part of transplant workup- scheduled around 3pm  - restarting home furosemide 20mg PO  - Pain management consult (given ongoing chest/arm pain despite isosorbide dinitrate TID, ranolazine, still requiring oxy q4)  - CTA showing high grade stenosis of the right brachiocephalic artery, ABIs borderline  - Ucx pending  - started scheduled miralax/senna given constipation     #Chronic HFrEF 2/2 Ischemic cardiomyopathy  (EF 27% TTE 01/04/2024)  NYHA Class 3  Follows with Dr. Guerin. Given her active smoking,  LVAD was thought to be a primary option.  BB: hold pta metoprolol 100mg d/t hypotension; start with 25mg metoprolol (increase as able)  ARNi hold pta entrest 97/103 mg BID d/t hypotension  SGLT2  continue pta dapagliflozin 10mg daily  MRA continue pta aldactone 12.5 mg daily  Duretics: consider starting back PTA furosemide 20mg PO tomorrow  Volume Status: appears euvolemic on exam  Device: ICD in place for primary prevention (implanted on 5/11/2023)  Has been transferred here for for LVAD workup, given smoking hx not able to be heart transplant candidate at this time. Given financial clearance have started LVAD workup given significant functional limitations. Will start with RHC on 2/6.   - K>4 and Mg>2 with replacement protocols in place  - BMP and Mg daily  - daily weights and strict I/Os  -  Right heart cath today, has been NPO  - PT/OT    #LVAD/transplant work up:   [x] Labs (CBC, CMP, PT/INR, cystatin C, prealbumin, UA + micro)  [] Infectious (Hep A/B/C, HIV, treponema, HSV 1/2 IgG, CMV IgG, Toxo IgG, EBV IgG, varicella IgG, Quant gold, COVID vaccine/PCR)  [] Utox/nicotine and cotinine/PeTH   [] Immunocompatibility (last transfusion, ABO, HLA tissue typing, PRA)  [] CVTS consult (Plan for formal consult this upcoming week)  [] Social work evaluation  [] Palliative care evaluation  [] Neuropsych evaluation  [] Nutrition evaluation  [] CT Dental + evaluation  [x] Abd US + doppler  [x] Extremity US and ABIs  [] Carotid US (if DM or ICM or >51yo)  [] PFTs    [x] CT head non-contrast: No acute pathology, wnl.  [x] CT CAP non-contrast: no acute pathology  [] Colonoscopy  [] DEXA  [x] PSA - N/A       #Multivessel CAD with PCI of RCA  and MOHAN x1 to mLAD and MOHAN x1 to mLCx (Dec 2022 and Jan 2023)  #Peripheral artery disease  3V disease not appropriate for CABG due to poor bypass targets. So had staged PCI of CTOs in Dec 2022 and Jan 2023.Has ongoing chest pain managed with nitroglycerin and oxycodone/fentanyl at OSH.  Due to small vessels patient is a poor candidate for PCI. Plan for medical management. Troponin normal on admission.   - continue DAPT with Aspirin and Brilinta  - continue lipitor and fenofibrate   - continue ranexa (started at the OSH)  - Start isosorbide dinitrate 10 TID   - NTG prn and tylenol 975mg TID, lidocaine patch and oxycodone 5mg Q4H prn for chest pain       #Right subclavian stenosis  #High grade brachiocephalic stenosis.   Discrepancy of blood pressure, which is lower on the right side. Patient notes pain and tingling in the right hand. Upper arterial duplex shows right subclavian stenosis and possible brachiocephalic stenosis. She was recommended further evaluation with CTA, done 2/5 which showed high grade stenosis of the right brachiocephalic artery.  - ABIs borderline  -  "avoid using right arm for blood pressure measurement (patient care order placed)     #Diabetes, on Lantus and sliding scale. (A1C 6.8)  - hold pta glargine 30U qPM   - hold pta metformin and liraglutide  - MDSS  - hypoglycemia protocol   - lantus 15 qpm given NPO at MN     #Tobacco use disorder  Has been trying to quit.   - pta buspar  - nicotine patch 21mg daily   - nicotine lozenge q1hr PRN     #MDD  - pta Effexor.        Diet: NPO per Anesthesia Guidelines for Procedure/Surgery Except for: Meds , NPO at midnight   DVT Prophylaxis: Enoxaparin (Lovenox) SQ  Cuevas Catheter: Not present  Cardiac Monitoring: ACTIVE order. Indication: Acute decompensated heart failure (48 hours)  Code Status: Full Code          Clinically Significant Risk Factors                   # End stage heart failure: Ventricular assist device (VAD) present      # DMII: A1C = 10.6 % (Ref range: <5.7 %) within past 6 months, PRESENT ON ADMISSION  # Obesity: Estimated body mass index is 32.14 kg/m  as calculated from the following:    Height as of this encounter: 1.575 m (5' 2\").    Weight as of this encounter: 79.7 kg (175 lb 11.3 oz)., PRESENT ON ADMISSION      # ICD device present       Disposition Plan   Expected discharge: 4 - 7 days, recommended to prior living arrangement once advanced heart failure work-up completed.    Entered: Valentina Gale MD 02/06/2024, 6:51 AM   The patient's care was discussed with the Attending Physician, Dr. Valencia .      Valentina Gale MD  [facility]  ______________________________________________________________________    Interval History   Nursing notes reviewed. No acute events overnight. VSS.    VS: afebrile, HR:60s-70s , BPs ranging 90s-120s/40s-50s , oxygenating well on RA  Is/Os: 1L in/ 1.7L out  Wt (in pounds): 175lb     Reports still ongoing pain: chest and arms. Having some SOB, cough and feels like she needs to start her furosemide again. Still requiring oxy " q4hrs to manage pain. Isosorbide dinitrate tried yesterday, did not help.     Intake/Output Summary (Last 24 hours) at 2024 1127  Last data filed at 2024 1026  Gross per 24 hour   Intake 700 ml   Output 800 ml   Net -100 ml       Physical Exam   Vital Signs: Temp: 97.7  F (36.5  C) Temp src: Oral BP: 128/60 Pulse: 59   Resp: 16 SpO2: 96 % O2 Device: None (Room air)    Weight: 175 lbs 11.31 oz    General Appearance: Chronically ill appearing, no acute distress, sitting up in bed  Respiratory:  clear to ascultation in the peripheral lung fields, mild wheezes present  Cardiovascular: RRR, no murmurs, no appreciable JVD   GI: Soft, non-distended, non-tender  Skin: No concerning skin lesions/rashes on exposed skin, no peripheral edema  Neuro: alert & oriented x3    Medical Decision Making       Please see A&P for additional details of medical decision making.      Data     I have personally reviewed the following data over the past 24 hrs:    7.2  \   13.1   / 298     N/A N/A N/A /  86   N/A N/A N/A \     TSH: N/A T4: N/A A1C: N/A       Imaging results reviewed over the past 24 hrs:   Recent Results (from the past 24 hour(s))   Echo Complete   Result Value    LVEF  30-35% (moderately reduced)    Narrative    [id number]  [id number]  [id number]  [id number]^HEVER^FLAQUITA     [facility],[place]  Echocardiography Laboratory  [address] [place] [id number]     Name: MARTHA SETHI  MRN: [id number]  : 1975  Study Date: 2024 08:52 AM  Age: 48 yrs  Gender: Female  Patient Location: [facility]  Reason For Study: Heart Failure  Ordering Physician: FLAQUITA KATHLEEN  Referring Physician: QUINTON HICKS  Performed By: Kashif Beckford     BSA: 1.8 m2  Height: 62 in  Weight: 176 lb  HR: 58  ______________________________________________________________________________  Procedure  Complete Portable Echo Adult. Contrast Optison. Technically difficult  study.Extremely difficult  acoustic windows despite the use of contrast for  endcardial border definition. Optison (NDC #1235-0632-19) given intravenously.  Patient was given 7 ml mixture of 3 ml Optison and 6 ml saline. 2 ml wasted.  ______________________________________________________________________________  Interpretation Summary  Technically difficult study.Extremely difficult acoustic windows despite the  use of contrast for endcardial border definition.  Left ventricular function is decreased. The ejection fraction is 30-35%  (moderately reduced).  Apical wall akinesis is present.  Global right ventricular function is normal.  Mild mitral insufficiency is present.  Mild aortic insufficiency is present.  Pulmonary artery systolic pressure cannot be assessed.  IVC diameter <2.1 cm collapsing >50% with sniff suggests a normal RA pressure  of 3 mmHg.  No pericardial effusion is present.  ______________________________________________________________________________  Left Ventricle  Left ventricular function is decreased. The ejection fraction is 30-35%  (moderately reduced). Apical wall akinesis is present. Inferolateral  (posterior) wall hypokinesis is present.     Right Ventricle  The right ventricle is normal size. Global right ventricular function is  normal.     Atria  The atria cannot be assessed.     Mitral Valve  Mild mitral insufficiency is present.     Aortic Valve  Mild aortic insufficiency is present.     Tricuspid Valve  Mild tricuspid insufficiency is present. Pulmonary artery systolic pressure  cannot be assessed.     Vessels  The inferior vena cava was normal in size with preserved respiratory  variability. IVC diameter <2.1 cm collapsing >50% with sniff suggests a normal  RA pressure of 3 mmHg.     Pericardium  No pericardial effusion is present.     ______________________________________________________________________________  MMode/2D Measurements & Calculations  IVSd: 0.93 cm  LVIDd: 5.3 cm  LVIDs: 4.7 cm  LVPWd:  0.83 cm  FS: 11.5 %  LV mass(C)d: 170.1 grams  LV mass(C)dI: 94.0 grams/m2  RWT: 0.31  TAPSE: 2.0 cm     Doppler Measurements & Calculations  AI P1/2t: 681.9 msec     ______________________________________________________________________________  Report approved by: MD Casa Toney 02/05/2024 09:56 AM         Cardiac Device Check - Inpatient   Result Value    Date Time Interrogation Session [id number]    Implantable Pulse Generator  Medtronic    Implantable Pulse Generator Model [id number] Cobalt XT VR MRI    Implantable Pulse Generator Serial Number [id number]    Type Interrogation Session In Clinic    Clinic Name [facility]    Implantable Pulse Generator Type Defibrillator    Implantable Pulse Generator Implant Date 20230511    Implantable Lead  Medtronic    Implantable Lead Model 6935M Sprint Quattro Secure S MRI SureScan    Implantable Lead Serial Number [id number]    Implantable Lead Implant Date 20230511    Implantable Lead Polarity Type Tripolar Lead    Implantable Lead Location Detail 1 SEPTUM    Implantable Lead Special Function 55CM    Implantable Lead Location Right Ventricle    Implantable Lead Connection Status Connected    Tong Setting Mode (NBG Code) VVI    Tong Setting Lower Rate Limit 40    Tong Setting Hysterisis Rate Off    Lead Channel Setting Sensing Polarity Bipolar    Lead Channel Setting Sensing Anode Location Right Ventricle    Lead Channel Setting Sensing Anode Terminal Ring    Lead Channel Setting Sensing Cathode Location Right Ventricle    Lead Channel Setting Sensing Cathode Terminal Tip    Lead Channel Setting Sensing Sensitivity 0.3    Lead Channel Setting Pacing Polarity Bipolar    Lead Channel Setting Pacing Anode Location Right Ventricle    Lead Channel Setting Pacing Anode Terminal Ring    Lead Channel Setting Sensing Cathode Location Right Ventricle    Lead Channel Setting Sensing Cathode Terminal Tip     Lead Channel Setting Pacing Pulse Width 0.4    Lead Channel Setting Pacing Amplitude 2.0    Lead Channel Setting Pacing Capture Mode Adaptive    Zone Setting Type Category VF    Zone Setting Vendor Type Category VF    Zone Setting Status Active    Zone Setting Detection Interval 270    Zone Setting Detection Beats Numerator 30    Zone Setting Detection Beats Denominator 40    Zone Setting Type Category VT    Zone Setting Vendor Type Category FastVT    Zone Setting Status Inactive    Zone Setting Type Category VT    Zone Setting Vendor Type Category VT    Zone Setting Status Active    Zone Setting Detection Interval 320    Zone Setting Detection Beats Numerator 16    Zone Setting Detection Beats Denominator 16    Zone Setting Type Category VT    Zone Setting Vendor Type Category MonVT    Zone Setting Status Monitor    Zone Setting Detection Interval 400    Zone Setting Detection Beats Numerator 32    Zone Setting Detection Beats Denominator 32    Lead Channel Impedance Value 380    Lead Channel Impedance Value 456    Lead Channel Sensing Intrinsic Amplitude 30.0    Lead Channel Pacing Threshold Amplitude 0.625    Lead Channel Pacing Threshold Pulse Width 0.4    Lead Channel Pacing Threshold Amplitude 0.75    Lead Channel Pacing Threshold Pulse Width 0.4    Battery Date Time of Measurements 02678562905985    Battery RRT Trigger 2.8 V    Battery Remaining Longevity 159    Battery Voltage 3.02    Capacitor Charge Type Shock    Capacitor Last Charge Date Time 91754337573801    Capacitor Charge Time 3.8    Capacitor Charge Energy 18.0    Tong Statistic Date Time Start 21689746563009    Tong Statistic Date Time End 86539515914597    Tong Statistic RA Percent Paced Invalid Value    Tong Statistic RV Percent Paced 0.01    CRT Statistic Date Time Start 81007403943939    CRT Statistic Date Time End 37814439590712    Atrial Tachy Statistic Date Time Start 38088951467629    Atrial Tachy Statistic Date Time End 21599913886066     Atrial Tachy Statistic AT/AF [place] Percent 0    Therapy Statistic Recent Shocks Delivered 0    Therapy Statistic Recent Shocks Aborted 0    Therapy Statistic Recent ATP Delivered 0    Therapy Statistic Recent Date Time Start 17571688996036    Therapy Statistic Recent Date Time End 02813716719802    Therapy Statistic Total Shocks Delivered 0    Therapy Statistic Total Shocks Aborted 0    Therapy Statistic Total ATP Delivered 0    Therapy Statistic Total  Date Time Start 14190483846209    Therapy Statistic Total  Date Time End 94309546751847    Episode Statistic Recent Count 0    Episode Statistic Type Category Patient Activated    Episode Statistic Recent Count 0    Episode Statistic Type Category SVT    Episode Statistic Recent Count 0    Episode Statistic Type Category VT    Episode Statistic Recent Count 0    Episode Statistic Type Category VF    Episode Statistic Recent Count 0    Episode Statistic Type Category VT    Episode Statistic Recent Count 0    Episode Statistic Type Category VT    Episode Statistic Recent Count 0    Episode Statistic Type Category VT    Episode Statistic Recent Date Time Start 90851269119342    Episode Statistic Recent Date Time End 90733526184103    Episode Statistic Recent Date Time Start 33906813079504    Episode Statistic Recent Date Time End 40894283235983    Episode Statistic Recent Date Time Start 20597987930879    Episode Statistic Recent Date Time End 18852848936359    Episode Statistic Recent Date Time Start 97469091866496    Episode Statistic Recent Date Time End 46944990112835    Episode Statistic Recent Date Time Start 49710940447865    Episode Statistic Recent Date Time End 21154746472955    Episode Statistic Recent Date Time Start 54648813835314    Episode Statistic Recent Date Time End 33688110175157    Episode Statistic Recent Date Time Start 24365554776224    Episode Statistic Recent Date Time End 38621539493256    Episode Statistic Total Count 0    Episode  Statistic Type Category Patient Activated    Episode Statistic Total Count 0    Episode Statistic Type Category SVT    Episode Statistic Total Count 0    Episode Statistic Type Category VT    Episode Statistic Total Count 0    Episode Statistic Type Category VF    Episode Statistic Total Count 0    Episode Statistic Type Category VT    Episode Statistic Total Count 0    Episode Statistic Type Category VT    Episode Statistic Total Count 0    Episode Statistic Type Category VT    Episode Statistic Total Date Time Start 63613628851736    Episode Statistic Total Date Time End 50546886940585    Episode Statistic Total Date Time Start 31311305157232    Episode Statistic Total Date Time End 64686735138131    Episode Statistic Total Date Time Start 49973724088250    Episode Statistic Total Date Time End 50868630530280    Episode Statistic Total Date Time Start 56741242808626    Episode Statistic Total Date Time End 86360869141511    Episode Statistic Total Date Time Start 65913979011521    Episode Statistic Total Date Time End 93249320280061    Episode Statistic Total Date Time Start 72520225176676    Episode Statistic Total Date Time End 72097531447572    Episode Statistic Total Date Time Start 59161318795932    Episode Statistic Total Date Time End 38343443578955    Narrative    Patient seen on 6B for evaluation and iterative programming of their ICD   per MD orders.     Device: [employer] [id number] [place] XT VR MRI  Normal device function  Mode: VVI 40 bpm  : <0.1%  Intrinsic rhythm: VS 70 bpm  Thoracic Impedance: Below reference line suggesting possible intrathoracic   fluid accumulation.  Short V-V intervals: 0  Lead Trends Appear Stable: Yes  Estimated battery longevity to RRT = 13.3 years  Atrial Arrhythmia: 0  Ventricular Arrhythmia: 0  Setting Changes: None  VENU Sanchez RN    Single lead ICD    I have reviewed and interpreted the device interrogation, settings,   programming and nurse's summary. The device is  functioning within normal   device parameters. I agree with the current findings, assessment and plan.   CT Head w/o Contrast    Narrative    CT HEAD W/O CONTRAST 2/5/2024 5:44 PM    Provided History: LVAD workup  ICD-10:    Comparison: None     Technique: Using multidetector thin collimation helical acquisition  technique, axial, coronal and sagittal CT images from the skull base  to the vertex were obtained without intravenous contrast.     Findings:  Mild asymmetric appearance of the right lateral ventricle  likely constitutional in nature. Otherwise, the ventricles are  proportionate to the cerebral sulci. No evidence of hydrocephalus.  No intracranial hemorrhage, mass effect, or midline shift. . The gray  to white matter differentiation of the cerebral hemispheres is  preserved. The basal cisterns are patent.    The visualized paranasal sinuses are clear. The mastoid air cells are  clear.       Impression    Impression: No acute intracranial pathology. Minimal asymmetry of  right lateral ventricle, likely variational.    SHARONA ALEXANDER MD         SYSTEM ID:  [id number]   CT Dental wo Contrast    Narrative    CT DENTAL WO CONTRAST 2/5/2024 5:49 PM    History:  LVAD eval workup    Comparison:    TECHNIQUE: 3-D reconstruction by the technologists, with curved  multiplanar reformat of thin section imaging through the mandible and  maxilla obtained without intravenous contrast.    FINDINGS:  Right maxillary:  Third molar (1): absent  Second molar (2): absent  First molar (3): absent  Second premolar (4): absent  First premolar (5): Mild periapical lucency without cavity  Canine (6):  Small filling material.  Lateral incisor (7): Scattered filling material  Central incisor (8):Scattered filling material    Left maxillary:  Central incisor (9): Scattered filling material  Lateral incisor (10): Scattered filling material  Canine (11): Unremarkable  First premolar (12): Dental prosthesis. Minimal periapical  lucency.  Second premolar (13): normal  First molar (14): Absent  Second molar (15): Absent  Third molar (16): Absent    Left mandibular:  Third molar (17):Absent  Second molar (18):Absent  First molar (19):Absent  Second premolar (20): Normal  First premolar (21): Normal  Canine (22):  Small defect in the anterior medial aspect   Lateral incisor (23):Normal  Central incisor (24):Normal    Right mandibular:  Central incisor (25):Normal  Lateral incisor (26):Normal  Canine (27): Normal  First premolar (28):Normal  Second premolar (29): Small dental prosthesis with periapical lucency.  First molar (30): Superimposed streak artifacts. Likely normal in  nature.  Second molar (31): Absent  Third molar (32): Excellent    No significant soft tissue swelling or mass. Normal facial bone  alignment. No bony erosion.     Visualized portions of the paranasal sinuses are clear. Normal  temporomandibular joints.      Impression    IMPRESSION: Multiple tooth with by dental prosthesis and filling  material. Multiple absent teeth. Periapical lucency of unknown  significance in the tooth 12 and 29, may represent periapical  periodontitis. No large soft tissue abscess in the face.    SHARONA ALEXANDER MD         SYSTEM ID:  [id number]   CT Chest Abdomen Pelvis w/o Contrast    Impression    RESIDENT PRELIMINARY INTERPRETATION  IMPRESSION:  1. No acute finding in the chest, abdomen, or pelvis.  2. Several sub-4 mm solid pulmonary nodules in the left upper lobe,  unchanged compared to myocardial viability scan 9/28/2022 favoring  benign nodules.  3. Incidental and chronic findings detailed in the body of the report.       CTA Chest with Contrast    Impression    RESIDENT PRELIMINARY INTERPRETATION  Impression:  1. Short segment high-grade stenosis of the proximal brachiocephalic  artery.  2. Small volume nonocclusive thrombus versus fibrin sheath about the  implantable cardiac defibrillator lead within the SVC.    [Urgent Result: Impression  point #1 and 2]    Finding was identified on 2/5/2024 8:35 PM.     FRANCIS Rodarte was contacted by Dr. Colunga at 2/5/2024 8:45 PM and  verbalized understanding of the urgent finding.       I have reviewed today's vital signs, notes, medications, labs and imaging.  I have also seen and examined the patient and agree with the findings and plan as outlined above.   PT with ischemic DCM and tobacco use chronic who underwent RHC today which revealed normal left and right filling pressures with CI 2.7 by Altagracia.  Plan to continue to workup for LVAD placement as planned.  Pt with compensated heart failure.     David Valencia MD, PhD  Professor, Heart Failure and Cardiac Transplantation  [facility]

## 2024-02-06 NOTE — CONSULTS
See previously documented social work assessment. Forgot to attach orders.    Birgit Zamorano, SHAMIKA, [facility]  Heart Transplant/[facility]   ph. [phone]  pgr. [phone]

## 2024-02-06 NOTE — PLAN OF CARE
Goal Outcome Evaluation:      Plan of Care Reviewed With: patient    Overall Patient Progress: no changeOverall Patient Progress: no change    Outcome Evaluation: Pt. transferred from outside hospital for worsening heart failure symptoms. Needing work up for heart transplant/LVAD. No in-home supports in place prior to admit. Will stay hospitalized throughout workup.

## 2024-02-07 ENCOUNTER — APPOINTMENT (OUTPATIENT)
Dept: PHYSICAL THERAPY | Facility: CLINIC | Age: 49
End: 2024-02-07
Attending: INTERNAL MEDICINE
Payer: COMMERCIAL

## 2024-02-07 ENCOUNTER — APPOINTMENT (OUTPATIENT)
Dept: OCCUPATIONAL THERAPY | Facility: CLINIC | Age: 49
End: 2024-02-07
Attending: INTERNAL MEDICINE
Payer: COMMERCIAL

## 2024-02-07 LAB
AMPHETAMINES SERPL QL SCN: NEGATIVE NG/ML
ANION GAP SERPL CALCULATED.3IONS-SCNC: 9 MMOL/L (ref 7–15)
ANNOTATION COMMENT IMP: NORMAL
BACTERIA UR CULT: NORMAL
BARBITURATES SERPL QL SCN: NEGATIVE NG/ML
BASOPHILS # BLD AUTO: 0.1 10E3/UL (ref 0–0.2)
BASOPHILS NFR BLD AUTO: 1 %
BENZODIAZ SERPL QL SCN: NEGATIVE NG/ML
BUN SERPL-MCNC: 18.9 MG/DL (ref 6–20)
BUPRENORPHINE SERPL-MCNC: NEGATIVE NG/ML
CALCIUM SERPL-MCNC: 8.9 MG/DL (ref 8.6–10)
CANNABINOIDS SERPL QL SCN: NEGATIVE NG/ML
CHLORIDE SERPL-SCNC: 105 MMOL/L (ref 98–107)
COCAINE SERPL QL SCN: NEGATIVE NG/ML
CREAT SERPL-MCNC: 0.75 MG/DL (ref 0.51–0.95)
DEPRECATED HCO3 PLAS-SCNC: 22 MMOL/L (ref 22–29)
EGFRCR SERPLBLD CKD-EPI 2021: >90 ML/MIN/1.73M2
EOSINOPHIL # BLD AUTO: 0.1 10E3/UL (ref 0–0.7)
EOSINOPHIL NFR BLD AUTO: 2 %
ERYTHROCYTE [DISTWIDTH] IN BLOOD BY AUTOMATED COUNT: 14 % (ref 10–15)
GLUCOSE BLDC GLUCOMTR-MCNC: 117 MG/DL (ref 70–99)
GLUCOSE BLDC GLUCOMTR-MCNC: 161 MG/DL (ref 70–99)
GLUCOSE BLDC GLUCOMTR-MCNC: 174 MG/DL (ref 70–99)
GLUCOSE BLDC GLUCOMTR-MCNC: 190 MG/DL (ref 70–99)
GLUCOSE BLDC GLUCOMTR-MCNC: 209 MG/DL (ref 70–99)
GLUCOSE BLDC GLUCOMTR-MCNC: 83 MG/DL (ref 70–99)
GLUCOSE BLDC GLUCOMTR-MCNC: 95 MG/DL (ref 70–99)
GLUCOSE BLDC GLUCOMTR-MCNC: 95 MG/DL (ref 70–99)
GLUCOSE SERPL-MCNC: 86 MG/DL (ref 70–99)
HCT VFR BLD AUTO: 38.4 % (ref 35–47)
HGB BLD-MCNC: 12.8 G/DL (ref 11.7–15.7)
IMM GRANULOCYTES # BLD: 0 10E3/UL
IMM GRANULOCYTES NFR BLD: 0 %
LYMPHOCYTES # BLD AUTO: 2.3 10E3/UL (ref 0.8–5.3)
LYMPHOCYTES NFR BLD AUTO: 33 %
MAGNESIUM SERPL-MCNC: 2.1 MG/DL (ref 1.7–2.3)
MCH RBC QN AUTO: 29.7 PG (ref 26.5–33)
MCHC RBC AUTO-ENTMCNC: 33.3 G/DL (ref 31.5–36.5)
MCV RBC AUTO: 89 FL (ref 78–100)
METHADONE SERPL QL SCN: NEGATIVE NG/ML
METHAMPHET SERPL QL: NEGATIVE NG/ML
MONOCYTES # BLD AUTO: 0.6 10E3/UL (ref 0–1.3)
MONOCYTES NFR BLD AUTO: 9 %
NEUTROPHILS # BLD AUTO: 3.8 10E3/UL (ref 1.6–8.3)
NEUTROPHILS NFR BLD AUTO: 55 %
NRBC # BLD AUTO: 0 10E3/UL
NRBC BLD AUTO-RTO: 0 /100
OPIATES SERPL QL SCN: NEGATIVE NG/ML
OXYCODONE SERPL QL: POSITIVE NG/ML
PCP SERPL QL SCN: NEGATIVE NG/ML
PLATELET # BLD AUTO: 277 10E3/UL (ref 150–450)
POTASSIUM SERPL-SCNC: 3.9 MMOL/L (ref 3.4–5.3)
RBC # BLD AUTO: 4.31 10E6/UL (ref 3.8–5.2)
SODIUM SERPL-SCNC: 136 MMOL/L (ref 135–145)
WBC # BLD AUTO: 6.9 10E3/UL (ref 4–11)

## 2024-02-07 PROCEDURE — 99254 IP/OBS CNSLTJ NEW/EST MOD 60: CPT | Performed by: PHYSICIAN ASSISTANT

## 2024-02-07 PROCEDURE — 85025 COMPLETE CBC W/AUTO DIFF WBC: CPT

## 2024-02-07 PROCEDURE — 80048 BASIC METABOLIC PNL TOTAL CA: CPT

## 2024-02-07 PROCEDURE — 120N000003 HC R&B IMCU UMMC

## 2024-02-07 PROCEDURE — 250N000013 HC RX MED GY IP 250 OP 250 PS 637

## 2024-02-07 PROCEDURE — 94618 PULMONARY STRESS TESTING: CPT

## 2024-02-07 PROCEDURE — 97535 SELF CARE MNGMENT TRAINING: CPT | Mod: GO | Performed by: OCCUPATIONAL THERAPIST

## 2024-02-07 PROCEDURE — 99223 1ST HOSP IP/OBS HIGH 75: CPT | Performed by: INTERNAL MEDICINE

## 2024-02-07 PROCEDURE — 99222 1ST HOSP IP/OBS MODERATE 55: CPT | Mod: FS | Performed by: SURGERY

## 2024-02-07 PROCEDURE — 99232 SBSQ HOSP IP/OBS MODERATE 35: CPT | Mod: GC | Performed by: INTERNAL MEDICINE

## 2024-02-07 PROCEDURE — 97530 THERAPEUTIC ACTIVITIES: CPT | Mod: GO | Performed by: OCCUPATIONAL THERAPIST

## 2024-02-07 PROCEDURE — 36415 COLL VENOUS BLD VENIPUNCTURE: CPT

## 2024-02-07 PROCEDURE — 250N000013 HC RX MED GY IP 250 OP 250 PS 637: Performed by: STUDENT IN AN ORGANIZED HEALTH CARE EDUCATION/TRAINING PROGRAM

## 2024-02-07 PROCEDURE — 97116 GAIT TRAINING THERAPY: CPT | Mod: GP

## 2024-02-07 PROCEDURE — 83735 ASSAY OF MAGNESIUM: CPT

## 2024-02-07 RX ORDER — BISACODYL 5 MG
10 TABLET, DELAYED RELEASE (ENTERIC COATED) ORAL ONCE
Status: COMPLETED | OUTPATIENT
Start: 2024-02-08 | End: 2024-02-08

## 2024-02-07 RX ORDER — ESTRADIOL 0.1 MG/G
1 CREAM VAGINAL EVERY OTHER DAY
Status: DISCONTINUED | OUTPATIENT
Start: 2024-02-07 | End: 2024-02-09 | Stop reason: HOSPADM

## 2024-02-07 RX ORDER — BISACODYL 5 MG
10 TABLET, DELAYED RELEASE (ENTERIC COATED) ORAL ONCE
Qty: 2 TABLET | Refills: 0 | Status: COMPLETED | OUTPATIENT
Start: 2024-02-07 | End: 2024-02-07

## 2024-02-07 RX ORDER — ACETAMINOPHEN 325 MG/1
975 TABLET ORAL EVERY 8 HOURS PRN
Status: DISCONTINUED | OUTPATIENT
Start: 2024-02-07 | End: 2024-02-08

## 2024-02-07 RX ORDER — ZOLPIDEM TARTRATE 5 MG/1
5 TABLET ORAL
Status: DISCONTINUED | OUTPATIENT
Start: 2024-02-07 | End: 2024-02-09 | Stop reason: HOSPADM

## 2024-02-07 RX ADMIN — ESTRADIOL 1 G: 0.1 CREAM VAGINAL at 19:53

## 2024-02-07 RX ADMIN — VENLAFAXINE 100 MG: 25 TABLET ORAL at 19:53

## 2024-02-07 RX ADMIN — INSULIN GLARGINE 15 UNITS: 100 INJECTION, SOLUTION SUBCUTANEOUS at 21:58

## 2024-02-07 RX ADMIN — ATORVASTATIN CALCIUM 80 MG: 80 TABLET, FILM COATED ORAL at 07:49

## 2024-02-07 RX ADMIN — OXYCODONE HYDROCHLORIDE 5 MG: 5 TABLET ORAL at 13:22

## 2024-02-07 RX ADMIN — OXYCODONE HYDROCHLORIDE 5 MG: 5 TABLET ORAL at 20:28

## 2024-02-07 RX ADMIN — TICAGRELOR 90 MG: 90 TABLET ORAL at 07:49

## 2024-02-07 RX ADMIN — METOPROLOL SUCCINATE 25 MG: 25 TABLET, EXTENDED RELEASE ORAL at 07:48

## 2024-02-07 RX ADMIN — POLYETHYLENE GLYCOL 3350 17 G: 17 POWDER, FOR SOLUTION ORAL at 07:48

## 2024-02-07 RX ADMIN — ZOLPIDEM TARTRATE 5 MG: 5 TABLET, FILM COATED ORAL at 02:47

## 2024-02-07 RX ADMIN — OXYCODONE HYDROCHLORIDE 5 MG: 5 TABLET ORAL at 01:57

## 2024-02-07 RX ADMIN — TICAGRELOR 90 MG: 90 TABLET ORAL at 19:53

## 2024-02-07 RX ADMIN — BUSPIRONE HYDROCHLORIDE 10 MG: 5 TABLET ORAL at 19:53

## 2024-02-07 RX ADMIN — OXYCODONE HYDROCHLORIDE 5 MG: 5 TABLET ORAL at 07:48

## 2024-02-07 RX ADMIN — VENLAFAXINE 100 MG: 25 TABLET ORAL at 07:49

## 2024-02-07 RX ADMIN — NICOTINE 1 PATCH: 21 PATCH, EXTENDED RELEASE TRANSDERMAL at 07:54

## 2024-02-07 RX ADMIN — DAPAGLIFLOZIN 10 MG: 10 TABLET, FILM COATED ORAL at 07:48

## 2024-02-07 RX ADMIN — Medication 1 SPRAY: at 00:03

## 2024-02-07 RX ADMIN — RANOLAZINE 500 MG: 500 TABLET, FILM COATED, EXTENDED RELEASE ORAL at 19:53

## 2024-02-07 RX ADMIN — RANOLAZINE 500 MG: 500 TABLET, FILM COATED, EXTENDED RELEASE ORAL at 07:49

## 2024-02-07 RX ADMIN — ISOSORBIDE DINITRATE 10 MG: 10 TABLET ORAL at 18:31

## 2024-02-07 RX ADMIN — FENOFIBRATE 160 MG: 160 TABLET ORAL at 07:48

## 2024-02-07 RX ADMIN — FUROSEMIDE 20 MG: 20 TABLET ORAL at 07:49

## 2024-02-07 RX ADMIN — BUSPIRONE HYDROCHLORIDE 10 MG: 5 TABLET ORAL at 07:48

## 2024-02-07 RX ADMIN — INSULIN ASPART 1 UNITS: 100 INJECTION, SOLUTION INTRAVENOUS; SUBCUTANEOUS at 13:22

## 2024-02-07 RX ADMIN — SPIRONOLACTONE 12.5 MG: 25 TABLET, FILM COATED ORAL at 07:49

## 2024-02-07 RX ADMIN — BISACODYL 10 MG: 5 TABLET, COATED ORAL at 18:31

## 2024-02-07 RX ADMIN — ASPIRIN 81 MG CHEWABLE TABLET 81 MG: 81 TABLET CHEWABLE at 07:49

## 2024-02-07 RX ADMIN — ZOLPIDEM TARTRATE 5 MG: 5 TABLET, FILM COATED ORAL at 22:08

## 2024-02-07 RX ADMIN — POLYETHYLENE GLYCOL 3350 17 G: 17 POWDER, FOR SOLUTION ORAL at 19:53

## 2024-02-07 RX ADMIN — SACUBITRIL AND VALSARTAN 1 TABLET: 97; 103 TABLET, FILM COATED ORAL at 19:53

## 2024-02-07 RX ADMIN — ISOSORBIDE DINITRATE 10 MG: 10 TABLET ORAL at 07:48

## 2024-02-07 RX ADMIN — OXYCODONE HYDROCHLORIDE 5 MG: 5 TABLET ORAL at 16:33

## 2024-02-07 RX ADMIN — ISOSORBIDE DINITRATE 10 MG: 10 TABLET ORAL at 13:22

## 2024-02-07 ASSESSMENT — ACTIVITIES OF DAILY LIVING (ADL)
ADLS_ACUITY_SCORE: 30

## 2024-02-07 NOTE — CONSULTS
Dental Consult,  [facility]     Patient:   Cailin Burns    Date of birth 1975   MRN:  [id number]   Date of Visit:   02/07/2024   Date of Admission 2/4/2024   Consult Requested by David Valencia MD     I did not see the patient in person, I reviewed the [facility]'s consult notes on 02/06 and dental CT on 02/05.                                      Assessment and Recommendations:   ASSESSMENT:  Cailin Burns is a 48 year old female with a past medical history pertinent for a history of HFpEF (27%) s/p ICD, CAD s/p PCI (PCI of RCA  and MOHAN x1 to mLAD and MOHAN x1 to mLCx on  01/2023), DM2, tobacco use who was admitted to OS for ACS r/o and transferred to [facility] for LVAD/heart transplant work up. . Diagnosis upon admission Heart failure (H) [I50.9].   Dental exam pertinent for: pre-VAD/transplant workup.  The patient reports history of periodontal disease. She does see dentist every 6 months and had recent routine visit in November of 2023.She does not have any current dental pain, and has reported to have her periodontal disease under control.  Dental CT reveals generalized bone loss and periodontitis. PARL on #29.      RECOMMENDATION:  Due to clinical and radiographic findings, further assessment is indicated. A second examination with further imaging and any definitive treatment( re-evaluation of #29 and possible extraction) will occur at the [facility] dental clinic in the [facility] for Cailin Burns. The medical team is responsible for establishing transportation for the patient and sending any personnel they deem necessary to monitor the patient.    Optimize patient on the day of appointment.   Continue good oral hygiene and Chlorhexidine gluconate 0.12% rinse during hospital stay. _____________________________________________________________________  Thank you for allowing us to participate in the care of this patient,  Direct any further questions to:      Gelacio Maria MD DMD, Fellow  Pager: [phone]    Patient discussed with:   Donovan Gil DDS  , [facility]     Clinic information:   [facility]  [facility]  [address] [place] [id number]  Phone:[phone]  [place] phone: [phone]                                             Reason for Consult:   Referring MD & Reason for Visit: I was asked by David Valencia MD, to see Cailin Burns for a VAD clearance.                                               History of Present Illness:   This patient is a 48 year old female with a history of HFpEF (27%) s/p ICD, CAD s/p PCI (PCI of RCA  and MOHAN x1 to mLAD and MOHAN x1 to mLCx on  01/2023), DM2, tobacco use who was admitted to OSH for ACS r/o and transferred to [facility] for LVAD/heart transplant work up. .  Dental and oropharyngeal history is pertinent for pre-VAD/transplant workup.  The patient reports history of periodontal disease. She does see dentist every 6 months and had recent routine visit in November of 2023.She does not have any current dental pain, and has reported to have her periodontal disease under control.                                                   Clinical Examination    Please see complete exam from [facility]'s consult notes on 02/06.  Vitals were reviewed  Temp: 97.6  F (36.4  C) Temp src: Oral BP: 127/65 Pulse: 64   Resp: 16 SpO2: 96 % O2 Device: None (Room air)      See H&P for complete ROS.                                                          Imaging     Dental CT taken on 02/05.  Potential periradicular and/or periapical findings on: #29-lower right 2nd premolar    Condyles seated in fossa bilaterally, maxillary sinuses clear bilaterally.  No osseous pathology seen.   Recent Results (from the past 48 hour(s))   Cardiac Device Check - Inpatient   Result Value     Date Time Interrogation Session [id number]    Implantable Pulse Generator  Medtronic    Implantable Pulse Generator Model [id number] Cobalt XT VR MRI    Implantable Pulse Generator Serial Number [id number]    Type Interrogation Session In Clinic    Clinic Name [facility]    Implantable Pulse Generator Type Defibrillator    Implantable Pulse Generator Implant Date 20230511    Implantable Lead  Medtronic    Implantable Lead Model 6935M Sprint Quattro Secure S MRI SureScan    Implantable Lead Serial Number [id number]    Implantable Lead Implant Date 20230511    Implantable Lead Polarity Type Tripolar Lead    Implantable Lead Location Detail 1 SEPTUM    Implantable Lead Special Function 55CM    Implantable Lead Location Right Ventricle    Implantable Lead Connection Status Connected    Tong Setting Mode (NBG Code) VVI    Tong Setting Lower Rate Limit 40    Tong Setting Hysterisis Rate Off    Lead Channel Setting Sensing Polarity Bipolar    Lead Channel Setting Sensing Anode Location Right Ventricle    Lead Channel Setting Sensing Anode Terminal Ring    Lead Channel Setting Sensing Cathode Location Right Ventricle    Lead Channel Setting Sensing Cathode Terminal Tip    Lead Channel Setting Sensing Sensitivity 0.3    Lead Channel Setting Pacing Polarity Bipolar    Lead Channel Setting Pacing Anode Location Right Ventricle    Lead Channel Setting Pacing Anode Terminal Ring    Lead Channel Setting Sensing Cathode Location Right Ventricle    Lead Channel Setting Sensing Cathode Terminal Tip    Lead Channel Setting Pacing Pulse Width 0.4    Lead Channel Setting Pacing Amplitude 2.0    Lead Channel Setting Pacing Capture Mode Adaptive    Zone Setting Type Category VF    Zone Setting Vendor Type Category VF    Zone Setting Status Active    Zone Setting Detection Interval 270    Zone Setting Detection Beats Numerator 30    Zone Setting Detection Beats Denominator 40    Zone  Setting Type Category VT    Zone Setting Vendor Type Category FastVT    Zone Setting Status Inactive    Zone Setting Type Category VT    Zone Setting Vendor Type Category VT    Zone Setting Status Active    Zone Setting Detection Interval 320    Zone Setting Detection Beats Numerator 16    Zone Setting Detection Beats Denominator 16    Zone Setting Type Category VT    Zone Setting Vendor Type Category MonVT    Zone Setting Status Monitor    Zone Setting Detection Interval 400    Zone Setting Detection Beats Numerator 32    Zone Setting Detection Beats Denominator 32    Lead Channel Impedance Value 380    Lead Channel Impedance Value 456    Lead Channel Sensing Intrinsic Amplitude 30.0    Lead Channel Pacing Threshold Amplitude 0.625    Lead Channel Pacing Threshold Pulse Width 0.4    Lead Channel Pacing Threshold Amplitude 0.75    Lead Channel Pacing Threshold Pulse Width 0.4    Battery Date Time of Measurements 58431323857439    Battery RRT Trigger 2.8 V    Battery Remaining Longevity 159    Battery Voltage 3.02    Capacitor Charge Type Shock    Capacitor Last Charge Date Time 00577645073352    Capacitor Charge Time 3.8    Capacitor Charge Energy 18.0    Tong Statistic Date Time Start 23705714781410    Tong Statistic Date Time End 81967886242152    Tong Statistic RA Percent Paced Invalid Value    Tong Statistic RV Percent Paced 0.01    CRT Statistic Date Time Start 17303055866106    CRT Statistic Date Time End 99840722254624    Atrial Tachy Statistic Date Time Start 01763356726601    Atrial Tachy Statistic Date Time End 56400583074727    Atrial Tachy Statistic AT/AF [place] Percent 0    Therapy Statistic Recent Shocks Delivered 0    Therapy Statistic Recent Shocks Aborted 0    Therapy Statistic Recent ATP Delivered 0    Therapy Statistic Recent Date Time Start 11699735279981    Therapy Statistic Recent Date Time End 47012091208594    Therapy Statistic Total Shocks Delivered 0    Therapy Statistic Total Shocks  Aborted 0    Therapy Statistic Total ATP Delivered 0    Therapy Statistic Total  Date Time Start 03391760824380    Therapy Statistic Total  Date Time End 49055193560465    Episode Statistic Recent Count 0    Episode Statistic Type Category Patient Activated    Episode Statistic Recent Count 0    Episode Statistic Type Category SVT    Episode Statistic Recent Count 0    Episode Statistic Type Category VT    Episode Statistic Recent Count 0    Episode Statistic Type Category VF    Episode Statistic Recent Count 0    Episode Statistic Type Category VT    Episode Statistic Recent Count 0    Episode Statistic Type Category VT    Episode Statistic Recent Count 0    Episode Statistic Type Category VT    Episode Statistic Recent Date Time Start 65548668826914    Episode Statistic Recent Date Time End 39921594942813    Episode Statistic Recent Date Time Start 02065751609017    Episode Statistic Recent Date Time End 59429912960602    Episode Statistic Recent Date Time Start 55289715644798    Episode Statistic Recent Date Time End 58445662260068    Episode Statistic Recent Date Time Start 46386170273437    Episode Statistic Recent Date Time End 56843728118272    Episode Statistic Recent Date Time Start 80450436076561    Episode Statistic Recent Date Time End 14016582655625    Episode Statistic Recent Date Time Start 82537921281972    Episode Statistic Recent Date Time End 77438569913826    Episode Statistic Recent Date Time Start 70236747351172    Episode Statistic Recent Date Time End 77411648198956    Episode Statistic Total Count 0    Episode Statistic Type Category Patient Activated    Episode Statistic Total Count 0    Episode Statistic Type Category SVT    Episode Statistic Total Count 0    Episode Statistic Type Category VT    Episode Statistic Total Count 0    Episode Statistic Type Category VF    Episode Statistic Total Count 0    Episode Statistic Type Category VT    Episode Statistic Total Count 0    Episode  Statistic Type Category VT    Episode Statistic Total Count 0    Episode Statistic Type Category VT    Episode Statistic Total Date Time Start 41024063252954    Episode Statistic Total Date Time End 20240205143600    Episode Statistic Total Date Time Start 20230511164113    Episode Statistic Total Date Time End 20240205143600    Episode Statistic Total Date Time Start 20230511164113    Episode Statistic Total Date Time End 20240205143600    Episode Statistic Total Date Time Start 20230511164113    Episode Statistic Total Date Time End 20240205143600    Episode Statistic Total Date Time Start 20230511164113    Episode Statistic Total Date Time End 20240205143600    Episode Statistic Total Date Time Start 20230511164113    Episode Statistic Total Date Time End 20240205143600    Episode Statistic Total Date Time Start 20230511164113    Episode Statistic Total Date Time End 20240205143600    Narrative    Patient seen on 6B for evaluation and iterative programming of their ICD   per MD orders.     Device: Medtronic [id number] [place] XT VR MRI  Normal device function  Mode: VVI 40 bpm  : <0.1%  Intrinsic rhythm: VS 70 bpm  Thoracic Impedance: Below reference line suggesting possible intrathoracic   fluid accumulation.  Short V-V intervals: 0  Lead Trends Appear Stable: Yes  Estimated battery longevity to RRT = 13.3 years  Atrial Arrhythmia: 0  Ventricular Arrhythmia: 0  Setting Changes: None  VENU Sanchez, RN    Single lead ICD    I have reviewed and interpreted the device interrogation, settings,   programming and nurse's summary. The device is functioning within normal   device parameters. I agree with the current findings, assessment and plan.   CT Head w/o Contrast    Narrative    CT HEAD W/O CONTRAST 2/5/2024 5:44 PM    Provided History: LVAD workup  ICD-10:    Comparison: None     Technique: Using multidetector thin collimation helical acquisition  technique, axial, coronal and sagittal CT images from the skull base  to  the vertex were obtained without intravenous contrast.     Findings:  Mild asymmetric appearance of the right lateral ventricle  likely constitutional in nature. Otherwise, the ventricles are  proportionate to the cerebral sulci. No evidence of hydrocephalus.  No intracranial hemorrhage, mass effect, or midline shift. . The gray  to white matter differentiation of the cerebral hemispheres is  preserved. The basal cisterns are patent.    The visualized paranasal sinuses are clear. The mastoid air cells are  clear.       Impression    Impression: No acute intracranial pathology. Minimal asymmetry of  right lateral ventricle, likely variational.    SHARONA ALEXANDER MD         SYSTEM ID:  [id number]   CT Dental wo Contrast    Narrative    CT DENTAL WO CONTRAST 2/5/2024 5:49 PM    History:  LVAD eval workup    Comparison:    TECHNIQUE: 3-D reconstruction by the technologists, with curved  multiplanar reformat of thin section imaging through the mandible and  maxilla obtained without intravenous contrast.    FINDINGS:  Right maxillary:  Third molar (1): absent  Second molar (2): absent  First molar (3): absent  Second premolar (4): absent  First premolar (5): Mild periapical lucency without cavity  Canine (6):  Small filling material.  Lateral incisor (7): Scattered filling material  Central incisor (8):Scattered filling material    Left maxillary:  Central incisor (9): Scattered filling material  Lateral incisor (10): Scattered filling material  Canine (11): Unremarkable  First premolar (12): Dental prosthesis. Minimal periapical lucency.  Second premolar (13): normal  First molar (14): Absent  Second molar (15): Absent  Third molar (16): Absent    Left mandibular:  Third molar (17):Absent  Second molar (18):Absent  First molar (19):Absent  Second premolar (20): Normal  First premolar (21): Normal  Canine (22):  Small defect in the anterior medial aspect   Lateral incisor (23):Normal  Central incisor (24):Normal    Right  mandibular:  Central incisor (25):Normal  Lateral incisor (26):Normal  Canine (27): Normal  First premolar (28):Normal  Second premolar (29): Small dental prosthesis with periapical lucency.  First molar (30): Superimposed streak artifacts. Likely normal in  nature.  Second molar (31): Absent  Third molar (32): Excellent    No significant soft tissue swelling or mass. Normal facial bone  alignment. No bony erosion.     Visualized portions of the paranasal sinuses are clear. Normal  temporomandibular joints.      Impression    IMPRESSION: Multiple tooth with by dental prosthesis and filling  material. Multiple absent teeth. Periapical lucency of unknown  significance in the tooth 12 and 29, may represent periapical  periodontitis. No large soft tissue abscess in the face.    SHARONA ALEXANDER MD         SYSTEM ID:  [id number]   CT Chest Abdomen Pelvis w/o Contrast    Narrative    EXAMINATION: CT CHEST ABDOMEN PELVIS W/O CONTRAST, 2/5/2024 5:50 PM    INDICATION: LVAD workup    COMPARISON STUDY: CTA chest 2/5/2024. Myocardial viability study  9/28/2022.    TECHNIQUE: CT scan of the chest, abdomen, and pelvis was performed on  multidetector CT scanner using volumetric acquisition technique and  images were reconstructed in multiple planes with variable thickness  and reviewed on dedicated workstations. Noncontrast exam.    CT scan radiation dose is optimized to minimum requisite dose using  automated dose modulation techniques.    FINDINGS:    Lines, tubes, devices: Left chest wall implantable cardiac  defibrillator and leads are remain in stable position. Coronary artery  stents.    CHEST:    Lungs: The central tracheobronchial tree is patent. No areas of  bronchiectasis or architectural distortion. No consolidation. Mild  atelectasis versus scarring within the right middle lobe and bilateral  lower lobes. Pulmonary emphysema.    There are a few sub-4 mm solid pulmonary nodules including a 3 mm  solid pulmonary nodule in the  left upper lobe (series 3 image 134) and  an adjacent 3 mm solid perivascular left upper lobe pulmonary nodule  (series 3, image 136), stable compared to myocardial viability scan  9/28/2022 and a 2.9 mm left lower lobe pulmonary nodule, which was not  visualized on prior exams..    No pleural effusion or pneumothorax.     Mediastinum: The visualized thyroid is unremarkable.Heart size is  within normal limits. Coronary stents. No pericardial effusion. The  thoracic aorta and main pulmonary artery are within normal limits.  Normal variant common origin of the right brachiocephalic and left  common carotid arteries. No abnormal thoracic lymph nodes by size  criteria. Partially calcified mediastinal lymph nodes. Mild diffuse  esophageal wall thickening. Small esophageal hernia.      ABDOMEN/PELVIS:    Hepatobiliary: Normal noncontrast appearance of the liver. No  calcified gallstones. No intra or extrahepatic biliary dilation.     Pancreas: Normal noncontrast appearance of the pancreas. No mass or  pancreatic ductal dilation.    Adrenal glands: No mass or nodules    Spleen: Normal.    Kidneys: Suspected cyst within the right mid kidney although  evaluation limited due to lack of intravenous contrast. Nonobstructing  3 mm calculus within the left mid kidney (4/174; 6/65). Or  hydronephrosis.    Gastrointestinal tract: Mild gastric wall thickening, likely in part  due to underdistention. No small or large bowel dilation. Moderate  colonic stool burden. Appendix was not identified.    Mesentery/peritoneum/retroperitoneum: No mass. No free fluid or air.    Lymph nodes: No significant lymphadenopathy by size criteria.    Vasculature: Patency of vessels cannot be evaluated on this  noncontrast exam.  Mild aortobiiliac atherosclerotic disease. The  abdominal aorta is nonaneurysmal.    Pelvis: Urinary bladder is normal. Uterus and adnexa appear within  normal limits.    Soft tissues and osseous structures: No aggressive or  acute osseous  lesion.  Mild degenerative changes throughout the visualized spine.  Small fat-containing umbilical hernia. Small fat-containing left  inguinal hernia. Several soft tissue nodules within the subcutaneous  anterior abdominal wall likely related to medication injection sites.  Additional scattered subcutaneous nodularity within the ventral  abdominal wall.      Impression    IMPRESSION:  1. No acute CT findings in the chest, abdomen, or pelvis.  2. Few sub-4 mm solid pulmonary nodules in the left upper lobe and  left lower lobe, some of which appear stable when compared to prior CT  09/28/2022. Recommend follow-up per Fleischner Society guidelines.  3. Nonobstructing 3 mm calculus within the left mid kidney.  4. Additional incidental and chronic findings detailed in the body of  the report.      I have personally reviewed the examination and initial interpretation  and I agree with the findings.    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     COMPARISONS: Chest x-ray 2/4/2024    REFERRING PROVIDER: NICOLAS RANGEL    TECHNIQUE: Unenhanced CT performed through the chest, abdomen, and  pelvis as part of a separate study. After the uneventful  administration of intravenous contrast, CTA performed through the  chest. Coronal and sagittal reconstructions were produced. Lung MIP  produced.    3D and multiplanar reconstructions were produced and reviewed.    CONTRAST: 88 mL Isovue 370    DOSE (DLP): 608 mGy*cm - including the non contrast CT.    FINDINGS: CTA: Right innominate and left carotid arteries share a  broad based common origin.     Mural or periarterial thickening along the right innominate  proximal  segment.    Right innominate string-sign focal near occlusion.    Left carotid origin 50% diameter stenosis.     Right innominate, subclavian, and carotid, left carotid, and left  subclavian arteries otherwise patent. Bilateral vertebral arteries  patent.    Aortic measurements:       Sinuses of Valsalva: 31 mm       Sinotubular junction: 29 mm       Ascendin mm       Arch: 24 mm       Proximal descendin mm       Mid descendin mm       Diaphragm: 22 mm    Left subclavian AICD lead. Suboptimal bolus timing to evaluate left  subclavian venous patency. Non occlusive filling defect around the  lead through the superior vena cava.    Celiac origin 50% diameter stenosis. Celiac artery otherwise patent.  Accessory left hepatic artery originates from the left gastric artery.    Superior mesenteric and visualized renal arteries patent.    CT: 4 mm left upper lobe ground glass opacity.    Calcified mediastinal lymph nodes.    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     FRANCIS Rodarte was contacted by Dr. Shearer at 2024 8:45 PM and  verbalized understanding of the urgent finding.       Impression    IMPRESSION:  1. Right innominate and left carotid arteries share a common broad  based origin.    2. Proximal right innominate artery mural thickening and focal  string-sign near occlusion. Differential includes arteritis,  dissection, and atherosclerotic disease.    3. Left carotid origin 50% diameter stenosis.    4. Non occlusive filling defect, thrombus or fibrin, around the AICD  lead through the superior vena cava.    5. Proximal celiac artery 50% diameter stenosis.    I have personally reviewed the examination and initial interpretation  and I agree with the findings.    NEVA PFEIFFER MD         SYSTEM ID:  [id number]   US Abdomen Complete    Narrative    EXAMINATION: US ABDOMEN  COMPLETE  2/6/2024 8:56 AM      CLINICAL HISTORY: Heart Failure pre Ventricular Assist Device (VAD)  planning. Evaluate hepatic texture, renal size and aortic diameter    COMPARISON: CT 2/5/2024        FINDINGS:    The liver demonstrates increased echogenicity measuring approximately  18 cm in length without focal masses or lesions. There is no  intrahepatic biliary ductal dilatation. The common bile duct measures  up to  7 mm. Distal CBD is obscured. The gallbladder is normal,  without gallstones, wall thickening, or pericholecystic fluid.    The spleen measures maximally 9.5 cm and is normal in appearance.  Pancreas is partially visualized with the visualized portions of the  pancreas appearing unremarkable    The visualized upper abdominal aorta and inferior vena cava are  normal. The proximal aorta measures 2.4 cm.    The kidneys are normal in position and echogenicity. The right kidney  measures 12.2 cm and the left kidney measures 11.8 cm. At the mid pole  of the right kidney and there is a anechoic avascular cyst measuring  approximately 1.0 x 1.3 x 1.3 cm. No hydronephrosis.      Impression    IMPRESSION:     1. Increased hepatic echogenicity which may be seen with parenchymal  disease including steatosis.  2. Mild hepatomegaly.  3. Borderline visualized common bile duct, measuring up to 7 mm. No  obstructive lesion demonstrated in the visualized CBD; distal CBD is  obscured.  4. Renal size within normal limits. Simple appearing cyst in the right  kidney measuring up to 1.3 cm   5. Proximal abdominal aorta measures 2.4 cm in anteroposterior  diameter    I have personally reviewed the examination and initial interpretation  and I agree with the findings.    KEENAN ORTEGA MD         SYSTEM ID:  [id number]   US MANAN Doppler No Exercise    Narrative    Examinations 2/6/2024 8:56 AM:  1. MANAN  2. Ultrasound lower extremity arterial duplex bilateral    CLINICAL HISTORY: Heart Failure pre Ventricular Assist Device  (VAD)  planning.     COMPARISONS: None available.    REFERRING PROVIDER: QUINTON HICKS    TECHNIQUE: Bilateral MANAN obtained.    Bilateral leg arteries evaluated with grayscale, color Doppler, and  spectral pulsed wave Doppler ultrasound.    FINDINGS:  RIGHT:       Brachial: not taken due reported clot        Ankle (PT): 61 mmHg       Ankle (DP): 49 mmHg         MANAN: 0.67         Common femoral artery: 219/21 cm/s, triphasic       Profundus femoral artery: 163/0 cm/s, triphasic         Superficial femoral artery, origin: 124/6 cm/s, triphasic       Superficial femoral artery, proximal thigh: 156/14 cm/s,  triphasic       Superficial femoral artery, mid thigh, above collateral: 64/10  cm/s, arterial monophasic       Superficial femoral artery, mid thigh, below collateral: occluded       Superficial femoral artery, distal thigh: 35/7 cm/s, arterial  monophasic       Superficial femoral artery, distal thigh: 165/26 cm/s, arterial  monophasic       Superficial femoral artery, distal thigh: 64/13 cm/s, arterial  monophasic         Popliteal artery, proximal: 122/20 cm/s, arterial monophasic       Popliteal artery, distal: 82/21 cm/s, arterial monophasic         Posterior tibial artery, ankle: 42/15 cm/s, arterial monophasic       Anterior tibial artery, ankle: 47/12 cm/s, arterial monophasic    LEFT:       Brachial: 91 mmHg       Ankle (PT): 72 mmHg       Ankle (DP): 71 mmHg         MANAN: 0.79         Common femoral artery: 173/20 cm/s, arterial monophasic       Profundus femoral artery: 121/0 cm/s, triphasic         Superficial femoral artery, origin: 199/24 cm/s, arterial  monophasic       Superficial femoral artery, mid thigh: 199/29 cm/s, arterial  monophasic       Superficial femoral artery, distal thigh: 153/0 cm/s, biphasic         Popliteal artery, proximal: 172/32 cm/s, arterial monophasic       Popliteal artery, distal: 131/12 cm/s, arterial monophasic         Posterior tibial artery, ankle: 57/13 cm/s, arterial  monophasic       Anterior tibial artery, ankle: 131/26 cm/s, arterial monophasic      Impression    IMPRESSION:  1. RIGHT:       A. Resting MANAN is ABNORMAL, 0.67.       B. Superficial femoral artery occluded in the mid thigh and  reconstituted in the distal thigh.    2. LEFT:       A. Resting MANAN is ABNORMAL, 0.79.       B. Negative duplex arterial ultrasound.    Guidelines:    MANAN Diagnostic Criteria (Based on criteria published in Circulation  2011; 124: 3233-9142):    > 1.4: Non compressible    1.00 - 1.40: Normal    0.91 - 0.99: Borderline    At or below 0.90: Abnormal    I have personally reviewed the examination and initial interpretation  and I agree with the findings.    NEVA PFEIFFER MD         SYSTEM ID:  [id number]   US Lower Extremity Arterial Duplex Bilateral    Narrative    Examinations 2/6/2024 8:56 AM:  1. MANAN  2. Ultrasound lower extremity arterial duplex bilateral    CLINICAL HISTORY: Heart Failure pre Ventricular Assist Device (VAD)  planning.     COMPARISONS: None available.    REFERRING PROVIDER: QUINTON HICKS    TECHNIQUE: Bilateral MANAN obtained.    Bilateral leg arteries evaluated with grayscale, color Doppler, and  spectral pulsed wave Doppler ultrasound.    FINDINGS:  RIGHT:       Brachial: not taken due reported clot        Ankle (PT): 61 mmHg       Ankle (DP): 49 mmHg         MANAN: 0.67         Common femoral artery: 219/21 cm/s, triphasic       Profundus femoral artery: 163/0 cm/s, triphasic         Superficial femoral artery, origin: 124/6 cm/s, triphasic       Superficial femoral artery, proximal thigh: 156/14 cm/s,  triphasic       Superficial femoral artery, mid thigh, above collateral: 64/10  cm/s, arterial monophasic       Superficial femoral artery, mid thigh, below collateral: occluded       Superficial femoral artery, distal thigh: 35/7 cm/s, arterial  monophasic       Superficial femoral artery, distal thigh: 165/26 cm/s, arterial  monophasic       Superficial femoral artery, distal  thigh: 64/13 cm/s, arterial  monophasic         Popliteal artery, proximal: 122/20 cm/s, arterial monophasic       Popliteal artery, distal: 82/21 cm/s, arterial monophasic         Posterior tibial artery, ankle: 42/15 cm/s, arterial monophasic       Anterior tibial artery, ankle: 47/12 cm/s, arterial monophasic    LEFT:       Brachial: 91 mmHg       Ankle (PT): 72 mmHg       Ankle (DP): 71 mmHg         MANAN: 0.79         Common femoral artery: 173/20 cm/s, arterial monophasic       Profundus femoral artery: 121/0 cm/s, triphasic         Superficial femoral artery, origin: 199/24 cm/s, arterial  monophasic       Superficial femoral artery, mid thigh: 199/29 cm/s, arterial  monophasic       Superficial femoral artery, distal thigh: 153/0 cm/s, biphasic         Popliteal artery, proximal: 172/32 cm/s, arterial monophasic       Popliteal artery, distal: 131/12 cm/s, arterial monophasic         Posterior tibial artery, ankle: 57/13 cm/s, arterial monophasic       Anterior tibial artery, ankle: 131/26 cm/s, arterial monophasic      Impression    IMPRESSION:  1. RIGHT:       A. Resting MANAN is ABNORMAL, 0.67.       B. Superficial femoral artery occluded in the mid thigh and  reconstituted in the distal thigh.    2. LEFT:       A. Resting MANAN is ABNORMAL, 0.79.       B. Negative duplex arterial ultrasound.    Guidelines:    MANAN Diagnostic Criteria (Based on criteria published in Circulation  2011; 124: 4382-1468):    > 1.4: Non compressible    1.00 - 1.40: Normal    0.91 - 0.99: Borderline    At or below 0.90: Abnormal    I have personally reviewed the examination and initial interpretation  and I agree with the findings.    NEVA PFEIFFER MD         SYSTEM ID:  [id number]   Cardiac Device Check - Inpatient   Result Value    Date Time Interrogation Session [id number]    Implantable Pulse Generator  Medtronic    Implantable Pulse Generator Model [id number] Cobalt XT VR MRI    Implantable Pulse Generator Serial Number  [id number]    Type Interrogation Session In Clinic    Clinic Name [facility]    Implantable Pulse Generator Type Defibrillator    Implantable Pulse Generator Implant Date 20230511    Implantable Lead  Medtronic    Implantable Lead Model 6935M Sprint Quattro Secure S MRI SureScan    Implantable Lead Serial Number [id number]    Implantable Lead Implant Date 20230511    Implantable Lead Polarity Type Tripolar Lead    Implantable Lead Location Detail 1 SEPTUM    Implantable Lead Special Function 55CM    Implantable Lead Location Right Ventricle    Implantable Lead Connection Status Connected    Tong Setting Mode (NBG Code) VVI    Tong Setting Lower Rate Limit 40    Tong Setting Hysterisis Rate Off    Lead Channel Setting Sensing Polarity Bipolar    Lead Channel Setting Sensing Anode Location Right Ventricle    Lead Channel Setting Sensing Anode Terminal Ring    Lead Channel Setting Sensing Cathode Location Right Ventricle    Lead Channel Setting Sensing Cathode Terminal Tip    Lead Channel Setting Sensing Sensitivity 0.3    Lead Channel Setting Pacing Polarity Bipolar    Lead Channel Setting Pacing Anode Location Right Ventricle    Lead Channel Setting Pacing Anode Terminal Ring    Lead Channel Setting Sensing Cathode Location Right Ventricle    Lead Channel Setting Sensing Cathode Terminal Tip    Lead Channel Setting Pacing Pulse Width 0.4    Lead Channel Setting Pacing Amplitude 2.0    Lead Channel Setting Pacing Capture Mode Adaptive    Zone Setting Type Category VF    Zone Setting Vendor Type Category VF    Zone Setting Status Active    Zone Setting Detection Interval 270    Zone Setting Detection Beats Numerator 30    Zone Setting Detection Beats Denominator 40    Zone Setting Type Category VT    Zone Setting Vendor Type Category FastVT    Zone Setting Status Inactive    Zone Setting Type Category VT    Zone Setting Vendor Type Category VT    Zone Setting Status Active    Zone  Setting Detection Interval 320    Zone Setting Detection Beats Numerator 16    Zone Setting Detection Beats Denominator 16    Zone Setting Type Category VT    Zone Setting Vendor Type Category MonVT    Zone Setting Status Monitor    Zone Setting Detection Interval 400    Zone Setting Detection Beats Numerator 32    Zone Setting Detection Beats Denominator 32    Lead Channel Impedance Value 380    Lead Channel Impedance Value 456    Lead Channel Sensing Intrinsic Amplitude 23.4    Lead Channel Pacing Threshold Amplitude 0.625    Lead Channel Pacing Threshold Pulse Width 0.4    Battery Date Time of Measurements 71931615527977    Battery Status Middle of Service    Battery RRT Trigger 2.8 V    Battery Remaining Longevity 159    Battery Voltage 3.02    Capacitor Charge Type Shock    Capacitor Last Charge Date Time 28218484572143    Capacitor Charge Time 3.8    Capacitor Charge Energy 18.0    Tong Statistic Date Time Start 26277894874117    Tong Statistic Date Time End 15422096213417    Tong Statistic RV Percent Paced 0.01    CRT Statistic Date Time Start 60415732608887    CRT Statistic Date Time End 17293018026059    Atrial Tachy Statistic Date Time Start 92038175506209    Atrial Tachy Statistic Date Time End 21658270086495    Atrial Tachy Statistic AT/AF [place] Percent 0    Therapy Statistic Recent Shocks Delivered 0    Therapy Statistic Recent Shocks Aborted 0    Therapy Statistic Recent ATP Delivered 0    Therapy Statistic Recent Date Time Start 06448742050036    Therapy Statistic Recent Date Time End 38060935721830    Therapy Statistic Total Shocks Delivered 0    Therapy Statistic Total Shocks Aborted 0    Therapy Statistic Total ATP Delivered 0    Therapy Statistic Total  Date Time Start 86418195207918    Therapy Statistic Total  Date Time End 35086388548954    Episode Statistic Recent Count 0    Episode Statistic Type Category Patient Activated    Episode Statistic Recent Count 0    Episode Statistic Type  Category SVT    Episode Statistic Recent Count 0    Episode Statistic Type Category VT    Episode Statistic Recent Count 0    Episode Statistic Type Category VF    Episode Statistic Recent Count 0    Episode Statistic Type Category VT    Episode Statistic Recent Count 0    Episode Statistic Type Category VT    Episode Statistic Recent Count 0    Episode Statistic Type Category VT    Episode Statistic Recent Date Time Start 67664927519960    Episode Statistic Recent Date Time End 26368998247203    Episode Statistic Recent Date Time Start 04745530496665    Episode Statistic Recent Date Time End 49883300406523    Episode Statistic Recent Date Time Start 34440622094050    Episode Statistic Recent Date Time End 21758219069989    Episode Statistic Recent Date Time Start 56159454129555    Episode Statistic Recent Date Time End 48993597880388    Episode Statistic Recent Date Time Start 35648523437067    Episode Statistic Recent Date Time End 01320626619174    Episode Statistic Recent Date Time Start 04896501337683    Episode Statistic Recent Date Time End 20416725603627    Episode Statistic Recent Date Time Start 71657057265598    Episode Statistic Recent Date Time End 56510211081587    Episode Statistic Total Count 0    Episode Statistic Type Category Patient Activated    Episode Statistic Total Count 0    Episode Statistic Type Category SVT    Episode Statistic Total Count 0    Episode Statistic Type Category VT    Episode Statistic Total Count 0    Episode Statistic Type Category VF    Episode Statistic Total Count 0    Episode Statistic Type Category VT    Episode Statistic Total Count 0    Episode Statistic Type Category VT    Episode Statistic Total Count 0    Episode Statistic Type Category VT    Episode Statistic Total Date Time Start 10157906572166    Episode Statistic Total Date Time End 92376062380987    Episode Statistic Total Date Time Start 01848018810790    Episode Statistic Total Date Time End 05836739615195     Episode Statistic Total Date Time Start 37929989071199    Episode Statistic Total Date Time End 33078558971228    Episode Statistic Total Date Time Start 70119356630323    Episode Statistic Total Date Time End 45440591943403    Episode Statistic Total Date Time Start 04057483259867    Episode Statistic Total Date Time End 16869665096409    Episode Statistic Total Date Time Start 22701111971174    Episode Statistic Total Date Time End 69839373851030    Episode Statistic Total Date Time Start 76438450610951    Episode Statistic Total Date Time End 20240206091636    Narrative    Patient seen in 6B for evaluation and iterative programming of their ICD per MD orders.     Device: [employer] [id number] [place] XT VR MRI  Normal device function.  Mode: VVI 40 bpm  : <0.1%  Intrinsic rhythm: SR 65 bpm  Thoracic Impedance: Below reference line. Suggestive of possible intrathoracic fluid accumulation.  Short V-V intervals: 0  Lead Trends Appear Stable.  Estimated battery longevity to RRT = 13.3 years. Battery voltage = 3.03 V.   Atrial Arrhythmia: None.  Ventricular Arrhythmia: None.  Setting Changes: VT Rx2 energy increased from 20 J to 40 J.    DENNIS Quigley RN    Single lead ICD    I have reviewed and interpreted the device interrogation, settings, programming and nurse's summary. The device is functioning within normal device parameters. I agree with the current findings, assessment and plan.                                          Past Medical History      No past medical history on file.    Immunization History   Administered Date(s) Administered    COVID-19 Bivalent 12+ (Pfizer) 10/14/2022    COVID-19 Vaccine (Renee) 03/10/2021       Past Surgical History   Past Surgical History:   Procedure Laterality Date    CV CORONARY ANGIOGRAM N/A 12/9/2022    Procedure: Coronary Angiogram;  Surgeon: Liam Bernardo MD;  Location:  [facility]    CV PCI N/A 1/2/2023    Procedure: Percutaneous Coronary  Intervention of LCx and LAD;  Surgeon: Liam Bernardo MD;  Location:  [facility]    CV PCI CHRONIC TOTAL OCCLUSION N/A 12/9/2022    Procedure: Percutaneous - Coronary Intervention Chronic Total Occlusion;  Surgeon: Liam Bernardo MD;  Location: [facility] C[facility]    EP ICD INSERT SINGLE N/A 5/11/2023    Procedure: Implantable Cardioverter Defibrillator Device & Lead Implant Single or Dual;  Surgeon: Miky Garcia MD;  Location: [facility] CARDIAC CATH LAB                                           Social History      No family history on file.                                       Allergies      Allergies   Allergen Reactions    Phenytoin Nausea and Vomiting    Rosuvastatin Muscle Pain (Myalgia) and Other (See Comments)     Myalgias      Valproic Acid Unknown                                        Medications        Medications Prior to Admission   Medication Sig Dispense Refill Last Dose    aspirin (ASA) 81 MG EC tablet Take 81 mg by mouth daily   2/3/2024 at AM    atorvastatin (LIPITOR) 80 MG tablet Take 1 tablet by mouth daily   2/3/2024 at PM    blood glucose (CONTOUR NEXT TEST) test strip To test BG values three times daily - uses insulin   2/4/2024    clindamycin (CLEOCIN T) 1 % external solution Apply topically 2 times daily as needed (cystic irritation)    at Not Started Yet    dapagliflozin (FARXIGA) tablet Take 10 mg by mouth daily   1/31/2024 at AM    Doxycycline Hyclate 100 MG TBEC Take 100 mg by mouth 2 times daily    at Not started yet    ESTRADIOL 0.1MG/GM CREAM Place vaginally every other day   1/31/2024 at AM    fenofibrate (TRIGLIDE/LOFIBRA) 160 MG tablet Take 160 mg by mouth daily   2/4/2024 at 1005    furosemide (LASIX) 20 MG tablet Take 20 mg by mouth daily May also take 1 tablet 1 time per day as needed for weight gain of >2lbs in a day or >5lbs in a day   2/4/2024 at AM    insulin aspart (NOVOLOG PEN) 100 UNIT/ML pen Inject 5 Units  Subcutaneous daily (before supper)   2/4/2024 at 1638    insulin glargine (LANTUS PEN) 100 UNIT/ML pen Inject 30 Units Subcutaneous at bedtime   2/4/2024 at 2129    liraglutide (VICTOZA PEN) 18 MG/3ML solution INJECT 0.6MG SUBCUTANEOUSLY ONCE DAILY for 1 week, then 1.8 mg subcutaneously daily   1/31/2024 at AM    metFORMIN (GLUCOPHAGE) 1000 MG tablet Take 1,000 mg by mouth 2 times daily (with meals)   1/31/2024 at AM    methocarbamol (ROBAXIN) 500 MG tablet Take 500 mg by mouth 4 times daily as needed for muscle spasms   Past Month at PRN    metoprolol succinate ER (TOPROL XL) 100 MG 24 hr tablet Take 150 mg by mouth daily   2/4/2024 at 1005    nicotine (NICODERM CQ) 21 MG/24HR 24 hr patch Place 1 patch onto the skin every 24 hours   2/4/2024 at 1005    nitroGLYcerin (NITROSTAT) 0.4 MG sublingual tablet Place 0.4 mg under the tongue every 5 minutes as needed   Past Month at PRN    ranolazine (RANEXA) 500 MG 12 hr tablet Take 500 mg by mouth 2 times daily   2/4/2024 at 1005    sacubitril-valsartan (ENTRESTO)  MG per tablet Take 1 tablet by mouth 2 times daily   2/4/2024 at 1005    spironolactone (ALDACTONE) 25 MG tablet Take 12.5 mg by mouth daily   2/4/2024 at 1005    ticagrelor (BRILINTA) 90 MG tablet Take 90 mg by mouth 2 times daily   2/4/2024 at 1005    venlafaxine (EFFEXOR) 100 MG tablet Take 1 tablet by mouth 2 times daily   2/4/2024 at 1005    busPIRone (BUSPAR) 10 MG tablet Take 10 mg by mouth 2 times daily            Current Facility-Administered Medications Ordered in Epic   Medication Dose Route Frequency Last Rate Last Admin    acetaminophen (TYLENOL) tablet 975 mg  975 mg Oral TID        alum & mag hydroxide-simethicone (MAALOX) suspension 30 mL  30 mL Oral Q4H PRN        artificial saliva (BIOTENE MT) solution 1 spray  1 spray Swish & Spit 4x Daily PRN   1 spray at 02/07/24 0003    aspirin (ASA) chewable tablet 81 mg  81 mg Oral Daily   81 mg at 02/07/24 0749    atorvastatin (LIPITOR) tablet 80  mg  80 mg Oral Daily   80 mg at 02/07/24 0749    busPIRone (BUSPAR) tablet 10 mg  10 mg Oral BID   10 mg at 02/07/24 0748    dapagliflozin (FARXIGA) tablet 10 mg  10 mg Oral Daily   10 mg at 02/07/24 0748    glucose gel 15-30 g  15-30 g Oral Q15 Min PRN        Or    dextrose 50 % injection 25-50 mL  25-50 mL Intravenous Q15 Min PRN        Or    glucagon injection 1 mg  1 mg Subcutaneous Q15 Min PRN        enoxaparin ANTICOAGULANT (LOVENOX) injection 40 mg  40 mg Subcutaneous QPM   40 mg at 02/04/24 2354    fenofibrate (TRIGLIDE/LOFIBRA) tablet 160 mg  160 mg Oral Daily   160 mg at 02/07/24 0748    furosemide (LASIX) tablet 20 mg  20 mg Oral Daily   20 mg at 02/07/24 0749    insulin aspart (NovoLOG) injection (RAPID ACTING)  1-6 Units Subcutaneous Q4H   1 Units at 02/06/24 1952    insulin glargine (LANTUS PEN) injection 15 Units  15 Units Subcutaneous At Bedtime   15 Units at 02/06/24 2155    isosorbide dinitrate (ISORDIL) tablet 10 mg  10 mg Oral TID   10 mg at 02/07/24 0748    lidocaine (LMX4) cream   Topical Q1H PRN        lidocaine 1 % 0.1-1 mL  0.1-1 mL Other Q1H PRN        medication instruction   Does not apply Continuous PRN        metoprolol succinate ER (TOPROL XL) 24 hr tablet 25 mg  25 mg Oral Daily   25 mg at 02/07/24 0748    naloxone (NARCAN) injection 0.2 mg  0.2 mg Intravenous Q2 Min PRN        Or    naloxone (NARCAN) injection 0.4 mg  0.4 mg Intravenous Q2 Min PRN        Or    naloxone (NARCAN) injection 0.2 mg  0.2 mg Intramuscular Q2 Min PRN        Or    naloxone (NARCAN) injection 0.4 mg  0.4 mg Intramuscular Q2 Min PRN        nicotine (COMMIT) lozenge 2 mg  2 mg Buccal Q1H PRN        nicotine (NICODERM CQ) 21 MG/24HR 24 hr patch 1 patch  1 patch Transdermal Daily   1 patch at 02/07/24 0754    nicotine Patch in Place   Transdermal Q8H        nitroGLYcerin (NITROSTAT) sublingual tablet 0.4 mg  0.4 mg Sublingual Q5 Min PRN   0.4 mg at 02/04/24 2131    ondansetron (ZOFRAN ODT) ODT tab 4 mg  4 mg Oral  Q6H PRN        Or    ondansetron (ZOFRAN) injection 4 mg  4 mg Intravenous Q6H PRN   4 mg at 02/06/24 1059    oxyCODONE (ROXICODONE) tablet 5 mg  5 mg Oral Q4H PRN   5 mg at 02/07/24 0748    polyethylene glycol (MIRALAX) Packet 17 g  17 g Oral BID   17 g at 02/07/24 0748    ranolazine (RANEXA) 12 hr tablet 500 mg  500 mg Oral BID   500 mg at 02/07/24 0749    senna-docusate (SENOKOT-S/PERICOLACE) 8.6-50 MG per tablet 1 tablet  1 tablet Oral BID PRN   1 tablet at 02/06/24 1131    sodium chloride (PF) 0.9% PF flush 3 mL  3 mL Intracatheter Q8H   3 mL at 02/06/24 1229    sodium chloride (PF) 0.9% PF flush 3 mL  3 mL Intracatheter q1 min prn        spironolactone (ALDACTONE) half-tab 12.5 mg  12.5 mg Oral Daily   12.5 mg at 02/07/24 0749    ticagrelor (BRILINTA) tablet 90 mg  90 mg Oral BID   90 mg at 02/07/24 0749    venlafaxine (EFFEXOR) tablet 100 mg  100 mg Oral BID   100 mg at 02/07/24 0749    zolpidem (AMBIEN) tablet 5 mg  5 mg Oral At Bedtime PRN   5 mg at 02/07/24 0247     No current [facility]-ordered outpatient medications on file.

## 2024-02-07 NOTE — PLAN OF CARE
Shift: [id number]        Team: Cards 2  Neuro: A&Ox4, endorses numbness in R arm   Resp: >95% on room air  Cardiac: Sinus rhythm, afebrile, palpable pulses, vital signs stable   GI/: Voids spontaneously, bowel sounds audible, no bowel movement this shift, passing gas, bowel regiment in use   Skin: Abdominal bruising, left hand rash, R internal jugular site  Pain: 8/10 non-cardiac chest pain that radiates to R arm  PRN: Oxy given twice  Activity: SBA in halls, independent in room  Labs: Mag and K protocol  Endo: Q4 Glu  LDA's: R PIV  Diet: 2g Na      Plan: LVAD work up. Continue plan of care and notify team with changes.

## 2024-02-07 NOTE — PLAN OF CARE
"SIX MINUTE WALK TEST  Physical Therapy  2024    Cailin Burns MRN# [id number]   YOB: 1975 Age: 48 year old     Height: 5' 2\"  Weight (lbs): 176 lbs 5.89 oz Weight (kg): 80 kg (actual weight)    Supplemental oxygen during the test: No,     Oxygen Appliance: None    Oximetry: Finger Probe    Gait Aid: Walker     Pre-test Post-test   Time 10:28 10:38     Blood Pressure (mm Hg) 110/47 109/53   Heart rate (bpm) 73 85   Rated Perceived Dyspnea (Jake Scale) 0 -- Nothing at all  3 -- Moderate shortness of breath or breathing difficulty    Rated Perceived Exertion (Jake Scale) 3 -- Moderate  8 -- Severe   SpO2 (%) 99 99     Total distance walked in 6 minutes: 428 feet    Paused during test?Yes  Number of pauses: 1  Total Time stopped: 45 secs    Stopped test before 6 minutes? No      Did the patient experience any pain or discomfort during the test? No  Pain Ratin/10  Pain Description:     Oxygen Titration Required: No  Resting oxygen requirement: 0 Liters on   Performing Staff: Rangel Benítez        "

## 2024-02-07 NOTE — CONSULTS
GASTROENTEROLOGY CONSULTATION      Date of Admission:  2/4/2024           Reason for Consultation:   We were asked by Dr. Gale of [facility] to evaluate this patient with LVAD/heart transplant work up           ASSESSMENT AND RECOMMENDATIONS:   Assessment:    48y.o. with PMHx of HFpEF (27%) s/p ICD, CAD s/p PCI (PCI of RCA  and MOHAN x1 to mLAD and MOHAN x1 to mLCx on 01/2023), DM2, tobacco use who was admitted to OSH for ACS r/o and transferred to [facility] for LVAD workup.  Our service is consulted to complete routine CRC screening with colonoscopy during work up.      #. Routine CRC screening  #. Pre-transplant/pre-LVAD evaluation  #. Hx of constipation  Patient has never undergone colonoscopy. We have been asked by the advanced cardiology/heart failure service to perform a colonoscopy as part of routine cancer screening prior to undergoing transplant or LVAD placement. We have explained to the patient and the cardiology team that she is a high risk patient and that our ability to intervene/remove lesions will be dictated by the presence/time since absence of anticoagulant and antiplatelet agents. This may also require a repeat procedure off of anticoagulation which the team and patient understand. Patient agreeable to colonoscopy. Due to history of constipation will enhance bowel prep.     Recommendations:  -- regular diet today  -- CLD (no reds) as of midnight tonight, and then all day on 2/8/24  -- 10 mg bisacodyl today-NOW (we have ordered), 10 mg bisacodyl tomorrow AM (we have ordered)  -- 6L total of GoLytely to start tomorrow (2/8/24)  - 2L (12 oz every 15 minutes until complete) starting at 4:00 p  -- 2L (12 oz every 15 minutes until complete) starting at 10:00 pm   -- 2L (12 oz every 15 minutes until complete) starting at 4:00 am (on 2/9/24)  -- strict NPO once final prep complete in AM of 2/9/24  -- planning for colonoscopy 2/9/24  -- luminal GI will follow peripherally and document findings  from endoscopy on 2/9/24, reach out with any questions or concerns    Gastroenterology outpatient follow up recommendations: TBD by inpatient course    Thank you for involving us in this patient's care. Please do not hesitate to contact the GI service with any questions or concerns.     Pt care plan discussed with Dr. Lyle, GI staff physician.    Scott Ferrara MD, PGY6  Gastroenterology Fellow  [facility]  See AMCOM/Qgenda for GI on-call information    ----------------------------------------------------------------------------------------------------           History of Present Illness:   Cailin Burns is a 48 year old female with a history of  48y.o. with PMHx of HFpEF (27%) s/p ICD, CAD s/p PCI (PCI of RCA  and MOHAN x1 to mLAD and MOHAN x1 to mLCx on 01/2023), DM2, tobacco use who was admitted to OSH for ACS r/o and transferred to [facility] for LVAD workup.  Our service is consulted to complete routine CRC screening with colonoscopy during work up.     Briefly, patient understands why she requires a colonoscopy this admission.     Risks, benefits, alternatives explained. Formal consult to occur prior to procedure.     Prep explained, diet explained.     Has been constipated prior to admission. Last BM 2/2/24.    GI ROS negative unless noted above. All questions answered. Patient agreeable to undergo colonoscopy.          Data:   Key relevant labs:   Lab Results   Component Value Date    WBC 6.9 02/07/2024    HGB 12.8 02/07/2024    HCT 38.4 02/07/2024     02/07/2024     02/07/2024    POTASSIUM 3.9 02/07/2024    CHLORIDE 105 02/07/2024    CO2 22 02/07/2024    BUN 18.9 02/07/2024    CR 0.75 02/07/2024     (H) 02/07/2024    NTBNPI 236 02/06/2024    AST 25 02/06/2024    ALT 25 02/06/2024    ALKPHOS 55 02/06/2024    BILITOTAL 0.3 02/06/2024    INR 0.99 02/06/2024        Key relevant imaging:    CTAP: 2/4/24    IMPRESSION:  1. No acute CT findings in the chest, abdomen, or pelvis.  2.  Few sub-4 mm solid pulmonary nodules in the left upper lobe and  left lower lobe, some of which appear stable when compared to prior CT  09/28/2022. Recommend follow-up per Fleischner Society guidelines.  3. Nonobstructing 3 mm calculus within the left mid kidney.  4. Additional incidental and chronic findings detailed in the body of  the report.      RUQ U/S: 2/6/24    IMPRESSION:      1. Increased hepatic echogenicity which may be seen with parenchymal  disease including steatosis.  2. Mild hepatomegaly.  3. Borderline visualized common bile duct, measuring up to 7 mm. No  obstructive lesion demonstrated in the visualized CBD; distal CBD is  obscured.  4. Renal size within normal limits. Simple appearing cyst in the right  kidney measuring up to 1.3 cm   5. Proximal abdominal aorta measures 2.4 cm in anteroposterior  Diameter         Previous Endoscopy:   Has never undergone endoscopy.          Medications:     Current Facility-Administered Medications   Medication    acetaminophen (TYLENOL) tablet 975 mg    alum & mag hydroxide-simethicone (MAALOX) suspension 30 mL    artificial saliva (BIOTENE MT) solution 1 spray    aspirin (ASA) chewable tablet 81 mg    atorvastatin (LIPITOR) tablet 80 mg    bisacodyl (DULCOLAX) EC tablet 10 mg    [START ON 2/8/2024] bisacodyl (DULCOLAX) EC tablet 10 mg    busPIRone (BUSPAR) tablet 10 mg    dapagliflozin (FARXIGA) tablet 10 mg    glucose gel 15-30 g    Or    dextrose 50 % injection 25-50 mL    Or    glucagon injection 1 mg    diclofenac (VOLTAREN) 1 % topical gel 2 g    [Held by provider] enoxaparin ANTICOAGULANT (LOVENOX) injection 40 mg    estradiol (ESTRACE) cream 1 g    fenofibrate (TRIGLIDE/LOFIBRA) tablet 160 mg    furosemide (LASIX) tablet 20 mg    insulin aspart (NovoLOG) injection (RAPID ACTING)    insulin glargine (LANTUS PEN) injection 15 Units    isosorbide dinitrate (ISORDIL) tablet 10 mg    lidocaine (LMX4) cream    lidocaine 1 % 0.1-1 mL    medication  "instruction    metoprolol succinate ER (TOPROL XL) 24 hr tablet 25 mg    naloxone (NARCAN) injection 0.2 mg    Or    naloxone (NARCAN) injection 0.4 mg    Or    naloxone (NARCAN) injection 0.2 mg    Or    naloxone (NARCAN) injection 0.4 mg    nicotine (COMMIT) lozenge 2 mg    nicotine (NICODERM CQ) 21 MG/24HR 24 hr patch 1 patch    nicotine Patch in Place    nitroGLYcerin (NITROSTAT) sublingual tablet 0.4 mg    ondansetron (ZOFRAN ODT) ODT tab 4 mg    Or    ondansetron (ZOFRAN) injection 4 mg    oxyCODONE (ROXICODONE) tablet 5 mg    [START ON 2/8/2024] polyethylene glycol (GoLYTELY) suspension 2,000 mL    polyethylene glycol (MIRALAX) Packet 17 g    ranolazine (RANEXA) 12 hr tablet 500 mg    sacubitril-valsartan (ENTRESTO)  MG per tablet 1 tablet    senna-docusate (SENOKOT-S/PERICOLACE) 8.6-50 MG per tablet 1 tablet    sodium chloride (PF) 0.9% PF flush 3 mL    sodium chloride (PF) 0.9% PF flush 3 mL    spironolactone (ALDACTONE) half-tab 12.5 mg    ticagrelor (BRILINTA) tablet 90 mg    venlafaxine (EFFEXOR) tablet 100 mg    zolpidem (AMBIEN) tablet 5 mg             Physical Exam:   /65 (BP Location: Left arm)   Pulse 64   Temp 97.6  F (36.4  C) (Oral)   Resp 16   Ht 1.575 m (5' 2\")   Wt 80 kg (176 lb 5.9 oz)   SpO2 96%   BMI 32.26 kg/m    Wt:   Wt Readings from Last 2 Encounters:   02/07/24 80 kg (176 lb 5.9 oz)   01/16/24 80.3 kg (177 lb)      Constitutional: cooperative, pleasant, not dyspneic/diaphoretic, no acute distress  Eyes: Sclera anicteric/injected  Ears/nose/mouth/throat: Normal oropharynx without ulcers or exudate, mucus membranes moist, hearing intact  CV: Nontachycardic  Respiratory: Unlabored breathing  Abd: Nondistended, +bs, no hepatosplenomegaly, nontender, no peritoneal signs  Skin:  no jaundice  Neuro: AAO x 3, No asterixis  Psych: Normal affect  MSK: No gross deformities          Past Medical History:   Reviewed and edited as appropriate  Past Medical History:   Diagnosis " Date    CAD (coronary artery disease)     Nicotine dependence     Type 2 diabetes mellitus with other circulatory complications (H)             Past Surgical History:   Reviewed and edited as appropriate   Past Surgical History:   Procedure Laterality Date    APPENDECTOMY      CV CORONARY ANGIOGRAM N/A 12/09/2022    Procedure: Coronary Angiogram;  Surgeon: Liam Bernardo MD;  Location:  HEART CARDIAC CATH LAB    CV PCI N/A 01/02/2023    Procedure: Percutaneous Coronary Intervention of LCx and LAD;  Surgeon: Liam Bernardo MD;  Location: [facility] CARDIAC CATH LAB    CV PCI CHRONIC TOTAL OCCLUSION N/A 12/09/2022    Procedure: Percutaneous - Coronary Intervention Chronic Total Occlusion;  Surgeon: Liam Bernardo MD;  Location:  HEART CARDIAC CATH LAB    CV RIGHT HEART CATH MEASUREMENTS RECORDED Left 02/06/2024    Procedure: Right Heart Catheterization FYI: there is C/f R subclavian stenosis;  Surgeon: Deja Navarro MD;  Location: [facility] CARDIAC CATH LAB    EP ICD INSERT SINGLE N/A 05/11/2023    Procedure: Implantable Cardioverter Defibrillator Device & Lead Implant Single or Dual;  Surgeon: Miky Garcia MD;  Location: [facility] CARDIAC CATH LAB            Social History:   Alcohol use: denies  Smoking history: denies  Living situation: at home with family          Family History:   Reviewed and edited as appropriate  Family History   Problem Relation Age of Onset    Coronary Artery Disease Early Onset Maternal Grandfather      No known history of colorectal cancer, liver disease, or inflammatory bowel disease.         Allergies:   Reviewed and edited as appropriate     Allergies   Allergen Reactions    Phenytoin Nausea and Vomiting    Rosuvastatin Muscle Pain (Myalgia) and Other (See Comments)     Myalgias      Valproic Acid Unknown              Review of Systems:     A complete 10 point review of systems was performed and is negative except as noted in the  HPI

## 2024-02-07 NOTE — CONSULTS
"Pain Service Consultation Note  [facility]      Patient Name: Cailin Burns  MRN: [id number]   Age: 48 year old  Sex: female  Date: February 7, 2024                                      Reviewed: Yes    Referring Provider:      Valentina Gale MD     Referring Service: cards  Reason for Consultation: \"    ongoing chest/arm pain, taking oxy q5, isosorbide dinitrate, ranolazine that has not been helping.\"       Assessment/Recommendations:  Cailin Burns is a 48 year old female who has PMH  HFpEF (27%) s/p ICD, CAD s/p PCI (PCI of RCA  and MOHAN x1 to mLAD and MOHAN x1 to mLCx on  01/2023), DM2, tobacco use who was admitted to OS for ACS r/o and transferred to [facility] on 2/4/24 for LVAD/heart transplant work up.    Pain service consulted to assist with pain management.    Cailin reports pain on right side of chest that radiates to neck and right arm. Pain is currently 5-6/10 at rest and with movements is 7-8/10.  She states that the pain can rotate to the left side of chest.  She states she has chronic pain and pain is usually 6/10 \"always\"  even prior to her MI on Sept. 2022.  States typically naproxen would help with pain but is unable to take it now due to cardiac status.  States oxycodone 5mg helps with pain a great deal from 8 to 6/10.  She tried fentanyl and morphine but both caused side effects but did not help with the pain as well as oxycodone.  Unclear if she had cervical spine imaging studies.  Note as well that she has h/o lower back and leg pain with lumbar spine MRIs on May and Oct. 2023 showed minimal ddd and facet arthropathy.  No EMG has been done.    We discussed pain plan to help with symptoms and she is agreeable.    Plan:   Consider cervical spine imaging study.  Continue with oxycodone 5mg PO Q 4 hours PRN.  Have encourage to use the lowest when possible to minimize side effects.  Note h/o chronic constipation.  Consider gabapentin 100mg PO TID -Estimated " Creatinine Clearance: 89 mL/min based on SCr of 0.75 mg/dL.   Acetaminophen 975mg PO Q 8 hours  Start Robaxin 500mg PO Q 6 hours PRN  Start voltaren gel   Bowel regimen    Thank you for the opportunity to participate in the care of Cailin Burns  Pain Service will sign off.    Discussed with attending anesthesiologist  Primary Service Contacted with Recommendations? Yes    Chasidy Campbell PA-C  2024      Per MN  review pulled from system on 24.   No MN   data    Pain Medications/Prescriber: not on chronic pain medications.    Past Medical History:  No past medical history on file.      Family History:    No family history on file.    Social History:  Social History     Tobacco Use    Smoking status: Some Days     Types: Cigarettes     Last attempt to quit: 2022     Years since quittin.4    Smokeless tobacco: Never    Tobacco comments:     Trying to quit, wearing patch    Substance Use Topics    Alcohol use: Not Currently               Review of Systems:  Complete ROS reviewed. Unless otherwise noted, all other systems found to be negative.        Laboratory Results:  Recent Labs   Lab Test 24  0547 24  0614   INR  --  0.99    298   PTT  --  27   BUN 18.9 15.5       Allergies:  Allergies   Allergen Reactions    Phenytoin Nausea and Vomiting    Rosuvastatin Muscle Pain (Myalgia) and Other (See Comments)     Myalgias      Valproic Acid Unknown         Current Pain Related Medications:  Medications related to Pain Management (From now, onward)      Start     Dose/Rate Route Frequency Ordered Stop    24 0237  zolpidem (AMBIEN) tablet 5 mg         5 mg Oral AT BEDTIME PRN 24 0237      24 1130  polyethylene glycol (MIRALAX) Packet 17 g         17 g Oral 2 TIMES DAILY 24 1119      24 1119  senna-docusate (SENOKOT-S/PERICOLACE) 8.6-50 MG per tablet 1 tablet         1 tablet Oral 2 TIMES DAILY PRN 24 1119      24 0830  oxyCODONE  "(ROXICODONE) tablet 5 mg         5 mg Oral EVERY 4 HOURS PRN 02/05/24 0822      02/05/24 0800  aspirin (ASA) chewable tablet 81 mg        Note to Pharmacy: PTA Sig:Take 81 mg by mouth daily    81 mg Oral DAILY 02/04/24 2227 02/05/24 0130  acetaminophen (TYLENOL) tablet 975 mg         975 mg Oral 3 TIMES DAILY 02/05/24 0123      02/04/24 2300  busPIRone (BUSPAR) tablet 10 mg        Note to Pharmacy: PTA Sig:Take 10 mg by mouth 2 times daily      10 mg Oral 2 TIMES DAILY 02/04/24 2227 02/04/24 2044  lidocaine 1 % 0.1-1 mL         0.1-1 mL Other EVERY 1 HOUR PRN 02/04/24 2048 02/04/24 2044  lidocaine (LMX4) cream          Topical EVERY 1 HOUR PRN 02/04/24 2048                Physical Exam:  Vitals: /65   Pulse 64   Temp 97.6  F (36.4  C)   Resp 18   Ht 1.575 m (5' 2\")   Wt 80 kg (176 lb 5.9 oz)   SpO2 97%   BMI 32.26 kg/m      Physical Exam:     CONSTITUTIONAL/GENERAL APPEARANCE:  NAD  EYES: EOMI, sclera anicteric, PERRLA  ENT/NECK: atraumatic, lips and oral mucous membranes dry  RESPIRATORY: non-labored breathing. No cough, wheeze  CV:HR within normal limits  ABDOMEN:round  MUSCULOSKELETAL/BACK/SPINE/EXTREMITIES: Moves all extremities purposefully.  Painful with right side cervical ROM.  NEURO: Alert and Oriented x3. Answers questions appropriately  SKIN/VASCULAR EXAM:  No jaundice, no visible rashes or lesions            Acute Inpatient Pain Service [facility]  Hours of pain coverage 24/7   Page via Amcom- Please Page the Pain Team Via Amcom: \"PAIN MANAGEMENT ACUTE INPATIENT/ [facility]\"           "

## 2024-02-07 NOTE — PLAN OF CARE
Goal Outcome Evaluation:      Plan of Care Reviewed With: patient    Overall Patient Progress: no changeOverall Patient Progress: no change    Outcome Evaluation: See RD note.    Jeanette Kevin, MPH, RDN, LD  6B + Obs RD pager: [phone] or Nichole   Weekend/Holiday RD pager [phone]

## 2024-02-07 NOTE — PROVIDER NOTIFICATION
TC from [address]- they would like to reduce Mrs. Kaylen Hernandez to #28 days with no refills due to the CDC guidelines instead of #30 with a refill. I have ok this. .. and if she needs a refill, she will call us due to no refill on this prescription Time of notification: 2:16 AM  Provider notified: Dr. Leida Mcgraw   Patient status: Pt endorsing throbbing pain in bilateral legs with numbness in soles of feet. States they have experienced this in the past, but wanted team to be aware. Pt also complaining of not being able to sleep. Would like something to help promote sleep. They state melatonin makes them nauseous and would like something else.   Temp:  [96.3  F (35.7  C)-99  F (37.2  C)] 98.5  F (36.9  C)  Pulse:  [59-81] 75  Resp:  [16-18] 16  BP: (104-133)/(48-86) 133/63  SpO2:  [96 %-98 %] 98 %  Orders received: MD acknowledged RN's message. Order placed.

## 2024-02-07 NOTE — PROGRESS NOTES
[facility]    Cardiology Progress Note- Cardiology        Date of Admission:  2/4/2024     Assessment & Plan: HVSL    48y.o. with PMHx of HFpEF (27%) s/p ICD, CAD s/p PCI (PCI of RCA  and MOHAN x1 to mLAD and MOHAN x1 to mLCx on 01/2023), DM2, tobacco use who was admitted to OSH for ACS r/o and transferred to [facility] for LVAD workup.     Updates:   - TTE: EF 30-35%, apical wall akinesis, global RV function normal, IVC small  - Starting LVAD evaluation 2/5, labs ordered    - colonoscopy planned Fri 2/9, holding AC  - Pain management consult (given ongoing chest/arm pain despite isosorbide dinitrate TID, ranolazine, still requiring oxy q4)  - Ucx with mixed growth- given no dysuria, will not treat  - started scheduled miralax/senna given constipation  - restarting PTA entresto  mg BID given normalized Bps     #Chronic HFrEF 2/2 Ischemic cardiomyopathy (EF 27% TTE 01/04/2024), NYHA Class 3  #Tobacco use, active smoking  Follows with Dr. Guerin. Given her active smoking,  LVAD was thought to be a primary option, workup as below.   Underwent RHC today which revealed normal left and right filling pressures with CI 2.7 by Altagracia.  Plan to continue to workup for LVAD placement as planned.  Pt with compensated heart failure.   Meds:  BB: 25mg metoprolol, increasing as able (holding home 100mg metoprolol dose d/t hypotension)  ARNI: restarting pta entresto 97/103 mg BID  SGLT2:  continue pta dapagliflozin 10mg daily  MRA: continue pta aldactone 12.5 mg daily  Duretics: PTA furosemide 20mg  Volume Status: appears euvolemic on exam  Device: ICD in place for primary prevention (implanted on 5/11/2023)  RHC 2/6: mRA: 6, PA pressures 30/12/19, mPCW 10 overall looks appropriate  - K>4 and Mg>2 with replacement protocols in place  - BMP and Mg daily  - daily weights and strict I/Os  - PT/OT    #LVAD/transplant work up:   [x] Labs (CBC, CMP, PT/INR, cystatin C, prealbumin, UA +  micro)  [] Infectious (Hep A/B/C, HIV, treponema, HSV 1/2 IgG, CMV IgG, Toxo IgG, EBV IgG, varicella IgG, Quant gold, COVID vaccine/PCR)  [] Utox/nicotine and cotinine/PeTH   [] Immunocompatibility (last transfusion, ABO, HLA tissue typing, PRA)  [] CVTS consult (Plan for formal consult this upcoming week)  [x] Social work evaluation  [] Palliative care evaluation  [] Neuropsych evaluation  [] Nutrition evaluation  [x] CT Dental + evaluation - in process  [x] Abd US + doppler  [x] Extremity US and ABIs  [] Carotid US (if DM or ICM or >49yo)  [] PFTs  : ordered  [] mammogram: ordered  [x] CT head non-contrast: No acute pathology, wnl.  [x] CT CAP non-contrast: no acute pathology  [] Colonoscopy- planned Fri 2/9  [] DEXA  [x] PSA - N/A  [x] 6 min walk test: walked ~400 feet. Worry that claudication is contributing to decompensated picture, and CTPX will not be useful to assess max oxygen consumption.      #Multivessel CAD with PCI of RCA  and MOHAN x1 to mLAD and MOHAN x1 to mLCx (Dec 2022 and Jan 2023)  #Peripheral artery disease  3V disease not appropriate for CABG due to poor bypass targets. So had staged PCI of CTOs in Dec 2022 and Jan 2023.Has ongoing chest pain managed with nitroglycerin and oxycodone/fentanyl at OSH.  Due to small vessels patient is a poor candidate for PCI. Plan for medical management. Troponin normal on admission.   - continue DAPT with Aspirin and Brilinta  - continue lipitor and fenofibrate    #Chronic chest and arm pain   - continue ranexa (started at the OSH)  - Start isosorbide dinitrate 10 TID   - NTG prn and tylenol 975mg TID, lidocaine patch and oxycodone 5mg Q4H prn for chest pain  - Pain consulted, appreciate recommendations   - consider cervical MRI if ongoing pain   - voltaren gel    #Right subclavian stenosis  #High grade brachiocephalic stenosis.   Discrepancy of blood pressure, which is lower on the right side. Patient notes pain and tingling in the right hand. Upper arterial  "duplex shows right subclavian stenosis and possible brachiocephalic stenosis. She was recommended further evaluation with CTA, done 2/5 which showed high grade stenosis of the right brachiocephalic artery.  - ABIs borderline  - avoid using right arm for blood pressure measurement (patient care order placed)       #Diabetes, on Lantus and sliding scale. (A1C 6.8)  - hold pta glargine 30U qPM   - hold pta metformin and liraglutide  - MDSS  - hypoglycemia protocol   - lantus 15 qpm given NPO at MN     #Tobacco use disorder  Has been trying to quit.   - pta buspar  - nicotine patch 21mg daily   - nicotine lozenge q1hr PRN     #MDD  - pta Effexor.     #Vaginal dryness  Estradiol cream every other day ordered       Diet: 2 Gram Sodium Diet   DVT Prophylaxis: Enoxaparin (Lovenox) SQ  Cuevas Catheter: Not present  Cardiac Monitoring: ACTIVE order. Indication: Chest pain/ ACS rule out (24 hours)  Code Status: Full Code          Clinically Significant Risk Factors                   # End stage heart failure: Ventricular assist device (VAD) present      # DMII: A1C = 10.6 % (Ref range: <5.7 %) within past 6 months, PRESENT ON ADMISSION  # Obesity: Estimated body mass index is 32.26 kg/m  as calculated from the following:    Height as of this encounter: 1.575 m (5' 2\").    Weight as of this encounter: 80 kg (176 lb 5.9 oz)., PRESENT ON ADMISSION      # ICD device present       Disposition Plan   Expected discharge: 4 - 7 days, recommended to prior living arrangement once advanced heart failure work-up completed.    Entered: Valentina Gale MD 02/07/2024, 4:09 PM   The patient's care was discussed with the Attending Physician, Dr. Valencia .      Valentina Gale MD  [facility]  ______________________________________________________________________    Interval History   Nursing notes reviewed. No acute events overnight. VSS.   Not having dysuria, abdominal pain. No chest pain. Discussed plan " for day.     Intake/Output Summary (Last 24 hours) at 2/5/2024 1127  Last data filed at 2/5/2024 1026  Gross per 24 hour   Intake 700 ml   Output 800 ml   Net -100 ml       Physical Exam   Vital Signs: Temp: 97.6  F (36.4  C) Temp src: Oral BP: 127/65 Pulse: 64   Resp: 16 SpO2: 96 % O2 Device: None (Room air)    Weight: 176 lbs 5.89 oz    General Appearance: Chronically ill appearing, no acute distress, sitting up in bed  Respiratory:  clear to ascultation in the peripheral lung fields  Cardiovascular: RRR, no murmurs, no appreciable JVD   GI: Soft, non-distended, non-tender  Skin: No concerning skin lesions/rashes on exposed skin, no peripheral edema  Neuro: alert & oriented x3    Medical Decision Making       Please see A&P for additional details of medical decision making.      Data     I have personally reviewed the following data over the past 24 hrs:    6.9  \   12.8   / 277     136 105 18.9 /  174 (H)   3.9 22 0.75 \       Imaging results reviewed over the past 24 hrs:   No results found for this or any previous visit (from the past 24 hour(s)).      I have reviewed today's vital signs, notes, medications, labs and imaging.  I have also seen and examined the patient and agree with the findings and plan as outlined above.  Pt with nonischemic DCM being worked up for LVAD placement.  Currently pt is euvolemic with CI > 2 and PAD with abnormal MANAN measurements.  Will continue workup.     David Valencia MD, PhD  Professor, Heart Failure and Cardiac Transplantation  [facility]

## 2024-02-07 NOTE — PROGRESS NOTES
"CLINICAL NUTRITION SERVICES - ASSESSMENT NOTE     Nutrition Prescription    RECOMMENDATIONS FOR MDs/PROVIDERS TO ORDER:  None at this time     Malnutrition Status:    Patient does not meet two of the established criteria necessary for diagnosing malnutrition    Recommendations already ordered by Registered Dietitian (RD):  Discussed nutrition education for potential upcoming LVAD surgery  Ensure Max 1/day     Future/Additional Recommendations:  Monitor nutrition-related findings and follow pt per protocol    If patient requires FT placement for enteral nutrition support , see recs below:  Use dosing weight 57.5 kg  Formula: Osmolite 1.5 (or equivalent)   Goal Regimen: Osmolite 1.5 Davin (or equivalent) @ goal of  40ml/hr  (960ml/day) +2 packs ProSource TF20 provides: 1600 kcals, 100 g PRO, 731 ml free H20, 195 g CHO, and 0 g fiber daily.   - Initiate at 10 ml/hr and advance by 10 ml q8hr pending pt's tolerance.  - Do not advance TF rate unless Mg++ >1.5, K+ is >3, and phos >1.9  - Recommend 30-60 ml q4hr fluid flushes for tube patency. Additional fluids and/or adjustments per MD.    - Order multivitamin/minerals to help ensure micronutrient needs being met with suspected hypermetabolic demands and potential interruptions to TF infusions.   - Consider additional 100 mg Thiamine x 5-7 days to prevent lyte depletion if at risk for refeeding syndrome    - If gastric enteral access: HOB > 30 degrees or Reverse Trendelenburg >10-25 degrees.              -Send a nutrition consult for \"Registered Dietitian to Order TF per Medical Nutrition Therapy Guidelines\" if desire RD to order TFs.         REASON FOR ASSESSMENT  Cailin Burns is a/an 48 year old female assessed by the dietitian for Provider Order - Heart Failure pre Ventricular Assist Device (VAD) planning, Frailty assessment    Patient was admitted to OSH for ACS r/o and [place]heart transplant work up.     MEDICAL HISTORY  PMH of HFpEF (27%) s/p " ICD, CAD s/p PCI (PCI of RCA  and MOHAN x1 to mLAD and MOHAN x1 to mLCx on  01/2023), DM2, tobacco use.    NUTRITION HISTORY  Pt reports doing small, frequent meals at baseline and attempts to do 1-2 days/week of vegetarian meals of which she includes chickpeas and other beans which she prepares herself from dry. She watches her salt intake and carbs.   Food allergies: None noted     NUTRITION EDUCATION  Pt admits to not receiving formal nutrition education on meal planning post-op LVAD placement in the past.    Nutrition education for nutrition relationship to health/disease: Discussed nutrition education for potential upcoming LVAD surgery (pt in the LVAD workup process). Mentioned potential need for feeding tube and TFs for nutrition support post-op. Discussed the importance of adequate oral intake post-op. Emphasized protein intake and gave examples of high protein foods/beverages. Discussed and encouraged oral supplements post-op. Rec continue low-sodium diet at this time. Pt with no questions on current diet order. Pt seemed agreeable to nutrition plan if LVAD is placed in the future.     CURRENT NUTRITION ORDERS  Diet:  2 g Sodium  Intake/Tolerance: 100% of 2-3 full meals/day     GI  Last BM PTA - scheduled daily miralax started yesterday and PRN senna given yesterday     MEDICATIONS  Fenofibrate, lasix, sliding scale insulin, lantus, isordil, miralax     LABS  Electrolytes  Potassium (mmol/L)   Date Value   02/07/2024 3.9   02/06/2024 4.1   02/05/2024 3.9     Phosphorus (mg/dL)   Date Value   02/06/2024 3.8   02/05/2024 2.6    Blood Glucose  Glucose (mg/dL)   Date Value   02/07/2024 86     GLUCOSE BY METER POCT (mg/dL)   Date Value   02/07/2024 83   02/07/2024 95   02/07/2024 95   02/07/2024 117 (H)   02/06/2024 210 (H)     Hemoglobin A1C (%)   Date Value   02/05/2024 10.6 (H)    Inflammatory Markers  WBC Count (10e3/uL)   Date Value   02/07/2024 6.9   02/06/2024 7.2   02/05/2024 8.7     Albumin (g/dL)   Date  "Value   02/06/2024 3.8   02/04/2024 3.8      Magnesium (mg/dL)   Date Value   02/07/2024 2.1   02/06/2024 2.2   02/05/2024 2.0     Sodium (mmol/L)   Date Value   02/07/2024 136   02/06/2024 138   02/05/2024 137    Renal  Urea Nitrogen (mg/dL)   Date Value   02/07/2024 18.9   02/06/2024 15.5   02/05/2024 11.9     Creatinine (mg/dL)   Date Value   02/07/2024 0.75   02/06/2024 0.70   02/05/2024 0.65     Additional  Triglycerides (mg/dL)   Date Value   02/06/2024 142   02/05/2024 112   01/02/2023 107     Ketones Urine (mg/dL)   Date Value   02/06/2024 Negative     Platelet Count (10e3/uL)   Date Value   02/07/2024 277     aPTT (Seconds)   Date Value   02/06/2024 27     INR (no units)   Date Value   02/06/2024 0.99        RENAL  GFR >90    RESPIRATORY  Room air     ANTHROPOMETRICS  Height: 157.5 cm (5' 2\")  Most Recent Weight: 80 kg (176 lb 5.9 oz)    IBW: 50 kg   160%IBW   Body mass index is 32.26 kg/m . BMI Category: Obesity Grade I BMI 30-34.9    Weight History:   No significant weight loss noted.  Wt Readings from Last 15 Encounters:   02/07/24 80 kg (176 lb 5.9 oz)   01/16/24 80.3 kg (177 lb)   08/16/23 81.2 kg (179 lb)   07/25/23 79.8 kg (176 lb)   05/11/23 78.5 kg (173 lb)   01/02/23 76.2 kg (168 lb)   12/09/22 78.2 kg (172 lb 6.4 oz)       ASSESSED NUTRITION NEEDS  Dosing Weight: 57.5 kg (Adjusted BW based on IBW 50 kg & wt measurement 80 kg on 2/7)   Estimated Energy Needs: 1438 - 1725 kcals/day (25 - 30 kcals/kg)  Justification: Maintenance  Estimated Protein Needs: 46 - 58 grams protein/day (0.8 - 1 grams of pro/kg)  Justification: Maintenance; Up to 86 - 115 grams/day (1.5 - 2 g/kg) once post-op VAD  Estimated Fluid Needs: 1500 - 1725 mL/day (1 mL/kcal)   Justification: Maintenance    PHYSICAL FINDINGS  See malnutrition section below.    SKIN  No pressure injuries documented at this time     MALNUTRITION  % Intake: No decreased intake noted  % Weight Loss: None noted  Subcutaneous Fat Loss: None observed "   Muscle Loss: None observed  Fluid Accumulation/Edema: Does not meet criteria  Malnutrition Diagnosis: Patient does not meet two of the established criteria necessary for diagnosing malnutrition    NUTRITION DIAGNOSIS  Food- and nutrition-related knowledge deficit r/t limited knowledge of meal planning post-op LVAD placement AEB pt report of not receiving formal nutrition education on meal planning post-op LVAD placement in the past.     Interventions:  Nutrition Education  1.  Provided instruction on the need to eat small, frequent meals. Importance of adequate oral intake post-op, especially for 6-8 weeks, was discussed.  2.  Provided handouts regarding the amounts of protein in various food items & tips to increase protein. Discussed protein goals with pt.     Goals  1.  Patient will verbalize the importance of eating small, frequent meals.    2.  Pt will verbalize at least three high protein foods.     Monitoring/Evaluation  Progress toward goals will be monitored and evaluated per protocol.  2. Patient to ask any further nutrition-related questions before discharge.  In addition, pt may request outpatient RD appointment.    Jeanette Kevin, MPH, RDN, LD  6B + Obs RD pager: [phone] or Nichole   Weekend/Holiday RD pager [phone]

## 2024-02-07 NOTE — PROGRESS NOTES
Care Management Follow Up    CHW met with pt at bedside and asked if she has a PCP.     Pt said she had  PCP in [place] who has been on maternity leave and she has not had a PCP for a while. Pt also would like to establish a PCP in the [facility] system as she has had to come here often for appointments and doctors she had in [place] did not understand her LVAD needs. Pt asked if her insurance would cover a [facility] PCP. CHW tasked [facility] with a teams message asking them to follow up with pt- awaiting response.       Sofia Gray   6A/B/C Community Health Worker   Phone: [phone]

## 2024-02-08 ENCOUNTER — APPOINTMENT (OUTPATIENT)
Dept: GENERAL RADIOLOGY | Facility: CLINIC | Age: 49
End: 2024-02-08
Attending: INTERNAL MEDICINE
Payer: COMMERCIAL

## 2024-02-08 ENCOUNTER — APPOINTMENT (OUTPATIENT)
Dept: PHYSICAL THERAPY | Facility: CLINIC | Age: 49
End: 2024-02-08
Attending: INTERNAL MEDICINE
Payer: COMMERCIAL

## 2024-02-08 ENCOUNTER — TRANSFERRED RECORDS (OUTPATIENT)
Dept: HEALTH INFORMATION MANAGEMENT | Facility: CLINIC | Age: 49
End: 2024-02-08

## 2024-02-08 ENCOUNTER — APPOINTMENT (OUTPATIENT)
Dept: ULTRASOUND IMAGING | Facility: CLINIC | Age: 49
End: 2024-02-08
Attending: INTERNAL MEDICINE
Payer: COMMERCIAL

## 2024-02-08 LAB
ANION GAP SERPL CALCULATED.3IONS-SCNC: 5 MMOL/L (ref 7–15)
BASOPHILS # BLD AUTO: 0.1 10E3/UL (ref 0–0.2)
BASOPHILS NFR BLD AUTO: 1 %
BUN SERPL-MCNC: 13.7 MG/DL (ref 6–20)
CALCIUM SERPL-MCNC: 8.6 MG/DL (ref 8.6–10)
CHLORIDE SERPL-SCNC: 110 MMOL/L (ref 98–107)
CREAT SERPL-MCNC: 0.78 MG/DL (ref 0.51–0.95)
DEPRECATED HCO3 PLAS-SCNC: 21 MMOL/L (ref 22–29)
EGFRCR SERPLBLD CKD-EPI 2021: >90 ML/MIN/1.73M2
EOSINOPHIL # BLD AUTO: 0.2 10E3/UL (ref 0–0.7)
EOSINOPHIL NFR BLD AUTO: 2 %
ERYTHROCYTE [DISTWIDTH] IN BLOOD BY AUTOMATED COUNT: 13.8 % (ref 10–15)
GLUCOSE BLDC GLUCOMTR-MCNC: 114 MG/DL (ref 70–99)
GLUCOSE BLDC GLUCOMTR-MCNC: 136 MG/DL (ref 70–99)
GLUCOSE BLDC GLUCOMTR-MCNC: 275 MG/DL (ref 70–99)
GLUCOSE BLDC GLUCOMTR-MCNC: 89 MG/DL (ref 70–99)
GLUCOSE SERPL-MCNC: 104 MG/DL (ref 70–99)
HCT VFR BLD AUTO: 36.8 % (ref 35–47)
HGB BLD-MCNC: 11.9 G/DL (ref 11.7–15.7)
IMM GRANULOCYTES # BLD: 0 10E3/UL
IMM GRANULOCYTES NFR BLD: 0 %
LABORATORY REPORT: NORMAL
LYMPHOCYTES # BLD AUTO: 2.6 10E3/UL (ref 0.8–5.3)
LYMPHOCYTES NFR BLD AUTO: 36 %
MAGNESIUM SERPL-MCNC: 2.1 MG/DL (ref 1.7–2.3)
MCH RBC QN AUTO: 29 PG (ref 26.5–33)
MCHC RBC AUTO-ENTMCNC: 32.3 G/DL (ref 31.5–36.5)
MCV RBC AUTO: 90 FL (ref 78–100)
MDC_IDC_LEAD_CONNECTION_STATUS: NORMAL
MDC_IDC_LEAD_IMPLANT_DT: NORMAL
MDC_IDC_LEAD_LOCATION: NORMAL
MDC_IDC_LEAD_LOCATION_DETAIL_1: NORMAL
MDC_IDC_LEAD_MFG: NORMAL
MDC_IDC_LEAD_MODEL: NORMAL
MDC_IDC_LEAD_POLARITY_TYPE: NORMAL
MDC_IDC_LEAD_SERIAL: NORMAL
MDC_IDC_LEAD_SPECIAL_FUNCTION: NORMAL
MDC_IDC_MSMT_BATTERY_DTM: NORMAL
MDC_IDC_MSMT_BATTERY_REMAINING_LONGEVITY: 159 MO
MDC_IDC_MSMT_BATTERY_RRT_TRIGGER: NORMAL
MDC_IDC_MSMT_BATTERY_STATUS: NORMAL
MDC_IDC_MSMT_BATTERY_VOLTAGE: 3.02 V
MDC_IDC_MSMT_CAP_CHARGE_DTM: NORMAL
MDC_IDC_MSMT_CAP_CHARGE_ENERGY: 18 J
MDC_IDC_MSMT_CAP_CHARGE_TIME: 3.8 S
MDC_IDC_MSMT_CAP_CHARGE_TYPE: NORMAL
MDC_IDC_MSMT_LEADCHNL_RV_IMPEDANCE_VALUE: 380 OHM
MDC_IDC_MSMT_LEADCHNL_RV_IMPEDANCE_VALUE: 456 OHM
MDC_IDC_MSMT_LEADCHNL_RV_PACING_THRESHOLD_AMPLITUDE: 0.62 V
MDC_IDC_MSMT_LEADCHNL_RV_PACING_THRESHOLD_PULSEWIDTH: 0.4 MS
MDC_IDC_MSMT_LEADCHNL_RV_SENSING_INTR_AMPL: 23.4 MV
MDC_IDC_PG_IMPLANT_DTM: NORMAL
MDC_IDC_PG_MFG: NORMAL
MDC_IDC_PG_MODEL: NORMAL
MDC_IDC_PG_SERIAL: NORMAL
MDC_IDC_PG_TYPE: NORMAL
MDC_IDC_SESS_CLINIC_NAME: NORMAL
MDC_IDC_SESS_DTM: NORMAL
MDC_IDC_SESS_TYPE: NORMAL
MDC_IDC_SET_BRADY_HYSTRATE: NORMAL
MDC_IDC_SET_BRADY_LOWRATE: 40 {BEATS}/MIN
MDC_IDC_SET_BRADY_MODE: NORMAL
MDC_IDC_SET_LEADCHNL_RV_PACING_AMPLITUDE: 2 V
MDC_IDC_SET_LEADCHNL_RV_PACING_ANODE_ELECTRODE_1: NORMAL
MDC_IDC_SET_LEADCHNL_RV_PACING_ANODE_LOCATION_1: NORMAL
MDC_IDC_SET_LEADCHNL_RV_PACING_CAPTURE_MODE: NORMAL
MDC_IDC_SET_LEADCHNL_RV_PACING_CATHODE_ELECTRODE_1: NORMAL
MDC_IDC_SET_LEADCHNL_RV_PACING_CATHODE_LOCATION_1: NORMAL
MDC_IDC_SET_LEADCHNL_RV_PACING_POLARITY: NORMAL
MDC_IDC_SET_LEADCHNL_RV_PACING_PULSEWIDTH: 0.4 MS
MDC_IDC_SET_LEADCHNL_RV_SENSING_ANODE_ELECTRODE_1: NORMAL
MDC_IDC_SET_LEADCHNL_RV_SENSING_ANODE_LOCATION_1: NORMAL
MDC_IDC_SET_LEADCHNL_RV_SENSING_CATHODE_ELECTRODE_1: NORMAL
MDC_IDC_SET_LEADCHNL_RV_SENSING_CATHODE_LOCATION_1: NORMAL
MDC_IDC_SET_LEADCHNL_RV_SENSING_POLARITY: NORMAL
MDC_IDC_SET_LEADCHNL_RV_SENSING_SENSITIVITY: 0.3 MV
MDC_IDC_SET_ZONE_DETECTION_BEATS_DENOMINATOR: 16 {BEATS}
MDC_IDC_SET_ZONE_DETECTION_BEATS_DENOMINATOR: 32 {BEATS}
MDC_IDC_SET_ZONE_DETECTION_BEATS_DENOMINATOR: 40 {BEATS}
MDC_IDC_SET_ZONE_DETECTION_BEATS_NUMERATOR: 16 {BEATS}
MDC_IDC_SET_ZONE_DETECTION_BEATS_NUMERATOR: 30 {BEATS}
MDC_IDC_SET_ZONE_DETECTION_BEATS_NUMERATOR: 32 {BEATS}
MDC_IDC_SET_ZONE_DETECTION_INTERVAL: 270 MS
MDC_IDC_SET_ZONE_DETECTION_INTERVAL: 320 MS
MDC_IDC_SET_ZONE_DETECTION_INTERVAL: 400 MS
MDC_IDC_SET_ZONE_STATUS: NORMAL
MDC_IDC_SET_ZONE_TYPE: NORMAL
MDC_IDC_SET_ZONE_VENDOR_TYPE: NORMAL
MDC_IDC_STAT_AT_BURDEN_PERCENT: 0 %
MDC_IDC_STAT_AT_DTM_END: NORMAL
MDC_IDC_STAT_AT_DTM_START: NORMAL
MDC_IDC_STAT_BRADY_DTM_END: NORMAL
MDC_IDC_STAT_BRADY_DTM_START: NORMAL
MDC_IDC_STAT_BRADY_RV_PERCENT_PACED: 0.01 %
MDC_IDC_STAT_CRT_DTM_END: NORMAL
MDC_IDC_STAT_CRT_DTM_START: NORMAL
MDC_IDC_STAT_EPISODE_RECENT_COUNT: 0
MDC_IDC_STAT_EPISODE_RECENT_COUNT_DTM_END: NORMAL
MDC_IDC_STAT_EPISODE_RECENT_COUNT_DTM_START: NORMAL
MDC_IDC_STAT_EPISODE_TOTAL_COUNT: 0
MDC_IDC_STAT_EPISODE_TOTAL_COUNT_DTM_END: NORMAL
MDC_IDC_STAT_EPISODE_TOTAL_COUNT_DTM_START: NORMAL
MDC_IDC_STAT_EPISODE_TYPE: NORMAL
MDC_IDC_STAT_TACHYTHERAPY_ATP_DELIVERED_RECENT: 0
MDC_IDC_STAT_TACHYTHERAPY_ATP_DELIVERED_TOTAL: 0
MDC_IDC_STAT_TACHYTHERAPY_RECENT_DTM_END: NORMAL
MDC_IDC_STAT_TACHYTHERAPY_RECENT_DTM_START: NORMAL
MDC_IDC_STAT_TACHYTHERAPY_SHOCKS_ABORTED_RECENT: 0
MDC_IDC_STAT_TACHYTHERAPY_SHOCKS_ABORTED_TOTAL: 0
MDC_IDC_STAT_TACHYTHERAPY_SHOCKS_DELIVERED_RECENT: 0
MDC_IDC_STAT_TACHYTHERAPY_SHOCKS_DELIVERED_TOTAL: 0
MDC_IDC_STAT_TACHYTHERAPY_TOTAL_DTM_END: NORMAL
MDC_IDC_STAT_TACHYTHERAPY_TOTAL_DTM_START: NORMAL
MONOCYTES # BLD AUTO: 0.7 10E3/UL (ref 0–1.3)
MONOCYTES NFR BLD AUTO: 10 %
NEUTROPHILS # BLD AUTO: 3.7 10E3/UL (ref 1.6–8.3)
NEUTROPHILS NFR BLD AUTO: 51 %
NRBC # BLD AUTO: 0 10E3/UL
NRBC BLD AUTO-RTO: 0 /100
PETH INTERPRETATION: NORMAL
PLATELET # BLD AUTO: 253 10E3/UL (ref 150–450)
PLPETH BLD-MCNC: <10 NG/ML
POPETH BLD-MCNC: <10 NG/ML
POTASSIUM SERPL-SCNC: 3.8 MMOL/L (ref 3.4–5.3)
RBC # BLD AUTO: 4.11 10E6/UL (ref 3.8–5.2)
SODIUM SERPL-SCNC: 136 MMOL/L (ref 135–145)
WBC # BLD AUTO: 7.3 10E3/UL (ref 4–11)

## 2024-02-08 PROCEDURE — 72040 X-RAY EXAM NECK SPINE 2-3 VW: CPT

## 2024-02-08 PROCEDURE — 250N000013 HC RX MED GY IP 250 OP 250 PS 637

## 2024-02-08 PROCEDURE — 250N000013 HC RX MED GY IP 250 OP 250 PS 637: Performed by: INTERNAL MEDICINE

## 2024-02-08 PROCEDURE — 250N000011 HC RX IP 250 OP 636: Performed by: INTERNAL MEDICINE

## 2024-02-08 PROCEDURE — 93880 EXTRACRANIAL BILAT STUDY: CPT | Mod: 26 | Performed by: RADIOLOGY

## 2024-02-08 PROCEDURE — 90791 PSYCH DIAGNOSTIC EVALUATION: CPT | Performed by: CLINICAL NEUROPSYCHOLOGIST

## 2024-02-08 PROCEDURE — 71045 X-RAY EXAM CHEST 1 VIEW: CPT

## 2024-02-08 PROCEDURE — 97116 GAIT TRAINING THERAPY: CPT | Mod: GP

## 2024-02-08 PROCEDURE — 96130 PSYCL TST EVAL PHYS/QHP 1ST: CPT | Performed by: CLINICAL NEUROPSYCHOLOGIST

## 2024-02-08 PROCEDURE — 80048 BASIC METABOLIC PNL TOTAL CA: CPT

## 2024-02-08 PROCEDURE — 250N000013 HC RX MED GY IP 250 OP 250 PS 637: Performed by: STUDENT IN AN ORGANIZED HEALTH CARE EDUCATION/TRAINING PROGRAM

## 2024-02-08 PROCEDURE — 94375 RESPIRATORY FLOW VOLUME LOOP: CPT

## 2024-02-08 PROCEDURE — 85025 COMPLETE CBC W/AUTO DIFF WBC: CPT

## 2024-02-08 PROCEDURE — 72040 X-RAY EXAM NECK SPINE 2-3 VW: CPT | Mod: 26 | Performed by: RADIOLOGY

## 2024-02-08 PROCEDURE — 99221 1ST HOSP IP/OBS SF/LOW 40: CPT | Performed by: NURSE PRACTITIONER

## 2024-02-08 PROCEDURE — 71045 X-RAY EXAM CHEST 1 VIEW: CPT | Mod: 26 | Performed by: RADIOLOGY

## 2024-02-08 PROCEDURE — 83735 ASSAY OF MAGNESIUM: CPT

## 2024-02-08 PROCEDURE — 93880 EXTRACRANIAL BILAT STUDY: CPT

## 2024-02-08 PROCEDURE — 36415 COLL VENOUS BLD VENIPUNCTURE: CPT

## 2024-02-08 PROCEDURE — 120N000003 HC R&B IMCU UMMC

## 2024-02-08 PROCEDURE — 99232 SBSQ HOSP IP/OBS MODERATE 35: CPT | Mod: GC | Performed by: INTERNAL MEDICINE

## 2024-02-08 PROCEDURE — 97530 THERAPEUTIC ACTIVITIES: CPT | Mod: GP

## 2024-02-08 RX ORDER — GABAPENTIN 100 MG/1
100 CAPSULE ORAL 3 TIMES DAILY
Status: DISCONTINUED | OUTPATIENT
Start: 2024-02-08 | End: 2024-02-09 | Stop reason: HOSPADM

## 2024-02-08 RX ORDER — METHOCARBAMOL 500 MG/1
500 TABLET, FILM COATED ORAL EVERY 6 HOURS PRN
Status: DISCONTINUED | OUTPATIENT
Start: 2024-02-08 | End: 2024-02-09 | Stop reason: HOSPADM

## 2024-02-08 RX ORDER — ACETAMINOPHEN 325 MG/1
975 TABLET ORAL EVERY 8 HOURS
Status: DISCONTINUED | OUTPATIENT
Start: 2024-02-08 | End: 2024-02-09 | Stop reason: HOSPADM

## 2024-02-08 RX ORDER — SPIRONOLACTONE 25 MG/1
25 TABLET ORAL DAILY
Status: DISCONTINUED | OUTPATIENT
Start: 2024-02-09 | End: 2024-02-09 | Stop reason: HOSPADM

## 2024-02-08 RX ORDER — POTASSIUM CHLORIDE 750 MG/1
20 TABLET, EXTENDED RELEASE ORAL ONCE
Status: COMPLETED | OUTPATIENT
Start: 2024-02-08 | End: 2024-02-08

## 2024-02-08 RX ADMIN — OXYCODONE HYDROCHLORIDE 5 MG: 5 TABLET ORAL at 00:28

## 2024-02-08 RX ADMIN — RANOLAZINE 500 MG: 500 TABLET, FILM COATED, EXTENDED RELEASE ORAL at 20:11

## 2024-02-08 RX ADMIN — DICLOFENAC SODIUM TOPICAL GEL, 1% 4 G: 10 GEL TOPICAL at 20:11

## 2024-02-08 RX ADMIN — ENOXAPARIN SODIUM 40 MG: 40 INJECTION SUBCUTANEOUS at 20:11

## 2024-02-08 RX ADMIN — SENNOSIDES AND DOCUSATE SODIUM 1 TABLET: 8.6; 5 TABLET ORAL at 09:40

## 2024-02-08 RX ADMIN — TICAGRELOR 90 MG: 90 TABLET ORAL at 20:11

## 2024-02-08 RX ADMIN — VENLAFAXINE 100 MG: 25 TABLET ORAL at 20:10

## 2024-02-08 RX ADMIN — VENLAFAXINE 100 MG: 25 TABLET ORAL at 09:41

## 2024-02-08 RX ADMIN — DAPAGLIFLOZIN 10 MG: 10 TABLET, FILM COATED ORAL at 09:46

## 2024-02-08 RX ADMIN — METOPROLOL SUCCINATE 25 MG: 25 TABLET, EXTENDED RELEASE ORAL at 09:42

## 2024-02-08 RX ADMIN — POLYETHYLENE GLYCOL 3350 17 G: 17 POWDER, FOR SOLUTION ORAL at 09:40

## 2024-02-08 RX ADMIN — SACUBITRIL AND VALSARTAN 1 TABLET: 97; 103 TABLET, FILM COATED ORAL at 09:40

## 2024-02-08 RX ADMIN — FUROSEMIDE 20 MG: 20 TABLET ORAL at 09:41

## 2024-02-08 RX ADMIN — SPIRONOLACTONE 12.5 MG: 25 TABLET, FILM COATED ORAL at 09:41

## 2024-02-08 RX ADMIN — ATORVASTATIN CALCIUM 80 MG: 80 TABLET, FILM COATED ORAL at 09:41

## 2024-02-08 RX ADMIN — NICOTINE 1 PATCH: 21 PATCH, EXTENDED RELEASE TRANSDERMAL at 09:44

## 2024-02-08 RX ADMIN — POLYETHYLENE GLYCOL 3350, SODIUM SULFATE ANHYDROUS, SODIUM BICARBONATE, SODIUM CHLORIDE, POTASSIUM CHLORIDE 2000 ML: 236; 22.74; 6.74; 5.86; 2.97 POWDER, FOR SOLUTION ORAL at 23:25

## 2024-02-08 RX ADMIN — ISOSORBIDE DINITRATE 10 MG: 10 TABLET ORAL at 11:27

## 2024-02-08 RX ADMIN — DICLOFENAC SODIUM TOPICAL GEL, 1% 4 G: 10 GEL TOPICAL at 14:00

## 2024-02-08 RX ADMIN — BUSPIRONE HYDROCHLORIDE 10 MG: 5 TABLET ORAL at 20:11

## 2024-02-08 RX ADMIN — POTASSIUM CHLORIDE 20 MEQ: 750 TABLET, EXTENDED RELEASE ORAL at 09:40

## 2024-02-08 RX ADMIN — POLYETHYLENE GLYCOL 3350, SODIUM SULFATE ANHYDROUS, SODIUM BICARBONATE, SODIUM CHLORIDE, POTASSIUM CHLORIDE 2000 ML: 236; 22.74; 6.74; 5.86; 2.97 POWDER, FOR SOLUTION ORAL at 16:15

## 2024-02-08 RX ADMIN — FENOFIBRATE 160 MG: 160 TABLET ORAL at 09:41

## 2024-02-08 RX ADMIN — SACUBITRIL AND VALSARTAN 1 TABLET: 97; 103 TABLET, FILM COATED ORAL at 20:11

## 2024-02-08 RX ADMIN — OXYCODONE HYDROCHLORIDE 5 MG: 5 TABLET ORAL at 21:47

## 2024-02-08 RX ADMIN — OXYCODONE HYDROCHLORIDE 5 MG: 5 TABLET ORAL at 14:00

## 2024-02-08 RX ADMIN — INSULIN GLARGINE 15 UNITS: 100 INJECTION, SOLUTION SUBCUTANEOUS at 21:47

## 2024-02-08 RX ADMIN — TICAGRELOR 90 MG: 90 TABLET ORAL at 09:40

## 2024-02-08 RX ADMIN — GABAPENTIN 100 MG: 100 CAPSULE ORAL at 14:00

## 2024-02-08 RX ADMIN — ISOSORBIDE DINITRATE 10 MG: 10 TABLET ORAL at 09:40

## 2024-02-08 RX ADMIN — BISACODYL 10 MG: 5 TABLET, COATED ORAL at 09:46

## 2024-02-08 RX ADMIN — RANOLAZINE 500 MG: 500 TABLET, FILM COATED, EXTENDED RELEASE ORAL at 09:40

## 2024-02-08 RX ADMIN — POLYETHYLENE GLYCOL 3350 17 G: 17 POWDER, FOR SOLUTION ORAL at 20:11

## 2024-02-08 RX ADMIN — BUSPIRONE HYDROCHLORIDE 10 MG: 5 TABLET ORAL at 09:40

## 2024-02-08 RX ADMIN — ASPIRIN 81 MG CHEWABLE TABLET 81 MG: 81 TABLET CHEWABLE at 09:40

## 2024-02-08 RX ADMIN — OXYCODONE HYDROCHLORIDE 5 MG: 5 TABLET ORAL at 09:41

## 2024-02-08 RX ADMIN — GABAPENTIN 100 MG: 100 CAPSULE ORAL at 20:11

## 2024-02-08 RX ADMIN — ISOSORBIDE DINITRATE 10 MG: 10 TABLET ORAL at 17:34

## 2024-02-08 ASSESSMENT — ACTIVITIES OF DAILY LIVING (ADL)
ADLS_ACUITY_SCORE: 26
ADLS_ACUITY_SCORE: 30
ADLS_ACUITY_SCORE: 26
ADLS_ACUITY_SCORE: 30
ADLS_ACUITY_SCORE: 30
ADLS_ACUITY_SCORE: 26
ADLS_ACUITY_SCORE: 26

## 2024-02-08 NOTE — PLAN OF CARE
Goal Outcome Evaluation:       Neuro: A&Ox4.   Cardiac: SR. VSS.   Respiratory: Sating >94% on RA. Lungs sounded wheezy, coarse at 8pm, 12 am, pt also complained of more chest tightness/discomfort than usual and trace to mild edema. Cardio MD assessed client and ordered chest Xray with pending results. Lung sounds improved to clear diminished at around 4am after coughing.   GI/: Adequate urine output. BM X1  Diet/appetite: Tolerating 2G Na diet. Eating well.  Activity:  Independent up to bathroom, SBA walker in halls.  Pain: At acceptable level on current regimen. PRN oxy given X2 with improvement in pain.   Skin: No new deficits noted.  LDA's: R PIV SL.    Plan: Continue with POC. Notify primary team with changes.

## 2024-02-08 NOTE — PROGRESS NOTES
"Met with patient,  Dimas, and daughter Miranda to present the HM3 as a possible treatment option.      Discussed patient and caregiver responsibilities before and after VAD implant. Clarified that only 2 caregivers may be trained. Clarified the need for a caregiver to be present for education and to assist patient for at least first 30 days after a patient returns home.  Patient's designated caregiver is mainly Dimas but Miranda would take the training, too..     Discussed basic overview of VAD equipment, on going management, need for anticoagulation, regular dressing change, grounded three-pronged outlet and functioning telephone. Also discussed frequency of follow-up clinic appointments and the need to stay locally for at least 4 weeks.  Reviewed restrictions of having a VAD such as no swimming, bathing, boats, MRI's, or arc welding.     Provided and discussed the VAD educational brochure, information regarding the VAD and transplant support groups, and \"VAD What You Should Know\" with additional details. Patient, Dimas and Miranda signed \"VAD What You Should Know\" document (or agreed to have VAD Coordinator sign on their behalf). VAD coordinator contact information provided.  Encouraged patient, Dimas and Miranda to call with questions. Learners verbalized understanding of the above information.   "

## 2024-02-08 NOTE — CONSULTS
VASCULAR SURGERY INPATIENT CONSULTATION / Initial In-Patient visit    VASCULAR SURGEON: Dr. Almonte    LOCATION: [facility]    Cailin Burns  Medical Record #:  [id number]  YOB: 1975  Age:  48 year old     Date of Service: 2/8/24    PRIMARY CARE PROVIDER: No Ref-Primary, Physician    Reason for consultation: Abnormal ABIs on setting of LVAD workup    IMPRESSION / RECOMMENDATION:   Cailin Burns is a 48y.o. female with PMHx of HFpEF (27%) s/p ICD, CAD s/p PCI (PCI of RCA  and MOHAN x1 to mLAD and MOHAN x1 to mLCx on 01/2023), DM2, tobacco use, now undergoing LVAD workup. Arterial studies and ABIs were completed as part of workup, demonstrating ABIs on the right at 0.67 and ABIs on left at 0.79. Patient has occlusion of RIGHT SFA in the mid thigh with reconstitution in the distal thigh. Otherwise monophasic flow bilaterally, to ankle.  With claudication symptoms walking more than 1 block however patient also has heart failure confounding her symptomology and is a barrier to walking longer distances. Given the above findings, recommend best medical management with aspirin and statin as you are, smoking cessation and walking program. Upon discharge, please refer patient to Dr. Nitish Yang from vascular medicine.  Recommendations were discussed with patient who agrees with above.    Vascular surgery will sign off.    Discussed pt history, exam, assessment and plan with Dr. Almonte of the vascular surgery service, who is in agreement with the above.    Thank you for involving vascular surgery in the care of Cailin Burns. Should any questions or concerns arise, please don't hesitate to contact us.    Trinidad Payne CNP  Vascular Surgery  Pager: [phone]  [email or web address]  Send message or 10 digit call back number Securely via [employer] with the Vocera Web Console (learn more here)      HPI:  Cailin Burns is a 48 year old female who was seen today in  consultation for abnormal ABIs and arterial duplexes demonstrating SFA occlusion with reconstitution in distal thigh. Patient is a current smoker, has smoked for many years however he is committed to smoking cessation. She has had claudication symptoms for several years however it remained unclear whether her tiredness where is related to her heart disease or PAD. There are no open wounds, overt discoloration or any signs or symptoms of ischemia on bilateral lower extremities.  She has monophasic Doppler DP and PTs bilaterally as well as monophasic popliteal signals bilaterally.    PHH:    Past Medical History:   Diagnosis Date     CAD (coronary artery disease)      Nicotine dependence      Type 2 diabetes mellitus with other circulatory complications (H)          Past Surgical History:   Procedure Laterality Date     APPENDECTOMY       CV CORONARY ANGIOGRAM N/A 12/09/2022    Procedure: Coronary Angiogram;  Surgeon: Liam Bernardo MD;  Location: [facility] CARDIAC CATH LAB     CV PCI N/A 01/02/2023    Procedure: Percutaneous Coronary Intervention of LCx and LAD;  Surgeon: Liam Bernardo MD;  Location: [facility] CARDIAC CATH LAB     CV PCI CHRONIC TOTAL OCCLUSION N/A 12/09/2022    Procedure: Percutaneous - Coronary Intervention Chronic Total Occlusion;  Surgeon: Liam Bernardo MD;  Location: [facility] CARDIAC CATH LAB     CV RIGHT HEART CATH MEASUREMENTS RECORDED Left 02/06/2024    Procedure: Right Heart Catheterization FYI: there is C/f R subclavian stenosis;  Surgeon: Deja Navarro MD;  Location: [facility] CARDIAC CATH LAB     EP ICD INSERT SINGLE N/A 05/11/2023    Procedure: Implantable Cardioverter Defibrillator Device & Lead Implant Single or Dual;  Surgeon: Miky Garcia MD;  Location: [facility] CARDIAC CATH LAB        ALLERGIES:     Allergies   Allergen Reactions     Phenytoin Nausea and Vomiting     Rosuvastatin Muscle Pain (Myalgia) and Other (See Comments)      Myalgias       Valproic Acid Unknown        MEDS:    Current Facility-Administered Medications:      acetaminophen (TYLENOL) tablet 975 mg, 975 mg, Oral, Q8H PRN, Valentina Gale MD     alum & mag hydroxide-simethicone (MAALOX) suspension 30 mL, 30 mL, Oral, Q4H PRN, Natalie Mccarty MD     artificial saliva (BIOTENE MT) solution 1 spray, 1 spray, Swish & Spit, 4x Daily PRN, Valentina Gale MD, 1 spray at 02/07/24 0003     aspirin (ASA) chewable tablet 81 mg, 81 mg, Oral, Daily, Natalie Mccarty MD, 81 mg at 02/08/24 0940     atorvastatin (LIPITOR) tablet 80 mg, 80 mg, Oral, Daily, Natalie Mccarty MD, 80 mg at 02/08/24 0941     busPIRone (BUSPAR) tablet 10 mg, 10 mg, Oral, BID, Natalie Mccarty MD, 10 mg at 02/08/24 0940     dapagliflozin (FARXIGA) tablet 10 mg, 10 mg, Oral, Daily, Natalie Mccarty MD, 10 mg at 02/08/24 0946     glucose gel 15-30 g, 15-30 g, Oral, Q15 Min PRN **OR** dextrose 50 % injection 25-50 mL, 25-50 mL, Intravenous, Q15 Min PRN **OR** glucagon injection 1 mg, 1 mg, Subcutaneous, Q15 Min PRN, Natalie Mccarty MD     diclofenac (VOLTAREN) 1 % topical gel 4 g, 4 g, Topical, 4x Daily, Melvin Mendez MD     [Held by provider] enoxaparin ANTICOAGULANT (LOVENOX) injection 40 mg, 40 mg, Subcutaneous, QPM, Valentina Gale MD, 40 mg at 02/04/24 2354     estradiol (ESTRACE) cream 1 g, 1 g, Vaginal, Every Other Day, Valentina Gale MD, 1 g at 02/07/24 1953     fenofibrate (TRIGLIDE/LOFIBRA) tablet 160 mg, 160 mg, Oral, Daily, Hue Tillman MD, 160 mg at 02/08/24 0941     furosemide (LASIX) tablet 20 mg, 20 mg, Oral, Daily, Valentina Gale MD, 20 mg at 02/08/24 0941     insulin aspart (NovoLOG) injection (RAPID ACTING), 1-6 Units, Subcutaneous, TID w/meals, Valentina Gale MD, 2 Units at 02/07/24 1950     insulin glargine (LANTUS PEN) injection 15 Units, 15 Units, Subcutaneous, At Bedtime, Natalie Mccarty MD, 15 Units at 02/07/24 1308     isosorbide  dinitrate (ISORDIL) tablet 10 mg, 10 mg, Oral, TID, Hue Tillman MD, 10 mg at 02/08/24 1127     lidocaine (LMX4) cream, , Topical, Q1H PRN, Natalie Mccarty MD     lidocaine 1 % 0.1-1 mL, 0.1-1 mL, Other, Q1H PRN, Natalie Mccarty MD     medication instruction, , Does not apply, Continuous PRN, Natalie Mccarty MD     metoprolol succinate ER (TOPROL XL) 24 hr tablet 25 mg, 25 mg, Oral, Daily, Hue Tillman MD, 25 mg at 02/08/24 0942     naloxone (NARCAN) injection 0.2 mg, 0.2 mg, Intravenous, Q2 Min PRN **OR** naloxone (NARCAN) injection 0.4 mg, 0.4 mg, Intravenous, Q2 Min PRN **OR** naloxone (NARCAN) injection 0.2 mg, 0.2 mg, Intramuscular, Q2 Min PRN **OR** naloxone (NARCAN) injection 0.4 mg, 0.4 mg, Intramuscular, Q2 Min PRN, David Valencia MD     nicotine (COMMIT) lozenge 2 mg, 2 mg, Buccal, Q1H PRN, Natalie Mccarty MD     nicotine (NICODERM CQ) 21 MG/24HR 24 hr patch 1 patch, 1 patch, Transdermal, Daily, Natalie Mccarty MD, 1 patch at 02/08/24 0944     nicotine Patch in Place, , Transdermal, Q8H, Natalie Mccarty MD     nitroGLYcerin (NITROSTAT) sublingual tablet 0.4 mg, 0.4 mg, Sublingual, Q5 Min PRN, Natalie Mccarty MD, 0.4 mg at 02/04/24 2135     ondansetron (ZOFRAN ODT) ODT tab 4 mg, 4 mg, Oral, Q6H PRN **OR** ondansetron (ZOFRAN) injection 4 mg, 4 mg, Intravenous, Q6H PRN, Natalie Mccarty MD, 4 mg at 02/06/24 1059     oxyCODONE (ROXICODONE) tablet 5 mg, 5 mg, Oral, Q4H PRN, Hue Tillman MD, 5 mg at 02/08/24 0941     polyethylene glycol (GoLYTELY) suspension 2,000 mL, 2,000 mL, Oral, Q6H, Scott Ferrara MD     polyethylene glycol (MIRALAX) Packet 17 g, 17 g, Oral, BID, Valentina Gale MD, 17 g at 02/08/24 0940     ranolazine (RANEXA) 12 hr tablet 500 mg, 500 mg, Oral, BID, Natalie Mccarty MD, 500 mg at 02/08/24 0940     sacubitril-valsartan (ENTRESTO)  MG per tablet 1 tablet, 1 tablet, Oral, BID, Valentina Gale MD, 1 tablet at  "02/08/24 0940     senna-docusate (SENOKOT-S/PERICOLACE) 8.6-50 MG per tablet 1 tablet, 1 tablet, Oral, BID PRN, Valentina Gale MD, 1 tablet at 02/08/24 0940     sodium chloride (PF) 0.9% PF flush 3 mL, 3 mL, Intracatheter, Q8H, Natalie Mccarty MD, 3 mL at 02/08/24 0335     sodium chloride (PF) 0.9% PF flush 3 mL, 3 mL, Intracatheter, q1 min prn, Natalie Mccarty MD     spironolactone (ALDACTONE) half-tab 12.5 mg, 12.5 mg, Oral, Daily, Natalie Mccarty MD, 12.5 mg at 02/08/24 0941     ticagrelor (BRILINTA) tablet 90 mg, 90 mg, Oral, BID, Natalie Mccarty MD, 90 mg at 02/08/24 0940     venlafaxine (EFFEXOR) tablet 100 mg, 100 mg, Oral, BID, Natalie Mccarty MD, 100 mg at 02/08/24 0941     zolpidem (AMBIEN) tablet 5 mg, 5 mg, Oral, At Bedtime PRN, Leida Mcgraw MD, 5 mg at 02/07/24 2208     SOCIAL HABITS:    Tobacco Use      Smoking status: Some Days        Packs/day: 1.5        Types: Cigarettes        Start date: 1988      Smokeless tobacco: Never      Tobacco comments: Trying to quit, wearing patch      Social History    Substance and Sexual Activity      Alcohol use: Not Currently       History   Drug Use Unknown        FAMILY HISTORY:    Family History   Problem Relation Age of Onset     Coronary Artery Disease Early Onset Maternal Grandfather        REVIEW OF SYSTEMS:    A 12 point ROS was reviewed and except for what is listed in the HPI above, all others are negative    PE:    Vital signs:  Temp: 98.3  F (36.8  C) Temp src: Oral BP: 100/52 Pulse: 67   Resp: 17 SpO2: 97 % O2 Device: None (Room air)   Height: 157.5 cm (5' 2\") Weight: 80.1 kg (176 lb 9.4 oz)  Estimated body mass index is 32.3 kg/m  as calculated from the following:    Height as of this encounter: 1.575 m (5' 2\").    Weight as of this encounter: 80.1 kg (176 lb 9.4 oz).       Wt Readings from Last 1 Encounters:   02/08/24 80.1 kg (176 lb 9.4 oz)     Body mass index is 32.3 kg/m .    EXAM:  GENERAL: This is a " well-developed 48 year old female who appears her stated age  CARDIAC:  Normal S1 and S2, no Murmur  CHEST/LUNG:  Clear lung fields bilaterally  GASTROINTESINAL (ABDOMEN):Soft, non-tender, B/S present, no pulsatile mass   MUSCULOSKELETAL: Grossly normal and both lower extremities are intact.  HEME/LYMPH: No lymphedema  NEUROLOGIC: Focally intact, Alert and oriented x 3.   PSYCH: appropriate affect  INTEGUMENT: No open lesions or ulcers  VASCULAR: Doppler DP PT and popliteal signals bilaterally.  No lower extremity edema noted, no erythema, no open wounds.  Warm, with normal dorsiflexion and plantarflexion, motor and sensory function intact.      DIAGNOSTIC STUDIES:     Images:  US MANAN Doppler No Exercise    Result Date: 2/6/2024  Examinations 2/6/2024 8:56 AM: 1. MANAN 2. Ultrasound lower extremity arterial duplex bilateral CLINICAL HISTORY: Heart Failure pre Ventricular Assist Device (VAD) planning. COMPARISONS: None available. REFERRING PROVIDER: QUINTON HICKS TECHNIQUE: Bilateral MANAN obtained. Bilateral leg arteries evaluated with grayscale, color Doppler, and spectral pulsed wave Doppler ultrasound. FINDINGS: RIGHT:      Brachial: not taken due reported clot      Ankle (PT): 61 mmHg      Ankle (DP): 49 mmHg      MANAN: 0.67      Common femoral artery: 219/21 cm/s, triphasic      Profundus femoral artery: 163/0 cm/s, triphasic      Superficial femoral artery, origin: 124/6 cm/s, triphasic      Superficial femoral artery, proximal thigh: 156/14 cm/s, triphasic      Superficial femoral artery, mid thigh, above collateral: 64/10 cm/s, arterial monophasic      Superficial femoral artery, mid thigh, below collateral: occluded      Superficial femoral artery, distal thigh: 35/7 cm/s, arterial monophasic      Superficial femoral artery, distal thigh: 165/26 cm/s, arterial monophasic      Superficial femoral artery, distal thigh: 64/13 cm/s, arterial monophasic      Popliteal artery, proximal: 122/20 cm/s, arterial  monophasic      Popliteal artery, distal: 82/21 cm/s, arterial monophasic      Posterior tibial artery, ankle: 42/15 cm/s, arterial monophasic      Anterior tibial artery, ankle: 47/12 cm/s, arterial monophasic LEFT:      Brachial: 91 mmHg      Ankle (PT): 72 mmHg      Ankle (DP): 71 mmHg      MANAN: 0.79      Common femoral artery: 173/20 cm/s, arterial monophasic      Profundus femoral artery: 121/0 cm/s, triphasic      Superficial femoral artery, origin: 199/24 cm/s, arterial monophasic      Superficial femoral artery, mid thigh: 199/29 cm/s, arterial monophasic      Superficial femoral artery, distal thigh: 153/0 cm/s, biphasic      Popliteal artery, proximal: 172/32 cm/s, arterial monophasic      Popliteal artery, distal: 131/12 cm/s, arterial monophasic      Posterior tibial artery, ankle: 57/13 cm/s, arterial monophasic      Anterior tibial artery, ankle: 131/26 cm/s, arterial monophasic     IMPRESSION: 1. RIGHT:      A. Resting MANAN is ABNORMAL, 0.67.      B. Superficial femoral artery occluded in the mid thigh and reconstituted in the distal thigh. 2. LEFT:      A. Resting MANAN is ABNORMAL, 0.79.      B. Negative duplex arterial ultrasound. Guidelines: MANAN Diagnostic Criteria (Based on criteria published in Circulation 2011; 124: 8094-6800):   > 1.4: Non compressible   1.00 - 1.40: Normal   0.91 - 0.99: Borderline   At or below 0.90: Abnormal I have personally reviewed the examination and initial interpretation and I agree with the findings. NEVA PFEIFFER MD   SYSTEM ID:  [id number]    US Lower Extremity Arterial Duplex Bilateral    Result Date: 2/6/2024  Examinations 2/6/2024 8:56 AM: 1. MANAN 2. Ultrasound lower extremity arterial duplex bilateral CLINICAL HISTORY: Heart Failure pre Ventricular Assist Device (VAD) planning. COMPARISONS: None available. REFERRING PROVIDER: QUINTON HICKS TECHNIQUE: Bilateral MANAN obtained. Bilateral leg arteries evaluated with grayscale, color Doppler, and spectral pulsed wave  Doppler ultrasound. FINDINGS: RIGHT:      Brachial: not taken due reported clot      Ankle (PT): 61 mmHg      Ankle (DP): 49 mmHg      MANAN: 0.67      Common femoral artery: 219/21 cm/s, triphasic      Profundus femoral artery: 163/0 cm/s, triphasic      Superficial femoral artery, origin: 124/6 cm/s, triphasic      Superficial femoral artery, proximal thigh: 156/14 cm/s, triphasic      Superficial femoral artery, mid thigh, above collateral: 64/10 cm/s, arterial monophasic      Superficial femoral artery, mid thigh, below collateral: occluded      Superficial femoral artery, distal thigh: 35/7 cm/s, arterial monophasic      Superficial femoral artery, distal thigh: 165/26 cm/s, arterial monophasic      Superficial femoral artery, distal thigh: 64/13 cm/s, arterial monophasic      Popliteal artery, proximal: 122/20 cm/s, arterial monophasic      Popliteal artery, distal: 82/21 cm/s, arterial monophasic      Posterior tibial artery, ankle: 42/15 cm/s, arterial monophasic      Anterior tibial artery, ankle: 47/12 cm/s, arterial monophasic LEFT:      Brachial: 91 mmHg      Ankle (PT): 72 mmHg      Ankle (DP): 71 mmHg      MANAN: 0.79      Common femoral artery: 173/20 cm/s, arterial monophasic      Profundus femoral artery: 121/0 cm/s, triphasic      Superficial femoral artery, origin: 199/24 cm/s, arterial monophasic      Superficial femoral artery, mid thigh: 199/29 cm/s, arterial monophasic      Superficial femoral artery, distal thigh: 153/0 cm/s, biphasic      Popliteal artery, proximal: 172/32 cm/s, arterial monophasic      Popliteal artery, distal: 131/12 cm/s, arterial monophasic      Posterior tibial artery, ankle: 57/13 cm/s, arterial monophasic      Anterior tibial artery, ankle: 131/26 cm/s, arterial monophasic     IMPRESSION: 1. RIGHT:      A. Resting MANAN is ABNORMAL, 0.67.      B. Superficial femoral artery occluded in the mid thigh and reconstituted in the distal thigh. 2. LEFT:      A. Resting MANAN is  ABNORMAL, 0.79.      B. Negative duplex arterial ultrasound. Guidelines: MANAN Diagnostic Criteria (Based on criteria published in Circulation 2011; 124: [id number]):   > 1.4: Non compressible   1.00 - 1.40: Normal   0.91 - 0.99: Borderline   At or below 0.90: Abnormal I have personally reviewed the examination and initial interpretation and I agree with the findings. NEVA PFEIFFER MD   SYSTEM ID:  [id number]    LABS:      Sodium   Date Value Ref Range Status   02/08/2024 136 135 - 145 mmol/L Final     Comment:     Reference intervals for this test were updated on 09/26/2023 to more accurately reflect our healthy population. There may be differences in the flagging of prior results with similar values performed with this method. Interpretation of those prior results can be made in the context of the updated reference intervals.    02/07/2024 136 135 - 145 mmol/L Final     Comment:     Reference intervals for this test were updated on 09/26/2023 to more accurately reflect our healthy population. There may be differences in the flagging of prior results with similar values performed with this method. Interpretation of those prior results can be made in the context of the updated reference intervals.    02/06/2024 138 135 - 145 mmol/L Final     Comment:     Reference intervals for this test were updated on 09/26/2023 to more accurately reflect our healthy population. There may be differences in the flagging of prior results with similar values performed with this method. Interpretation of those prior results can be made in the context of the updated reference intervals.      Urea Nitrogen   Date Value Ref Range Status   02/08/2024 13.7 6.0 - 20.0 mg/dL Final   02/07/2024 18.9 6.0 - 20.0 mg/dL Final   02/06/2024 15.5 6.0 - 20.0 mg/dL Final     Hemoglobin   Date Value Ref Range Status   02/08/2024 11.9 11.7 - 15.7 g/dL Final   02/07/2024 12.8 11.7 - 15.7 g/dL Final   02/06/2024 13.1 11.7 - 15.7 g/dL Final     Platelet Count    Date Value Ref Range Status   02/08/2024 253 150 - 450 10e3/uL Final   02/07/2024 277 150 - 450 10e3/uL Final   02/06/2024 298 150 - 450 10e3/uL Final     INR   Date Value Ref Range Status   02/06/2024 0.99 0.85 - 1.15 Final   02/04/2024 0.98 0.85 - 1.15 Final   01/02/2023 0.89 0.85 - 1.15 Final
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
## 2024-02-08 NOTE — PLAN OF CARE
V/S: VSS, afebrile. -130s. HR 60-100s.  Neuro: Pt is A&O x4, no c/o headache & lightheadedness. Has numbness & tingling in RUE, calls appropriately.  Resp: RA. Sats > 95, has SOB w/ ambulation. Lung sounds clear & diminished in bases bilaterally.  Cardiac: Tele SR. No c/o palpitations.  GI/: BG checks ACHS. Clear liquid diet, tolerating well. No FR. No c/o n/v/d. Voiding via hat in toilet/ bedside commode d/t frequent stooling w/ bowel prep. Last BM 2/8.  Skin: Skin dry & pale, rash on L hand & fingers. No new deficits noted.  Pain: C/o non-cardiac R chest pain radiating down RUE, given x2 oxycodone.  Activity: Independent in room, SBA w/walker in hallways.  Electrolytes: No replacements given, recheck in am.  LDAs: Old R internal jugular site WDL & CDI. R PIV (frorm OS) WDL.     Pt had spine x-ray & ultrasound today, awaiting results. Family met w/ VAD coordinator & vascular surgery recieved education about VADs & heart transplant. Plan is to have colonoscopy & dental appointment 2/9, pt on clear liquid diet & golytely prep started.    Plan of care ongoing.  Patient currently resting in bed with call light in reach.     Goal Outcome Evaluation:    Plan of Care Reviewed With: patient    Overall Patient Progress: no change    Outcome Evaluation: Pt had spine x-ray & ultrasound today, awaiting results. Family met w/ VAD coordinator & vascular surgery recieved education about VADs & heart transplant. Plan is to have colonoscopy & dental appointment 2/9, pt on clear liquid diet & golytely prep started.

## 2024-02-08 NOTE — PLAN OF CARE
V/S: VSS, afebrile. -120s. HR 60-90s.  Neuro: Pt is A&O x4, no c/o headache & lightheadedness. Has numbness & tingling in RUE, calls appropriately.  Resp: RA. Sats > 95, has SOB w/ ambulation. Lung sounds clear & diminished in bases bilaterally.  Cardiac: Tele SR. No c/o palpitations.  GI/: BG checks ACHS. 2 gram sodium diet, tolerating well. No FR. No c/o n/v/d. Voiding via hat in toilet. Last BM PTA.  Skin: Skin dry & pale, rash on L hand & fingers. No new deficits noted.  Pain: C/o non-cardiac R chest pain radiating down RUE, given x2 oxycodone.  Activity: Independent in room, SBA w/walker in hallways.  Electrolytes: No replacements given, recheck in am.  LDAs: Old R internal jugular site WDL & CDI. R PIV ([facility] OSH) WDL.    Plan is to have colonoscopy & dental appointment 2/9 (clear liquid diet starts @ 0000), MA screening bilateral (see orders) scheduled as apart of evaluation (will likely occur tomorrow).     Plan of care ongoing.  Patient currently resting in bed with call light in reach.     Goal Outcome Evaluation:    Plan of Care Reviewed With: patient    Overall Patient Progress: no change    Outcome Evaluation: Plan is to have colonoscopy & dental appointment 2/9, MA screening (see orders) will likely occur tomorrow as apart of evaluation

## 2024-02-08 NOTE — PROGRESS NOTES
Patient YOB: 1975    Patient age:48    Patient gender:Female    Patient height:157.5          Predicted values:     FEV1: 2.42    FVC: 2.84    PEF: 6.11      FEV1/FVC:    Patient values:    FEV1: 2.39    FVC: 3.07    PEF: 4.58    FEV1/FVC:          Patient effort:  fair

## 2024-02-08 NOTE — PROGRESS NOTES
[facility]    Cardiology Progress Note- Cardiology        Date of Admission:  2/4/2024     Assessment & Plan: HVSL    48y.o. with PMHx of HFpEF (27%) s/p ICD, CAD s/p PCI (PCI of RCA  and MOHAN x1 to mLAD and MOHAN x1 to mLCx on 01/2023), DM2, tobacco use who was admitted to OSH for ACS r/o and transferred to [facility] for LVAD workup.     02/08: Patient remains hemodynamic stable, she is complaining of chest pain that happens at rest (describe it as a pressure in the right chest, radiated to the right arm with numbness) and is reproducible on the physical exam when the right chest is pressed. Yesterday during 6MWT patient complained mainly of claudication (known PVD) not really of chest pain. RHC yesterday showed that patient is euvolemic, PCWP 10, and CI:2.7.     - Chest pain most likely chronic, does not seem angina type  -Tecnically she does not meet criteria to be listed just yet based on RHC numbers. During this admission we are continuing evaluation, but she probably can by discharged and evaluation can be completed as outpatient. Colonoscopy is scheduled for tomorrow.   - Due to PVD we are consulting vascular surgery, to assess the severity of the disease. In case she undergoes LVAD, she needs appropriate vascular access.     Updates:   - Evaluated by pain medicine gave recommendations - Medications adjusted based on their recommendations.   - Colonoscopy tomorrow.   - No emergent changes from the CV stand point.   - Ordering carotid US, it was not ordered before         #Chronic HFrEF 2/2 Ischemic cardiomyopathy (EF 27% TTE 01/04/2024), NYHA Class 3  #Tobacco use, active smoking  Follows with Dr. Guerin. Given her active smoking,  LVAD was thought to be a primary option, workup as below.   Underwent RHC on 02/07 which revealed normal left and right filling pressures with CI 2.7 by Altagracia.  Plan to continue to workup for LVAD placement as planned.  Patient has  remained euvolemic and medications has been optimized since admission.     HF Meds:  BB: 25mg metoprolol, increasing as able (holding home 100mg metoprolol dose d/t hypotension)HR has ranged between 60-70.   ARNI: entresto 97/103 mg BID  SGLT2:  dapagliflozin 10mg daily  MRA: Increasing aldactone to 25 mg daily  Duretics: PTA furosemide 20mg - Patient is euvolemic, this is mainly to maintain euvolemia  Volume Status: appears euvolemic on exam  Device: ICD in place for primary prevention (implanted on 5/11/2023)  [facility] 2/6: mRA: 6, PA pressures 30/12/19, mPCW 10 overall looks appropriate  - K>4 and Mg>2 with replacement protocols in place  - BMP and Mg daily  - daily weights and strict I/Os  - PT/OT    #LVAD/transplant work up:   [x] Labs (CBC, CMP, PT/INR, cystatin C, prealbumin, UA + micro)  [] Infectious (Hep A/B/C, HIV, treponema, HSV 1/2 IgG, CMV IgG, Toxo IgG, EBV IgG, varicella IgG, Quant gold, COVID vaccine/PCR)  [] Utox/nicotine and cotinine/PeTH   [] Immunocompatibility (last transfusion, ABO, HLA tissue typing, PRA)  [x] CVTS consult (Plan for formal consult this upcoming week)  [x] Social work evaluation  [] Palliative care evaluation  [] Neuropsych evaluation  [] Nutrition evaluation  [x] CT Dental + evaluation - in process  [x] Abd US + doppler  [x] Extremity US and ABIs  [] Carotid US (if DM or ICM or >51yo)  [] PFTs  : ordered  [] mammogram: ordered  [x] CT head non-contrast: No acute pathology, wnl.  [x] CT CAP non-contrast: no acute pathology  [] Colonoscopy- planned Fri 2/9  [] DEXA  [x] PSA - N/A  [x] 6 min walk test: walked ~400 feet. Worry that claudication is contributing to decompensated picture, and CTPX will not be useful to assess max oxygen consumption.     #Angina chest pain   #Multivessel CAD with PCI of RCA  and MOHAN x1 to mLAD and MOHAN x1 to mLCx (Dec 2022 and Jan 2023)  #Peripheral artery disease  3V disease not appropriate for CABG due to poor bypass targets. So had staged PCI of CTOs  in Dec 2022 and Jan 2023.Has ongoing chest pain managed with nitroglycerin and oxycodone/fentanyl at OSH. Due to small vessels patient is a poor candidate for PCI. Plan for medical management.   02/08: Patient has ongoing chest pain that happens at rest.     Angina medication: Metoprolol 25mg daily , Isordil 10mg TID, Ranolazine 500mg BID.   MOHAN 01/2023: Aspirin and Brilinta - I had a discussion with her that she has been on those for more than 1 year, She refers that outpatient cardiologist in [place] is planning to keep her on dual life long. I have not found documentation for that.   PVD: Aspirin/Lipitor     #Chronic Chest pain  Angina vs Muscular   Pain medication recommendations:  Consider cervical spine imaging study.  Continue with oxycodone 5mg PO Q 4 hours PRN.  Have encourage to use the lowest when possible to minimize side effects.  Note h/o chronic constipation.  Consider gabapentin 100mg PO TID -Estimated Creatinine Clearance: 89 mL/min based on SCr of 0.75 mg/dL.   Acetaminophen 975mg PO Q 8 hours  Start Robaxin 500mg PO Q 6 hours PRN  Start voltaren gel   Bowel regimen    #Right subclavian stenosis  #High grade brachiocephalic stenosis.   Discrepancy of blood pressure, which is lower on the right side. Patient notes pain and tingling in the right hand. Upper arterial duplex shows right subclavian stenosis and possible brachiocephalic stenosis. She was recommended further evaluation with CTA, done 2/5 which showed high grade stenosis of the right brachiocephalic artery.  - ABIs borderline  - avoid using right arm for blood pressure measurement (patient care order placed)       #Diabetes, on Lantus and sliding scale. (A1C 6.8)  -Glargine 15 daily.   -Insulin sliding scale insulin.     #Tobacco use disorder  Has been trying to quit.   - pta buspar  - nicotine patch 21mg daily   - nicotine lozenge q1hr PRN     #MDD  - pta Effexor.     #Vaginal dryness  Estradiol cream every other day ordered       Diet:  "Clear Liquid Diet   DVT Prophylaxis: Enoxaparin (Lovenox) SQ  Cuevas Catheter: Not present  Cardiac Monitoring: ACTIVE order. Indication: Chest pain/ ACS rule out (24 hours)  Code Status: Full Code          Clinically Significant Risk Factors                   # End stage heart failure: Ventricular assist device (VAD) present      # DMII: A1C = 10.6 % (Ref range: <5.7 %) within past 6 months, PRESENT ON ADMISSION  # Obesity: Estimated body mass index is 32.3 kg/m  as calculated from the following:    Height as of this encounter: 1.575 m (5' 2\").    Weight as of this encounter: 80.1 kg (176 lb 9.4 oz)., PRESENT ON ADMISSION      # ICD device present       Disposition Plan   Expected discharge: 4 - 7 days, recommended to prior living arrangement once advanced heart failure work-up completed.    Entered: Melvin Mendez MD 02/08/2024, 1:15 PM   The patient's care was discussed with the Attending Physician, Dr. Valencia .      Melvin Mendez MD  [facility]  ______________________________________________________________________    Interval History   Nursing notes reviewed. No acute events overnight.Refers ongoing chest pain.     Intake/Output Summary (Last 24 hours) at 2/5/2024 1127  Last data filed at 2/5/2024 1026  Gross per 24 hour   Intake 700 ml   Output 800 ml   Net -100 ml       Physical Exam   Vital Signs: Temp: 98.3  F (36.8  C) Temp src: Oral BP: 100/52 Pulse: 67   Resp: 17 SpO2: 97 % O2 Device: None (Room air)    Weight: 176 lbs 9.42 oz    General Appearance: Chronically ill appearing, no acute distress, sitting up in bed  Respiratory:  clear to ascultation in the peripheral lung fields  Cardiovascular: RRR, no murmurs, no appreciable JVD   GI: Soft, non-distended, non-tender  Skin: No concerning skin lesions/rashes on exposed skin, no peripheral edema  Neuro: alert & oriented x3    Medical Decision Making       Please see A&P for additional details of " medical decision making.      Data     I have personally reviewed the following data over the past 24 hrs:    7.3  \   11.9   / 253     136 110 (H) 13.7 /  136 (H)   3.8 21 (L) 0.78 \       Imaging results reviewed over the past 24 hrs:   Recent Results (from the past 24 hour(s))   XR Chest Port 1 View    Narrative    Exam: XR CHEST PORT 1 VIEW  2/8/2024 3:30 AM     History:  shortness of breath with asymmetric breathing sounds       Comparison:  CT 2/5/2024    Findings: Single view of the chest. Stable left chest wall ICD. The  cardiomediastinal silhouette is within normal limits. No significant  pleural effusion or pneumothorax. No focal airspace consolidation.       Impression    Impression: No acute airspace disease.    I have personally reviewed the examination and initial interpretation  and I agree with the findings.    ELZA GUZMAN MD         SYSTEM ID:  [id number]         I have reviewed today's vital signs, notes, medications, labs and imaging.  I have also seen and examined the patient and agree with the findings and plan as outlined above.  Pt with nonischemic DCM being worked up for LVAD placement.  Currently pt is euvolemic with CI > 2 and PAD with abnormal MANAN measurements--awaiting Vascular Surg recommendations.  Will continue workup.     David Valencia MD, PhD  Professor, Heart Failure and Cardiac Transplantation  [facility]

## 2024-02-09 VITALS
HEIGHT: 62 IN | HEART RATE: 61 BPM | DIASTOLIC BLOOD PRESSURE: 55 MMHG | TEMPERATURE: 98.5 F | SYSTOLIC BLOOD PRESSURE: 90 MMHG | OXYGEN SATURATION: 97 % | WEIGHT: 173.72 LBS | BODY MASS INDEX: 31.97 KG/M2 | RESPIRATION RATE: 14 BRPM

## 2024-02-09 LAB
ANION GAP SERPL CALCULATED.3IONS-SCNC: 15 MMOL/L (ref 7–15)
BASOPHILS # BLD AUTO: 0.1 10E3/UL (ref 0–0.2)
BASOPHILS NFR BLD AUTO: 1 %
BUN SERPL-MCNC: 11.7 MG/DL (ref 6–20)
CALCIUM SERPL-MCNC: 9 MG/DL (ref 8.6–10)
CHLORIDE SERPL-SCNC: 103 MMOL/L (ref 98–107)
COLONOSCOPY: NORMAL
CREAT SERPL-MCNC: 0.75 MG/DL (ref 0.51–0.95)
DEPRECATED HCO3 PLAS-SCNC: 20 MMOL/L (ref 22–29)
EGFRCR SERPLBLD CKD-EPI 2021: >90 ML/MIN/1.73M2
EOSINOPHIL # BLD AUTO: 0.1 10E3/UL (ref 0–0.7)
EOSINOPHIL NFR BLD AUTO: 2 %
ERYTHROCYTE [DISTWIDTH] IN BLOOD BY AUTOMATED COUNT: 13.9 % (ref 10–15)
GLUCOSE BLDC GLUCOMTR-MCNC: 104 MG/DL (ref 70–99)
GLUCOSE SERPL-MCNC: 160 MG/DL (ref 70–99)
HAV AB SER QL IA: NONREACTIVE
HBV CORE AB SERPL QL IA: NONREACTIVE
HBV SURFACE AB SERPL IA-ACNC: 943 M[IU]/ML
HBV SURFACE AB SERPL IA-ACNC: REACTIVE M[IU]/ML
HBV SURFACE AG SERPL QL IA: NONREACTIVE
HCT VFR BLD AUTO: 38.8 % (ref 35–47)
HCV AB SERPL QL IA: NONREACTIVE
HGB BLD-MCNC: 12.7 G/DL (ref 11.7–15.7)
HIV 1+2 AB+HIV1 P24 AG SERPL QL IA: NONREACTIVE
IMM GRANULOCYTES # BLD: 0 10E3/UL
IMM GRANULOCYTES NFR BLD: 1 %
LYMPHOCYTES # BLD AUTO: 2.5 10E3/UL (ref 0.8–5.3)
LYMPHOCYTES NFR BLD AUTO: 39 %
MAGNESIUM SERPL-MCNC: 1.9 MG/DL (ref 1.7–2.3)
MCH RBC QN AUTO: 28.7 PG (ref 26.5–33)
MCHC RBC AUTO-ENTMCNC: 32.7 G/DL (ref 31.5–36.5)
MCV RBC AUTO: 88 FL (ref 78–100)
MONOCYTES # BLD AUTO: 0.5 10E3/UL (ref 0–1.3)
MONOCYTES NFR BLD AUTO: 8 %
NEUTROPHILS # BLD AUTO: 3.3 10E3/UL (ref 1.6–8.3)
NEUTROPHILS NFR BLD AUTO: 49 %
NRBC # BLD AUTO: 0 10E3/UL
NRBC BLD AUTO-RTO: 0 /100
PLATELET # BLD AUTO: 288 10E3/UL (ref 150–450)
POTASSIUM SERPL-SCNC: 3.8 MMOL/L (ref 3.4–5.3)
RBC # BLD AUTO: 4.43 10E6/UL (ref 3.8–5.2)
SODIUM SERPL-SCNC: 138 MMOL/L (ref 135–145)
T PALLIDUM AB SER QL: NONREACTIVE
VZV IGG SER QL IA: 1849 INDEX
VZV IGG SER QL IA: POSITIVE
WBC # BLD AUTO: 6.6 10E3/UL (ref 4–11)

## 2024-02-09 PROCEDURE — 86787 VARICELLA-ZOSTER ANTIBODY: CPT

## 2024-02-09 PROCEDURE — 86780 TREPONEMA PALLIDUM: CPT

## 2024-02-09 PROCEDURE — 99153 MOD SED SAME PHYS/QHP EA: CPT | Performed by: INTERNAL MEDICINE

## 2024-02-09 PROCEDURE — 86704 HEP B CORE ANTIBODY TOTAL: CPT

## 2024-02-09 PROCEDURE — 88305 TISSUE EXAM BY PATHOLOGIST: CPT | Mod: 26 | Performed by: PATHOLOGY

## 2024-02-09 PROCEDURE — 86708 HEPATITIS A ANTIBODY: CPT

## 2024-02-09 PROCEDURE — 250N000013 HC RX MED GY IP 250 OP 250 PS 637

## 2024-02-09 PROCEDURE — 86644 CMV ANTIBODY: CPT

## 2024-02-09 PROCEDURE — G0500 MOD SEDAT ENDO SERVICE >5YRS: HCPCS | Performed by: INTERNAL MEDICINE

## 2024-02-09 PROCEDURE — 86777 TOXOPLASMA ANTIBODY: CPT

## 2024-02-09 PROCEDURE — 85025 COMPLETE CBC W/AUTO DIFF WBC: CPT

## 2024-02-09 PROCEDURE — 80048 BASIC METABOLIC PNL TOTAL CA: CPT

## 2024-02-09 PROCEDURE — 86481 TB AG RESPONSE T-CELL SUSP: CPT

## 2024-02-09 PROCEDURE — 45380 COLONOSCOPY AND BIOPSY: CPT | Performed by: INTERNAL MEDICINE

## 2024-02-09 PROCEDURE — 99238 HOSP IP/OBS DSCHRG MGMT 30/<: CPT | Mod: GC | Performed by: INTERNAL MEDICINE

## 2024-02-09 PROCEDURE — 86665 EPSTEIN-BARR CAPSID VCA: CPT

## 2024-02-09 PROCEDURE — 86695 HERPES SIMPLEX TYPE 1 TEST: CPT

## 2024-02-09 PROCEDURE — 250N000013 HC RX MED GY IP 250 OP 250 PS 637: Performed by: INTERNAL MEDICINE

## 2024-02-09 PROCEDURE — 0DBM8ZZ EXCISION OF DESCENDING COLON, VIA NATURAL OR ARTIFICIAL OPENING ENDOSCOPIC: ICD-10-PCS | Performed by: INTERNAL MEDICINE

## 2024-02-09 PROCEDURE — 86803 HEPATITIS C AB TEST: CPT

## 2024-02-09 PROCEDURE — 87340 HEPATITIS B SURFACE AG IA: CPT

## 2024-02-09 PROCEDURE — 83735 ASSAY OF MAGNESIUM: CPT

## 2024-02-09 PROCEDURE — 36415 COLL VENOUS BLD VENIPUNCTURE: CPT

## 2024-02-09 PROCEDURE — 88305 TISSUE EXAM BY PATHOLOGIST: CPT | Mod: TC | Performed by: INTERNAL MEDICINE

## 2024-02-09 PROCEDURE — 86696 HERPES SIMPLEX TYPE 2 TEST: CPT

## 2024-02-09 PROCEDURE — 250N000011 HC RX IP 250 OP 636: Performed by: INTERNAL MEDICINE

## 2024-02-09 PROCEDURE — 86706 HEP B SURFACE ANTIBODY: CPT

## 2024-02-09 RX ORDER — POLYETHYLENE GLYCOL 3350 17 G/17G
17 POWDER, FOR SOLUTION ORAL DAILY
Qty: 510 G | Refills: 0 | Status: SHIPPED | OUTPATIENT
Start: 2024-02-09

## 2024-02-09 RX ORDER — GABAPENTIN 100 MG/1
100 CAPSULE ORAL 3 TIMES DAILY
Qty: 36 CAPSULE | Refills: 0 | Status: SHIPPED | OUTPATIENT
Start: 2024-02-09 | End: 2024-07-02

## 2024-02-09 RX ORDER — POTASSIUM CHLORIDE 750 MG/1
20 TABLET, EXTENDED RELEASE ORAL ONCE
Status: COMPLETED | OUTPATIENT
Start: 2024-02-09 | End: 2024-02-09

## 2024-02-09 RX ORDER — ISOSORBIDE DINITRATE 10 MG/1
10 TABLET ORAL
Qty: 36 TABLET | Refills: 0 | Status: SHIPPED | OUTPATIENT
Start: 2024-02-09 | End: 2024-02-21

## 2024-02-09 RX ORDER — AMOXICILLIN 250 MG
1 CAPSULE ORAL 2 TIMES DAILY PRN
Qty: 30 TABLET | Refills: 0 | Status: SHIPPED | OUTPATIENT
Start: 2024-02-09 | End: 2024-02-21

## 2024-02-09 RX ORDER — ACETAMINOPHEN 325 MG/1
975 TABLET ORAL EVERY 8 HOURS
Qty: 108 TABLET | Refills: 0 | Status: SHIPPED | OUTPATIENT
Start: 2024-02-09 | End: 2024-02-21

## 2024-02-09 RX ORDER — MAGNESIUM OXIDE 400 MG/1
400 TABLET ORAL EVERY 4 HOURS
Status: COMPLETED | OUTPATIENT
Start: 2024-02-09 | End: 2024-02-09

## 2024-02-09 RX ORDER — POLYETHYLENE GLYCOL 3350 17 G
2 POWDER IN PACKET (EA) ORAL
Qty: 60 LOZENGE | Refills: 0 | Status: SHIPPED | OUTPATIENT
Start: 2024-02-09 | End: 2024-02-28

## 2024-02-09 RX ORDER — METOPROLOL SUCCINATE 25 MG/1
25 TABLET, EXTENDED RELEASE ORAL DAILY
Qty: 11 TABLET | Refills: 0 | Status: SHIPPED | OUTPATIENT
Start: 2024-02-10 | End: 2024-07-02

## 2024-02-09 RX ORDER — OXYCODONE HYDROCHLORIDE 5 MG/1
5 TABLET ORAL EVERY 4 HOURS PRN
Qty: 30 TABLET | Refills: 0 | Status: SHIPPED | OUTPATIENT
Start: 2024-02-09 | End: 2024-02-21

## 2024-02-09 RX ORDER — ONDANSETRON 2 MG/ML
INJECTION INTRAMUSCULAR; INTRAVENOUS PRN
Status: DISCONTINUED | OUTPATIENT
Start: 2024-02-09 | End: 2024-02-09 | Stop reason: HOSPADM

## 2024-02-09 RX ORDER — FENTANYL CITRATE 50 UG/ML
INJECTION, SOLUTION INTRAMUSCULAR; INTRAVENOUS PRN
Status: DISCONTINUED | OUTPATIENT
Start: 2024-02-09 | End: 2024-02-09 | Stop reason: HOSPADM

## 2024-02-09 RX ADMIN — ASPIRIN 81 MG CHEWABLE TABLET 81 MG: 81 TABLET CHEWABLE at 10:48

## 2024-02-09 RX ADMIN — METHOCARBAMOL 500 MG: 500 TABLET ORAL at 01:48

## 2024-02-09 RX ADMIN — DAPAGLIFLOZIN 10 MG: 10 TABLET, FILM COATED ORAL at 10:49

## 2024-02-09 RX ADMIN — FENOFIBRATE 160 MG: 160 TABLET ORAL at 10:50

## 2024-02-09 RX ADMIN — FUROSEMIDE 20 MG: 20 TABLET ORAL at 10:51

## 2024-02-09 RX ADMIN — VENLAFAXINE 100 MG: 25 TABLET ORAL at 10:51

## 2024-02-09 RX ADMIN — GABAPENTIN 100 MG: 100 CAPSULE ORAL at 10:51

## 2024-02-09 RX ADMIN — SPIRONOLACTONE 25 MG: 25 TABLET, FILM COATED ORAL at 10:50

## 2024-02-09 RX ADMIN — BUSPIRONE HYDROCHLORIDE 10 MG: 5 TABLET ORAL at 10:51

## 2024-02-09 RX ADMIN — POLYETHYLENE GLYCOL 3350, SODIUM SULFATE ANHYDROUS, SODIUM BICARBONATE, SODIUM CHLORIDE, POTASSIUM CHLORIDE 2000 ML: 236; 22.74; 6.74; 5.86; 2.97 POWDER, FOR SOLUTION ORAL at 04:07

## 2024-02-09 RX ADMIN — METOPROLOL SUCCINATE 25 MG: 25 TABLET, EXTENDED RELEASE ORAL at 10:51

## 2024-02-09 RX ADMIN — OXYCODONE HYDROCHLORIDE 5 MG: 5 TABLET ORAL at 11:00

## 2024-02-09 RX ADMIN — SACUBITRIL AND VALSARTAN 1 TABLET: 97; 103 TABLET, FILM COATED ORAL at 10:49

## 2024-02-09 RX ADMIN — TICAGRELOR 90 MG: 90 TABLET ORAL at 10:51

## 2024-02-09 RX ADMIN — RANOLAZINE 500 MG: 500 TABLET, FILM COATED, EXTENDED RELEASE ORAL at 10:50

## 2024-02-09 RX ADMIN — OXYCODONE HYDROCHLORIDE 5 MG: 5 TABLET ORAL at 01:48

## 2024-02-09 RX ADMIN — ISOSORBIDE DINITRATE 10 MG: 10 TABLET ORAL at 10:51

## 2024-02-09 RX ADMIN — POTASSIUM CHLORIDE 20 MEQ: 750 TABLET, EXTENDED RELEASE ORAL at 10:52

## 2024-02-09 RX ADMIN — MAGNESIUM OXIDE TAB 400 MG (241.3 MG ELEMENTAL MG) 400 MG: 400 (241.3 MG) TAB at 10:49

## 2024-02-09 ASSESSMENT — ACTIVITIES OF DAILY LIVING (ADL)
ADLS_ACUITY_SCORE: 26
ADLS_ACUITY_SCORE: 27
ADLS_ACUITY_SCORE: 26
ADLS_ACUITY_SCORE: 27

## 2024-02-09 NOTE — OR NURSING
Patient underwent colonoscopy under conscious sedation. Tolerated procedure. Specimens sent to lab. Report given to FRANCIS Real. Transported back to PCU on RA.

## 2024-02-09 NOTE — PROGRESS NOTES
I have seen and evaluated the patient today.  There are no significant changes in the patient's history or physical exam from the prior date of service.  The patient is stable and appropriate to undergo the proposed endoscopic procedure today.  Holland Lyle MD

## 2024-02-09 NOTE — DISCHARGE SUMMARY
[facility]   Cardiology II Service / Advanced Heart Failure  Discharge Summary     Cailin Burns MRN# [id number]   YOB: 1975 Age: 48 year old     DATE OF ADMISSION:  2/4/2024  DATE OF DISCHARGE: 2/9/2024  ADMITTING PROVIDER: David Valencia MD  DISCHARGE PROVIDER: Dr. Black    PRIMARY PROVIDER:  No Ref-Primary, Physician    ADMIT DIAGNOSES:   Nonischemic dilated cardiomyopathy    DISCHARGE DIAGNOSES:   Nonischemic dilated cardiomyopathy     FOLLOW-UP:  - Transplant workup completion- DEXA scan, lab followup, palliative care if necessary, PFTs if not completed at time of discharge  - Vascular otupt appt    PENDING RESULTS:   - Transplant/LVAD olivares labs   - colonoscopy path results (will be followed up by GI)    HPI: Please see the detailed H & P by Dr. Mccarty from 2/4/2024. Briefly, 48y.o. with PMHx of HFpEF (27%) s/p ICD, CAD s/p PCI (PCI of RCA  and MOHAN x1 to mLAD and MOHAN x1 to mLCx on 01/2023), DM2, tobacco use who was admitted to OSH for ACS r/o and transferred to [facility] for LVAD workup.      HOSPITAL COURSE:   LVAD/transplant workup as detailed below. Tecnically she does not meet criteria to be listed just yet based on RHC numbers (RHC on 02/07 w normal left and right filling pressures with CI 2.7 by Altagracia, PCWP 10). During this admission cardiology team continued evaluation, 2/9 was discharged with any further evaluation to be completed as outpatient. Candidacy for LVAD/transplant will be discussed 2/16.       #Chronic HFrEF 2/2 Ischemic cardiomyopathy (EF 27% TTE 01/04/2024), NYHA Class 3  #Tobacco use, active smoking  Follows with Dr. Guerin. Given her active smoking,  LVAD was thought to be a primary option, workup as below.   Underwent RHC on 02/07 which revealed normal left and right filling pressures with CI 2.7 by Altagracia. PCWP 10. Patient has remained euvolemic and medications has been optimized since admission.      GDMT/HF meds:  BB: 25mg metoprolol, increasing as  able (holding home 100mg metoprolol dose d/t hypotension)HR has ranged between 60-70.   ARNI: entresto 97/103 mg BID  SGLT2:  dapagliflozin 10mg daily  MRA: Increasing aldactone to 25 mg daily  Duretics: PTA furosemide 20mg - Patient is euvolemic, this is mainly to maintain euvolemia  Volume Status: appears euvolemic on exam  Device: ICD in place for primary prevention (implanted on 5/11/2023)  C 2/6: mRA: 6, PA pressures 30/12/19, mPCW 10 overall looks appropriate     #LVAD/transplant work up:   [x] Labs (CBC, CMP, PT/INR, cystatin C, prealbumin, UA + micro)  [] Infectious (Hep A/B/C, HIV, treponema, HSV 1/2 IgG, CMV IgG, Toxo IgG, EBV IgG, varicella IgG, Quant gold, COVID vaccine/PCR) - ordered   [x] Utox/nicotine and cotinine/PeTH (Peth neg, utox only with oxycodone which pt is taking, nicotine not ordered as pt trying to quit/has patch)  [x] Immunocompatibility (last transfusion, ABO, HLA tissue typing, PRA)  [x] CVTS consult - seen 2/7  [x] Social work evaluation  [] Palliative care evaluation  [x] Neuropsych evaluation - seen 2/8  [x] Nutrition evaluation - seen 2/7  [x] CT Dental + evaluation - in process  [x] Abd US + doppler  [x] Extremity US and ABIs  [x] Carotid US (if DM or ICM or >49yo) - less than 50% stenosis bilaterally, stenosis R brachiocephalic artery   [] PFTs: planned 2/9   [x] mammogram: ordered  [x] CT head non-contrast: No acute pathology, wnl.  [x] CT CAP non-contrast: no acute pathology  [x] Colonoscopy- 2 small polyps removed, path pending  [] DEXA  [x] PSA - N/A  [x] 6 min walk test: walked ~400 feet. Worry that claudication is contributing to decompensated picture, and CTPX will not be useful to assess max oxygen consumption. Vascular consulted, rec med management and vascular followup as outpatient.     #Chest pain, chronic vs angina   #Multivessel CAD with PCI of RCA  and MOHAN x1 to mLAD and MOHAN x1 to mLCx (Dec 2022 and Jan 2023)  3V disease not appropriate for CABG due to poor  bypass targets- therefore had staged PCI of CTOs in Dec 2022 and Jan 2023.  Has ongoing chest pain managed with nitroglycerin and oxycodone/fentanyl at OSH. Due to small vessels patient is a poor candidate for PCI. Plan for medical management.   Pain consulted for further assistance with management.   Current regiment for pain:   - Oxycodone 5mg PO Q 4 hours PRN, has bowel reg (miralax and senna scheduled)  - Gabapentin 100mg TID  - Acetaminophen 975mg PO Q 8 hours  - Robaxin 500mg PO Q 6 hours PRN  - Voltaren gel   - Metoprolol 25mg daily  - Isordil 10mg TID  - Ranolazine 500mg BID  - per pain, Consider cervical spine imaging study if worsening pain      #Peripheral artery disease  Given abnormal ABIs, concern that 6 minute walk test is limited by claudication, vascular consulted. They recommended med management with ASA + statin with additional outpatient followup  - ASA, lipitor  - on discharge, will refer pt to Dr. Nitish Yang from vascular medicine         #Right subclavian stenosis  #High grade brachiocephalic stenosis.   Discrepancy of blood pressure, which is lower on the right side. Patient notes pain and tingling in the right hand. Upper arterial duplex shows right subclavian stenosis and possible brachiocephalic stenosis. She was recommended further evaluation with CTA, done 2/5 which showed high grade stenosis of the right brachiocephalic artery.  - ABIs borderline  - avoid using right arm for blood pressure measurement (patient care order placed)        #Diabetes, on Lantus and sliding scale. (A1C 6.8)  -Glargine 15 daily.   -Insulin sliding scale insulin.      #Tobacco use disorder  Has been trying to quit.  - pta buspar  - nicotine patch 21mg daily   - nicotine lozenge q1hr PRN     #MDD  - pta Effexor.      #Vaginal dryness  - Estradiol cream every other day    Valentina Gale MD  Internal Medicine, PGY1    Patient staffed with attending physician, Dr. Black    PHYSICAL EXAM:  Blood pressure 90/55, pulse  "61, temperature 98.5  F (36.9  C), temperature source Oral, resp. rate 14, height 1.575 m (5' 2\"), weight 78.8 kg (173 lb 11.6 oz), SpO2 97%.    General Appearance:  Chronically ill appearing, no acute distress, sitting up in bed  Respiratory:  clear to ascultation in the peripheral lung fields  Cardiovascular: RRR, no murmurs, no appreciable JVD   GI: Soft, non-distended, non-tender  Skin: No concerning skin lesions/rashes on exposed skin, no peripheral edema  Neuro: alert & oriented x3    LABS:   Last CBC:   Recent Labs   Lab Test 02/09/24  0452   WBC 6.6   RBC 4.43   HGB 12.7   HCT 38.8   MCV 88   MCH 28.7   MCHC 32.7   RDW 13.9          Last CMP:  Recent Labs   Lab Test 02/09/24  0452 02/06/24  0846 02/06/24  0614      < > 138   POTASSIUM 3.8   < > 4.1   CHLORIDE 103   < > 106   RON 9.0   < > 8.9   CO2 20*   < > 23   BUN 11.7   < > 15.5   CR 0.75   < > 0.70   *   < > 89   AST  --   --  25   ALT  --   --  25   BILITOTAL  --   --  0.3   ALBUMIN  --   --  3.8   PROTTOTAL  --   --  6.4   ALKPHOS  --   --  55    < > = values in this interval not displayed.       IMAGING:  Results for orders placed or performed during the hospital encounter of 02/04/24   XR Chest Port 1 View    Narrative    Exam: XR CHEST PORT 1 VIEW  2/4/2024 10:23 PM     History:  new admission with decompensated heart failure       Comparison:  5/11/2023    Findings: Single view of the chest. Stable left chest wall ICD. The  cardiomediastinal silhouette is within normal limits. No significant  pleural effusion or pneumothorax. No focal airspace opacity.      Impression    Impression: No confluent consolidation.    I have personally reviewed the examination and initial interpretation  and I agree with the findings.    KEENAN ORTEGA MD         SYSTEM ID:  [id number]   US MANAN Doppler No Exercise    Narrative    Examinations 2/6/2024 8:56 AM:  1. MANAN  2. Ultrasound lower extremity arterial duplex bilateral    CLINICAL HISTORY: " Heart Failure pre Ventricular Assist Device (VAD)  planning.     COMPARISONS: None available.    REFERRING PROVIDER: QUINTON HICKS    TECHNIQUE: Bilateral MANAN obtained.    Bilateral leg arteries evaluated with grayscale, color Doppler, and  spectral pulsed wave Doppler ultrasound.    FINDINGS:  RIGHT:       Brachial: not taken due reported clot        Ankle (PT): 61 mmHg       Ankle (DP): 49 mmHg         MANAN: 0.67         Common femoral artery: 219/21 cm/s, triphasic       Profundus femoral artery: 163/0 cm/s, triphasic         Superficial femoral artery, origin: 124/6 cm/s, triphasic       Superficial femoral artery, proximal thigh: 156/14 cm/s,  triphasic       Superficial femoral artery, mid thigh, above collateral: 64/10  cm/s, arterial monophasic       Superficial femoral artery, mid thigh, below collateral: occluded       Superficial femoral artery, distal thigh: 35/7 cm/s, arterial  monophasic       Superficial femoral artery, distal thigh: 165/26 cm/s, arterial  monophasic       Superficial femoral artery, distal thigh: 64/13 cm/s, arterial  monophasic         Popliteal artery, proximal: 122/20 cm/s, arterial monophasic       Popliteal artery, distal: 82/21 cm/s, arterial monophasic         Posterior tibial artery, ankle: 42/15 cm/s, arterial monophasic       Anterior tibial artery, ankle: 47/12 cm/s, arterial monophasic    LEFT:       Brachial: 91 mmHg       Ankle (PT): 72 mmHg       Ankle (DP): 71 mmHg         MANAN: 0.79         Common femoral artery: 173/20 cm/s, arterial monophasic       Profundus femoral artery: 121/0 cm/s, triphasic         Superficial femoral artery, origin: 199/24 cm/s, arterial  monophasic       Superficial femoral artery, mid thigh: 199/29 cm/s, arterial  monophasic       Superficial femoral artery, distal thigh: 153/0 cm/s, biphasic         Popliteal artery, proximal: 172/32 cm/s, arterial monophasic       Popliteal artery, distal: 131/12 cm/s, arterial monophasic          Posterior tibial artery, ankle: 57/13 cm/s, arterial monophasic       Anterior tibial artery, ankle: 131/26 cm/s, arterial monophasic      Impression    IMPRESSION:  1. RIGHT:       A. Resting MANAN is ABNORMAL, 0.67.       B. Superficial femoral artery occluded in the mid thigh and  reconstituted in the distal thigh.    2. LEFT:       A. Resting MANAN is ABNORMAL, 0.79.       B. Negative duplex arterial ultrasound.    Guidelines:    MANAN Diagnostic Criteria (Based on criteria published in Circulation  2011; 124: 3238-2700):    > 1.4: Non compressible    1.00 - 1.40: Normal    0.91 - 0.99: Borderline    At or below 0.90: Abnormal    I have personally reviewed the examination and initial interpretation  and I agree with the findings.    NEVA PFEIFFER MD         SYSTEM ID:  [id number]    Lower Extremity Arterial Duplex Bilateral    Narrative    Examinations 2/6/2024 8:56 AM:  1. MANAN  2. Ultrasound lower extremity arterial duplex bilateral    CLINICAL HISTORY: Heart Failure pre Ventricular Assist Device (VAD)  planning.     COMPARISONS: None available.    REFERRING PROVIDER: QUINTON HICKS    TECHNIQUE: Bilateral MANAN obtained.    Bilateral leg arteries evaluated with grayscale, color Doppler, and  spectral pulsed wave Doppler ultrasound.    FINDINGS:  RIGHT:       Brachial: not taken due reported clot        Ankle (PT): 61 mmHg       Ankle (DP): 49 mmHg         MANAN: 0.67         Common femoral artery: 219/21 cm/s, triphasic       Profundus femoral artery: 163/0 cm/s, triphasic         Superficial femoral artery, origin: 124/6 cm/s, triphasic       Superficial femoral artery, proximal thigh: 156/14 cm/s,  triphasic       Superficial femoral artery, mid thigh, above collateral: 64/10  cm/s, arterial monophasic       Superficial femoral artery, mid thigh, below collateral: occluded       Superficial femoral artery, distal thigh: 35/7 cm/s, arterial  monophasic       Superficial femoral artery, distal thigh: 165/26 cm/s,  arterial  monophasic       Superficial femoral artery, distal thigh: 64/13 cm/s, arterial  monophasic         Popliteal artery, proximal: 122/20 cm/s, arterial monophasic       Popliteal artery, distal: 82/21 cm/s, arterial monophasic         Posterior tibial artery, ankle: 42/15 cm/s, arterial monophasic       Anterior tibial artery, ankle: 47/12 cm/s, arterial monophasic    LEFT:       Brachial: 91 mmHg       Ankle (PT): 72 mmHg       Ankle (DP): 71 mmHg         MANAN: 0.79         Common femoral artery: 173/20 cm/s, arterial monophasic       Profundus femoral artery: 121/0 cm/s, triphasic         Superficial femoral artery, origin: 199/24 cm/s, arterial  monophasic       Superficial femoral artery, mid thigh: 199/29 cm/s, arterial  monophasic       Superficial femoral artery, distal thigh: 153/0 cm/s, biphasic         Popliteal artery, proximal: 172/32 cm/s, arterial monophasic       Popliteal artery, distal: 131/12 cm/s, arterial monophasic         Posterior tibial artery, ankle: 57/13 cm/s, arterial monophasic       Anterior tibial artery, ankle: 131/26 cm/s, arterial monophasic      Impression    IMPRESSION:  1. RIGHT:       A. Resting MANAN is ABNORMAL, 0.67.       B. Superficial femoral artery occluded in the mid thigh and  reconstituted in the distal thigh.    2. LEFT:       A. Resting MANAN is ABNORMAL, 0.79.       B. Negative duplex arterial ultrasound.    Guidelines:    MANAN Diagnostic Criteria (Based on criteria published in Circulation  2011; 124: 6808-2108):    > 1.4: Non compressible    1.00 - 1.40: Normal    0.91 - 0.99: Borderline    At or below 0.90: Abnormal    I have personally reviewed the examination and initial interpretation  and I agree with the findings.    NEVA PFEIFFER MD         SYSTEM ID:  [id number]   CT Head w/o Contrast    Narrative    CT HEAD W/O CONTRAST 2/5/2024 5:44 PM    Provided History: LVAD workup  ICD-10:    Comparison: None     Technique: Using multidetector thin collimation helical  acquisition  technique, axial, coronal and sagittal CT images from the skull base  to the vertex were obtained without intravenous contrast.     Findings:  Mild asymmetric appearance of the right lateral ventricle  likely constitutional in nature. Otherwise, the ventricles are  proportionate to the cerebral sulci. No evidence of hydrocephalus.  No intracranial hemorrhage, mass effect, or midline shift. . The gray  to white matter differentiation of the cerebral hemispheres is  preserved. The basal cisterns are patent.    The visualized paranasal sinuses are clear. The mastoid air cells are  clear.       Impression    Impression: No acute intracranial pathology. Minimal asymmetry of  right lateral ventricle, likely variational.    SHARONA ALEXANDER MD         SYSTEM ID:  [id number]   CT Chest Abdomen Pelvis w/o Contrast    Narrative    EXAMINATION: CT CHEST ABDOMEN PELVIS W/O CONTRAST, 2/5/2024 5:50 PM    INDICATION: LVAD workup    COMPARISON STUDY: CTA chest 2/5/2024. Myocardial viability study  9/28/2022.    TECHNIQUE: CT scan of the chest, abdomen, and pelvis was performed on  multidetector CT scanner using volumetric acquisition technique and  images were reconstructed in multiple planes with variable thickness  and reviewed on dedicated workstations. Noncontrast exam.    CT scan radiation dose is optimized to minimum requisite dose using  automated dose modulation techniques.    FINDINGS:    Lines, tubes, devices: Left chest wall implantable cardiac  defibrillator and leads are remain in stable position. Coronary artery  stents.    CHEST:    Lungs: The central tracheobronchial tree is patent. No areas of  bronchiectasis or architectural distortion. No consolidation. Mild  atelectasis versus scarring within the right middle lobe and bilateral  lower lobes. Pulmonary emphysema.    There are a few sub-4 mm solid pulmonary nodules including a 3 mm  solid pulmonary nodule in the left upper lobe (series 3 image 134) and  an  adjacent 3 mm solid perivascular left upper lobe pulmonary nodule  (series 3, image 136), stable compared to myocardial viability scan  9/28/2022 and a 2.9 mm left lower lobe pulmonary nodule, which was not  visualized on prior exams..    No pleural effusion or pneumothorax.     Mediastinum: The visualized thyroid is unremarkable.Heart size is  within normal limits. Coronary stents. No pericardial effusion. The  thoracic aorta and main pulmonary artery are within normal limits.  Normal variant common origin of the right brachiocephalic and left  common carotid arteries. No abnormal thoracic lymph nodes by size  criteria. Partially calcified mediastinal lymph nodes. Mild diffuse  esophageal wall thickening. Small esophageal hernia.      ABDOMEN/PELVIS:    Hepatobiliary: Normal noncontrast appearance of the liver. No  calcified gallstones. No intra or extrahepatic biliary dilation.     Pancreas: Normal noncontrast appearance of the pancreas. No mass or  pancreatic ductal dilation.    Adrenal glands: No mass or nodules    Spleen: Normal.    Kidneys: Suspected cyst within the right mid kidney although  evaluation limited due to lack of intravenous contrast. Nonobstructing  3 mm calculus within the left mid kidney (4/174; 6/65). Or  hydronephrosis.    Gastrointestinal tract: Mild gastric wall thickening, likely in part  due to underdistention. No small or large bowel dilation. Moderate  colonic stool burden. Appendix was not identified.    Mesentery/peritoneum/retroperitoneum: No mass. No free fluid or air.    Lymph nodes: No significant lymphadenopathy by size criteria.    Vasculature: Patency of vessels cannot be evaluated on this  noncontrast exam.  Mild aortobiiliac atherosclerotic disease. The  abdominal aorta is nonaneurysmal.    Pelvis: Urinary bladder is normal. Uterus and adnexa appear within  normal limits.    Soft tissues and osseous structures: No aggressive or acute osseous  lesion.  Mild degenerative  changes throughout the visualized spine.  Small fat-containing umbilical hernia. Small fat-containing left  inguinal hernia. Several soft tissue nodules within the subcutaneous  anterior abdominal wall likely related to medication injection sites.  Additional scattered subcutaneous nodularity within the ventral  abdominal wall.      Impression    IMPRESSION:  1. No acute CT findings in the chest, abdomen, or pelvis.  2. Few sub-4 mm solid pulmonary nodules in the left upper lobe and  left lower lobe, some of which appear stable when compared to prior CT  09/28/2022. Recommend follow-up per Fleischner Society guidelines.  3. Nonobstructing 3 mm calculus within the left mid kidney.  4. Additional incidental and chronic findings detailed in the body of  the report.      I have personally reviewed the examination and initial interpretation  and I agree with the findings.    DUARTE LIZ MD         SYSTEM ID:  [id number]   CT Dental wo Contrast    Narrative    CT DENTAL WO CONTRAST 2/5/2024 5:49 PM    History:  LVAD eval workup    Comparison:    TECHNIQUE: 3-D reconstruction by the technologists, with curved  multiplanar reformat of thin section imaging through the mandible and  maxilla obtained without intravenous contrast.    FINDINGS:  Right maxillary:  Third molar (1): absent  Second molar (2): absent  First molar (3): absent  Second premolar (4): absent  First premolar (5): Mild periapical lucency without cavity  Canine (6):  Small filling material.  Lateral incisor (7): Scattered filling material  Central incisor (8):Scattered filling material    Left maxillary:  Central incisor (9): Scattered filling material  Lateral incisor (10): Scattered filling material  Canine (11): Unremarkable  First premolar (12): Dental prosthesis. Minimal periapical lucency.  Second premolar (13): normal  First molar (14): Absent  Second molar (15): Absent  Third molar (16): Absent    Left mandibular:  Third molar (17):Absent  Second  molar (18):Absent  First molar (19):Absent  Second premolar (20): Normal  First premolar (21): Normal  Canine (22):  Small defect in the anterior medial aspect   Lateral incisor (23):Normal  Central incisor (24):Normal    Right mandibular:  Central incisor (25):Normal  Lateral incisor (26):Normal  Canine (27): Normal  First premolar (28):Normal  Second premolar (29): Small dental prosthesis with periapical lucency.  First molar (30): Superimposed streak artifacts. Likely normal in  nature.  Second molar (31): Absent  Third molar (32): Excellent    No significant soft tissue swelling or mass. Normal facial bone  alignment. No bony erosion.     Visualized portions of the paranasal sinuses are clear. Normal  temporomandibular joints.      Impression    IMPRESSION: Multiple tooth with by dental prosthesis and filling  material. Multiple absent teeth. Periapical lucency of unknown  significance in the tooth 12 and 29, may represent periapical  periodontitis. No large soft tissue abscess in the face.    SHARONA ALEXANDER MD         SYSTEM ID:  [id number]   CTA Chest with Contrast     Value    Radiologist flags (Urgent)     Right innominate stenosis and non occlusive thrombus    Narrative    CTA CHEST WITH 3D AND MULTIPLANAR RECONSTRUCTIONS 2/5/2024 5:50 PM    CLINICAL HISTORY: Eval for right subclavian stenosis and possible  brachiocephalic stenosis..     COMPARISONS: Chest x-ray 2/4/2024    REFERRING PROVIDER: NICOLAS RANGEL    TECHNIQUE: Unenhanced CT performed through the chest, abdomen, and  pelvis as part of a separate study. After the uneventful  administration of intravenous contrast, CTA performed through the  chest. Coronal and sagittal reconstructions were produced. Lung MIP  produced.    3D and multiplanar reconstructions were produced and reviewed.    CONTRAST: 88 mL Isovue 370    DOSE (DLP): 608 mGy*cm - including the non contrast CT.    FINDINGS: CTA: Right innominate and left carotid arteries share a  broad based  common origin.     Mural or periarterial thickening along the right innominate proximal  segment.    Right innominate string-sign focal near occlusion.    Left carotid origin 50% diameter stenosis.     Right innominate, subclavian, and carotid, left carotid, and left  subclavian arteries otherwise patent. Bilateral vertebral arteries  patent.    Aortic measurements:       Sinuses of Valsalva: 31 mm       Sinotubular junction: 29 mm       Ascendin mm       Arch: 24 mm       Proximal descendin mm       Mid descendin mm       Diaphragm: 22 mm    Left subclavian AICD lead. Suboptimal bolus timing to evaluate left  subclavian venous patency. Non occlusive filling defect around the  lead through the superior vena cava.    Celiac origin 50% diameter stenosis. Celiac artery otherwise patent.  Accessory left hepatic artery originates from the left gastric artery.    Superior mesenteric and visualized renal arteries patent.    CT: 4 mm left upper lobe ground glass opacity.    Calcified mediastinal lymph nodes.    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     FRANCIS Rodarte was contacted by Dr. Shearer at 2024 8:45 PM and  verbalized understanding of the urgent finding.       Impression    IMPRESSION:  1. Right innominate and left carotid arteries share a common broad  based origin.    2. Proximal right innominate artery mural thickening and focal  string-sign near occlusion. Differential includes arteritis,  dissection, and atherosclerotic disease.    3. Left carotid origin 50% diameter stenosis.    4. Non occlusive filling defect, thrombus or fibrin, around the AICD  lead through the superior vena cava.    5. Proximal celiac artery 50% diameter stenosis.    I have personally reviewed the examination and initial interpretation  and I agree with the findings.    NEVA PFEIFFER MD         SYSTEM  ID:  [id number]   US Abdomen Complete    Narrative    EXAMINATION: US ABDOMEN COMPLETE  2/6/2024 8:56 AM      CLINICAL HISTORY: Heart Failure pre Ventricular Assist Device (VAD)  planning. Evaluate hepatic texture, renal size and aortic diameter    COMPARISON: CT 2/5/2024        FINDINGS:    The liver demonstrates increased echogenicity measuring approximately  18 cm in length without focal masses or lesions. There is no  intrahepatic biliary ductal dilatation. The common bile duct measures  up to  7 mm. Distal CBD is obscured. The gallbladder is normal,  without gallstones, wall thickening, or pericholecystic fluid.    The spleen measures maximally 9.5 cm and is normal in appearance.  Pancreas is partially visualized with the visualized portions of the  pancreas appearing unremarkable    The visualized upper abdominal aorta and inferior vena cava are  normal. The proximal aorta measures 2.4 cm.    The kidneys are normal in position and echogenicity. The right kidney  measures 12.2 cm and the left kidney measures 11.8 cm. At the mid pole  of the right kidney and there is a anechoic avascular cyst measuring  approximately 1.0 x 1.3 x 1.3 cm. No hydronephrosis.      Impression    IMPRESSION:     1. Increased hepatic echogenicity which may be seen with parenchymal  disease including steatosis.  2. Mild hepatomegaly.  3. Borderline visualized common bile duct, measuring up to 7 mm. No  obstructive lesion demonstrated in the visualized CBD; distal CBD is  obscured.  4. Renal size within normal limits. Simple appearing cyst in the right  kidney measuring up to 1.3 cm   5. Proximal abdominal aorta measures 2.4 cm in anteroposterior  diameter    I have personally reviewed the examination and initial interpretation  and I agree with the findings.    KEENAN ORTEGA MD         SYSTEM ID:  [id number]   XR Chest Port 1 View    Narrative    Exam: XR CHEST PORT 1 VIEW  2/8/2024 3:30 AM     History:  shortness of breath with  asymmetric breathing sounds       Comparison:  CT 2/5/2024    Findings: Single view of the chest. Stable left chest wall ICD. The  cardiomediastinal silhouette is within normal limits. No significant  pleural effusion or pneumothorax. No focal airspace consolidation.       Impression    Impression: No acute airspace disease.    I have personally reviewed the examination and initial interpretation  and I agree with the findings.    ELZA GUZMAN MD         SYSTEM ID:  [id number]   XR Cervical Spine 2/3 Views    Narrative    Exam: XR CERVICAL SPINE 2/3 VIEWS, 2/8/2024 5:19 PM    Indication: Neck pain    Comparison: None    Findings:   AP, lateral, swimmers, and odontoid views of the cervical spine.  Straightening of the cervical lordosis. Trace anterolisthesis of C4 on  C5. No significant loss of disc space. No fracture or subluxation.  Mild endplate osteophytosis. The prevertebral soft tissues are within  normal limits. The visualized lung apices are clear. The lateral  masses of C1 are normally aligned on C2. The odontoid process is  intact.      Impression    Impression:   1. No acute fracture or traumatic subluxation of the cervical spine.  2. Mild multilevel degenerative changes.    I have personally reviewed the examination and initial interpretation  and I agree with the findings.    DUARTE FONG MD         SYSTEM ID:  [id number]   US Carotid Bilateral    Narrative    Exam: Bilateral carotid duplex Doppler ultrasound dated 2/8/2024 3:21  PM    Clinical history: LVAD/Transplant work up    Comparison Study: CT chest 2/5/2024    Ordering provider: QUINTON HICKS    Technique: Grayscale (B-mode) and duplex and spectral Doppler  ultrasound of the extracranial internal carotid, external carotid,  vertebral artery origins, right brachiocephalic/subclavian and left  subclavian arteries. Velocity measurements obtained with angle  correction at or less than 60 degrees.    Findings:    Right side:     Right-sided spectral  waveforms are postobstructive throughout.    Plaque Morphology: Mild plaque.       Proximal CCA: 101/21 cm/sec     Mid CCA: 58/24 cm/sec     Distal CCA: 86/38 cm/sec          External CA: 109/43 cm/sec       Proximal ICA: 49/35 cm/sec     Mid ICA: 56/31 cm/sec     Distal ICA: 48/33 cm/sec       Vertebral artery: Retrograde.     Subclavian artery: 201 cm/s       ICA/CCA ratio: <1    Left side:     Plaque Morphology: Echogenic plaque at the bifurcation and proximal  internal carotid artery       Proximal CCA: 132/28 cm/sec     Mid CCA: 113/37 cm/sec     Distal CCA: 118/29 cm/sec          External CA: 138/36 cm/sec       Proximal ICA: 150/37 cm/sec     Mid ICA: 115/28 cm/sec     Distal ICA: 95/31 cm/sec       Vertebral artery: 138/20 cm/sec     ICA/CCA ratio: 1.4      Impression    Impression:    1. Right side:        Degree of stenosis of the internal carotid artery: Assessment of  stenosis based on peak velocity is limited given proximal upstream  stenosis. Likely less than 20%.    2. Left side:         Degree of stenosis of the internal carotid artery: Less than 50 %.    3. 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    4. 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    Intersocietal Accreditation Commission Updated Recommendations for  Carotid Stenosis Interpretation Criteria - October 2021.  https://intersocietal.org/wp-content/uploads/2021/10/IAC-Vascular-Test  dk-Deefwjwogrdrs_Rihmthh-Zragepuyzfwyjaw-for-Carotid-Stenosis-Interpre  ation-Criteria.pdf    Society for Vascular Surgery Clinical Practice Guidelines for  Management of Extracranial Cerebrovascular Disease. Journal of  Vascular Surgery Vol. 75, Issue 1, Supplement [id number] Published  online: June 18, 2021 (additional  criteria adjustment for >70% ICA  stenosis)         Normal            ICA PSV: < 180 cm/s            Plaque: None            ICA/CCA PSV Ratio: < 2.0            ICA EDV: < 40 cm/s         < 50%            ICA PSV: < 180 cm/s            Plaque: < 50%            ICA/CCA PSV Ratio: < 2.0            ICA EDV: < 40 cm/s         50 - 69%            ICA PSV: < 180 - 230 cm/s            ICA/CCA PSV Ratio: 2.0 - 4.0            ICA EDV: 40 - 100 cm/s         > 70%            ICA PSV: > 230 cm/s AND             ICA/CCA PSV Ratio: > 4.0 or ICA EDV: > 100 cm/s         Total occlusion            ICA PSV: Undetectable            ICA/CCA PSV Ratio: N/A            ICA EDV: N/A    JESSE NICKERSON         SYSTEM ID:  [id number]   Echo Complete     Value    LVEF  30-35% (moderately reduced)    Narrative    [id number]  [id number]  [id number]  [id number]^HEVER^FLAQUITA     [facility],[place]  Echocardiography Laboratory  [address] [place] [id number]     Name: MARTHA SETHI  MRN: [id number]  : 1975  Study Date: 2024 08:52 AM  Age: 48 yrs  Gender: Female  Patient Location: [facility]  Reason For Study: Heart Failure  Ordering Physician: FLAQUITA KATHLEEN  Referring Physician: QUINTON HICKS  Performed By: Kashif Beckford     BSA: 1.8 m2  Height: 62 in  Weight: 176 lb  HR: 58  ______________________________________________________________________________  Procedure  Complete Portable Echo Adult. Contrast Optison. Technically difficult  study.Extremely difficult acoustic windows despite the use of contrast for  endcardial border definition. Optison (NDC #3740-4049-92) given intravenously.  Patient was given 7 ml mixture of 3 ml Optison and 6 ml saline. 2 ml wasted.  ______________________________________________________________________________  Interpretation Summary  Technically difficult study.Extremely difficult acoustic windows despite the  use of contrast for endcardial border  definition.  Left ventricular function is decreased. The ejection fraction is 30-35%  (moderately reduced).  Apical wall akinesis is present.  Global right ventricular function is normal.  Mild mitral insufficiency is present.  Mild aortic insufficiency is present.  Pulmonary artery systolic pressure cannot be assessed.  IVC diameter <2.1 cm collapsing >50% with sniff suggests a normal RA pressure  of 3 mmHg.  No pericardial effusion is present.  ______________________________________________________________________________  Left Ventricle  Left ventricular function is decreased. The ejection fraction is 30-35%  (moderately reduced). Apical wall akinesis is present. Inferolateral  (posterior) wall hypokinesis is present.     Right Ventricle  The right ventricle is normal size. Global right ventricular function is  normal.     Atria  The atria cannot be assessed.     Mitral Valve  Mild mitral insufficiency is present.     Aortic Valve  Mild aortic insufficiency is present.     Tricuspid Valve  Mild tricuspid insufficiency is present. Pulmonary artery systolic pressure  cannot be assessed.     Vessels  The inferior vena cava was normal in size with preserved respiratory  variability. IVC diameter <2.1 cm collapsing >50% with sniff suggests a normal  RA pressure of 3 mmHg.     Pericardium  No pericardial effusion is present.     ______________________________________________________________________________  MMode/2D Measurements & Calculations  IVSd: 0.93 cm  LVIDd: 5.3 cm  LVIDs: 4.7 cm  LVPWd: 0.83 cm  FS: 11.5 %  LV mass(C)d: 170.1 grams  LV mass(C)dI: 94.0 grams/m2  RWT: 0.31  TAPSE: 2.0 cm     Doppler Measurements & Calculations  AI P1/2t: 681.9 msec     ______________________________________________________________________________  Report approved by: MD Casa Toney 02/05/2024 09:56 AM         Cardiac Catheterization    Narrative      Right sided filling pressures are normal.    Left sided  filling pressures are normal.    Normal PA pressures.    Normal cardiac output level.     Cardiac Device Check - Inpatient     Value    Date Time Interrogation Session [id number]    Implantable Pulse Generator  Medtronic    Implantable Pulse Generator Model [id number] Cobalt XT VR MRI    Implantable Pulse Generator Serial Number [id number]    Type Interrogation Session In Clinic    Clinic Name [facility]    Implantable Pulse Generator Type Defibrillator    Implantable Pulse Generator Implant Date 20230511    Implantable Lead  Medtronic    Implantable Lead Model 6935M Sprint Quattro Secure S MRI SureScan    Implantable Lead Serial Number [id number]    Implantable Lead Implant Date 20230511    Implantable Lead Polarity Type Tripolar Lead    Implantable Lead Location Detail 1 SEPTUM    Implantable Lead Special Function 55CM    Implantable Lead Location Right Ventricle    Implantable Lead Connection Status Connected    Tong Setting Mode (NBG Code) VVI    Tong Setting Lower Rate Limit 40    Tong Setting Hysterisis Rate Off    Lead Channel Setting Sensing Polarity Bipolar    Lead Channel Setting Sensing Anode Location Right Ventricle    Lead Channel Setting Sensing Anode Terminal Ring    Lead Channel Setting Sensing Cathode Location Right Ventricle    Lead Channel Setting Sensing Cathode Terminal Tip    Lead Channel Setting Sensing Sensitivity 0.3    Lead Channel Setting Pacing Polarity Bipolar    Lead Channel Setting Pacing Anode Location Right Ventricle    Lead Channel Setting Pacing Anode Terminal Ring    Lead Channel Setting Sensing Cathode Location Right Ventricle    Lead Channel Setting Sensing Cathode Terminal Tip    Lead Channel Setting Pacing Pulse Width 0.4    Lead Channel Setting Pacing Amplitude 2.0    Lead Channel Setting Pacing Capture Mode Adaptive    Zone Setting Type Category VF    Zone Setting Vendor Type Category VF    Zone Setting Status Active     Zone Setting Detection Interval 270    Zone Setting Detection Beats Numerator 30    Zone Setting Detection Beats Denominator 40    Zone Setting Type Category VT    Zone Setting Vendor Type Category FastVT    Zone Setting Status Inactive    Zone Setting Type Category VT    Zone Setting Vendor Type Category VT    Zone Setting Status Active    Zone Setting Detection Interval 320    Zone Setting Detection Beats Numerator 16    Zone Setting Detection Beats Denominator 16    Zone Setting Type Category VT    Zone Setting Vendor Type Category MonVT    Zone Setting Status Monitor    Zone Setting Detection Interval 400    Zone Setting Detection Beats Numerator 32    Zone Setting Detection Beats Denominator 32    Lead Channel Impedance Value 380    Lead Channel Impedance Value 456    Lead Channel Sensing Intrinsic Amplitude 30.0    Lead Channel Pacing Threshold Amplitude 0.625    Lead Channel Pacing Threshold Pulse Width 0.4    Lead Channel Pacing Threshold Amplitude 0.75    Lead Channel Pacing Threshold Pulse Width 0.4    Battery Date Time of Measurements 05847304490420    Battery RRT Trigger 2.8 V    Battery Remaining Longevity 159    Battery Voltage 3.02    Capacitor Charge Type Shock    Capacitor Last Charge Date Time 54604364030454    Capacitor Charge Time 3.8    Capacitor Charge Energy 18.0    Tong Statistic Date Time Start 09407054429416    Tong Statistic Date Time End 82666293451717    Tong Statistic RA Percent Paced Invalid Value    Tong Statistic RV Percent Paced 0.01    CRT Statistic Date Time Start 56987349799651    CRT Statistic Date Time End 13339989535481    Atrial Tachy Statistic Date Time Start 24683986908711    Atrial Tachy Statistic Date Time End 87196091589060    Atrial Tachy Statistic AT/AF [place] Percent 0    Therapy Statistic Recent Shocks Delivered 0    Therapy Statistic Recent Shocks Aborted 0    Therapy Statistic Recent ATP Delivered 0    Therapy Statistic Recent Date Time Start 95853223155638     Therapy Statistic Recent Date Time End 72460834323216    Therapy Statistic Total Shocks Delivered 0    Therapy Statistic Total Shocks Aborted 0    Therapy Statistic Total ATP Delivered 0    Therapy Statistic Total  Date Time Start 18122847834622    Therapy Statistic Total  Date Time End 16543062508596    Episode Statistic Recent Count 0    Episode Statistic Type Category Patient Activated    Episode Statistic Recent Count 0    Episode Statistic Type Category SVT    Episode Statistic Recent Count 0    Episode Statistic Type Category VT    Episode Statistic Recent Count 0    Episode Statistic Type Category VF    Episode Statistic Recent Count 0    Episode Statistic Type Category VT    Episode Statistic Recent Count 0    Episode Statistic Type Category VT    Episode Statistic Recent Count 0    Episode Statistic Type Category VT    Episode Statistic Recent Date Time Start 49895216685604    Episode Statistic Recent Date Time End 89235464700242    Episode Statistic Recent Date Time Start 83160961555383    Episode Statistic Recent Date Time End 34027649141188    Episode Statistic Recent Date Time Start 88459343075988    Episode Statistic Recent Date Time End 46896534035931    Episode Statistic Recent Date Time Start 19544013801927    Episode Statistic Recent Date Time End 06696944276646    Episode Statistic Recent Date Time Start 28991576612357    Episode Statistic Recent Date Time End 56530874015538    Episode Statistic Recent Date Time Start 60100583535284    Episode Statistic Recent Date Time End 56949728554931    Episode Statistic Recent Date Time Start 61969541622117    Episode Statistic Recent Date Time End 40993888641330    Episode Statistic Total Count 0    Episode Statistic Type Category Patient Activated    Episode Statistic Total Count 0    Episode Statistic Type Category SVT    Episode Statistic Total Count 0    Episode Statistic Type Category VT    Episode Statistic Total Count 0    Episode Statistic Type  Category VF    Episode Statistic Total Count 0    Episode Statistic Type Category VT    Episode Statistic Total Count 0    Episode Statistic Type Category VT    Episode Statistic Total Count 0    Episode Statistic Type Category VT    Episode Statistic Total Date Time Start 20230511164113    Episode Statistic Total Date Time End 20240205143600    Episode Statistic Total Date Time Start 20230511164113    Episode Statistic Total Date Time End 21908331771530    Episode Statistic Total Date Time Start 20230511164113    Episode Statistic Total Date Time End 20240205143600    Episode Statistic Total Date Time Start 20230511164113    Episode Statistic Total Date Time End 20240205143600    Episode Statistic Total Date Time Start 20230511164113    Episode Statistic Total Date Time End 20240205143600    Episode Statistic Total Date Time Start 20230511164113    Episode Statistic Total Date Time End 20240205143600    Episode Statistic Total Date Time Start 20230511164113    Episode Statistic Total Date Time End 20240205143600    Narrative    Patient seen on 6B for evaluation and iterative programming of their ICD   per MD orders.     Device: Medtronic [id number] [place] XT VR MRI  Normal device function  Mode: VVI 40 bpm  : <0.1%  Intrinsic rhythm: VS 70 bpm  Thoracic Impedance: Below reference line suggesting possible intrathoracic   fluid accumulation.  Short V-V intervals: 0  Lead Trends Appear Stable: Yes  Estimated battery longevity to RRT = 13.3 years  Atrial Arrhythmia: 0  Ventricular Arrhythmia: 0  Setting Changes: None  VENU Sanchez, RN    Single lead ICD    I have reviewed and interpreted the device interrogation, settings,   programming and nurse's summary. The device is functioning within normal   device parameters. I agree with the current findings, assessment and plan.       PROCEDURES:  RHC, see chart for results     CONSULTATIONS:   LVAD/heart transplant workup related consults, see chart for further info    DISCHARGE  MEDICATIONS:  Current Discharge Medication List        START taking these medications    Details   acetaminophen (TYLENOL) 325 MG tablet Take 3 tablets (975 mg) by mouth every 8 hours for 12 days  Qty: 108 tablet, Refills: 0    Associated Diagnoses: Other chest pain      diclofenac (VOLTAREN) 1 % topical gel Apply 4 g topically 4 times daily for 12 days  Qty: 192 g, Refills: 0    Associated Diagnoses: Other chest pain      gabapentin (NEURONTIN) 100 MG capsule Take 1 capsule (100 mg) by mouth 3 times daily for 12 days  Qty: 36 capsule, Refills: 0    Associated Diagnoses: Other chest pain      isosorbide dinitrate (ISORDIL) 10 MG tablet Take 1 tablet (10 mg) by mouth 3 times daily for 12 days  Qty: 36 tablet, Refills: 0    Associated Diagnoses: Other chest pain      nicotine (COMMIT) 2 MG lozenge Place 1 lozenge (2 mg) inside cheek every hour as needed for nicotine withdrawal symptoms  Qty: 60 lozenge, Refills: 0    Associated Diagnoses: Tobacco use disorder      oxyCODONE (ROXICODONE) 5 MG tablet Take 1 tablet (5 mg) by mouth every 4 hours as needed for severe pain  Qty: 30 tablet, Refills: 0    Associated Diagnoses: Other chest pain      polyethylene glycol (MIRALAX) 17 GM/Dose powder Take 17 g by mouth daily  Qty: 510 g, Refills: 0    Associated Diagnoses: Drug-induced constipation      senna-docusate (SENOKOT-S/PERICOLACE) 8.6-50 MG tablet Take 1 tablet by mouth 2 times daily as needed for constipation  Qty: 30 tablet, Refills: 0    Associated Diagnoses: Drug-induced constipation           CONTINUE these medications which have CHANGED    Details   metoprolol succinate ER (TOPROL XL) 25 MG 24 hr tablet Take 1 tablet (25 mg) by mouth daily for 11 days  Qty: 11 tablet, Refills: 0    Associated Diagnoses: Acute heart failure, unspecified heart failure type (H)           CONTINUE these medications which have NOT CHANGED    Details   aspirin (ASA) 81 MG EC tablet Take 81 mg by mouth daily      atorvastatin (LIPITOR) 80  MG tablet Take 1 tablet by mouth daily      blood glucose (CONTOUR NEXT TEST) test strip To test BG values three times daily - uses insulin      dapagliflozin (FARXIGA) tablet Take 10 mg by mouth daily      Doxycycline Hyclate 100 MG TBEC Take 100 mg by mouth 2 times daily      ESTRADIOL 0.1MG/GM CREAM Place vaginally every other day      fenofibrate (TRIGLIDE/LOFIBRA) 160 MG tablet Take 160 mg by mouth daily      furosemide (LASIX) 20 MG tablet Take 20 mg by mouth daily May also take 1 tablet 1 time per day as needed for weight gain of >2lbs in a day or >5lbs in a day      insulin aspart (NOVOLOG PEN) 100 UNIT/ML pen Inject 5 Units Subcutaneous daily (before supper)      insulin glargine (LANTUS PEN) 100 UNIT/ML pen Inject 30 Units Subcutaneous at bedtime      liraglutide (VICTOZA PEN) 18 MG/3ML solution INJECT 0.6MG SUBCUTANEOUSLY ONCE DAILY for 1 week, then 1.8 mg subcutaneously daily      metFORMIN (GLUCOPHAGE) 1000 MG tablet Take 1,000 mg by mouth 2 times daily (with meals)      methocarbamol (ROBAXIN) 500 MG tablet Take 500 mg by mouth 4 times daily as needed for muscle spasms      nicotine (NICODERM CQ) 21 MG/24HR 24 hr patch Place 1 patch onto the skin every 24 hours      nitroGLYcerin (NITROSTAT) 0.4 MG sublingual tablet Place 0.4 mg under the tongue every 5 minutes as needed      ranolazine (RANEXA) 500 MG 12 hr tablet Take 500 mg by mouth 2 times daily      sacubitril-valsartan (ENTRESTO)  MG per tablet Take 1 tablet by mouth 2 times daily      spironolactone (ALDACTONE) 25 MG tablet Take 12.5 mg by mouth daily      ticagrelor (BRILINTA) 90 MG tablet Take 90 mg by mouth 2 times daily      venlafaxine (EFFEXOR) 100 MG tablet Take 1 tablet by mouth 2 times daily      busPIRone (BUSPAR) 10 MG tablet Take 10 mg by mouth 2 times daily           STOP taking these medications       clindamycin (CLEOCIN T) 1 % external solution Comments:   Reason for Stopping:               DISCHARGE DISPOSITION: Cailin WATT  Grant will discharge to home in stable condition.     DISCHARGE INSTRUCTIONS:  Discharge Procedure Orders   Vascular Surgery Referral   Standing Status: Future   Referral Priority: Routine: Next available opening Referral Type: Consultation   Requested Specialty: Vascular Surgery   Number of Visits Requested: 1     Follow-Up with Cardiology [facility] Heart Failure Discharge   Standing Status: Future   Referral Priority: Urgent: 3-5 Days Referral Type: Consultation   Requested Specialty: Cardiovascular Disease   Number of Visits Requested: 1     Reason for your hospital stay   Order Comments: You were hospitalized in order to undergo a workup for LVAD/heart transplant candidacy. Most of this workup is complete, and you should follow up with a Heart Failure provider in clinic in 7-10 days after discharge.     Activity   Order Comments: Your activity upon discharge: activity as tolerated     Order Specific Question Answer Comments   Is discharge order? Yes      Diet   Order Comments: Follow this diet upon discharge: Orders Placed This Encounter      Advance Diet as Tolerated: Regular Diet Adult     Order Specific Question Answer Comments   Is discharge order? Yes

## 2024-02-09 NOTE — PROGRESS NOTES
Brief Palliative Care Note    Palliative medicine attempted to see Cailin today for LVAD evaluation. Unfortunately due to scheduled procedures Cailin was unable to be seen today.     Per chart review, cardiology's note Cailin is being discharge today with further evaluation to be completed as outpatient. Candidacy for LVAD/transplant will be discussed 02/16 as per cardiology. Could consider outpatient Palliative Care consult. Palliative Care will sign off at this time as Cailin has been clinically stable and no active Palliative Care needs are identified.    If there are future changes clinically for which further goals of care conversation or family meeting would be helpful, or new needs for support for this patient and family, please don't hesitate to contact our service or place order for reconsult.    CARLOTTA Curiel CNP  [facility], Palliative Care  Securely message with the [employer] Web Console (learn more here) or  Text page via [facility] Paging/Directory

## 2024-02-09 NOTE — PLAN OF CARE
Neuro: A&Ox4. N/T in RUE. Able to make needs known.  Cardiac: SR. HR 70s, VSS.   Respiratory: Sating >95% on RA. Endorses GONZALES.  GI/: Adequate urine output via bedside commode. Multiple loose, watery Bms. Patient on bowel prep for colonoscopy in AM.   Diet/appetite: Tolerating clear liquied diet. Adequate appetite.  Activity:  Independent in room  Pain: R continuous chest pain managed with PRN Robaxin and oxycodone  Skin: No new deficits noted.  LDA's: R PIV    Plan: Continue with POC. Notify primary team with changes.

## 2024-02-09 NOTE — PLAN OF CARE
DISCHARGE                   ----------------------------------------------------------------------------  Discharged to: Home  Via: private transportation  Accompanied by: Family  Discharge Instructions: diet, activity, medications, follow up appointments, when to call the MD, aftercare instructions.  Prescriptions: To be filled by outpatient [facility] pharmacy; medication list reviewed & sent with pt  Follow Up Appointments: arranged; information given  Belongings: All sent with pt  IV: d/c'd  Telemetry: d/c'd  Pt exhibits understanding of above discharge instructions; all questions answered.    Discharge Paperwork: Signed, copied, and sent home with patient.

## 2024-02-09 NOTE — PROGRESS NOTES
Gastroenterology Endoscopy Suite Brief Operative Note    Procedure:  Colonoscopy   Post-operative diagnosis:  Colonic polyps x 2, miniscule   Staff Physician:  Dr. Holland Lyle   Fellow/Assistant(s):  Dr. Scott Ferrara     Specimens:  Please see final procedure note for further details.   Findings:  - Normal appearing terminal ileum and IC valve  - two small (<5mm) likely hyperplastic polyps in the left colon, both removed with jumbo forceps  - Normal, healthy appearing colonic mucosa  - Rectal vault normal on retroflexion     Complications:  None.   Condition:  Stable   Recommendations  - We will follow up pathology  - ADAT  - Remainder of cares per cardiology  - [facility] will sign off at this time, reach out with any questions or concerns     Scott Ferrara MD, PGY6  Gastroenterology Fellow  [facility]  See [facility] for GI on-call information    GI Staff: Dr. Lyle

## 2024-02-10 LAB
6MAM SERPL-MCNC: <2 NG/ML
CODEINE SERPL-MCNC: <2 NG/ML
HYDROCODONE SERPL-MCNC: <2 NG/ML
HYDROMORPHONE SERPL-MCNC: <2 NG/ML
MORPHINE SERPL-MCNC: <2 NG/ML
OXYCODONE SERPL-MCNC: 8 NG/ML
OXYMORPHONE SERPL-MCNC: <2 NG/ML

## 2024-02-10 RX ORDER — LIRAGLUTIDE 6 MG/ML
INJECTION SUBCUTANEOUS
Qty: 3 ML | Refills: 0 | Status: SHIPPED | OUTPATIENT
Start: 2024-02-10 | End: 2024-03-08

## 2024-02-10 NOTE — PLAN OF CARE
Occupational Therapy Discharge Summary    Reason for therapy discharge:    Discharged to home with outpatient therapy.    Progress towards therapy goal(s). See goals on Care Plan in [facility] electronic health record for goal details.  Goals partially met.  Barriers to achieving goals:   discharge from facility.    Therapy recommendation(s):    Continued therapy is recommended.  Rationale/Recommendations:  pt would benefit from continued therapy in OP CR setting to progress cardiovascular strength and endurance.

## 2024-02-11 LAB
GAMMA INTERFERON BACKGROUND BLD IA-ACNC: 0.01 IU/ML
M TB IFN-G BLD-IMP: NEGATIVE
M TB IFN-G CD4+ BCKGRND COR BLD-ACNC: 9.99 IU/ML
MITOGEN IGNF BCKGRD COR BLD-ACNC: 0.01 IU/ML
MITOGEN IGNF BCKGRD COR BLD-ACNC: 0.03 IU/ML
QUANTIFERON MITOGEN: 10 IU/ML
QUANTIFERON NIL TUBE: 0.01 IU/ML
QUANTIFERON TB1 TUBE: 0.02 IU/ML
QUANTIFERON TB2 TUBE: 0.04

## 2024-02-12 ENCOUNTER — TELEPHONE (OUTPATIENT)
Dept: CARDIOLOGY | Facility: CLINIC | Age: 49
End: 2024-02-12
Payer: COMMERCIAL

## 2024-02-12 LAB
CMV IGG SERPL IA-ACNC: <0.2 U/ML
CMV IGG SERPL IA-ACNC: NORMAL
EBV VCA IGG SER IA-ACNC: 704 U/ML
EBV VCA IGG SER IA-ACNC: POSITIVE
HSV1 IGG SERPL QL IA: 0.63 INDEX
HSV1 IGG SERPL QL IA: ABNORMAL
HSV2 IGG SERPL QL IA: 1.1 INDEX
HSV2 IGG SERPL QL IA: ABNORMAL
T GONDII IGG SER QL: <3 IU/ML (ref 0–7.1)

## 2024-02-12 NOTE — TELEPHONE ENCOUNTER
[facility] Call Center    Phone Message    May a detailed message be left on voicemail: yes     Reason for Call: Other: Pt is requesting a call back to discuss scheduling a virtual visit for Hospital HF follow up marked urgent 3-5 days with CHRIS..  Pt said she was not aware that the visit was urgent so she went home upon discharge 300 miles away.     Action Taken: Other: cardio    Travel Screening: Not Applicable    Thank you!  [facility]

## 2024-02-13 LAB
PATH REPORT.COMMENTS IMP SPEC: NORMAL
PATH REPORT.COMMENTS IMP SPEC: NORMAL
PATH REPORT.FINAL DX SPEC: NORMAL
PATH REPORT.GROSS SPEC: NORMAL
PATH REPORT.MICROSCOPIC SPEC OTHER STN: NORMAL
PATH REPORT.RELEVANT HX SPEC: NORMAL
PHOTO IMAGE: NORMAL

## 2024-02-14 NOTE — TELEPHONE ENCOUNTER
2/14 Scheduled Enrollment CORE video visit (OK per Dr. Black) w/ Ora Youngblood 3/22. Scheduled RHF w/ Dr. Guerin w/ lab prior 7/2. Pt asked if Dr. Guerin appt can be virtual and if she'll need to do prep for CORE. Sent staff message to Donovan Rendon and Nikia FITZGERALD

## 2024-02-16 NOTE — TELEPHONE ENCOUNTER
2/16 Rescheduled 7/2 [facility] w/ Dr. Guerin as a video visit (OK per Dr. Guerin). Pt said she'd like lab orders faxed to [facility]. She will complete labs locally 1 week prior. AMILCAR

## 2024-03-13 ENCOUNTER — VIRTUAL VISIT (OUTPATIENT)
Dept: CARDIOLOGY | Facility: CLINIC | Age: 49
End: 2024-03-13
Payer: COMMERCIAL

## 2024-03-13 VITALS — WEIGHT: 176 LBS | HEIGHT: 62 IN | BODY MASS INDEX: 32.39 KG/M2

## 2024-03-13 DIAGNOSIS — I10 BENIGN ESSENTIAL HYPERTENSION: ICD-10-CM

## 2024-03-13 DIAGNOSIS — E11.9 TYPE 2 DIABETES MELLITUS WITHOUT COMPLICATION, WITH LONG-TERM CURRENT USE OF INSULIN (H): ICD-10-CM

## 2024-03-13 DIAGNOSIS — Z95.810 S/P ICD (INTERNAL CARDIAC DEFIBRILLATOR) PROCEDURE: ICD-10-CM

## 2024-03-13 DIAGNOSIS — I50.20 HEART FAILURE WITH REDUCED EJECTION FRACTION, NYHA CLASS III (H): Primary | ICD-10-CM

## 2024-03-13 DIAGNOSIS — I25.10 CORONARY ARTERY DISEASE INVOLVING NATIVE CORONARY ARTERY OF NATIVE HEART WITHOUT ANGINA PECTORIS: ICD-10-CM

## 2024-03-13 DIAGNOSIS — Z79.4 TYPE 2 DIABETES MELLITUS WITHOUT COMPLICATION, WITH LONG-TERM CURRENT USE OF INSULIN (H): ICD-10-CM

## 2024-03-13 DIAGNOSIS — I25.5 ISCHEMIC CARDIOMYOPATHY: ICD-10-CM

## 2024-03-13 PROCEDURE — 99214 OFFICE O/P EST MOD 30 MIN: CPT | Mod: 95 | Performed by: NURSE PRACTITIONER

## 2024-03-13 ASSESSMENT — PAIN SCALES - GENERAL: PAINLEVEL: NO PAIN (0)

## 2024-03-13 NOTE — PROGRESS NOTES
HPI: Cailin is a 48 year old White female with a past medical history of with a past medical history notable for PMH of DM, HLD, obesity, tobacco use disorder, sciatica who was admitted to [place] ICU. She initially presented to urgent care with new onset chest pain, was found to have STEMI and was transferred to [facility] for immediate further evaluation and angiogram on 9/25/22. It was significant for 3 vessel disease. Case was immediately discussed with Dr Leal for potential urgent CABG considerations but ultimately the decision was made to proceed with PCI due to poor bypass targets. Intervention to LCx was made. Prior to further PCI PET viability was pursued prior to considering further interventions. Unfortunately viability did not show enough and thus medical management was recommended. She did have short runs of NSVT while inpatient. ECHO showed EF 20% and thus she was discharged with LifeVest. She was discharged on mimimal medical therapy due to associated hypotension and not being able to tolerate higher doses. She also has PRN lasix for weight gain and PRN nitro. She is following up at the CHF clinic at [place] and was also referred to [facility] for potential future advanced heart failure therapies evaluation.     Patient states she has no SOB at rest. She has no GONZALES.  No LE edema. No distention in the abdomen. Her appetite has been normal.  Weight at home 174 lbs. She says 174-176 lbs is normal. Last time she took an extra diuretic was 2-3 weeks ago.  She has stopped smoking.  Nothing since the hospital- has a patch. She has to meet with the vascular surgeon regarding the LVAD evaluation.  She watches the sodium and is careful with the diet. Last labs 2/9. BP at home < 100 systolic.        Denies SOB, GONZALES, PND, orthopnea, edema, weight gain, chest pain, palpitations, lightheadedness, dizziness, near syncopal/syncopal episodes. Cailin has been following salt and fluid restrictions.      PAST MEDICAL  HISTORY:  Past Medical History:   Diagnosis Date    CAD (coronary artery disease)     Nicotine dependence     Type 2 diabetes mellitus with other circulatory complications (H)        FAMILY HISTORY:  Family History   Problem Relation Age of Onset    Coronary Artery Disease Early Onset Maternal Grandfather        SOCIAL HISTORY:  Social History     Tobacco Use    Smoking status: Some Days     Packs/day: 1.5     Types: Cigarettes     Start date: 1988    Smokeless tobacco: Never    Tobacco comments:     Trying to quit, wearing patch -    Substance Use Topics    Alcohol use: Not Currently    Drug use: Never           CURRENT MEDICATIONS:  Current Outpatient Medications   Medication Sig Dispense Refill    aspirin (ASA) 81 MG EC tablet Take 81 mg by mouth daily      atorvastatin (LIPITOR) 80 MG tablet Take 1 tablet by mouth daily      blood glucose (CONTOUR NEXT TEST) test strip To test BG values three times daily - uses insulin      busPIRone (BUSPAR) 10 MG tablet Take 10 mg by mouth 2 times daily      dapagliflozin (FARXIGA) tablet Take 10 mg by mouth daily      Doxycycline Hyclate 100 MG TBEC Take 100 mg by mouth 2 times daily      ESTRADIOL 0.1MG/GM CREAM Place vaginally every other day      fenofibrate (TRIGLIDE/LOFIBRA) 160 MG tablet Take 160 mg by mouth daily      furosemide (LASIX) 20 MG tablet Take 20 mg by mouth daily May also take 1 tablet 1 time per day as needed for weight gain of >2lbs in a day or >5lbs in a day      gabapentin (NEURONTIN) 100 MG capsule Take 1 capsule (100 mg) by mouth 3 times daily for 12 days 36 capsule 0    insulin aspart (NOVOLOG PEN) 100 UNIT/ML pen Inject 5 Units Subcutaneous daily (before supper) for 27 days 1.35 mL 0    insulin glargine (LANTUS PEN) 100 UNIT/ML pen Inject 30 Units Subcutaneous at bedtime for 28 days 8.4 mL 0    isosorbide dinitrate (ISORDIL) 10 MG tablet Take 1 tablet (10 mg) by mouth 3 times daily for 12 days 36 tablet 0    liraglutide (VICTOZA PEN) 18 MG/3ML  solution INJECT 0.6MG SUBCUTANEOUSLY ONCE DAILY for 1 week, then 1.8 mg subcutaneously daily 3 mL 0    metFORMIN (GLUCOPHAGE) 1000 MG tablet Take 1,000 mg by mouth 2 times daily (with meals)      methocarbamol (ROBAXIN) 500 MG tablet Take 500 mg by mouth 4 times daily as needed for muscle spasms      metoprolol succinate ER (TOPROL XL) 25 MG 24 hr tablet Take 1 tablet (25 mg) by mouth daily for 11 days 11 tablet 0    nicotine (NICODERM CQ) 21 MG/24HR 24 hr patch Place 1 patch onto the skin every 24 hours      nitroGLYcerin (NITROSTAT) 0.4 MG sublingual tablet Place 0.4 mg under the tongue every 5 minutes as needed      polyethylene glycol (MIRALAX) 17 GM/Dose powder Take 17 g by mouth daily 510 g 0    ranolazine (RANEXA) 500 MG 12 hr tablet Take 500 mg by mouth 2 times daily      sacubitril-valsartan (ENTRESTO)  MG per tablet Take 1 tablet by mouth 2 times daily      spironolactone (ALDACTONE) 25 MG tablet Take 12.5 mg by mouth daily      ticagrelor (BRILINTA) 90 MG tablet Take 90 mg by mouth 2 times daily      venlafaxine (EFFEXOR) 100 MG tablet Take 1 tablet by mouth 2 times daily         ROS:  Per HPI        EXAM:   This is a virtual appointment and no current vitals are available.  Previous ones were reviewed and blood pressure was in the 80s systolic.  Constitutional - WDWN, no acute distress   Eyes - no redness or discharge, nonicteric sclera  Respiratory - nonlabored breathing, able to speak in full sentences without difficulty  CV: no visible edema of visualized upper extremities.  Neurological - alert and oriented, speech fluent/appropriate  PSYCH: cooperative, affect appropriate  DERM: no rashes on visualized face/neck/upper extremities       Labs:  CBC RESULTS:  Lab Results   Component Value Date    WBC 6.6 02/09/2024    RBC 4.43 02/09/2024    HGB 12.7 02/09/2024    HCT 38.8 02/09/2024    MCV 88 02/09/2024    MCH 28.7 02/09/2024    MCHC 32.7 02/09/2024    RDW 13.9 02/09/2024     02/09/2024  "      CMP RESULTS:  Lab Results   Component Value Date     02/09/2024    POTASSIUM 3.8 02/09/2024    CHLORIDE 103 02/09/2024    CO2 20 (L) 02/09/2024    ANIONGAP 15 02/09/2024     (H) 02/09/2024    BUN 11.7 02/09/2024    CR 0.75 02/09/2024    GFRESTIMATED >90 02/09/2024    RON 9.0 02/09/2024    BILITOTAL 0.3 02/06/2024    ALBUMIN 3.8 02/06/2024    ALKPHOS 55 02/06/2024    ALT 25 02/06/2024    AST 25 02/06/2024        INR RESULTS:  Lab Results   Component Value Date    INR 0.99 02/06/2024       No components found for: \"CK\"  Lab Results   Component Value Date    MAG 1.9 02/09/2024     No results found for: \"NTBNP\"  @BRIEFLABR (dig)@    Most recent echocardiogram:  No results found for this or any previous visit (from the past 8760 hour(s)).      Assessment and Plan:    47 year old lady with a  including DM which is reasonably better controlled at the moment as well as recent STEMI back in September 2022 in the setting of atypical symptoms with high peak and troponin at around 63. She does have multivessel coronary artery disease only the LCx vessel was intervened however she has significant disease in the small LAD as well as  of the RCA.  She was found to be not a surgical candidate in [place].      Overall she continues to do relatively poorly with low exercise tolerance and low energy.  She takes her medications as prescribed however she often becomes hypotensive.  Her 6-minute walk test was extremely low.  She noted she lives in a small apartment and she does have difficulty walking from 1 end to the other. According to the limited access with virtual appts. She appears to be stable and doing well. She offers no new concerns.  I will not make any medication adjustments. The patient does state in person visits are not doable for now due to the travel distance ( 4 hours).      1.  Chronic systolic heart failure secondary to ICM  Stage D  NYHA Class III  ACEi/ARB/ARNI: yes  BB: yes  Aldosterone " antagonist: yes- 12.5 mg daily  SGLT2i- Farxiga   SCD prophylaxis: ICD  Fluid status: euvolemic  Anticoagulation:   Antiplatelet:  ASA dose   Sleep apnea:not discussed today  NSAID use:  Contraindicated.  Avoid use.  Remote Monitoring:none  ISORDIL 10 mg TID    Plan:  CORE in May (Virtual is fine)          Ora LAGUERRE, CNP  [facility]

## 2024-03-13 NOTE — PROGRESS NOTES
Virtual Visit Details    Type of service:  Video Visit   Video Start Time:  8:55  Video End Time:9:11 AM    Originating Location (pt. Location): Home    Distant Location (provider location):  On-site  Platform used for Video Visit: [facility]      45 min total with chart review and visit.

## 2024-03-13 NOTE — PATIENT INSTRUCTIONS
Take your medicines every day, as directed    Changes made today:  No medication changes      Monitor Your Weight and Symptoms    Contact us if you:    Gain 2 pounds in one day or 5 pounds in one week  Feel more short of breath  Notice more leg swelling  Feel lightheadeded   Change your lifestyle    Limit Salt or Sodium:  2000 mg  Limit Fluids:  2000 mL or approximately 64 ounces  Eat a Heart Healthy Diet  Low in saturated fats  Stay Active:  Aim to move at least 150 minutes every  week         To Contact us    During Business Hours:  [phone], option # 1      After hours, weekends or holidays:   [phone], Option #4  Ask to speak to the On-Call Cardiologist. Inform them you are a CORE/heart failure patient at the [facility].     Use [employer] allows you to communicate directly with your heart team through secure messaging.  [employer] Experience can be accessed any time on your phone, computer, or tablet.  If you need assistance, we'd be happy to help!         Keep your Heart Appointments:    Follow-up in May 2024 with lab prior at [facility].     Please consider attending our virtual support group which is held monthly. Please reach out to [place] at [phone] for more information if you are interested in attending.     2024 dates:    Monday, April 1st, 1-2pm     Monday, May 6th, 1-2pm     Monday, June 3rd, 1-2pm     Monday, July 1st, 1-2pm     Monday, August 5th, 1-2pm     Monday, September 9th, 1-2pm     Monday, October 7th, 1-2pm     Monday, November 4th, 1-2pm     Monday, December 2nd, 1-2pm

## 2024-03-13 NOTE — NURSING NOTE
Patient confirms medications and allergies are accurate via patients echeck in completion, and or denies any changes since last reviewed/verified.     Patient states all up today since release from hospital  Is the patient currently in the state of [place]? YES    Visit mode:VIDEO    If the visit is dropped, the patient can be reconnected by: TELEPHONE VISIT: Phone number:   Telephone Information:   Mobile [phone]       Will anyone else be joining the visit? NO  (If patient encounters technical issues they should call [phone]    How would you like to obtain your AVS? MyChart    Are changes needed to the allergy or medication list? No    Reason for visit: Consult    Lawanda JUAREZ

## 2024-04-02 ENCOUNTER — TELEPHONE (OUTPATIENT)
Dept: CARDIOLOGY | Facility: CLINIC | Age: 49
End: 2024-04-02
Payer: COMMERCIAL

## 2024-04-02 NOTE — TELEPHONE ENCOUNTER
Called waitlist patient and offered an appointment with Dr. Guerin on this date 04/04/24 & time 8:30 am at this location [facility]     Please note there is no guarantee this appointment will be available as it is first come first serve.

## 2024-04-24 ENCOUNTER — OFFICE VISIT (OUTPATIENT)
Dept: VASCULAR SURGERY | Facility: CLINIC | Age: 49
End: 2024-04-24
Payer: COMMERCIAL

## 2024-04-24 VITALS
DIASTOLIC BLOOD PRESSURE: 63 MMHG | TEMPERATURE: 97.8 F | RESPIRATION RATE: 16 BRPM | HEART RATE: 78 BPM | SYSTOLIC BLOOD PRESSURE: 95 MMHG | OXYGEN SATURATION: 97 %

## 2024-04-24 DIAGNOSIS — I73.9 PAD (PERIPHERAL ARTERY DISEASE) (H): Primary | ICD-10-CM

## 2024-04-24 DIAGNOSIS — E78.5 DYSLIPIDEMIA, GOAL LDL BELOW 70: ICD-10-CM

## 2024-04-24 DIAGNOSIS — I77.1 BRACHIOCEPHALIC ARTERY STENOSIS, RIGHT (H): ICD-10-CM

## 2024-04-24 PROCEDURE — G0463 HOSPITAL OUTPT CLINIC VISIT: HCPCS | Performed by: HOSPITALIST

## 2024-04-24 PROCEDURE — 99204 OFFICE O/P NEW MOD 45 MIN: CPT | Performed by: HOSPITALIST

## 2024-04-24 RX ORDER — EZETIMIBE 10 MG/1
10 TABLET ORAL DAILY
Qty: 90 TABLET | Refills: 3 | Status: SHIPPED | OUTPATIENT
Start: 2024-04-24

## 2024-04-24 RX ORDER — OXYCODONE HYDROCHLORIDE 5 MG/1
1.5 TABLET ORAL EVERY 6 HOURS PRN
COMMUNITY
Start: 2024-04-23

## 2024-04-24 ASSESSMENT — PAIN SCALES - GENERAL: PAINLEVEL: MODERATE PAIN (5)

## 2024-04-24 NOTE — PATIENT INSTRUCTIONS
Israel Simeon,    Thank you for entrusting your care with us today. After your visit today with MD Nitish Yang this is the plan that was discussed at your appointment.    Start Zetia 10mg daily.  The goal is for your LDL to be less than 70.      Get your cholesterol levels checked in 2-3 months after starting Zetia.  You can either MyChart a picture of the results to Dr. Yang or bring them to your upcoming visit.     Follow up with ultrasounds and Dr. Yang as scheduled.     Do walking program, see below for instructions.     I am including additional information on these things and our contact information if you have any questions or concerns.   Please do not hesitate to reach out to us if you felt we did not answer your questions or you are unsure of the treatment plan after your visit today. Our number is [phone].Thank you for trusting us with your care.         Again thank you for your time.     What is PAD?          Peripheral arterial disease (PAD) is narrowing or blockage of arteries that causes poor blood flow to your arms and legs. PAD is most common in the legs.  PAD is often caused by fatty buildup (plaque) in the arteries. Over time, plaque builds up in the walls of the arteries, including those that supply blood to your legs. This can limit blood flow to the muscles and other tissues of the legs. PAD can make it hard for you to walk. It can also lead to tissue death. Sometimes part of the leg must be removed by surgery (amputation).    What is a PAD walking program?    A Peripheral Arterial Disease (PAD) walking program is a structured exercise regimen designed to benefit individuals with PAD. PAD is a condition characterized by reduced blood flow to the limbs, typically the legs, due to the narrowing or blockage of arteries. The walking program aims to improve circulation, reduce symptoms, and enhance overall cardiovascular health in individuals with PAD.    How do I start?     Here are some  "guidelines to help you commence your walking routine:  Perform a daily examination of your feet for redness and sores.  Ensure you wear well-fitting and supportive shoes and socks.  Aim to walk a minimum of three times a week. This is the most important part of the program: consistency is key!  Avoid missing more than two consecutive days, as it can diminish the benefits of the program.  Choose a suitable walking location, whether it's outdoors or on a treadmill indoors.  Keep a record of the minutes you spend walking on a calendar or a tracking tool.  Invite a friend to join you for walks to provide accountability.  Enjoy the process and reward yourself for your achievements!    What steps should I follow for my walking program?     Follow these step-by-step instructions for your walks:    Warm-up: Begin by walking slowly for two to three minutes to loosen up your legs. Stretch your calf and thigh muscles in each leg for 10-15 seconds.    Get Walking!: Walk at a moderate pace for 3-10 minutes until your leg pain reaches a 3 or 4 on a 5-point scale. The pain may manifest as a \"Carl horse,\" cramp, or tightness in your calf or thigh, accompanied by tiredness or fatigue.    Stop and Rest: Allow your pain to improve, which may take up to 3-10 minutes.    Repeat Steps 2 and 3: After the pain subsides, repeat the walking and resting steps several times. Although there may be discomfort, aim to walk fast enough to feel mild to moderate leg pain. Your goal is to reach a moderate level of leg pain before stopping.    Cool Down: Walk slowly for 5 minutes and take a few minutes to stretch your calf and thigh muscles for 10-15 seconds.    Maintain Consistency: Strive to complete the walking program 3-5 times a week. Gradually work towards achieving at least 30 minutes of walking per session. As walking becomes easier, increase the difficulty level by walking uphill, climbing stairs, or adjusting the incline on your " treadmill.    Have fun!: Bring enjoyment into your routine! Listen to music or podcasts as you walk and during your rest breaks. Explore your favorite park during your walks. Invite a friend to join you for your walks. Incorporate activities that bring you ishaan to enhance your overall exercise experience.     Using a tracking tool:    You should use a tracking tool to keep track of your progress. Ideally, you should be able to walk for longer distances with less pain the longer you stick with a walking program! Keeping track of your walks will help you see your progress over time. Please bring your tracking tool to your next appointment with your vascular provider so that we can see how you are responding to the exercise program. Below is an example of a tracking tool, but you may create any tracker that you like!     Date Distance walked Exercise time How did it feel?   12/21/23    3 blocks 30 minutes Pain started at a 4/5 but got easier towards the end of the session   12/23/23   1 mile (on treadmill) 35 minutes Pain 3/5, taking frequent breaks, overall felt good!

## 2024-04-24 NOTE — PROGRESS NOTES
[facility]        Patient is here for a consult to discuss Peripheral artery disease (PAD). Symptoms include claudicaton: right buttocks, left buttocks, right thigh, left thigh, right calf, and left calf at distance of 150 feet in both lower extremities.  Patient states pain starts near hips and then moves down the legs and her whole legs turn numb. Patient is working on quitting smoking, still smoking 1-2 cigarettes.      Pt is currently taking Aspirin, Statin, and Brilinta.    BP 95/63   Pulse 78   Temp 97.8  F (36.6  C)   Resp 16   SpO2 97%     The provider has been notified that the patient has concerns of leg pain.     Questions patient would like addressed today are: N/A.    Refills are needed: N/A    Has homecare services and agency name:  No

## 2024-04-24 NOTE — PROGRESS NOTES
VASCULAR MEDICINE CONSULT NOTE          LOCATION:  [facility]        Date of Service: 4/24/2024      Primary Care Provider: No Ref-Primary, Physician  Referring provider;  Valentina Gale      Reason for the visit/chief complaint:   Peripheral arterial disease      HPI:  Cailin Burns is a pleasant 48 year old female from [facility] who presents to our Vascular Medicine clinic accompanied by her  for the above mentioned reason.    Ms. Burns has a past medical history significant for longstanding tobacco smoking, type 2 diabetes mellitus, STEMI September 2022 with three-vessel disease status post staged PCI due to poor bypass target (PCI of RCA  and MOHAN x1 to mLAD and MOHAN x1 to mLCx on 01/2023), ischemic cardiomyopathy LVEF 27% status post ICD May 2023 and dyslipidemia.    Patient reports she started having issues with lower extremity pain even prior to her MI diagnosis.  This typically starts with walking on bilateral lower back and radiates to both lower extremities.  Reports that spinal issues were first considered however per her report this was ruled out.  The problem has been persistent and she believes has been getting worse although she was not paying a lot of attention to her walking distance.  Now, she believes her pain started at around 1 block, she typically can go a little bit further but needs to stop and sit down after that.  She currently walks with a walker especially if she is by herself and would typically sit on the walker chair anywhere between 15-20 minutes before the pain completely subsides.  When she is shopping, leaning to a shopping cart typically helps the pain and she can walk further.  No pain at rest.  No lower extremity wounds.    Resting MANAN study and arterial duplex was completed when she was in the hospital 2/6/24.  This showed MANAN of 0.67 on the right with occluded right SFA at the mid thigh with reconstitution in the distal thigh with  monophasic waveforms.  On the left, MANAN was 0.79 with monophasic waveforms starting at the SFA level but no hemodynamically significant stenosis.    She was seen by vascular surgery Trinidad Payne NP and Dr. Almonte during her hospital stay for recommended medical management and walking program.    Since then, the patient has not been necessarily doing the walking program.  She was referred to our clinic to get further medical management of her PAD.    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  Mr. Burns is right-handed. She reports some fatigue in her right upper extremity with activities over the head sometimes but she has been compensating with her left upper extremity.  No life or activity limiting pain or fatigue.  No dizziness, double vision, vertigo, nausea or vomiting with using her right arm.        Cardiovascular risk factors:  Diabetes mellitus: Was diagnosed with diabetes at age 34. Was initially controlled with Metformin then was started on insulin 4 years ago. Current HbA1c 10.6%.  Smoking: Smoked since age 12, 1.5-2 PPD. Continues to try to quit. Smokes minimal now.   Dyslipidemia: On atorvastatin 80 mg.  Last  from 2/6/2024.  Hypertension: No known history of hypertension.  Family history: Family history of CAD requiring multiple stents and CABG in her maternal grandfather at age 45.  Multiple family members with diabetes with some of them starting in the 30s-40s.        REVIEW OF SYSTEMS:    A 12 point ROS was reviewed and is negative except what is mentioned in HPI.       Past medical history, surgical history, medications, family history, social history and allergies were reviewed. Pertinent points mentioned under HPI.        OBJECTIVE:    Vital signs:  BP 95/63   Pulse 78   Temp 97.8  F (36.6  C)   Resp 16   SpO2 97%   Wt Readings from Last 1 Encounters:   03/13/24 176 lb (79.8 kg)     There is no height or weight on file to  calculate BMI.    Physical exam:  General appearance: Pleasant female in no apparent distress.    HEENT: NC/AT.    Neck: Carotids +2/2 bilaterally without bruits.  No jugular venous distension.   No subclavian bruit  Heart: RRR. Normal S1, S2.   Chest: Clear to auscultation bilaterally.  Abdomen: Soft, nontender, nondistended. No pulsatile mass.  No bruits.   Extremities: No swelling.  Right DP and PT 0/2, Left DP 2/2, PT 1/2.  Skin: Normal skin color and temperature.  No skin wounds.  Neurological: Alert, awake and oriented       DIAGNOSTIC STUDIES:   Labs and diagnostics reviewed including outside records. Pertinent points are mentioned under HPI and assessment and plan sections.          ASSESSMENT AND PLAN:    Peripheral arterial disease with intermittent claudication and possible mixed claudication  Right brachiocephalic stenosis  Longstanding tobacco use with more than 50-pack-year  Type 2 diabetes mellitus currently insulin-dependent: Uncontrolled A1c 10.6%  CAD status post STEMI September 2022 with three-vessel disease status post staged PCI due to poor bypass target (PCI of RCA  and MOHAN x1 to mLAD and MOHAN x1 to mLCx)  Ischemic cardiomyopathy LVEF 27% status post ICD  Dyslipidemia.    Ms. Burns does have evidence of intermittent claudication although it does appear that she might have also mixed claudications (pain starts at the lower back and improves when she is leaning on a cart).  Overall she was found to have moderate PAD by resting MANAN worse on the right with occluded SFA in the right mid thigh although reconstitutes distally (0.67 on the right and 0.79 on the left).     With no evidence of critical limb ischemia or short distance claudication that is lifestyle limiting, we would recommend conservative management.  This involves medical management, walking program and risk factor control.    We spent time discussing the walking program.  She is agreeable to starting to walk more.  Does not believe  that shortness of breath will be a limiting factor.  She is not close to any place that offers supervised walking program.  We discussed home-based program and the fact that people who did home-based program had better long-term adherence.  She was in agreement and will be walking outdoors.  She was provided with details of the walking program and detailed information and instructions were given to her on her after visit summary.    With regards to medical management, she is appropriately on antiplatelet therapy with aspirin 81 mg.  She is on high intensity statin with atorvastatin 80 mg.  LDL however is above goal of 111.  Therefore, we recommend adding ezetimibe 10 mg.    We will see her back in 3 months with repeat MANAN with exercise and arterial duplex prior to the visit.  She reports that these test were not available locally and she does not mind coming in person for these visits.    For her right brachiocephalic stenosis, she currently has very minimal symptoms of occasional arm fatigue however has not been limiting any activities.  Similarly, no symptoms of distal embolism or upper extremity ischemia.  No symptoms of steal phenomenon.      Recommendations:  Start home-based walking program.  Details and instructions provided.  Add ezetimibe 10 mg.  Prescribed.  Continue atorvastatin 80 mg.  Repeat lipid panel in 6-8 weeks from starting ezetimibe.  This can be done locally.  Continue aspirin 81 mg.  Work on diabetes control.  Goal A1c less than 7%.  Blood pressure should always be checked ONLY ON THE LEFT.  Will see her back in 3 months with repeat MANAN with exercise and arterial duplex in our clinic.      It was a pleasure meeting with Ms. Burns and her spouse in our clinic today.      Nitish Yang MD  Vascular Medicine  April 24, 2024

## 2024-05-03 ENCOUNTER — TELEPHONE (OUTPATIENT)
Dept: CARDIOLOGY | Facility: CLINIC | Age: 49
End: 2024-05-03
Payer: COMMERCIAL

## 2024-05-03 NOTE — TELEPHONE ENCOUNTER
5/6/2024 10:35AM Bina Lopez  Called patient and offered sooner Return CORE video visit appointment with CARLOTTA Delgadillo CNP on this date 5/8/2024 & time 9:45AM at this location [facility], [address], [place] [address]    5/6 Offered Return CORE video visit w/ Ora Youngblood 5/8 at 9:45AM, pt wants to keep appt as scheduled. AMILCAR     Please note there is no guarantee this appointment will be available as it is first come first serve.  Bina Lopez 5/6/2024 10:35AM        5/3/2024 5:46PM Bina John  Patient confirmed scheduled appointment:  Date: 10/2/2024  Time: 10:45AM  Visit type: Return EP (video visit)  Provider: Leanne Bearden NP  Location: Video Visit; [address], [address]rd floor, [place] [address]  Testing/imaging: *if pt needs labs, she will need mailed written copy of lab orders to take to labs at [place]*  Additional notes: 5/3 Pt called back while I was on the phone w/ another pt. Called pt back and offered Return CORE w/ Ora Youngblood video visit at 8:15AM on 5/8- would not work for pt since it was too early. Scheduled Return EP vv w/ Leanne Bearden 10/2. AMILCAR Lopez 5/3/2024 5:46PM        5/3/2024 5:10PM Bina Lopez  Sent Mychart (1st Attempt) Unable to LVM for the patient to call back and schedule the following (if interested):    Appointment type: Return CORE (video visit)  Provider: CARLOTTA Delgadillo CNP  Return date: 5/8/2024 8:15AM or 10:15AM  Specialty phone number: Bina REYES [phone]  Additional appointment(s) needed: labs completed locally prior  Additonal Notes: 5/3 unable to LVM, sent MYC to call me directly if interested in scheduling sooner Return CORE video visit w/ Ora Youngblood 5/8. AMILCAR Lopez 5/3/2024 5:10PM

## 2024-05-14 ENCOUNTER — TELEPHONE (OUTPATIENT)
Dept: CARDIOLOGY | Facility: CLINIC | Age: 49
End: 2024-05-14
Payer: COMMERCIAL

## 2024-05-14 DIAGNOSIS — I50.20 HEART FAILURE WITH REDUCED EJECTION FRACTION, NYHA CLASS III (H): Primary | ICD-10-CM

## 2024-05-14 NOTE — TELEPHONE ENCOUNTER
Called patient to see if she can get labs done locally today or tomorrow if they haven't already been done. Patient confirmed labs have not been done. Contacted Brennan Walters to obtain fax number for lab and sent fax.     Janice Trujillo CMA ([facility])    ----- Message from Kaye Castillo RN sent at 5/14/2024 10:31 AM CDT -----  Regarding: LABS TODAY  Hi would you please call to this pt and see if they completed labs locally    If not, would you please see if they could arrange labs today or tomorrow morning and fax the BMP order to where she prefer    We may have to get a lab after her visit, but would be most beneficial prior    Thank you

## 2024-05-15 ENCOUNTER — VIRTUAL VISIT (OUTPATIENT)
Dept: CARDIOLOGY | Facility: CLINIC | Age: 49
End: 2024-05-15
Payer: COMMERCIAL

## 2024-05-15 ENCOUNTER — TELEPHONE (OUTPATIENT)
Dept: CARDIOLOGY | Facility: CLINIC | Age: 49
End: 2024-05-15

## 2024-05-15 VITALS — WEIGHT: 176 LBS | BODY MASS INDEX: 32.39 KG/M2 | HEIGHT: 62 IN

## 2024-05-15 DIAGNOSIS — E11.9 TYPE 2 DIABETES MELLITUS WITHOUT COMPLICATION, WITH LONG-TERM CURRENT USE OF INSULIN (H): ICD-10-CM

## 2024-05-15 DIAGNOSIS — I50.22 CHRONIC SYSTOLIC HEART FAILURE (H): Primary | ICD-10-CM

## 2024-05-15 DIAGNOSIS — I10 BENIGN ESSENTIAL HYPERTENSION: ICD-10-CM

## 2024-05-15 DIAGNOSIS — I25.5 ISCHEMIC CARDIOMYOPATHY: ICD-10-CM

## 2024-05-15 DIAGNOSIS — I73.9 PAD (PERIPHERAL ARTERY DISEASE) (H): ICD-10-CM

## 2024-05-15 DIAGNOSIS — Z79.4 TYPE 2 DIABETES MELLITUS WITHOUT COMPLICATION, WITH LONG-TERM CURRENT USE OF INSULIN (H): ICD-10-CM

## 2024-05-15 PROCEDURE — 99213 OFFICE O/P EST LOW 20 MIN: CPT | Mod: 95 | Performed by: NURSE PRACTITIONER

## 2024-05-15 ASSESSMENT — PAIN SCALES - GENERAL: PAINLEVEL: NO PAIN (0)

## 2024-05-15 NOTE — TELEPHONE ENCOUNTER
Called patient to schedule 6 month follow up with labs prior with Ora for return CORE. Patient was at another doctor's appointment so this writer will call later.    Olga VINCENT, GIORGIO

## 2024-05-15 NOTE — TELEPHONE ENCOUNTER
Called patient to schedule 6 month follow up, virtual, with Ora. Patient uses [facility] lab, will call and fax lab orders.    Olga VINCENT, VF

## 2024-05-15 NOTE — PROGRESS NOTES
Virtual Visit Details    Type of service:  Video Visit   Video Start Time:  9;41  Video End Time:9:54 AM    Originating Location (pt. Location): Home    Distant Location (provider location):  On-site  Platform used for Video Visit: Amanda      I spent a total of 20 minutes on the date of service evaluating this patient which included virtual ( video)  discussion, reviewing of the chart to gain information from other providers to obtain further history, ordering tests and/or medications, and documenting clinical information in the electronic health record.

## 2024-05-15 NOTE — PATIENT INSTRUCTIONS
Take your medicines every day, as directed    Changes made today:  None today      Monitor Your Weight and Symptoms    Contact us if you:    Gain 2 pounds in one day or 5 pounds in one week  Feel more short of breath  Notice more leg swelling  Feel lightheadeded   Change your lifestyle    Limit Salt or Sodium:  2000 mg  Limit Fluids:  2000 mL or approximately 64 ounces  Eat a Heart Healthy Diet  Low in saturated fats  Stay Active:  Aim to move at least 150 minutes every  week         To Contact us    During Business Hours:  [phone], option # 1      After hours, weekends or holidays:   [phone], Option #4  Ask to speak to the On-Call Cardiologist. Inform them you are a CORE/heart failure patient at the [facility].     Use [employer] allows you to communicate directly with your heart team through secure messaging.  XZERES can be accessed any time on your phone, computer, or tablet.  If you need assistance, we'd be happy to help!         Keep your Heart Appointments:    6 months with CORE      Please consider attending our virtual support group which is held monthly. Please reach out to [place] at [phone] for more information if you are interested in attending.     2024 dates:    Monday, May 6th, 1-2pm     Monday, June 3rd, 1-2pm     Monday, July 1st, 1-2pm     Monday, August 5th, 1-2pm     Monday, September 9th, 1-2pm     Monday, October 7th, 1-2pm     Monday, November 4th, 1-2pm     Monday, December 2nd, 1-2pm

## 2024-05-15 NOTE — PROGRESS NOTES
HPI: Cailin is a 48 year old White female with a past medical history of with a past medical history notable for PMH of DM, HLD, obesity, tobacco use disorder, sciatica who was admitted to [facility] ICU. She initially presented to urgent care with new onset chest pain, was found to have STEMI and was transferred to [facility] for immediate further evaluation and angiogram on 9/25/22. It was significant for 3 vessel disease. Case was immediately discussed with Dr Leal for potential urgent CABG considerations but ultimately the decision was made to proceed with PCI due to poor bypass targets. Intervention to LCx was made. Prior to further PCI PET viability was pursued prior to considering further interventions. Unfortunately viability did not show enough and thus medical management was recommended. She did have short runs of NSVT while inpatient. ECHO showed EF 20% and thus she was discharged with LifeVest. She was discharged on mimimal medical therapy due to associated hypotension and not being able to tolerate higher doses. She also has PRN lasix for weight gain and PRN nitro. She is following up at the CHF clinic at [place] and was also referred to [facility] for potential future advanced heart failure therapies evaluation.     Patient states she has no SOB at rest. She has no GONZALES.  No LE edema. No distention in the abdomen. Her appetite has been normal.  Weight at home 174 lbs. She says 174-176 lbs is normal. Last time she took an extra diuretic was 1 week ago. She had seen a weight gain.   She has stopped smoking.  Nothing since the hospital- has a patch. She met with vascular specialist. She had to see the surgeon about the PAD - which would affect the LVAD.  She watches the sodium and is careful with the diet. Last labs 2/9. BP at home < 100 systolic.        Denies SOB, GONZALES, PND, orthopnea, edema, weight gain, chest pain, palpitations, lightheadedness, dizziness, near syncopal/syncopal episodes. Cailin has been following  salt and fluid restrictions.      PAST MEDICAL HISTORY:  Past Medical History:   Diagnosis Date    CAD (coronary artery disease)     Nicotine dependence     Type 2 diabetes mellitus with other circulatory complications (H)        FAMILY HISTORY:  Family History   Problem Relation Age of Onset    Coronary Artery Disease Early Onset Maternal Grandfather        SOCIAL HISTORY:  Social History     Tobacco Use    Smoking status: Some Days     Current packs/day: 1.50     Average packs/day: 1.5 packs/day for 36.4 years (54.6 ttl pk-yrs)     Types: Cigarettes     Start date: 1988    Smokeless tobacco: Never    Tobacco comments:     Trying to quit, wearing patch -    Substance Use Topics    Alcohol use: Not Currently    Drug use: Never           CURRENT MEDICATIONS:  Current Outpatient Medications   Medication Sig Dispense Refill    aspirin (ASA) 81 MG EC tablet Take 81 mg by mouth daily      atorvastatin (LIPITOR) 80 MG tablet Take 1 tablet by mouth daily      blood glucose (CONTOUR NEXT TEST) test strip To test BG values three times daily - uses insulin      busPIRone (BUSPAR) 10 MG tablet Take 10 mg by mouth 2 times daily      conjugated estrogens (PREMARIN) 0.625 MG/GM vaginal cream Insert 0.5 g into the vagina every night at bedtime for 14 days, THEN 0.5 g every night at bedtime.      dapagliflozin (FARXIGA) tablet Take 10 mg by mouth daily      diclofenac (VOLTAREN) 1 % topical gel       Doxycycline Hyclate 100 MG TBEC Take 100 mg by mouth 2 times daily      ESTRADIOL 0.1MG/GM CREAM Place vaginally every other day      ezetimibe (ZETIA) 10 MG tablet Take 1 tablet (10 mg) by mouth daily 90 tablet 3    fenofibrate (TRIGLIDE/LOFIBRA) 160 MG tablet Take 160 mg by mouth daily      furosemide (LASIX) 20 MG tablet Take 20 mg by mouth daily May also take 1 tablet 1 time per day as needed for weight gain of >2lbs in a day or >5lbs in a day      gabapentin (NEURONTIN) 100 MG capsule Take 1 capsule (100 mg) by mouth 3 times daily  for 12 days 36 capsule 0    insulin aspart (NOVOLOG PEN) 100 UNIT/ML pen Inject 5 Units Subcutaneous daily (before supper) for 27 days 1.35 mL 0    insulin glargine (LANTUS PEN) 100 UNIT/ML pen Inject 30 Units Subcutaneous at bedtime for 28 days 8.4 mL 0    isosorbide dinitrate (ISORDIL) 10 MG tablet Take 1 tablet (10 mg) by mouth 3 times daily for 12 days 36 tablet 0    liraglutide (VICTOZA PEN) 18 MG/3ML solution INJECT 0.6MG SUBCUTANEOUSLY ONCE DAILY for 1 week, then 1.8 mg subcutaneously daily 3 mL 0    metFORMIN (GLUCOPHAGE) 1000 MG tablet Take 1,000 mg by mouth 2 times daily (with meals)      methocarbamol (ROBAXIN) 500 MG tablet Take 500 mg by mouth 4 times daily as needed for muscle spasms      metoprolol succinate ER (TOPROL XL) 25 MG 24 hr tablet Take 1 tablet (25 mg) by mouth daily for 11 days 11 tablet 0    nicotine (NICODERM CQ) 21 MG/24HR 24 hr patch Place 1 patch onto the skin every 24 hours      nitroGLYcerin (NITROSTAT) 0.4 MG sublingual tablet Place 0.4 mg under the tongue every 5 minutes as needed      oxyCODONE (ROXICODONE) 5 MG tablet Take 1 tablet by mouth every 6 hours as needed      polyethylene glycol (MIRALAX) 17 GM/Dose powder Take 17 g by mouth daily 510 g 0    ranolazine (RANEXA) 500 MG 12 hr tablet Take 500 mg by mouth 2 times daily      sacubitril-valsartan (ENTRESTO)  MG per tablet Take 1 tablet by mouth 2 times daily      spironolactone (ALDACTONE) 25 MG tablet Take 12.5 mg by mouth daily      ticagrelor (BRILINTA) 90 MG tablet Take 90 mg by mouth 2 times daily      venlafaxine (EFFEXOR) 100 MG tablet Take 1 tablet by mouth 2 times daily         ROS:  Per HPI        EXAM:   This is a virtual appointment and no current vitals are available.    Constitutional - WDWN, no acute distress   Eyes - no redness or discharge, nonicteric sclera  Respiratory - nonlabored breathing, able to speak in full sentences without difficulty  CV: no visible edema of visualized upper  "extremities.  Neurological - alert and oriented, speech fluent/appropriate  PSYCH: cooperative, affect appropriate  DERM: no rashes on visualized face/neck/upper extremities       Labs:  CBC RESULTS:  Lab Results   Component Value Date    WBC 6.6 02/09/2024    RBC 4.43 02/09/2024    HGB 12.7 02/09/2024    HCT 38.8 02/09/2024    MCV 88 02/09/2024    MCH 28.7 02/09/2024    MCHC 32.7 02/09/2024    RDW 13.9 02/09/2024     02/09/2024       CMP RESULTS:  Lab Results   Component Value Date     02/09/2024    POTASSIUM 3.8 02/09/2024    CHLORIDE 103 02/09/2024    CO2 20 (L) 02/09/2024    ANIONGAP 15 02/09/2024     (H) 02/09/2024    BUN 11.7 02/09/2024    CR 0.75 02/09/2024    GFRESTIMATED >90 02/09/2024    RON 9.0 02/09/2024    BILITOTAL 0.3 02/06/2024    ALBUMIN 3.8 02/06/2024    ALKPHOS 55 02/06/2024    ALT 25 02/06/2024    AST 25 02/06/2024        INR RESULTS:  Lab Results   Component Value Date    INR 0.99 02/06/2024       No components found for: \"CK\"  Lab Results   Component Value Date    MAG 1.9 02/09/2024     No results found for: \"NTBNP\"  @BRIEFLABR (dig)@    Most recent echocardiogram:  No results found for this or any previous visit (from the past 8760 hour(s)).      Assessment and Plan:    47 year old lady with a  including DM which is reasonably better controlled at the moment as well as recent STEMI back in September 2022 in the setting of atypical symptoms with high peak and troponin at around 63. She does have multivessel coronary artery disease only the LCx vessel was intervened however she has significant disease in the small LAD as well as  of the RCA.  She was found to be not a surgical candidate in [place].      Overall she continues to do relatively poorly with low exercise tolerance and low energy.  She takes her medications as prescribed she is  normotensive.  Her last 6-minute walk test was extremely low.  She noted she lives in a small apartment and she does have difficulty " walking from one end to the other. According to the limited access with virtual appts. She appears to be stable and doing well. She offers no new concerns.  I will not make any medication adjustments. The patient does state in person visits are not doable for now due to the travel distance ( 4 hours).    Last BMP shows glucose to be elevated patient to work with her PCP.  She has a visit set for later today to discuss.     Vascular medicine follow up - We will see her back in 3 months with repeat MANAN with exercise and arterial duplex prior to the visit. She reports that these test were not available locally and she does not mind coming in person for these visits.       1.  Chronic systolic heart failure secondary to ICM  Stage D  NYHA Class III  ACEi/ARB/ARNI: yes  BB: yes  Aldosterone antagonist: yes- 12.5 mg daily  SGLT2i- Farxiga   SCD prophylaxis: ICD  Fluid status: euvolemic  Anticoagulation:   Antiplatelet:  ASA dose   Sleep apnea:not discussed today  NSAID use:  Contraindicated.  Avoid use.  Remote Monitoring:none  ISORDIL 10 mg TID    Plan:  CORE follow up in 6 months           Ora LAGUERRE CNP  CORE Clinic

## 2024-05-15 NOTE — TELEPHONE ENCOUNTER
----- Message from Kaye Castillo RN sent at 5/15/2024 10:08 AM CDT -----  Please call to pt to schedule 6 month core with labs prior locally. Virtual visit okay    Then mail AVS please

## 2024-05-15 NOTE — NURSING NOTE
Patient confirms medications and allergies are accurate via patients echeck in completion, and or denies any changes since last reviewed/verified.       Is the patient currently in the state of [place]? YES    Visit mode:VIDEO    If the visit is dropped, the patient can be reconnected by: VIDEO VISIT: Text to cell phone:   Telephone Information:   [place] [phone]       Will anyone else be joining the visit? Dim[place]   (If patient encounters technical issues they should call [phone]    How would you like to obtain your AVS? MyChart    Are changes needed to the allergy or medication list? No    Are refills needed on medications prescribed by this physician? NO    Reason for visit: RECHECK    Lawanda WRENF

## 2024-05-16 ENCOUNTER — TELEPHONE (OUTPATIENT)
Dept: CARDIOLOGY | Facility: CLINIC | Age: 49
End: 2024-05-16
Payer: COMMERCIAL

## 2024-05-16 NOTE — TELEPHONE ENCOUNTER
Called [facility] and requested transfer to [facility] lab department. Requested fax number to send lab orders to for mutual patient. Representative agreed. Fax number received was [phone].    Olga VINCENT, VF   
Called patient to inform of successful faxed orders to [facility] laboratory. Patient will schedule lab 1-2 days prior to appointment with Ora.    Olga VINCENT, VF   
Faxed lab orders with cover sheet to [facility].    Olga VINCENT, VF   
13-Nov-2017

## 2024-05-22 ENCOUNTER — ANCILLARY PROCEDURE (OUTPATIENT)
Dept: CARDIOLOGY | Facility: CLINIC | Age: 49
End: 2024-05-22
Attending: INTERNAL MEDICINE
Payer: COMMERCIAL

## 2024-05-22 PROCEDURE — 93295 DEV INTERROG REMOTE 1/2/MLT: CPT | Performed by: INTERNAL MEDICINE

## 2024-05-22 PROCEDURE — 93296 REM INTERROG EVL PM/IDS: CPT

## 2024-06-11 ENCOUNTER — TELEPHONE (OUTPATIENT)
Dept: CARDIOLOGY | Facility: CLINIC | Age: 49
End: 2024-06-11
Payer: COMMERCIAL

## 2024-06-11 NOTE — TELEPHONE ENCOUNTER
6/11/2024 11:25AM Bina Lopez  Called waitlist patient and offered a Return Heart Failure Video Visit appointment with Dr. Guerin on this date 6/18/2024 & time 1:30PM at this location VIDEO VISIT (ok to take spot if it's open per Donovan Velazcomoemily)    6/11 LVM for pt that we are calling off waitlist to offer sooner appt w/ Dr. Guerin 6/18- may not be available if pt calls back. MJ      Please note there is no guarantee this appointment will be available as it is first come first serve.   Bina Lopez 6/11/2024 11:25AM

## 2024-06-18 ENCOUNTER — TELEPHONE (OUTPATIENT)
Dept: CARDIOLOGY | Facility: CLINIC | Age: 49
End: 2024-06-18
Payer: COMMERCIAL

## 2024-06-20 ENCOUNTER — TELEPHONE (OUTPATIENT)
Dept: CARDIOLOGY | Facility: CLINIC | Age: 49
End: 2024-06-20
Payer: COMMERCIAL

## 2024-06-20 NOTE — TELEPHONE ENCOUNTER
6/20 cancelled appt due to max attempts reached Left Voicemail (2nd Attempt) and sent a letter for the patient to call back and schedule the following:    Appointment type: Return EP  Provider: Ronaldo Hawley or any EP CHRIS ([facility] or [employer])   Return date: next available  Specialty phone number: [phone] opt 1  Additional appointment(s) needed: n/a  Additonal Notes: n/a

## 2024-06-22 ENCOUNTER — HEALTH MAINTENANCE LETTER (OUTPATIENT)
Age: 49
End: 2024-06-22

## 2024-06-24 ENCOUNTER — TELEPHONE (OUTPATIENT)
Dept: CARDIOLOGY | Facility: CLINIC | Age: 49
End: 2024-06-24
Payer: COMMERCIAL

## 2024-06-24 NOTE — TELEPHONE ENCOUNTER
6/24/2024 4:45PM Bina Lopez  Called waitlist patient and offered a Return Heart Failure Video Visit appointment with Dr. Guerin on this date 6/25/2024 & time 9:30AM at this location [facility], [address] [place] [address], 3rd Floor [address], [place] [address]    6/24 LVM for pt that we are calling off waitlist to offer sooner appt w/ Dr. Guerin 6/25 (ok to schedule as a video visit, but Donovan Rendon would prefer in-person). MJ      Please note there is no guarantee this appointment will be available as it is first come first serve.   Bina Lopez 6/24/2024 4:45PM

## 2024-06-25 ENCOUNTER — TELEPHONE (OUTPATIENT)
Dept: CARDIOLOGY | Facility: CLINIC | Age: 49
End: 2024-06-25
Payer: COMMERCIAL

## 2024-06-25 NOTE — TELEPHONE ENCOUNTER
Left Voicemail (1st Attempt) for the patient to call back and schedule the following:    Appointment type:  RTN EP   Provider: AMINA HARE  Return date: 08/16/24  Specialty phone number: [phone] OPT 1   Additional appointment(s) needed: N/A   Additonal Notes: N/A

## 2024-06-27 ENCOUNTER — CARE COORDINATION (OUTPATIENT)
Dept: CARDIOLOGY | Facility: CLINIC | Age: 49
End: 2024-06-27
Payer: COMMERCIAL

## 2024-06-27 DIAGNOSIS — I50.22 CHRONIC SYSTOLIC HEART FAILURE (H): Primary | ICD-10-CM

## 2024-07-02 ENCOUNTER — VIRTUAL VISIT (OUTPATIENT)
Dept: CARDIOLOGY | Facility: CLINIC | Age: 49
End: 2024-07-02
Attending: FAMILY MEDICINE
Payer: COMMERCIAL

## 2024-07-02 VITALS — BODY MASS INDEX: 32.02 KG/M2 | WEIGHT: 174 LBS | HEIGHT: 62 IN

## 2024-07-02 DIAGNOSIS — I50.21 ACUTE SYSTOLIC HEART FAILURE (H): ICD-10-CM

## 2024-07-02 DIAGNOSIS — I25.10 CORONARY ARTERY DISEASE INVOLVING NATIVE CORONARY ARTERY OF NATIVE HEART WITHOUT ANGINA PECTORIS: ICD-10-CM

## 2024-07-02 DIAGNOSIS — I50.22 CHRONIC SYSTOLIC HEART FAILURE (H): Primary | ICD-10-CM

## 2024-07-02 DIAGNOSIS — I50.20 HEART FAILURE WITH REDUCED EJECTION FRACTION, NYHA CLASS III (H): ICD-10-CM

## 2024-07-02 DIAGNOSIS — I73.9 PAD (PERIPHERAL ARTERY DISEASE) (H): ICD-10-CM

## 2024-07-02 DIAGNOSIS — I25.5 ISCHEMIC CARDIOMYOPATHY: ICD-10-CM

## 2024-07-02 DIAGNOSIS — Z95.810 ICD (IMPLANTABLE CARDIOVERTER-DEFIBRILLATOR), SINGLE, IN SITU: ICD-10-CM

## 2024-07-02 PROCEDURE — G2211 COMPLEX E/M VISIT ADD ON: HCPCS | Mod: 95 | Performed by: INTERNAL MEDICINE

## 2024-07-02 PROCEDURE — 99215 OFFICE O/P EST HI 40 MIN: CPT | Mod: 95 | Performed by: INTERNAL MEDICINE

## 2024-07-02 RX ORDER — SEMAGLUTIDE 0.68 MG/ML
0.5 INJECTION, SOLUTION SUBCUTANEOUS
COMMUNITY
Start: 2024-05-15 | End: 2025-05-20

## 2024-07-02 ASSESSMENT — PAIN SCALES - GENERAL: PAINLEVEL: MODERATE PAIN (5)

## 2024-07-02 NOTE — NURSING NOTE
Current patient location: [address]  [address]   [facility] [address]    Is the patient currently in the state of [place]? YES    Visit mode:VIDEO    If the visit is dropped, the patient can be reconnected by: VIDEO VISIT: Text to cell phone:   Telephone Information:   Mobile [phone]       Will anyone else be joining the visit? NO  (If patient encounters technical issues they should call [phone]    How would you like to obtain your AVS? MyChart    Are changes needed to the allergy or medication list? No    Are refills needed on medications prescribed by this physician? NO    Reason for visit: CRISTINA JUAREZ

## 2024-07-02 NOTE — PROGRESS NOTES
July 2, 2024     Cailin Burns is a 48 year old female  with a past medical history notable for PMH of DM, HLD, obesity, tobacco use disorder, sciatica who was admitted to [facility] ICU. She initially presented to urgent care with new onset chest pain, was found to have STEMI and was transferred to [facility] for immediate further evaluation and angiogram on 9/25/22. It was significant for 3 vessel disease. Case was immediately discussed with Dr Leal for potential urgent CABG considerations but ultimately the decision was made to proceed with PCI due to poor bypass targets. Intervention to LCx was made. Prior to further PCI PET viability was pursued prior to considering further interventions. Unfortunately viability did not show enough and thus medical management was recommended. She did have short runs of NSVT while inpatient. ECHO showed EF 20% and thus she was discharged with LifeVest. She was discharged on mimimal medical therapy due to associated hypotension and not being able to tolerate higher doses. She also has PRN lasix for weight gain and PRN nitro. She is following up at the CHF clinic at [place] and was also referred to [facility] for potential future advanced heart failure therapies evaluation.      After discussion with her and the interventional and surgical teams at [facility], the decision was made to proceed with staged PCI of the CTOs. These were performed. She also follows in [facility] and recently attempts were made to increase XL to 50mg daily. In addition, she continued to have the LifeVest and potential referral for device was discussed. ICD had been implanted 5/11/23.  Overall she feels about the same may be a little bit better Definitely better compared to the time when we first met.  She is taking her medications including the Entresto maximum dose.  Blood pressure running in the 80s to 90s however she tolerates this without any symptoms.  No further episodes of chest pain.  She does have arm and neck pain  wondering if there is some stenosis that may be causing this.  No other issues overall.  She did quit smoking her quit date was 7/1/2024 sounds like she is still using some nicotine patches though.     PAST MEDICAL HISTORY:  Past Medical History:   Diagnosis Date    CAD (coronary artery disease)     Nicotine dependence     Type 2 diabetes mellitus with other circulatory complications (H)      FAMILY HISTORY:  Family History   Problem Relation Age of Onset    Coronary Artery Disease Early Onset Maternal Grandfather      SOCIAL HISTORY:  Social History     Socioeconomic History    Marital status:    Tobacco Use    Smoking status: Some Days     Current packs/day: 1.50     Average packs/day: 1.5 packs/day for 36.5 years (54.7 ttl pk-yrs)     Types: Cigarettes     Start date: 1988    Smokeless tobacco: Never    Tobacco comments:     Trying to quit, wearing patch -    Substance and Sexual Activity    Alcohol use: Not Currently    Drug use: Never     Social Determinants of Health     Financial Resource Strain: High Risk (10/14/2022)    [facility]    Overall Financial Resource Strain (CARDIA)     Difficulty of Paying Living Expenses: Very hard   Food Insecurity: Food Insecurity Present (4/2/2024)    Received from [facility]    Hunger Vital Sign     Worried About Running Out of Food in the Last Year: Often true     Ran Out of Food in the Last Year: Often true   Transportation Needs: No Transportation Needs (2/1/2024)    [facility] from [facility], [facility]    PRAPARE - Transportation     Lack of Transportation (Medical): No     Lack of Transportation (Non-Medical): No   Housing Stability: High Risk (2/1/2024)    Received from [facility], [facility]    Housing Stability Vital Sign     Unable to Pay for Housing in the Last Year: Yes     Number of Places Lived in  the Last Year: 1     Unstable Housing in the Last Year: No     CURRENT MEDICATIONS:  Current Outpatient Medications   Medication Sig Dispense Refill    aspirin (ASA) 81 MG EC tablet Take 81 mg by mouth daily      atorvastatin (LIPITOR) 80 MG tablet Take 1 tablet by mouth daily      blood glucose (CONTOUR NEXT TEST) test strip To test BG values three times daily - uses insulin      busPIRone (BUSPAR) 10 MG tablet Take 10 mg by mouth 2 times daily      conjugated estrogens (PREMARIN) 0.625 MG/GM vaginal cream Insert 0.5 g into the vagina every night at bedtime for 14 days, THEN 0.5 g every night at bedtime.      dapagliflozin (FARXIGA) tablet Take 10 mg by mouth daily      diclofenac (VOLTAREN) 1 % topical gel       Doxycycline Hyclate 100 MG TBEC Take 100 mg by mouth 2 times daily      ESTRADIOL 0.1MG/GM CREAM Place vaginally every other day      ezetimibe (ZETIA) 10 MG tablet Take 1 tablet (10 mg) by mouth daily 90 tablet 3    fenofibrate (TRIGLIDE/LOFIBRA) 160 MG tablet Take 160 mg by mouth daily      furosemide (LASIX) 20 MG tablet Take 20 mg by mouth daily May also take 1 tablet 1 time per day as needed for weight gain of >2lbs in a day or >5lbs in a day      gabapentin (NEURONTIN) 100 MG capsule Take 1 capsule (100 mg) by mouth 3 times daily for 12 days 36 capsule 0    insulin aspart (NOVOLOG PEN) 100 UNIT/ML pen Inject 5 Units Subcutaneous daily (before supper) for 27 days 1.35 mL 0    insulin glargine (LANTUS PEN) 100 UNIT/ML pen Inject 30 Units Subcutaneous at bedtime for 28 days 8.4 mL 0    isosorbide dinitrate (ISORDIL) 10 MG tablet Take 1 tablet (10 mg) by mouth 3 times daily for 12 days 36 tablet 0    liraglutide (VICTOZA PEN) 18 MG/3ML solution INJECT 0.6MG SUBCUTANEOUSLY ONCE DAILY for 1 week, then 1.8 mg subcutaneously daily 3 mL 0    metFORMIN (GLUCOPHAGE) 1000 MG tablet Take 1,000 mg by mouth 2 times daily (with meals)      methocarbamol (ROBAXIN) 500 MG tablet Take 500 mg by mouth 4 times daily as  needed for muscle spasms      metoprolol succinate ER (TOPROL XL) 25 MG 24 hr tablet Take 1 tablet (25 mg) by mouth daily for 11 days 11 tablet 0    nicotine (NICODERM CQ) 21 MG/24HR 24 hr patch Place 1 patch onto the skin every 24 hours      nitroGLYcerin (NITROSTAT) 0.4 MG sublingual tablet Place 0.4 mg under the tongue every 5 minutes as needed      oxyCODONE (ROXICODONE) 5 MG tablet Take 1 tablet by mouth every 6 hours as needed      polyethylene glycol (MIRALAX) 17 GM/Dose powder Take 17 g by mouth daily 510 g 0    ranolazine (RANEXA) 500 MG 12 hr tablet Take 500 mg by mouth 2 times daily      sacubitril-valsartan (ENTRESTO)  MG per tablet Take 1 tablet by mouth 2 times daily      spironolactone (ALDACTONE) 25 MG tablet Take 12.5 mg by mouth daily      ticagrelor (BRILINTA) 90 MG tablet Take 90 mg by mouth 2 times daily      venlafaxine (EFFEXOR) 100 MG tablet Take 1 tablet by mouth 2 times daily       No current facility-administered medications for this visit.     EXAM:  This is a virtual appointment and no current vitals are available.  Previous ones were reviewed and blood pressure was in the 80s systolic.  General: appears comfortable, no distress   Head: normocephalic, atraumatic  Eyes: anicteric sclera, EOMI  Neck: no adenopathy  Orophyarynx: clear and moist   Heart:  normal rate  Lungs: clear, no rales or wheezing  Abdomen: soft, non-tender, non-distended, no hepatosplenomegaly  Extremities: no clubbing, cyanosis, edema  Neurological: normal speech and affect, no gross motor deficits     Labs:  Lab Results   Component Value Date    WBC 6.6 02/09/2024    HGB 12.7 02/09/2024    HCT 38.8 02/09/2024     02/09/2024     02/09/2024    POTASSIUM 3.8 02/09/2024    CHLORIDE 102 05/14/2024    CO2 20 (L) 02/09/2024    BUN 11.7 02/09/2024    CR 0.75 02/09/2024     (H) 02/09/2024    NTBNPI 236 02/06/2024    AST 25 02/06/2024    ALT 25 02/06/2024    ALKPHOS 55 02/06/2024    BILITOTAL 0.3  02/06/2024    INR 0.99 02/06/2024   [facility] - 9/25/22: significant 3VD,  - LCx - mid, 99%, culprit - s/p PCI with a 2.75*26 mm [place] MOHAN  - LAD - diffusely diseased, small caliber, 90% mid & distal - not amenable to PCI due to small caliber  - RCA - mid, 100%, , fills distally with R-R collaterals, small caliber.   Discussed presentation with Dr Leal for consideration for CABG, before proceeding with PCI, it was decided to proceed with PCI-LCx & assess for CABG later.     TTE 9/25/22:    Left Ventricle: The left ventricle is dilated. Severely reduced left  ventricular systolic function. The EF is visually estimated to be 20%. No  thrombus seen -contrast images also reviewed See diagram for wall motion details.     Right Ventricle: The right ventricle appears normal in size. The tricuspid jet envelope definition is inadequate for estimation of RV systolic pressure.     Mitral Valve: The leaflets are thickened. There is mild regurgitation.     Tricuspid Valve: There is no regurgitation.     This is an abnormal study.      PET viability 9/28/22:    Metabolic Viability Conclusion: The left ventricle has potentially viable myocardium.  The total viable myocardium is 39%, total scar burden is 32%     Resting Perfusion Details: Large perfusion defect at rest involving the entire anteroseptal, inferoseptal, inferior and inferolateral segments.   This includes the entire RCA territory and parts of the LAD and possibly LCx territories.     Metabolic Viability Defect: The mid inferoseptal, mid inferior, mid inferolateral, distal septal and distal inferior segments are nonviable. The LCx territory appears to be mostly viable at 95% total viability. The LAD territory is predominantly viable with 75% viability. The RCA territory appears to be 50% viable with no perfusion at rest     Coronary angiogram 12/9//2022:  Severe three vessel coronary artery disease (mRCA , pLAD, mLcx stenosis)     Coronary angiogram 1/2/2023:  Severe  three vessel CAD status post PCI to the RCA  and residual LAD and LCx disease.  Successful PCI of midLAD with MOHAN x1 (2.5 x 32 mm Synergy MOHAN) and mid-LCx (2.75 x 16 mm Synergy MOHAN).     TTE 4/11/23    Left Ventricle: The left ventricle is dilated. Severely reduced left  ventricular systolic function. The EF is visually estimated to be 25-30%. Global hypokinesis of the left ventricle with akinetic apical segments.     Right Ventricle: The right ventricle appears normal in size. Systolic function is normal.     Aortic Valve: There is mild-to-moderate regurgitation.      TTE 1/4/2024:    Left Ventricle: Severely reduced left ventricular systolic function. LVEF 27%.    Right Ventricle: Systolic function is normal.     Aortic Valve: There is mild-to-moderate regurgitation.    RHC 2/24/24:    Right sided filling pressures are normal.    Left sided filling pressures are normal.    Normal PA pressures.    Normal cardiac output level.    TTE 2/5/24:  Technically difficult study.Extremely difficult acoustic windows despite the use of contrast for endcardial border definition.  Left ventricular function is decreased. The ejection fraction is 30-35% (moderately reduced). Apical wall akinesis is present.  Global right ventricular function is normal.   Mild mitral insufficiency is present. Mild aortic insufficiency is present.  Pulmonary artery systolic pressure cannot be assessed.  IVC diameter <2.1 cm collapsing >50% with sniff suggests a normal RA pressure of 3 mmHg.  No pericardial effusion is present.    ASSESSMENT AND PLAN:  48 year old lady with a  including DM which is reasonably better controlled at the moment as well as recent STEMI back in September 2022 in the setting of atypical symptoms with high peak and troponin at around 63. She does have multivessel coronary artery disease only the LCx vessel was intervened however she has significant disease in the small LAD as well as  of the RCA.  She was found to be  not a surgical candidate in [place].  She was started on medications now the blood pressure is better at 120/80.  Patient at home and as such losartan was started.  Images were reviewed and options discussed extensively with surgical as well as interventional colleagues at [facility]. Ultimately the decision was made to proceed with staged PCI of the CTOs. She did have this done early December 2022 and then January 2023 and tolerated the procedures well.   Overall no changes today to her medical regimen.  She is on good therapy and improved a little bit compared to our if she felt in the beginning.  She did quit smoking and we discussed that this will be very important that we are considering advanced reports especially heart transplantation.  She will start testing in a month or 2 when hopefully she can stop using the patches.  Otherwise she is not drinking any alcohol and does not use any drugs.  I will see her back in October or sooner as needed     I appreciate the opportunity to participate in the care of Cailin Burns . Please do not hesitate to contact me with any further questions.  I have spent a total of 40 minutes today reviewing labs, imaging studies, discussing with colleagues, face-to-face time with patient and documentation in the medical record.  The longitudinal plan of care for the diagnosis(es)/condition(s) as documented were addressed during this visit. Due to the added complexity in care, I will continue to support Cailin in the subsequent management and with ongoing continuity of care.    Sincerely,   Kurt Guerin MD  [facility] Division of Cardiology

## 2024-07-02 NOTE — PROGRESS NOTES
Virtual Visit Details    Type of service:  Video Visit   Video Start Time: 10:15  Video End Time:10:52 AM    Originating Location (pt. Location): Home    Distant Location (provider location):  On-site  Platform used for Video Visit: [email or web address]

## 2024-07-02 NOTE — LETTER
7/2/2024      RE: Cailin Burns  [address]  [address]  [facility] [address]       Dear Colleague,    Thank you for the opportunity to participate in the care of your patient, Cailin Burns, at the [facility] [place] at [facility]. Please see a copy of my visit note below.    July 2, 2024     Cailin Burns is a 48 year old female  with a past medical history notable for PMH of DM, HLD, obesity, tobacco use disorder, sciatica who was admitted to [facility] ICU. She initially presented to urgent care with new onset chest pain, was found to have STEMI and was transferred to [facility] for immediate further evaluation and angiogram on 9/25/22. It was significant for 3 vessel disease. Case was immediately discussed with Dr Leal for potential urgent CABG considerations but ultimately the decision was made to proceed with PCI due to poor bypass targets. Intervention to LCx was made. Prior to further PCI PET viability was pursued prior to considering further interventions. Unfortunately viability did not show enough and thus medical management was recommended. She did have short runs of NSVT while inpatient. ECHO showed EF 20% and thus she was discharged with LifeVest. She was discharged on mimimal medical therapy due to associated hypotension and not being able to tolerate higher doses. She also has PRN lasix for weight gain and PRN nitro. She is following up at the CHF clinic at [facility] and was also referred to [facility] for potential future advanced heart failure therapies evaluation.      After discussion with her and the interventional and surgical teams at [facility], the decision was made to proceed with staged PCI of the CTOs. These were performed. She also follows in [facility] and recently attempts were made to increase XL to 50mg daily. In addition, she continued to have the LifeVest and potential referral for device was discussed. ICD had been implanted  5/11/23.  Overall she feels about the same may be a little bit better Definitely better compared to the time when we first met.  She is taking her medications including the Entresto maximum dose.  Blood pressure running in the 80s to 90s however she tolerates this without any symptoms.  No further episodes of chest pain.  She does have arm and neck pain wondering if there is some stenosis that may be causing this.  No other issues overall.  She did quit smoking her quit date was 7/1/2024 sounds like she is still using some nicotine patches though.     PAST MEDICAL HISTORY:  Past Medical History:   Diagnosis Date     CAD (coronary artery disease)      Nicotine dependence      Type 2 diabetes mellitus with other circulatory complications (H)      FAMILY HISTORY:  Family History   Problem Relation Age of Onset     Coronary Artery Disease Early Onset Maternal Grandfather      SOCIAL HISTORY:  Social History     Socioeconomic History     Marital status:    Tobacco Use     Smoking status: Some Days     Current packs/day: 1.50     Average packs/day: 1.5 packs/day for 36.5 years (54.7 ttl pk-yrs)     Types: Cigarettes     Start date: 1988     Smokeless tobacco: Never     Tobacco comments:     Trying to quit, wearing patch -    Substance and Sexual Activity     Alcohol use: Not Currently     Drug use: Never     Social Determinants of Health     Financial Resource Strain: High Risk (10/14/2022)    [facility]    Overall Financial Resource Strain (CARDIA)      Difficulty of Paying Living Expenses: Very hard   Food Insecurity: Food Insecurity Present (4/2/2024)    Received from [facility]    Hunger Vital Sign      Worried About Running Out of Food in the Last Year: Often true      Ran Out of Food in the Last Year: Often true   Transportation Needs: No Transportation Needs (2/1/2024)    [facility]  [facility]    PRAPARE - Transportation      Lack of Transportation (Medical): No      Lack of Transportation (Non-Medical): No   Housing Stability: High Risk (2/1/2024)    [facility]    Housing Stability Vital Sign      Unable to Pay for Housing in the Last Year: Yes      Number of Places Lived in the Last Year: 1      Unstable Housing in the Last Year: No     CURRENT MEDICATIONS:  Current Outpatient Medications   Medication Sig Dispense Refill     aspirin (ASA) 81 MG EC tablet Take 81 mg by mouth daily       atorvastatin (LIPITOR) 80 MG tablet Take 1 tablet by mouth daily       blood glucose (CONTOUR NEXT TEST) test strip To test BG values three times daily - uses insulin       busPIRone (BUSPAR) 10 MG tablet Take 10 mg by mouth 2 times daily       conjugated estrogens (PREMARIN) 0.625 MG/GM vaginal cream Insert 0.5 g into the vagina every night at bedtime for 14 days, THEN 0.5 g every night at bedtime.       dapagliflozin (FARXIGA) tablet Take 10 mg by mouth daily       diclofenac (VOLTAREN) 1 % topical gel        Doxycycline Hyclate 100 MG TBEC Take 100 mg by mouth 2 times daily       ESTRADIOL 0.1MG/GM CREAM Place vaginally every other day       ezetimibe (ZETIA) 10 MG tablet Take 1 tablet (10 mg) by mouth daily 90 tablet 3     fenofibrate (TRIGLIDE/LOFIBRA) 160 MG tablet Take 160 mg by mouth daily       furosemide (LASIX) 20 MG tablet Take 20 mg by mouth daily May also take 1 tablet 1 time per day as needed for weight gain of >2lbs in a day or >5lbs in a day       gabapentin (NEURONTIN) 100 MG capsule Take 1 capsule (100 mg) by mouth 3 times daily for 12 days 36 capsule 0     insulin aspart (NOVOLOG PEN) 100 UNIT/ML pen Inject 5 Units Subcutaneous daily (before supper) for 27 days 1.35 mL 0     insulin glargine (LANTUS PEN) 100 UNIT/ML pen Inject 30 Units Subcutaneous at bedtime for 28 days 8.4 mL 0     isosorbide dinitrate (ISORDIL) 10  MG tablet Take 1 tablet (10 mg) by mouth 3 times daily for 12 days 36 tablet 0     liraglutide (VICTOZA PEN) 18 MG/3ML solution INJECT 0.6MG SUBCUTANEOUSLY ONCE DAILY for 1 week, then 1.8 mg subcutaneously daily 3 mL 0     metFORMIN (GLUCOPHAGE) 1000 MG tablet Take 1,000 mg by mouth 2 times daily (with meals)       methocarbamol (ROBAXIN) 500 MG tablet Take 500 mg by mouth 4 times daily as needed for muscle spasms       metoprolol succinate ER (TOPROL XL) 25 MG 24 hr tablet Take 1 tablet (25 mg) by mouth daily for 11 days 11 tablet 0     nicotine (NICODERM CQ) 21 MG/24HR 24 hr patch Place 1 patch onto the skin every 24 hours       nitroGLYcerin (NITROSTAT) 0.4 MG sublingual tablet Place 0.4 mg under the tongue every 5 minutes as needed       oxyCODONE (ROXICODONE) 5 MG tablet Take 1 tablet by mouth every 6 hours as needed       polyethylene glycol (MIRALAX) 17 GM/Dose powder Take 17 g by mouth daily 510 g 0     ranolazine (RANEXA) 500 MG 12 hr tablet Take 500 mg by mouth 2 times daily       sacubitril-valsartan (ENTRESTO)  MG per tablet Take 1 tablet by mouth 2 times daily       spironolactone (ALDACTONE) 25 MG tablet Take 12.5 mg by mouth daily       ticagrelor (BRILINTA) 90 MG tablet Take 90 mg by mouth 2 times daily       venlafaxine (EFFEXOR) 100 MG tablet Take 1 tablet by mouth 2 times daily       No current facility-administered medications for this visit.     EXAM:  This is a virtual appointment and no current vitals are available.  Previous ones were reviewed and blood pressure was in the 80s systolic.  General: appears comfortable, no distress   Head: normocephalic, atraumatic  Eyes: anicteric sclera, EOMI  Neck: no adenopathy  Orophyarynx: clear and moist   Heart:  normal rate  Lungs: clear, no rales or wheezing  Abdomen: soft, non-tender, non-distended, no hepatosplenomegaly  Extremities: no clubbing, cyanosis, edema  Neurological: normal speech and affect, no gross motor deficits     Labs:  Lab  Results   Component Value Date    WBC 6.6 02/09/2024    HGB 12.7 02/09/2024    HCT 38.8 02/09/2024     02/09/2024     02/09/2024    POTASSIUM 3.8 02/09/2024    CHLORIDE 102 05/14/2024    CO2 20 (L) 02/09/2024    BUN 11.7 02/09/2024    CR 0.75 02/09/2024     (H) 02/09/2024    NTBNPI 236 02/06/2024    AST 25 02/06/2024    ALT 25 02/06/2024    ALKPHOS 55 02/06/2024    BILITOTAL 0.3 02/06/2024    INR 0.99 02/06/2024   [facility] - 9/25/22: significant 3VD,  - LCx - mid, 99%, culprit - s/p PCI with a 2.75*26 mm [place] MOHAN  - LAD - diffusely diseased, small caliber, 90% mid & distal - not amenable to PCI due to small caliber  - RCA - mid, 100%, , fills distally with R-R collaterals, small caliber.   Discussed presentation with Dr Leal for consideration for CABG, before proceeding with PCI, it was decided to proceed with PCI-LCx & assess for CABG later.     TTE 9/25/22:     Left Ventricle: The left ventricle is dilated. Severely reduced left  ventricular systolic function. The EF is visually estimated to be 20%. No  thrombus seen -contrast images also reviewed See diagram for wall motion details.      Right Ventricle: The right ventricle appears normal in size. The tricuspid jet envelope definition is inadequate for estimation of RV systolic pressure.      Mitral Valve: The leaflets are thickened. There is mild regurgitation.      Tricuspid Valve: There is no regurgitation.      This is an abnormal study.      PET viability 9/28/22:     Metabolic Viability Conclusion: The left ventricle has potentially viable myocardium.  The total viable myocardium is 39%, total scar burden is 32%      Resting Perfusion Details: Large perfusion defect at rest involving the entire anteroseptal, inferoseptal, inferior and inferolateral segments.   This includes the entire RCA territory and parts of the LAD and possibly LCx territories.      Metabolic Viability Defect: The mid inferoseptal, mid inferior, mid inferolateral,  distal septal and distal inferior segments are nonviable. The LCx territory appears to be mostly viable at 95% total viability. The LAD territory is predominantly viable with 75% viability. The RCA territory appears to be 50% viable with no perfusion at rest     Coronary angiogram 12/9//2022:  Severe three vessel coronary artery disease (mRCA , pLAD, mLcx stenosis)     Coronary angiogram 1/2/2023:  Severe three vessel CAD status post PCI to the RCA  and residual LAD and LCx disease.  Successful PCI of midLAD with MOHAN x1 (2.5 x 32 mm Synergy MOHAN) and mid-LCx (2.75 x 16 mm Synergy MOHAN).     TTE 4/11/23     Left Ventricle: The left ventricle is dilated. Severely reduced left  ventricular systolic function. The EF is visually estimated to be 25-30%. Global hypokinesis of the left ventricle with akinetic apical segments.      Right Ventricle: The right ventricle appears normal in size. Systolic function is normal.      Aortic Valve: There is mild-to-moderate regurgitation.      TTE 1/4/2024:     Left Ventricle: Severely reduced left ventricular systolic function. LVEF 27%.     Right Ventricle: Systolic function is normal.      Aortic Valve: There is mild-to-moderate regurgitation.    RHC 2/24/24:     Right sided filling pressures are normal.     Left sided filling pressures are normal.     Normal PA pressures.     Normal cardiac output level.    TTE 2/5/24:  Technically difficult study.Extremely difficult acoustic windows despite the use of contrast for endcardial border definition.  Left ventricular function is decreased. The ejection fraction is 30-35% (moderately reduced). Apical wall akinesis is present.  Global right ventricular function is normal.   Mild mitral insufficiency is present. Mild aortic insufficiency is present.  Pulmonary artery systolic pressure cannot be assessed.  IVC diameter <2.1 cm collapsing >50% with sniff suggests a normal RA pressure of 3 mmHg.  No pericardial effusion is  present.    ASSESSMENT AND PLAN:  48 year old lady with a  including DM which is reasonably better controlled at the moment as well as recent STEMI back in September 2022 in the setting of atypical symptoms with high peak and troponin at around 63. She does have multivessel coronary artery disease only the LCx vessel was intervened however she has significant disease in the small LAD as well as  of the RCA.  She was found to be not a surgical candidate in [place].  She was started on medications now the blood pressure is better at 120/80.  Patient at home and as such losartan was started.  Images were reviewed and options discussed extensively with surgical as well as interventional colleagues at [facility]. Ultimately the decision was made to proceed with staged PCI of the CTOs. She did have this done early December 2022 and then January 2023 and tolerated the procedures well.   Overall no changes today to her medical regimen.  She is on good therapy and improved a little bit compared to our if she felt in the beginning.  She did quit smoking and we discussed that this will be very important that we are considering advanced reports especially heart transplantation.  She will start testing in a month or 2 when hopefully she can stop using the patches.  Otherwise she is not drinking any alcohol and does not use any drugs.  I will see her back in October or sooner as needed     I appreciate the opportunity to participate in the care of Cailin Burns . Please do not hesitate to contact me with any further questions.  I have spent a total of 40 minutes today reviewing labs, imaging studies, discussing with colleagues, face-to-face time with patient and documentation in the medical record.  The longitudinal plan of care for the diagnosis(es)/condition(s) as documented were addressed during this visit. Due to the added complexity in care, I will continue to support Cailin in the subsequent management and with ongoing  continuity of care.    Sincerely,   Kurt Guerin MD  [facility] Division of Cardiology         Virtual Visit Details    Type of service:  Video Visit   Video Start Time: 10:15  Video End Time:10:52 AM    Originating Location (pt. Location): Home    Distant Location (provider location):  On-site  Platform used for Video Visit: [facility]      Please do not hesitate to contact me if you have any questions/concerns.     Sincerely,     Kurt Guerin MD

## 2024-07-02 NOTE — PATIENT INSTRUCTIONS
Dr. Guerin recommends:    Lab appointment in August. (PETH, Cotinine, Drug Screen)    Follow up clinic visit, in person, in October or November with labs the same day.    Thank you for your visit today.  Please call me with any questions or concerns.   Donovan Rendon RN  Cardiology Care Coordinator  [phone]

## 2024-07-12 ENCOUNTER — TELEPHONE (OUTPATIENT)
Dept: CARDIOLOGY | Facility: CLINIC | Age: 49
End: 2024-07-12
Payer: COMMERCIAL

## 2024-07-12 NOTE — TELEPHONE ENCOUNTER
Patient confirmed scheduled appointment:  Date: 11/05/24  Time: 3pm  Visit type: rtn hf  Provider: laureen  Location: [facility] [place] [facility]  Testing/imaging: labs  Additional notes: n/a

## 2024-08-23 ENCOUNTER — ANCILLARY PROCEDURE (OUTPATIENT)
Dept: CARDIOLOGY | Facility: CLINIC | Age: 49
End: 2024-08-23
Attending: INTERNAL MEDICINE
Payer: COMMERCIAL

## 2024-08-23 DIAGNOSIS — Z95.810 ICD (IMPLANTABLE CARDIOVERTER-DEFIBRILLATOR), SINGLE, IN SITU: ICD-10-CM

## 2024-08-23 PROCEDURE — 93296 REM INTERROG EVL PM/IDS: CPT

## 2024-08-23 PROCEDURE — 93295 DEV INTERROG REMOTE 1/2/MLT: CPT | Performed by: INTERNAL MEDICINE

## 2024-09-07 LAB
MDC_IDC_LEAD_CONNECTION_STATUS: NORMAL
MDC_IDC_LEAD_IMPLANT_DT: NORMAL
MDC_IDC_LEAD_LOCATION: NORMAL
MDC_IDC_LEAD_LOCATION_DETAIL_1: NORMAL
MDC_IDC_LEAD_MFG: NORMAL
MDC_IDC_LEAD_MODEL: NORMAL
MDC_IDC_LEAD_POLARITY_TYPE: NORMAL
MDC_IDC_LEAD_SERIAL: NORMAL
MDC_IDC_LEAD_SPECIAL_FUNCTION: NORMAL
MDC_IDC_MSMT_BATTERY_DTM: NORMAL
MDC_IDC_MSMT_BATTERY_REMAINING_LONGEVITY: 152 MO
MDC_IDC_MSMT_BATTERY_RRT_TRIGGER: NORMAL
MDC_IDC_MSMT_BATTERY_VOLTAGE: 2.98 V
MDC_IDC_MSMT_CAP_CHARGE_DTM: NORMAL
MDC_IDC_MSMT_CAP_CHARGE_ENERGY: 18 J
MDC_IDC_MSMT_CAP_CHARGE_TIME: 3.8 S
MDC_IDC_MSMT_CAP_CHARGE_TYPE: NORMAL
MDC_IDC_MSMT_LEADCHNL_RV_IMPEDANCE_VALUE: 380 OHM
MDC_IDC_MSMT_LEADCHNL_RV_IMPEDANCE_VALUE: 456 OHM
MDC_IDC_MSMT_LEADCHNL_RV_PACING_THRESHOLD_AMPLITUDE: 0.88 V
MDC_IDC_MSMT_LEADCHNL_RV_PACING_THRESHOLD_PULSEWIDTH: 0.4 MS
MDC_IDC_MSMT_LEADCHNL_RV_SENSING_INTR_AMPL: 29 MV
MDC_IDC_PG_IMPLANT_DTM: NORMAL
MDC_IDC_PG_MFG: NORMAL
MDC_IDC_PG_MODEL: NORMAL
MDC_IDC_PG_SERIAL: NORMAL
MDC_IDC_PG_TYPE: NORMAL
MDC_IDC_SESS_CLINIC_NAME: NORMAL
MDC_IDC_SESS_DTM: NORMAL
MDC_IDC_SESS_TYPE: NORMAL
MDC_IDC_SET_BRADY_HYSTRATE: NORMAL
MDC_IDC_SET_BRADY_LOWRATE: 40 {BEATS}/MIN
MDC_IDC_SET_BRADY_MODE: NORMAL
MDC_IDC_SET_LEADCHNL_RV_PACING_AMPLITUDE: 2 V
MDC_IDC_SET_LEADCHNL_RV_PACING_ANODE_ELECTRODE_1: NORMAL
MDC_IDC_SET_LEADCHNL_RV_PACING_ANODE_LOCATION_1: NORMAL
MDC_IDC_SET_LEADCHNL_RV_PACING_CAPTURE_MODE: NORMAL
MDC_IDC_SET_LEADCHNL_RV_PACING_CATHODE_ELECTRODE_1: NORMAL
MDC_IDC_SET_LEADCHNL_RV_PACING_CATHODE_LOCATION_1: NORMAL
MDC_IDC_SET_LEADCHNL_RV_PACING_POLARITY: NORMAL
MDC_IDC_SET_LEADCHNL_RV_PACING_PULSEWIDTH: 0.4 MS
MDC_IDC_SET_LEADCHNL_RV_SENSING_ANODE_ELECTRODE_1: NORMAL
MDC_IDC_SET_LEADCHNL_RV_SENSING_ANODE_LOCATION_1: NORMAL
MDC_IDC_SET_LEADCHNL_RV_SENSING_CATHODE_ELECTRODE_1: NORMAL
MDC_IDC_SET_LEADCHNL_RV_SENSING_CATHODE_LOCATION_1: NORMAL
MDC_IDC_SET_LEADCHNL_RV_SENSING_POLARITY: NORMAL
MDC_IDC_SET_LEADCHNL_RV_SENSING_SENSITIVITY: 0.3 MV
MDC_IDC_SET_ZONE_DETECTION_BEATS_DENOMINATOR: 16 {BEATS}
MDC_IDC_SET_ZONE_DETECTION_BEATS_DENOMINATOR: 32 {BEATS}
MDC_IDC_SET_ZONE_DETECTION_BEATS_DENOMINATOR: 40 {BEATS}
MDC_IDC_SET_ZONE_DETECTION_BEATS_NUMERATOR: 16 {BEATS}
MDC_IDC_SET_ZONE_DETECTION_BEATS_NUMERATOR: 30 {BEATS}
MDC_IDC_SET_ZONE_DETECTION_BEATS_NUMERATOR: 32 {BEATS}
MDC_IDC_SET_ZONE_DETECTION_INTERVAL: 270 MS
MDC_IDC_SET_ZONE_DETECTION_INTERVAL: 320 MS
MDC_IDC_SET_ZONE_DETECTION_INTERVAL: 400 MS
MDC_IDC_SET_ZONE_STATUS: NORMAL
MDC_IDC_SET_ZONE_TYPE: NORMAL
MDC_IDC_SET_ZONE_VENDOR_TYPE: NORMAL
MDC_IDC_STAT_AT_BURDEN_PERCENT: 0 %
MDC_IDC_STAT_AT_DTM_END: NORMAL
MDC_IDC_STAT_AT_DTM_START: NORMAL
MDC_IDC_STAT_BRADY_DTM_END: NORMAL
MDC_IDC_STAT_BRADY_DTM_START: NORMAL
MDC_IDC_STAT_BRADY_RV_PERCENT_PACED: 0 %
MDC_IDC_STAT_CRT_DTM_END: NORMAL
MDC_IDC_STAT_CRT_DTM_START: NORMAL
MDC_IDC_STAT_EPISODE_RECENT_COUNT: 0
MDC_IDC_STAT_EPISODE_RECENT_COUNT_DTM_END: NORMAL
MDC_IDC_STAT_EPISODE_RECENT_COUNT_DTM_START: NORMAL
MDC_IDC_STAT_EPISODE_TOTAL_COUNT: 0
MDC_IDC_STAT_EPISODE_TOTAL_COUNT_DTM_END: NORMAL
MDC_IDC_STAT_EPISODE_TOTAL_COUNT_DTM_START: NORMAL
MDC_IDC_STAT_EPISODE_TYPE: NORMAL
MDC_IDC_STAT_TACHYTHERAPY_ATP_DELIVERED_RECENT: 0
MDC_IDC_STAT_TACHYTHERAPY_ATP_DELIVERED_TOTAL: 0
MDC_IDC_STAT_TACHYTHERAPY_RECENT_DTM_END: NORMAL
MDC_IDC_STAT_TACHYTHERAPY_RECENT_DTM_START: NORMAL
MDC_IDC_STAT_TACHYTHERAPY_SHOCKS_ABORTED_RECENT: 0
MDC_IDC_STAT_TACHYTHERAPY_SHOCKS_ABORTED_TOTAL: 0
MDC_IDC_STAT_TACHYTHERAPY_SHOCKS_DELIVERED_RECENT: 0
MDC_IDC_STAT_TACHYTHERAPY_SHOCKS_DELIVERED_TOTAL: 0
MDC_IDC_STAT_TACHYTHERAPY_TOTAL_DTM_END: NORMAL
MDC_IDC_STAT_TACHYTHERAPY_TOTAL_DTM_START: NORMAL

## 2024-11-05 ENCOUNTER — TELEPHONE (OUTPATIENT)
Dept: CARDIOLOGY | Facility: CLINIC | Age: 49
End: 2024-11-05

## 2024-11-05 NOTE — TELEPHONE ENCOUNTER
Left Voicemail (1st Attempt) for the patient to call back and schedule the following:    Appointment type: rtn hf   Provider: laureen   Return date: next available  Specialty phone number: [phone] opt 1   Additional appointment(s) needed: labs   Additonal Notes: n/a

## 2024-11-09 ENCOUNTER — HEALTH MAINTENANCE LETTER (OUTPATIENT)
Age: 49
End: 2024-11-09

## 2024-11-13 ENCOUNTER — TELEPHONE (OUTPATIENT)
Dept: CARDIOLOGY | Facility: CLINIC | Age: 49
End: 2024-11-13
Payer: COMMERCIAL

## 2024-11-13 DIAGNOSIS — I50.22 CHRONIC SYSTOLIC HEART FAILURE (H): Primary | ICD-10-CM

## 2024-11-18 ENCOUNTER — LAB (OUTPATIENT)
Dept: LAB | Facility: CLINIC | Age: 49
End: 2024-11-18
Payer: COMMERCIAL

## 2024-11-18 DIAGNOSIS — I50.22 CHRONIC SYSTOLIC HEART FAILURE (H): ICD-10-CM

## 2024-11-18 LAB
ANION GAP SERPL CALCULATED.3IONS-SCNC: 9 MMOL/L (ref 7–15)
BUN SERPL-MCNC: 11.9 MG/DL (ref 6–20)
CALCIUM SERPL-MCNC: 9.3 MG/DL (ref 8.8–10.4)
CHLORIDE SERPL-SCNC: 102 MMOL/L (ref 98–107)
CREAT SERPL-MCNC: 0.66 MG/DL (ref 0.51–0.95)
EGFRCR SERPLBLD CKD-EPI 2021: >90 ML/MIN/1.73M2
GLUCOSE SERPL-MCNC: 151 MG/DL (ref 70–99)
HCO3 SERPL-SCNC: 24 MMOL/L (ref 22–29)
POTASSIUM SERPL-SCNC: 4.7 MMOL/L (ref 3.4–5.3)
SODIUM SERPL-SCNC: 135 MMOL/L (ref 135–145)

## 2024-11-20 ENCOUNTER — TELEPHONE (OUTPATIENT)
Dept: CARDIOLOGY | Facility: CLINIC | Age: 49
End: 2024-11-20

## 2024-11-20 ENCOUNTER — VIRTUAL VISIT (OUTPATIENT)
Dept: CARDIOLOGY | Facility: CLINIC | Age: 49
End: 2024-11-20
Payer: COMMERCIAL

## 2024-11-20 VITALS — WEIGHT: 157 LBS | BODY MASS INDEX: 27.82 KG/M2 | HEIGHT: 63 IN

## 2024-11-20 DIAGNOSIS — I50.22 CHRONIC SYSTOLIC HEART FAILURE (H): Primary | ICD-10-CM

## 2024-11-20 DIAGNOSIS — I25.5 ISCHEMIC CARDIOMYOPATHY: ICD-10-CM

## 2024-11-20 DIAGNOSIS — Z79.4 TYPE 2 DIABETES MELLITUS WITHOUT COMPLICATION, WITH LONG-TERM CURRENT USE OF INSULIN (H): ICD-10-CM

## 2024-11-20 DIAGNOSIS — E78.2 MIXED HYPERLIPIDEMIA: ICD-10-CM

## 2024-11-20 DIAGNOSIS — Z95.810 S/P ICD (INTERNAL CARDIAC DEFIBRILLATOR) PROCEDURE: ICD-10-CM

## 2024-11-20 DIAGNOSIS — E11.9 TYPE 2 DIABETES MELLITUS WITHOUT COMPLICATION, WITH LONG-TERM CURRENT USE OF INSULIN (H): ICD-10-CM

## 2024-11-20 DIAGNOSIS — I10 BENIGN ESSENTIAL HYPERTENSION: ICD-10-CM

## 2024-11-20 RX ORDER — OXYCODONE HYDROCHLORIDE 10 MG/1
10 TABLET ORAL
COMMUNITY
Start: 2024-10-16

## 2024-11-20 RX ORDER — SEMAGLUTIDE 1.34 MG/ML
INJECTION, SOLUTION SUBCUTANEOUS
COMMUNITY
Start: 2024-11-01

## 2024-11-20 ASSESSMENT — PAIN SCALES - GENERAL: PAINLEVEL_OUTOF10: SEVERE PAIN (6)

## 2024-11-20 NOTE — PROGRESS NOTES
Virtual Visit Details    Type of service:  Video Visit   Video Start Time:  2:20  Video End Time:2:41 PM    Originating Location (pt. Location): Home    Distant Location (provider location):  On-site  Platform used for Video Visit: Amanda

## 2024-11-20 NOTE — PROGRESS NOTES
HPI: Cailin is a 49 year old White female with a past medical history of with a past medical history notable for PMH of DM, HLD, obesity, tobacco use disorder, sciatica who was admitted to [facility]. She initially presented to urgent care with new onset chest pain, was found to have STEMI and was transferred to [facility] for immediate further evaluation and angiogram on 9/25/22. It was significant for 3 vessel disease. Case was immediately discussed with Dr Leal for potential urgent CABG considerations but ultimately the decision was made to proceed with PCI due to poor bypass targets. Intervention to LCx was made. Prior to further PCI PET viability was pursued prior to considering further interventions. Unfortunately viability did not show enough and thus medical management was recommended. She did have short runs of NSVT while inpatient. ECHO showed EF 20% and thus she was discharged with LifeVest. She was discharged on mimimal medical therapy due to associated hypotension and not being able to tolerate higher doses. She also has PRN lasix for weight gain and PRN nitro. She is following up at the CHF clinic at [place] and was also referred to [facility] for potential future advanced heart failure therapies evaluation.     Patient states she has no SOB at rest. She has no GONZALES.  No LE edema. No distention in the abdomen. Her appetite has been normal.  Weight at home 157-160 lbs is normal. Hasn't had to take an extra diuretic lately.   She has stopped smoking.  Nothing since the hospital- has a patch. She met with vascular specialist. She had to see the surgeon about the PAD - which would affect the LVAD.  She watches the sodium and is careful with the diet. Last labs 2/9. BP at home < 100 systolic.        Denies SOB, GONZLAES, PND, orthopnea, edema, weight gain, chest pain, palpitations, lightheadedness, dizziness, near syncopal/syncopal episodes. Cailin has been following salt and fluid restrictions.      PAST MEDICAL  HISTORY:  Past Medical History:   Diagnosis Date    CAD (coronary artery disease)     Nicotine dependence     Type 2 diabetes mellitus with other circulatory complications (H)        FAMILY HISTORY:  Family History   Problem Relation Age of Onset    Coronary Artery Disease Early Onset Maternal Grandfather        SOCIAL HISTORY:  Social History     Tobacco Use    Smoking status: Some Days     Current packs/day: 1.50     Average packs/day: 1.5 packs/day for 36.9 years (55.3 ttl pk-yrs)     Types: Cigarettes     Start date: 1988     Passive exposure: Past    Smokeless tobacco: Never    Tobacco comments:     Trying to quit, wearing patch -    Substance Use Topics    Alcohol use: Not Currently    Drug use: Never           CURRENT MEDICATIONS:  Current Outpatient Medications   Medication Sig Dispense Refill    oxyCODONE IR (ROXICODONE) 10 MG tablet Take 10 mg by mouth 5 times daily.      OZEMPIC, 1 MG/DOSE, 4 MG/3ML pen inject 1 mg subcutaneously once a week      aspirin (ASA) 81 MG EC tablet Take 81 mg by mouth daily      atorvastatin (LIPITOR) 80 MG tablet Take 1 tablet by mouth daily      blood glucose (CONTOUR NEXT TEST) test strip To test BG values three times daily - uses insulin      busPIRone (BUSPAR) 10 MG tablet Take 10 mg by mouth 2 times daily      conjugated estrogens (PREMARIN) 0.625 MG/GM vaginal cream Insert 0.5 g into the vagina every night at bedtime for 14 days, THEN 0.5 g every night at bedtime.      dapagliflozin (FARXIGA) tablet Take 10 mg by mouth daily      diclofenac (VOLTAREN) 1 % topical gel       Doxycycline Hyclate 100 MG TBEC Take 100 mg by mouth 2 times daily      ESTRADIOL 0.1MG/GM CREAM Place vaginally every other day      ezetimibe (ZETIA) 10 MG tablet Take 1 tablet (10 mg) by mouth daily 90 tablet 3    fenofibrate (TRIGLIDE/LOFIBRA) 160 MG tablet Take 160 mg by mouth daily      furosemide (LASIX) 20 MG tablet Take 20 mg by mouth daily May also take 1 tablet 1 time per day as needed for  weight gain of >2lbs in a day or >5lbs in a day      gabapentin (NEURONTIN) 100 MG capsule Take 1 capsule (100 mg) by mouth 3 times daily for 12 days (Patient taking differently: Take 100 mg by mouth 3 times daily Also pt states taking 3 capsules at bedtime.) 36 capsule 0    insulin aspart (NOVOLOG PEN) 100 UNIT/ML pen Inject 5 Units Subcutaneous daily (before supper) for 27 days 1.35 mL 0    insulin glargine (LANTUS PEN) 100 UNIT/ML pen Inject 30 Units Subcutaneous at bedtime for 28 days (Patient taking differently: Inject 15 Units Subcutaneous at bedtime) 8.4 mL 0    isosorbide dinitrate (ISORDIL) 10 MG tablet Take 1 tablet (10 mg) by mouth 3 times daily for 12 days 36 tablet 0    liraglutide (VICTOZA PEN) 18 MG/3ML solution INJECT 0.6MG SUBCUTANEOUSLY ONCE DAILY for 1 week, then 1.8 mg subcutaneously daily 3 mL 0    metFORMIN (GLUCOPHAGE) 1000 MG tablet Take 1,000 mg by mouth 2 times daily (with meals)      methocarbamol (ROBAXIN) 500 MG tablet Take 500 mg by mouth 4 times daily as needed for muscle spasms      metoprolol succinate ER (TOPROL XL) 25 MG 24 hr tablet Take 1 tablet (25 mg) by mouth daily for 11 days 11 tablet 0    nicotine (NICODERM CQ) 21 MG/24HR 24 hr patch Place 1 patch onto the skin every 24 hours      nitroGLYcerin (NITROSTAT) 0.4 MG sublingual tablet Place 0.4 mg under the tongue every 5 minutes as needed      oxyCODONE (ROXICODONE) 5 MG tablet Take 1.5 tablets by mouth every 6 hours as needed (Patient not taking: Reported on 11/20/2024)      OZEMPIC, 0.25 OR 0.5 MG/DOSE, 2 MG/3ML pen Inject 0.5 mg Subcutaneous every 7 days (Patient not taking: Reported on 11/20/2024)      polyethylene glycol (MIRALAX) 17 GM/Dose powder Take 17 g by mouth daily 510 g 0    ranolazine (RANEXA) 500 MG 12 hr tablet Take 500 mg by mouth 2 times daily      sacubitril-valsartan (ENTRESTO)  MG per tablet Take 1 tablet by mouth 2 times daily      spironolactone (ALDACTONE) 25 MG tablet Take 12.5 mg by mouth daily  "     ticagrelor (BRILINTA) 90 MG tablet Take 90 mg by mouth 2 times daily      venlafaxine (EFFEXOR) 100 MG tablet Take 1 tablet by mouth 2 times daily         ROS:  Per HPI        EXAM:   This is a virtual appointment and no current vitals are available.    Constitutional - WDWN, no acute distress   Eyes - no redness or discharge, nonicteric sclera  Respiratory - nonlabored breathing, able to speak in full sentences without difficulty  CV: no visible edema of visualized upper extremities.  Neurological - alert and oriented, speech fluent/appropriate  PSYCH: cooperative, affect appropriate  DERM: no rashes on visualized face/neck/upper extremities       Labs:  CBC RESULTS:  Lab Results   Component Value Date    WBC 6.6 02/09/2024    RBC 4.43 02/09/2024    HGB 12.7 02/09/2024    HCT 38.8 02/09/2024    MCV 88 02/09/2024    MCH 28.7 02/09/2024    MCHC 32.7 02/09/2024    RDW 13.9 02/09/2024     02/09/2024       CMP RESULTS:  Lab Results   Component Value Date     11/18/2024    POTASSIUM 4.7 11/18/2024    CHLORIDE 102 11/18/2024    CHLORIDE 102 05/14/2024    CO2 24 11/18/2024    ANIONGAP 9 11/18/2024     (H) 11/18/2024     (H) 02/09/2024    BUN 11.9 11/18/2024    CR 0.66 11/18/2024    GFRESTIMATED >90 11/18/2024    RON 9.3 11/18/2024    BILITOTAL 0.3 02/06/2024    ALBUMIN 3.8 02/06/2024    ALKPHOS 55 02/06/2024    ALT 25 02/06/2024    AST 25 02/06/2024        INR RESULTS:  Lab Results   Component Value Date    INR 0.99 02/06/2024       No components found for: \"CK\"  Lab Results   Component Value Date    MAG 1.9 02/09/2024     No results found for: \"NTBNP\"  @BRIEFLABR (dig)@    Most recent echocardiogram:  No results found for this or any previous visit (from the past 8760 hours).      Assessment and Plan:    49 year old lady with a  including DM which is reasonably better controlled at the moment as well as recent STEMI back in September 2022 in the setting of atypical symptoms with high peak " and troponin at around 64. She does have multivessel coronary artery disease only the LCx vessel was intervened however she has significant disease in the small LAD as well as  of the RCA.  She was found to be not a surgical candidate in [place].      Overall she continues to do relatively poorly with low exercise tolerance and low energy.  She takes her medications as prescribed she is  normotensive.  According to the limited access with virtual appts. She appears to be stable and doing well. She offers no new concerns.  I will not make any medication adjustments. The patient does state in person visits are not doable for now due to the travel distance ( 4 hours).    Vascular MD in January . She had to reschedule the last appt.       1.  Chronic systolic heart failure secondary to ICM  Stage D  NYHA Class III  ACEi/ARB/ARNI: yes  BB: yes  Aldosterone antagonist: yes- 12.5 mg daily  SGLT2i- Farxiga   SCD prophylaxis: ICD  Fluid status: euvolemic  Anticoagulation:   Antiplatelet:  ASA dose   Sleep apnea:not discussed today  NSAID use:  Contraindicated.  Avoid use.  Remote Monitoring:none  ISORDIL 10 mg TID    Plan:  Dr. Guerin - next opening  [facility] follow up in 6 months           Ora LAGUERRE CNP  [facility]

## 2024-11-20 NOTE — NURSING NOTE
Patient reviewed medications and allergies in Mychart during e-check in and said everything looked correct.      Current patient location: [address]   [place] [facility] [address]    Is the patient currently in the state of [place]? YES    Visit mode:VIDEO    If the visit is dropped, the patient can be reconnected by:VIDEO VISIT: Text to cell phone:   Telephone Information:   Mobile [phone]       Will anyone else be joining the visit? NO  (If patient encounters technical issues they should call [phone])    Are changes needed to the allergy or medication list? Pt stated no changes to allergies    Are refills needed on medications prescribed by this physician? NO    Rooming Documentation:  Patient declined to complete questionnaire(s)    Reason for visit: RECHECK    Catalina WRENF

## 2024-11-20 NOTE — TELEPHONE ENCOUNTER
Spoke with patient and got her scheduled with Dr. Guerin, soonest available at the recommendation of Ora Youngblood NP. Patient scheduled soonest available in May; added to the wait list and message sent to Dr. Guerin's team as well.

## 2024-12-04 NOTE — PROGRESS NOTES
ASSESSMENT & PLAN     Today we discussed the underlying etiology/pathology of patient's   1. Chronic left shoulder pain, suspect adhesive capsulitis    2. Tobacco use disorder    3. Spinal stenosis of lumbar region without neurogenic claudication    4. Other chronic pain- on oxycodone 10 mg, up to 5 tabs a day (PCP manages)    5. Hx of cardiac pacemaker- 05/2023    6. History of heart attack, with stenting    7. Diabetes mellitus without complication (H)        Today a shared decision making model was used. The patient's values and choices were respected. The following information represents what was discussed and decided upon by the provider and the patient.  -We discussed the patient has chronic left shoulder pain.  Discussed that she has significantly decreased active as well as passive range of motion of the left shoulder.  Differential diagnosis would be adhesive capsulitis versus chronic rotator cuff tear with possible associated osteoarthritis.  - At this time we agreed to proceed with MRI scan of the left shoulder to evaluate for abnormality.  We discussed that MRI of the shoulder usually does not show evidence of adhesive capsulitis and that this is more of a diagnosis based on exclusion and physical exam.  - I will contact the patient with MRI results when known.  She does have a pacemaker which she states is MRI compatible.  Central scheduling will contact the patient from radiology once authorization has been obtained and patient will proceed with MRI at a chosen date.  - We did discuss briefly that if diagnosis of adhesive capsulitis is made that this condition certainly can last a couple years.  Treatment usually consists of shoulder intra-articular cortisone injection with distention technique and aggressive formal physical therapy program.  We did discuss the patient is very sensitive to corticosteroids with her diabetes with significant glucose elevation.  Patient currently is not on any type of  insulin or sliding scale and this would need to be considered in the setting of possible shoulder injection.  - Patient will continue chronic pain management with her primary care team as narcotics will not come from our office for this concern.  - We did discuss that I cannot rule out possibility of cervical spine pathology due to the left-sided neck pain as well as abnormal sensation of the dorsal aspect of her third and fourth digits.  Primary focus at this time will be of her left shoulder but patient may need a cervical spine workup with possible left upper extremity EMG in the future based on response to shoulder treatment.    - Appointment line phone number is [[phone]]     Lamont Mariscal PA-C  [facility]    This note was completed in part using a voice recognition software, any grammatical or context distortion are unintentional and inherent to the software.           SUBJECTIVE  Cailin Burns is a/an 49 year old female who is seen in consultation at the request of  self referral for evaluation of chronic left shoulder  pain. The patient is seen by themselves.    Expanded HPI: Patient states that she believes that she had a shoulder dislocation on the left side as a pediatric person ages 1-2 that she states needed reduction in the ED.  She has had chronic left shoulder pain intermittently on and off for 28 years.  She has had a heart attack and subsequently had a pacemaker placed in May 2023.  She describes a variety of symptoms ranging from chronic proximal shoulder pain with some cervical neck pain on the left side as well as some radiating numbness or tingling involving only the dorsal aspect of digits 3 and 4.  She has difficulty with motion or elevation of her left arm and shoulder.  She is on chronic pain management for chronic back pain taking up to 50 mg of oxycodone a day as well as 600 mg of gabapentin.  Patient's diabetes is well-controlled currently only managed  with metformin orally and Ozempic.  Patient is right-hand dominant.  Patient utilizes a rolling walker due to physical deconditioning and cardiac status.  She has not required nitroglycerin.  Symptoms have been worse over the last 2 months in the left shoulder.  Patient has never had formal physical therapy for the left shoulder, advanced imaging or injection therapy for the shoulder.  No previous history of surgery on the left shoulder.    Onset: 28 years(s) ago. Reports insidious onset without acute precipitating event.  Off and on.  Location of Pain: left shoulder, top of shoulder, radiates down arm to fingers, 3rd, 4th  Rating of Pain at worst: 10/10  Rating of Pain Currently: 4/10  Worsened by: lefting, pulling, grabbing, opening door  Better with: no activity-still feels tingling  Treatments tried: rest/activity avoidance, ice, and heat  Quality: aching, sharp  Associated symptoms: numbness and tingling  Orthopedic history: NO  Relevant surgical history: NO  - pacemaker placed 05/2023 to left shoulder/thorax  Social history: disabled-walks dog- min pin-[facility]    Past Medical History:   Diagnosis Date    CAD (coronary artery disease)     Nicotine dependence     Type 2 diabetes mellitus with other circulatory complications (H)      Social History     Socioeconomic History    Marital status:    Tobacco Use    Smoking status: Some Days     Current packs/day: 1.50     Average packs/day: 1.5 packs/day for 36.9 years (55.4 ttl pk-yrs)     Types: Cigarettes     Start date: 1988     Passive exposure: Past    Smokeless tobacco: Never    Tobacco comments:     Trying to quit, wearing patch -    Substance and Sexual Activity    Alcohol use: Not Currently    Drug use: Never     Social Drivers of Health     Financial Resource Strain: High Risk (10/14/2022)    [facility]    Overall Financial Resource Strain (CARDIA)     Difficulty of Paying Living  Expenses: Very hard   Food Insecurity: Food Insecurity Present (4/2/2024)    Received from [facility]    Hunger Vital Sign     Worried About Running Out of Food in the Last Year: Often true     Ran Out of Food in the Last Year: Often true   Transportation Needs: No Transportation Needs (2/1/2024)    [facility]    PRAPARE - Transportation     Lack of Transportation (Medical): No     Lack of Transportation (Non-Medical): No   Housing Stability: High Risk (2/1/2024)    [facility]    Housing Stability Vital Sign     Unable to Pay for Housing in the Last Year: Yes     Number of Places Lived in the Last Year: 1     Unstable Housing in the Last Year: No         Patient's past medical, surgical, social, and family histories were personally reviewed today and no changes are noted.    REVIEW OF SYSTEMS:  10 point ROS is negative other than symptoms noted above in HPI, Past Medical History or as stated below  Constitutional: NEGATIVE for fever, chills, change in weight  Skin: NEGATIVE for worrisome rashes, moles or lesions  GI/: NEGATIVE for bowel or bladder changes  Neuro: NEGATIVE for weakness, dizziness or paresthesias    OBJECTIVE:  Vital signs as noted in EPIC for 12/4/2024  General: healthy, alert and in no distress  HEENT: no scleral icterus or conjunctival erythema  Skin: no suspicious lesions or rash. No jaundice.  CV: no pedal edema  Resp: normal respiratory effort without conversational dyspnea   Psych: normal mood and affect  Neuro: Normal light sensory exam of lower extremity      MSK:  Exam shows a 49-year-old female who appears older than stated age.  She ambulates with a rolling walker.  She is alert and oriented x 3.  She does have some asymmetry of shoulder height and prefers to lean towards her right side.  She is tender to palpation along the upper trapezius  musculature on the left side as well as the paraspinous muscles of the neck.  No particular pain at the sternum, SC joint, clavicle or AC joint.  No pain along the anterior joint line or posterior joint line.  No pain over the subacromial space or deltoid.  Patient is reluctant to move her left arm but is willing to take command.  Active forward flexion is only approximately 50 degrees.  Abduction is 50 degrees.  Internal rotation is to the level of the lateral axillary line.  All these motions cause significant pain.  Passive motion of that left shoulder is not much better than active range of motion and patient guards.  Passively I can elevate her arm to proximately 80 degrees and externally rotate to 80 degrees but patient has significant pain.  Internal rotation is limited with impingement testing causing pain.  With her arms in a waist level position with testing internal and external rotation strength of the rotator cuff she shows adequate motor tone on external rotators.  Slightly weak on internal rotation.  Patient does not tolerate empty can testing or positioning.  No particular pain throughout the biceps or triceps to palpation.  Acceptable range of motion of the elbow wrist and fingers.  Radial pulses +2 and symmetric.            Personal Independent visualization of the below images done today:  Previous x-ray report of patient's left shoulder is reviewed today  XR Shoulder Left Port G/E 2 Views  Order: [id number]  Narrative    **FINAL REPORT**  x    Patient Name: Cailin Burns  CSN: [id number]  : 1975  Ordering Physician: JUAN LEMA  MRN: [id number]  CLEMENTINA: [id number]  Accession: [id number]  Order: [id number]  Procedure: XRAY SHOULDER MIN 2 VIEWS LT  Procedure Date/Time: 11/15/2024 13:44  PROCEDURE INFORMATION:  Exam: XR Left Shoulder  Exam date and time: 11/15/2024 1:45 PM  Age: 49 years old  Clinical indication: Other chronic pain; Pain in left shoulder; Prior surgery;  Surgery date:  6+ months; Surgery type: Pacemaker implanted 5/2023. Cardiac  9/2022    TECHNIQUE:  Imaging protocol: Radiologic exam of the left shoulder.  Views: 2 or more views.    COMPARISON:  No relevant prior studies available.    FINDINGS:  Tubes, catheters and devices: An AICD device is seen on the left.  Bones/joints: No fracture or other acute abnormality. Joint spaces are  unremarkable.  Soft tissues: Normal.    IMPRESSION:  Nonacute findings.  This report was electronically signed by Virtual Radiologic physician:  Chan Campoverde MD. on 11/18/2024 at 10:50, Central Standard Time.  Exam End: 11/15/24  1:56 PM    Specimen Collected: 11/15/24  1:45 PM Last Resulted: 11/18/24 10:50 AM   [facility]: [facility]  Result Received: 11/20/24  2:16 PM          Patient's conditions were thoroughly discussed during today's visit with total time reviewing patient's previous medical records/history/radiology, face-to-face examination and discussion and plan of care with the patient and documentation being 45 minutes for today's clinical visit  Lamont Mariscal PA-C  [facility]    This note was completed in part using a voice recognition software, any grammatical or context distortion are unintentional and inherent to the software.

## 2024-12-05 ENCOUNTER — TELEPHONE (OUTPATIENT)
Dept: ORTHOPEDICS | Facility: CLINIC | Age: 49
End: 2024-12-05

## 2024-12-05 ENCOUNTER — OFFICE VISIT (OUTPATIENT)
Dept: ORTHOPEDICS | Facility: CLINIC | Age: 49
End: 2024-12-05
Payer: COMMERCIAL

## 2024-12-05 VITALS
HEIGHT: 62 IN | SYSTOLIC BLOOD PRESSURE: 104 MMHG | WEIGHT: 156.97 LBS | DIASTOLIC BLOOD PRESSURE: 60 MMHG | BODY MASS INDEX: 28.89 KG/M2

## 2024-12-05 DIAGNOSIS — M48.061 SPINAL STENOSIS OF LUMBAR REGION WITHOUT NEUROGENIC CLAUDICATION: ICD-10-CM

## 2024-12-05 DIAGNOSIS — M25.512 CHRONIC LEFT SHOULDER PAIN: Primary | ICD-10-CM

## 2024-12-05 DIAGNOSIS — F17.200 TOBACCO USE DISORDER: ICD-10-CM

## 2024-12-05 DIAGNOSIS — G89.29 OTHER CHRONIC PAIN: ICD-10-CM

## 2024-12-05 DIAGNOSIS — I25.2 HISTORY OF HEART ATTACK: ICD-10-CM

## 2024-12-05 DIAGNOSIS — G89.29 CHRONIC LEFT SHOULDER PAIN: Primary | ICD-10-CM

## 2024-12-05 DIAGNOSIS — E11.9 DIABETES MELLITUS WITHOUT COMPLICATION (H): ICD-10-CM

## 2024-12-05 DIAGNOSIS — Z95.0 HX OF CARDIAC PACEMAKER: ICD-10-CM

## 2024-12-05 RX ORDER — ESTRADIOL 0.1 MG/G
CREAM VAGINAL PRN
COMMUNITY
Start: 2024-09-19

## 2024-12-05 RX ORDER — NYSTATIN 100000 [USP'U]/G
POWDER TOPICAL PRN
COMMUNITY
Start: 2024-11-05

## 2024-12-05 RX ORDER — DOCUSATE SODIUM 50MG AND SENNOSIDES 8.6MG 8.6; 5 MG/1; MG/1
2 TABLET, FILM COATED ORAL 2 TIMES DAILY
COMMUNITY

## 2024-12-05 RX ORDER — DAPAGLIFLOZIN 10 MG/1
10 TABLET, FILM COATED ORAL EVERY MORNING
COMMUNITY
Start: 2024-12-02

## 2024-12-05 NOTE — LETTER
12/5/2024      Cailin Burns  [address]  [facility] [address]      Dear Colleague,    Thank you for referring your patient, Cailin Burns, to the [facility]. Please see a copy of my visit note below.    ASSESSMENT & PLAN     Today we discussed the underlying etiology/pathology of patient's   1. Chronic left shoulder pain, suspect adhesive capsulitis    2. Tobacco use disorder    3. Spinal stenosis of lumbar region without neurogenic claudication    4. Other chronic pain- on oxycodone 10 mg, up to 5 tabs a day (PCP manages)    5. Hx of cardiac pacemaker- 05/2023    6. History of heart attack, with stenting    7. Diabetes mellitus without complication (H)        Today a shared decision making model was used. The patient's values and choices were respected. The following information represents what was discussed and decided upon by the provider and the patient.  -We discussed the patient has chronic left shoulder pain.  Discussed that she has significantly decreased active as well as passive range of motion of the left shoulder.  Differential diagnosis would be adhesive capsulitis versus chronic rotator cuff tear with possible associated osteoarthritis.  - At this time we agreed to proceed with MRI scan of the left shoulder to evaluate for abnormality.  We discussed that MRI of the shoulder usually does not show evidence of adhesive capsulitis and that this is more of a diagnosis based on exclusion and physical exam.  - I will contact the patient with MRI results when known.  She does have a pacemaker which she states is MRI compatible.  Central scheduling will contact the patient from radiology once authorization has been obtained and patient will proceed with MRI at a chosen date.  - We did discuss briefly that if diagnosis of adhesive capsulitis is made that this condition certainly can last a couple years.  Treatment usually consists of shoulder  intra-articular cortisone injection with distention technique and aggressive formal physical therapy program.  We did discuss the patient is very sensitive to corticosteroids with her diabetes with significant glucose elevation.  Patient currently is not on any type of insulin or sliding scale and this would need to be considered in the setting of possible shoulder injection.  - Patient will continue chronic pain management with her primary care team as narcotics will not come from our office for this concern.  - We did discuss that I cannot rule out possibility of cervical spine pathology due to the left-sided neck pain as well as abnormal sensation of the dorsal aspect of her third and fourth digits.  Primary focus at this time will be of her left shoulder but patient may need a cervical spine workup with possible left upper extremity EMG in the future based on response to shoulder treatment.    - Appointment line phone number is [[phone]]     Lamont Mariscal PA-C  [facility]    This note was completed in part using a voice recognition software, any grammatical or context distortion are unintentional and inherent to the software.           SUBJECTIVE  Cailin Burns is a/an 49 year old female who is seen in consultation at the request of  self referral for evaluation of chronic left shoulder  pain. The patient is seen by themselves.    Expanded HPI: Patient states that she believes that she had a shoulder dislocation on the left side as a pediatric person ages 1-2 that she states needed reduction in the ED.  She has had chronic left shoulder pain intermittently on and off for 28 years.  She has had a heart attack and subsequently had a pacemaker placed in May 2023.  She describes a variety of symptoms ranging from chronic proximal shoulder pain with some cervical neck pain on the left side as well as some radiating numbness or tingling involving only the dorsal aspect of digits 3 and  4.  She has difficulty with motion or elevation of her left arm and shoulder.  She is on chronic pain management for chronic back pain taking up to 50 mg of oxycodone a day as well as 600 mg of gabapentin.  Patient's diabetes is well-controlled currently only managed with metformin orally and Ozempic.  Patient is right-hand dominant.  Patient utilizes a rolling walker due to physical deconditioning and cardiac status.  She has not required nitroglycerin.  Symptoms have been worse over the last 2 months in the left shoulder.  Patient has never had formal physical therapy for the left shoulder, advanced imaging or injection therapy for the shoulder.  No previous history of surgery on the left shoulder.    Onset: 28 years(s) ago. Reports insidious onset without acute precipitating event.  Off and on.  Location of Pain: left shoulder, top of shoulder, radiates down arm to fingers, 3rd, 4th  Rating of Pain at worst: 10/10  Rating of Pain Currently: 4/10  Worsened by: lefting, pulling, grabbing, opening door  Better with: no activity-still feels tingling  Treatments tried: rest/activity avoidance, ice, and heat  Quality: aching, sharp  Associated symptoms: numbness and tingling  Orthopedic history: NO  Relevant surgical history: NO  - pacemaker placed 05/2023 to left shoulder/thorax  Social history: disabled-walks dog- min [facility]    Past Medical History:   Diagnosis Date     CAD (coronary artery disease)      Nicotine dependence      Type 2 diabetes mellitus with other circulatory complications (H)      Social History     Socioeconomic History     Marital status:    Tobacco Use     Smoking status: Some Days     Current packs/day: 1.50     Average packs/day: 1.5 packs/day for 36.9 years (55.4 ttl pk-yrs)     Types: Cigarettes     Start date: 1988     Passive exposure: Past     Smokeless tobacco: Never     Tobacco comments:     Trying to quit, wearing patch -    Substance and Sexual Activity     Alcohol use:  Not Currently     Drug use: Never     Social Drivers of Health     Financial Resource Strain: High Risk (10/14/2022)    [facility]    Overall Financial Resource Strain (CARDIA)      Difficulty of Paying Living Expenses: Very hard   Food Insecurity: Food Insecurity Present (4/2/2024)    Received from [facility]    Hunger Vital Sign      Worried About Running Out of Food in the Last Year: Often true      Ran Out of Food in the Last Year: Often true   Transportation Needs: No Transportation Needs (2/1/2024)    [facility]    PRAPARE - Transportation      Lack of Transportation (Medical): No      Lack of Transportation (Non-Medical): No   Housing Stability: High Risk (2/1/2024)    [facility]    Housing Stability Vital Sign      Unable to Pay for Housing in the Last Year: Yes      Number of Places Lived in the Last Year: 1      Unstable Housing in the Last Year: No         Patient's past medical, surgical, social, and family histories were personally reviewed today and no changes are noted.    REVIEW OF SYSTEMS:  10 point ROS is negative other than symptoms noted above in HPI, Past Medical History or as stated below  Constitutional: NEGATIVE for fever, chills, change in weight  Skin: NEGATIVE for worrisome rashes, moles or lesions  GI/: NEGATIVE for bowel or bladder changes  Neuro: NEGATIVE for weakness, dizziness or paresthesias    OBJECTIVE:  Vital signs as noted in EPIC for 12/4/2024  General: healthy, alert and in no distress  HEENT: no scleral icterus or conjunctival erythema  Skin: no suspicious lesions or rash. No jaundice.  CV: no pedal edema  Resp: normal respiratory effort without conversational dyspnea   Psych: normal mood and affect  Neuro: Normal light sensory exam of lower extremity      MSK:  Exam  shows a 49-year-old female who appears older than stated age.  She ambulates with a rolling walker.  She is alert and oriented x 3.  She does have some asymmetry of shoulder height and prefers to lean towards her right side.  She is tender to palpation along the upper trapezius musculature on the left side as well as the paraspinous muscles of the neck.  No particular pain at the sternum, SC joint, clavicle or AC joint.  No pain along the anterior joint line or posterior joint line.  No pain over the subacromial space or deltoid.  Patient is reluctant to move her left arm but is willing to take command.  Active forward flexion is only approximately 50 degrees.  Abduction is 50 degrees.  Internal rotation is to the level of the lateral axillary line.  All these motions cause significant pain.  Passive motion of that left shoulder is not much better than active range of motion and patient guards.  Passively I can elevate her arm to proximately 80 degrees and externally rotate to 80 degrees but patient has significant pain.  Internal rotation is limited with impingement testing causing pain.  With her arms in a waist level position with testing internal and external rotation strength of the rotator cuff she shows adequate motor tone on external rotators.  Slightly weak on internal rotation.  Patient does not tolerate empty can testing or positioning.  No particular pain throughout the biceps or triceps to palpation.  Acceptable range of motion of the elbow wrist and fingers.  Radial pulses +2 and symmetric.            Personal Independent visualization of the below images done today:  Previous x-ray report of patient's left shoulder is reviewed today  XR Shoulder Left Port G/E 2 Views  Order: [id number]  Narrative    **FINAL REPORT**  x    Patient Name: Cailin Burns  CSN: [id number]  : 1975  Ordering Physician: JUAN LEMA  MRN: [id number]  CLEMENTINA: [id number]  Accession: [id number]  Order:  [id number]  Procedure: XRAY SHOULDER MIN 2 VIEWS LT  Procedure Date/Time: 11/15/2024 13:44  PROCEDURE INFORMATION:  Exam: XR Left Shoulder  Exam date and time: 11/15/2024 1:45 PM  Age: 49 years old  Clinical indication: Other chronic pain; Pain in left shoulder; Prior surgery;  Surgery date: 6+ months; Surgery type: Pacemaker implanted 5/2023. Cardiac  9/2022    TECHNIQUE:  Imaging protocol: Radiologic exam of the left shoulder.  Views: 2 or more views.    COMPARISON:  No relevant prior studies available.    FINDINGS:  Tubes, catheters and devices: An AICD device is seen on the left.  Bones/joints: No fracture or other acute abnormality. Joint spaces are  unremarkable.  Soft tissues: Normal.    IMPRESSION:  Nonacute findings.  This report was electronically signed by [facility] physician:  Chan Campoverde MD. on 11/18/2024 at 10:50, Central Standard Time.  Exam End: 11/15/24  1:56 PM    Specimen Collected: 11/15/24  1:45 PM Last Resulted: 11/18/24 10:50 AM   Received From: [facility]  Result Received: 11/20/24  2:16 PM          Patient's conditions were thoroughly discussed during today's visit with total time reviewing patient's previous medical records/history/radiology, face-to-face examination and discussion and plan of care with the patient and documentation being 45 minutes for today's clinical visit  Lamont Mariscal PA-C  [facility]    This note was completed in part using a voice recognition software, any grammatical or context distortion are unintentional and inherent to the software.           Again, thank you for allowing me to participate in the care of your patient.        Sincerely,        Lamont Mariscal PA-C

## 2024-12-05 NOTE — PATIENT INSTRUCTIONS
Thank you for allowing me to be part of your care team. My personal goal for your visit today is that you felt that I listened to you, you understood your diagnosis and treatment options and our staff/clinic met your expectations. We strive to provide you excellent care.  If you felt like your expectations were not met at your visit today or if you have further questions about your visit or care, please send me a [employer] message and I would be happy answer any questions or listen to your feedback.  If you do not have [employer], you can call [phone] to speak with me.      Today we discussed the underlying etiology/pathology of patient's   1. Chronic left shoulder pain, suspect adhesive capsulitis    2. Tobacco use disorder    3. Spinal stenosis of lumbar region without neurogenic claudication    4. Other chronic pain- on oxycodone 10 mg, up to 5 tabs a day (PCP manages)    5. Hx of cardiac pacemaker- 05/2023    6. History of heart attack, with stenting    7. Diabetes mellitus without complication (H)        Today a shared decision making model was used. The patient's values and choices were respected. The following information represents what was discussed and decided upon by the provider and the patient.  -We discussed the patient has chronic left shoulder pain.  Discussed that she has significantly decreased active as well as passive range of motion of the left shoulder.  Differential diagnosis would be adhesive capsulitis versus chronic rotator cuff tear with possible associated osteoarthritis.  - At this time we agreed to proceed with MRI scan of the left shoulder to evaluate for abnormality.  We discussed that MRI of the shoulder usually does not show evidence of adhesive capsulitis and that this is more of a diagnosis based on exclusion and physical exam.  - I will contact the patient with MRI results when known.  She does have a pacemaker which she states is MRI compatible.  Central scheduling will contact  the patient from radiology once authorization has been obtained and patient will proceed with MRI at a chosen date.  - We did discuss briefly that if diagnosis of adhesive capsulitis is made that this condition certainly can last a couple years.  Treatment usually consists of shoulder intra-articular cortisone injection with distention technique and aggressive formal physical therapy program.  We did discuss the patient is very sensitive to corticosteroids with her diabetes with significant glucose elevation.  Patient currently is not on any type of insulin or sliding scale and this would need to be considered in the setting of possible shoulder injection.  - Patient will continue chronic pain management with her primary care team as narcotics will not come from our office for this concern.  - We did discuss that I cannot rule out possibility of cervical spine pathology due to the left-sided neck pain as well as abnormal sensation of the dorsal aspect of her third and fourth digits.  Primary focus at this time will be of her left shoulder but patient may need a cervical spine workup with possible left upper extremity EMG in the future based on response to shoulder treatment.    - Appointment line phone number is [[phone]]     Lamont Mariscal PA-C  [facility]    This note was completed in part using a voice recognition software, any grammatical or context distortion are unintentional and inherent to the software.

## 2024-12-05 NOTE — CONFIDENTIAL NOTE
I made an outbound phone call to the patient today to get clarity on patient's scheduled 5:00 visit with me today for her left shoulder.  In review of her records it appears that she has been doctoring with [facility] and had imaging of her left shoulder obtained in November 2024.  All of those records are not fully available for me to review.   [facility] [place] I wanted to ensure that before she drives up that we have all of her medical records available in order to provide her excellent care.  I asked for her to return my phone call this morning prior to coming to her appointment to ensure that we can address her needs today.  Lamont Mariscal PA-C

## 2024-12-30 ENCOUNTER — TELEPHONE (OUTPATIENT)
Dept: CARDIOLOGY | Facility: CLINIC | Age: 49
End: 2024-12-30
Payer: COMMERCIAL

## 2025-01-08 ENCOUNTER — OFFICE VISIT (OUTPATIENT)
Dept: VASCULAR SURGERY | Facility: CLINIC | Age: 50
End: 2025-01-08
Attending: HOSPITALIST
Payer: COMMERCIAL

## 2025-01-08 ENCOUNTER — ANCILLARY PROCEDURE (OUTPATIENT)
Dept: VASCULAR ULTRASOUND | Facility: CLINIC | Age: 50
End: 2025-01-08
Attending: HOSPITALIST
Payer: COMMERCIAL

## 2025-01-08 ENCOUNTER — LAB (OUTPATIENT)
Dept: LAB | Facility: CLINIC | Age: 50
End: 2025-01-08
Payer: COMMERCIAL

## 2025-01-08 VITALS
SYSTOLIC BLOOD PRESSURE: 88 MMHG | OXYGEN SATURATION: 98 % | HEART RATE: 77 BPM | DIASTOLIC BLOOD PRESSURE: 58 MMHG | TEMPERATURE: 97.9 F | RESPIRATION RATE: 18 BRPM

## 2025-01-08 DIAGNOSIS — I73.9 PAD (PERIPHERAL ARTERY DISEASE): ICD-10-CM

## 2025-01-08 DIAGNOSIS — I73.9 PAD (PERIPHERAL ARTERY DISEASE): Primary | ICD-10-CM

## 2025-01-08 DIAGNOSIS — E78.5 DYSLIPIDEMIA, GOAL LDL BELOW 70: ICD-10-CM

## 2025-01-08 LAB
CHOLEST SERPL-MCNC: 135 MG/DL
FASTING STATUS PATIENT QL REPORTED: NO
HDLC SERPL-MCNC: 32 MG/DL
LDLC SERPL CALC-MCNC: 76 MG/DL
NONHDLC SERPL-MCNC: 103 MG/DL
TRIGL SERPL-MCNC: 134 MG/DL

## 2025-01-08 PROCEDURE — G0463 HOSPITAL OUTPT CLINIC VISIT: HCPCS | Performed by: HOSPITALIST

## 2025-01-08 PROCEDURE — 36415 COLL VENOUS BLD VENIPUNCTURE: CPT

## 2025-01-08 PROCEDURE — 80061 LIPID PANEL: CPT

## 2025-01-08 PROCEDURE — 93924 LWR XTR VASC STDY BILAT: CPT

## 2025-01-08 PROCEDURE — 93925 LOWER EXTREMITY STUDY: CPT

## 2025-01-08 RX ORDER — ACYCLOVIR 400 MG/1
TABLET ORAL
COMMUNITY
Start: 2025-01-01

## 2025-01-08 RX ORDER — SEMAGLUTIDE 2.68 MG/ML
INJECTION, SOLUTION SUBCUTANEOUS
COMMUNITY
Start: 2025-01-02

## 2025-01-08 ASSESSMENT — PAIN SCALES - GENERAL: PAINLEVEL_OUTOF10: MILD PAIN (2)

## 2025-01-08 NOTE — PROGRESS NOTES
[facility]        Patient is here for a >6 month follow up  to discuss Peripheral artery disease (PAD). Symptoms include claudicaton: right buttocks, left buttocks, right thigh, left thigh, right calf, and left calf at distance of 2-3 blocks in both lower extremities, worse with incline.  Patient states she still have symptoms but feel they have improved some since last visit.  Patient is an every day smoker, she states less than 10 cigarettes per day.     Pt is currently taking Aspirin, Statin, Brillinta, and Zetia.    BP (!) 88/57   Pulse 77   Temp 97.9  F (36.6  C)   Resp 18   SpO2 98%     The provider has been notified that the patient has concerns of PAD with claudication.     Questions patient would like addressed today are: N/A.    Refills are needed: No    Has homecare services and agency name:  No

## 2025-01-08 NOTE — PATIENT INSTRUCTIONS
Israel Simeon,    Thank you for entrusting your care with us today. After your visit today with MD Nitish Yang this is the plan that was discussed at your appointment.    Stop in lab before you leave to have your cholesterol levels checked.     Follow up in 6 months with Dr. Yang and an ultrasound of your legs prior.  A  will reach out to you closer to that time to schedule.     Continue to do home walking.     I am including additional information on these things and our contact information if you have any questions or concerns.   Please do not hesitate to reach out to us if you felt we did not answer your questions or you are unsure of the treatment plan after your visit today. Our number is [phone].Thank you for trusting us with your care.         Again thank you for your time.     What is PAD?          Peripheral arterial disease (PAD) is narrowing or blockage of arteries that causes poor blood flow to your arms and legs. PAD is most common in the legs.  PAD is often caused by fatty buildup (plaque) in the arteries. Over time, plaque builds up in the walls of the arteries, including those that supply blood to your legs. This can limit blood flow to the muscles and other tissues of the legs. PAD can make it hard for you to walk. It can also lead to tissue death. Sometimes part of the leg must be removed by surgery (amputation).    What is a PAD walking program?    A Peripheral Arterial Disease (PAD) walking program is a structured exercise regimen designed to benefit individuals with PAD. PAD is a condition characterized by reduced blood flow to the limbs, typically the legs, due to the narrowing or blockage of arteries. The walking program aims to improve circulation, reduce symptoms, and enhance overall cardiovascular health in individuals with PAD.    How do I start?     Here are some guidelines to help you commence your walking routine:  Perform a daily examination of your feet for redness and  "sores.  Ensure you wear well-fitting and supportive shoes and socks.  Aim to walk a minimum of three times a week. This is the most important part of the program: consistency is key!  Avoid missing more than two consecutive days, as it can diminish the benefits of the program.  Choose a suitable walking location, whether it's outdoors or on a treadmill indoors.  Keep a record of the minutes you spend walking on a calendar or a tracking tool.  Invite a friend to join you for walks to provide accountability.  Enjoy the process and reward yourself for your achievements!    What steps should I follow for my walking program?     Follow these step-by-step instructions for your walks:    Warm-up: Begin by walking slowly for two to three minutes to loosen up your legs. Stretch your calf and thigh muscles in each leg for 10-15 seconds.    Get Walking!: Walk at a moderate pace for 3-10 minutes until your leg pain reaches a 3 or 4 on a 5-point scale. The pain may manifest as a \"Carl horse,\" cramp, or tightness in your calf or thigh, accompanied by tiredness or fatigue.    Stop and Rest: Allow your pain to improve, which may take up to 3-10 minutes.    Repeat Steps 2 and 3: After the pain subsides, repeat the walking and resting steps several times. Although there may be discomfort, aim to walk fast enough to feel mild to moderate leg pain. Your goal is to reach a moderate level of leg pain before stopping.    Cool Down: Walk slowly for 5 minutes and take a few minutes to stretch your calf and thigh muscles for 10-15 seconds.    Maintain Consistency: Strive to complete the walking program 3-5 times a week. Gradually work towards achieving at least 30 minutes of walking per session. As walking becomes easier, increase the difficulty level by walking uphill, climbing stairs, or adjusting the incline on your treadmill.    Have fun!: Bring enjoyment into your routine! Listen to music or podcasts as you walk and during your rest " breaks. Explore your favorite park during your walks. Invite a friend to join you for your walks. Incorporate activities that bring you ishaan to enhance your overall exercise experience.     Using a tracking tool:    You should use a tracking tool to keep track of your progress. Ideally, you should be able to walk for longer distances with less pain the longer you stick with a walking program! Keeping track of your walks will help you see your progress over time. Please bring your tracking tool to your next appointment with your vascular provider so that we can see how you are responding to the exercise program. Below is an example of a tracking tool, but you may create any tracker that you like!     Date Distance walked Exercise time How did it feel?   12/21/23    3 blocks 30 minutes Pain started at a 4/5 but got easier towards the end of the session   12/23/23   1 mile (on treadmill) 35 minutes Pain 3/5, taking frequent breaks, overall felt good!

## 2025-01-08 NOTE — Clinical Note
Needs 6 month follow up around 7/8/25 with Dr. Yang and MANAN and duplex prior.  6 month follow up PAD with claudication.

## 2025-01-08 NOTE — PROGRESS NOTES
"VASCULAR MEDICINE PROGRESS NOTE          LOCATION:  [facility]       Date of Service: 1/8/2025      Primary Care Provider: No Ref-Primary, Physician      Reason for the visit/chief complaint:   Follow up on PAD      Subjective:  Cailin Burns is a pleasant 49 year old female who presents to our Vascular Medicine clinic to follow up.    We first met 4/24/2024.  Ms. Burns has past medical history significant for longstanding tobacco smoking, type 2 diabetes mellitus, PAD with mixed claudication, STEMI September 2022 with three-vessel disease status post staged PCI due to poor bypass target (PCI of RCA  and MOHAN x1 to mLAD and MOHAN x1 to mLCx on 01/2023), ischemic cardiomyopathy LVEF 27% status post ICD May 2023 and dyslipidemia.    After our last visit, we discussed walking program and added ezetimibe 10 mg targeting LDL less than 70.  Patient reports she has been walking significantly more than before although not specifically following a walking program.  Looking into her phone data, she does \" physical activity\" for 3 hours/week as per her phone recording.    Denies any new complaints.  She is being considered for heart transplant.    Past medical history, surgical history, medications, family history, social history and allergies were reviewed. Pertinent points mentioned under HPI.        OBJECTIVE:    Vital signs:  BP (!) 88/57   Pulse 77   Temp 97.9  F (36.6  C)   Resp 18   SpO2 98%   Wt Readings from Last 1 Encounters:   12/05/24 156 lb 15.5 oz (71.2 kg)     There is no height or weight on file to calculate BMI.      Physical exam:  General appearance: Pleasant female in no apparent distress.    HEENT: NC/AT.    Neck: Carotids +2/2 bilaterally without bruits.  No jugular venous distension.   No subclavian bruit  Heart: RRR. Normal S1, S2.   Chest: Clear to auscultation bilaterally.  Extremities: No swelling.  Right DP and PT 0/2, Left DP 2/2, PT 1/2.  Skin: Normal skin color and " temperature.  No skin wounds.  Neurological: Alert, awake and oriented         DIAGNOSTIC STUDIES:   Labs and diagnostics reviewed including outside records. Pertinent points are mentioned under HPI and assessment and plan sections.        ASSESSMENT AND PLAN:    Peripheral arterial disease with intermittent claudication and mixed claudication  Right brachiocephalic stenosis  Longstanding tobacco use with more than 50-pack-year  Type 2 diabetes mellitus currently insulin-dependent: Uncontrolled A1c 10.6%  CAD status post STEMI September 2022 with three-vessel disease status post staged PCI due to poor bypass target (PCI of RCA  and MOHAN x1 to mLAD and MOHAN x1 to mLCx)  Ischemic cardiomyopathy LVEF 27% status post ICD  Dyslipidemia    Continues to have mixed claudication. Walking more but not necessarily following a walking program.  MANAN from today showing moderately reduced MANAN on the right 0.79 (previously 0.67) and normal 1.10 on the left (previously 0.79).  Patient did not exercises significant amount of exercise with no significant drop in ABIs.  Arterial duplex showing occluded right SFA at the mid thigh with reconstitution in the distal thigh with monophasic waveforms consistent with prior.  On the left however, waveforms are normal multiphasic throughout, previously, monophasic starting at the SFA level.  Overall, she did have improvement on her MANAN on the left as well as waveforms on duplex ultrasound.  No significant change on the right.  She was encouraged to continue walking and attempt at least 30 minutes of walking 3 times a week.  We did ask for lipid panel last time after adding ezetimibe to ensure improvement in LDL, she has not completed the lipid panel.  Finally, she continues to smoke 10 cigarettes a day.  Discussed the importance of complete tobacco abstinence.    Recommendations:  Continue walking and attempt walking at least 30 minutes, 3 times a week.  Follow-up in 6 months with MANAN study  with exercise and arterial duplex.  Obtain lipid panel to assess for improvement after ezetimibe.  Goal LDL less than 70.  Of note, patient's blood pressure was noted to be low 88/58.  Asymptomatic.  She has had blood pressure on the lower side but did discuss symptoms of hypotension and recommend touching base with cardiology for medication adjustment.  Again discussed the utmost importance of complete tobacco abstinence.      Nitish Yang MD, [facility]  Vascular Medicine  January 8, 2025

## 2025-01-17 ENCOUNTER — HOSPITAL ENCOUNTER (OUTPATIENT)
Dept: MRI IMAGING | Facility: CLINIC | Age: 50
Discharge: HOME | End: 2025-01-17
Attending: PHYSICIAN ASSISTANT
Payer: COMMERCIAL

## 2025-01-17 ENCOUNTER — ANCILLARY PROCEDURE (OUTPATIENT)
Dept: CARDIOLOGY | Facility: CLINIC | Age: 50
End: 2025-01-17
Attending: INTERNAL MEDICINE
Payer: COMMERCIAL

## 2025-01-17 VITALS — OXYGEN SATURATION: 96 % | HEART RATE: 84 BPM

## 2025-01-17 DIAGNOSIS — M25.512 CHRONIC LEFT SHOULDER PAIN: ICD-10-CM

## 2025-01-17 DIAGNOSIS — Z95.810 ICD (IMPLANTABLE CARDIOVERTER-DEFIBRILLATOR), SINGLE, IN SITU: ICD-10-CM

## 2025-01-17 DIAGNOSIS — G89.29 CHRONIC LEFT SHOULDER PAIN: ICD-10-CM

## 2025-01-17 PROCEDURE — 73221 MRI JOINT UPR EXTREM W/O DYE: CPT | Mod: LT

## 2025-01-17 PROCEDURE — 93287 PERI-PX DEVICE EVAL & PRGR: CPT | Mod: 26 | Performed by: INTERNAL MEDICINE

## 2025-01-17 PROCEDURE — 73221 MRI JOINT UPR EXTREM W/O DYE: CPT | Mod: 26 | Performed by: RADIOLOGY

## 2025-01-17 PROCEDURE — 93287 PERI-PX DEVICE EVAL & PRGR: CPT

## 2025-01-19 LAB
MDC_IDC_LEAD_CONNECTION_STATUS: NORMAL
MDC_IDC_LEAD_IMPLANT_DT: NORMAL
MDC_IDC_LEAD_LOCATION: NORMAL
MDC_IDC_LEAD_LOCATION_DETAIL_1: NORMAL
MDC_IDC_LEAD_MFG: NORMAL
MDC_IDC_LEAD_MODEL: NORMAL
MDC_IDC_LEAD_POLARITY_TYPE: NORMAL
MDC_IDC_LEAD_SERIAL: NORMAL
MDC_IDC_LEAD_SPECIAL_FUNCTION: NORMAL
MDC_IDC_MSMT_BATTERY_DTM: NORMAL
MDC_IDC_MSMT_BATTERY_REMAINING_LONGEVITY: 148 MO
MDC_IDC_MSMT_BATTERY_RRT_TRIGGER: NORMAL
MDC_IDC_MSMT_BATTERY_STATUS: NORMAL
MDC_IDC_MSMT_BATTERY_VOLTAGE: 3.01 V
MDC_IDC_MSMT_CAP_CHARGE_DTM: NORMAL
MDC_IDC_MSMT_CAP_CHARGE_ENERGY: 18 J
MDC_IDC_MSMT_CAP_CHARGE_TIME: 3.7 S
MDC_IDC_MSMT_CAP_CHARGE_TYPE: NORMAL
MDC_IDC_MSMT_LEADCHNL_RV_IMPEDANCE_VALUE: 456 OHM
MDC_IDC_MSMT_LEADCHNL_RV_PACING_THRESHOLD_AMPLITUDE: 0.75 V
MDC_IDC_MSMT_LEADCHNL_RV_PACING_THRESHOLD_PULSEWIDTH: 0.4 MS
MDC_IDC_MSMT_LEADCHNL_RV_SENSING_INTR_AMPL: 31.62 MV
MDC_IDC_PG_IMPLANT_DTM: NORMAL
MDC_IDC_PG_MFG: NORMAL
MDC_IDC_PG_MODEL: NORMAL
MDC_IDC_PG_SERIAL: NORMAL
MDC_IDC_PG_TYPE: NORMAL
MDC_IDC_SESS_CLINIC_NAME: NORMAL
MDC_IDC_SESS_DTM: NORMAL
MDC_IDC_SESS_TYPE: NORMAL
MDC_IDC_SET_BRADY_HYSTRATE: NORMAL
MDC_IDC_SET_BRADY_LOWRATE: 40 {BEATS}/MIN
MDC_IDC_SET_BRADY_MODE: NORMAL
MDC_IDC_SET_LEADCHNL_RA_SENSING_SENSITIVITY: NORMAL
MDC_IDC_SET_LEADCHNL_RV_PACING_AMPLITUDE: 2 V
MDC_IDC_SET_LEADCHNL_RV_PACING_ANODE_ELECTRODE_1: NORMAL
MDC_IDC_SET_LEADCHNL_RV_PACING_ANODE_LOCATION_1: NORMAL
MDC_IDC_SET_LEADCHNL_RV_PACING_CAPTURE_MODE: NORMAL
MDC_IDC_SET_LEADCHNL_RV_PACING_CATHODE_ELECTRODE_1: NORMAL
MDC_IDC_SET_LEADCHNL_RV_PACING_CATHODE_LOCATION_1: NORMAL
MDC_IDC_SET_LEADCHNL_RV_PACING_POLARITY: NORMAL
MDC_IDC_SET_LEADCHNL_RV_PACING_PULSEWIDTH: 0.4 MS
MDC_IDC_SET_LEADCHNL_RV_SENSING_ANODE_ELECTRODE_1: NORMAL
MDC_IDC_SET_LEADCHNL_RV_SENSING_ANODE_LOCATION_1: NORMAL
MDC_IDC_SET_LEADCHNL_RV_SENSING_CATHODE_ELECTRODE_1: NORMAL
MDC_IDC_SET_LEADCHNL_RV_SENSING_CATHODE_LOCATION_1: NORMAL
MDC_IDC_SET_LEADCHNL_RV_SENSING_POLARITY: NORMAL
MDC_IDC_SET_LEADCHNL_RV_SENSING_SENSITIVITY: 0.3 MV
MDC_IDC_SET_ZONE_DETECTION_BEATS_DENOMINATOR: 16 {BEATS}
MDC_IDC_SET_ZONE_DETECTION_BEATS_DENOMINATOR: 32 {BEATS}
MDC_IDC_SET_ZONE_DETECTION_BEATS_DENOMINATOR: 40 {BEATS}
MDC_IDC_SET_ZONE_DETECTION_BEATS_NUMERATOR: 16 {BEATS}
MDC_IDC_SET_ZONE_DETECTION_BEATS_NUMERATOR: 30 {BEATS}
MDC_IDC_SET_ZONE_DETECTION_BEATS_NUMERATOR: 32 {BEATS}
MDC_IDC_SET_ZONE_DETECTION_INTERVAL: 270 MS
MDC_IDC_SET_ZONE_DETECTION_INTERVAL: 320 MS
MDC_IDC_SET_ZONE_DETECTION_INTERVAL: 400 MS
MDC_IDC_SET_ZONE_STATUS: NORMAL
MDC_IDC_SET_ZONE_TYPE: NORMAL
MDC_IDC_SET_ZONE_VENDOR_TYPE: NORMAL
MDC_IDC_STAT_AT_BURDEN_PERCENT: 0 %
MDC_IDC_STAT_AT_DTM_END: NORMAL
MDC_IDC_STAT_AT_DTM_START: NORMAL
MDC_IDC_STAT_BRADY_DTM_END: NORMAL
MDC_IDC_STAT_BRADY_DTM_START: NORMAL
MDC_IDC_STAT_BRADY_RA_PERCENT_PACED: NORMAL
MDC_IDC_STAT_BRADY_RV_PERCENT_PACED: 0 %
MDC_IDC_STAT_EPISODE_RECENT_COUNT: 0
MDC_IDC_STAT_EPISODE_RECENT_COUNT_DTM_END: NORMAL
MDC_IDC_STAT_EPISODE_RECENT_COUNT_DTM_START: NORMAL
MDC_IDC_STAT_EPISODE_TOTAL_COUNT: 0
MDC_IDC_STAT_EPISODE_TOTAL_COUNT_DTM_END: NORMAL
MDC_IDC_STAT_EPISODE_TOTAL_COUNT_DTM_START: NORMAL
MDC_IDC_STAT_EPISODE_TYPE: NORMAL
MDC_IDC_STAT_TACHYTHERAPY_ATP_DELIVERED_RECENT: 0
MDC_IDC_STAT_TACHYTHERAPY_ATP_DELIVERED_TOTAL: 0
MDC_IDC_STAT_TACHYTHERAPY_RECENT_DTM_END: NORMAL
MDC_IDC_STAT_TACHYTHERAPY_RECENT_DTM_START: NORMAL
MDC_IDC_STAT_TACHYTHERAPY_SHOCKS_ABORTED_RECENT: 0
MDC_IDC_STAT_TACHYTHERAPY_SHOCKS_ABORTED_TOTAL: 0
MDC_IDC_STAT_TACHYTHERAPY_SHOCKS_DELIVERED_RECENT: 0
MDC_IDC_STAT_TACHYTHERAPY_SHOCKS_DELIVERED_TOTAL: 0
MDC_IDC_STAT_TACHYTHERAPY_TOTAL_DTM_END: NORMAL
MDC_IDC_STAT_TACHYTHERAPY_TOTAL_DTM_START: NORMAL

## 2025-01-20 ENCOUNTER — PATIENT OUTREACH (OUTPATIENT)
Dept: CARE COORDINATION | Facility: CLINIC | Age: 50
End: 2025-01-20
Payer: COMMERCIAL

## 2025-01-22 ENCOUNTER — PATIENT OUTREACH (OUTPATIENT)
Dept: CARE COORDINATION | Facility: CLINIC | Age: 50
End: 2025-01-22
Payer: COMMERCIAL

## 2025-01-29 ENCOUNTER — MYC MEDICAL ADVICE (OUTPATIENT)
Dept: ORTHOPEDICS | Facility: CLINIC | Age: 50
End: 2025-01-29
Payer: COMMERCIAL

## 2025-01-29 NOTE — PROGRESS NOTES
Sports Medicine Procedure Note    Cailin was seen today for pain.    Diagnoses and all orders for this visit:    Adhesive capsulitis of left shoulder  -     Orthopedic  Referral  -     Large Joint Injection/Arthocentesis: L glenohumeral joint  -     Upper Extremity Injection/Arthrocentesis: left biceps tendon sheath    Proximal Biceps tendonitis on left  -     Orthopedic  Referral  -     Large Joint Injection/Arthocentesis: L glenohumeral joint  -     Upper Extremity Injection/Arthrocentesis: left biceps tendon sheath    Other orders  -     Cancel: Large Joint Injection/Arthocentesis        Cailin Burns is a/an 49 year old female who is seen for Corticosteroid injection of left GHJ and biceps tendon sheath.   Last injection: Has never had injection in left shoulder.    -Biceps and GHJ injection completed today. Tolerated well. Will follow-up with Lamont alvarenga in two weeks and start PT. Had improvement in range of motion post injection.   -Post-injection instructions were provided to the patient.   -Return > 3 months for repeat cortisone injection sooner if develops new or worsening symptoms.    Options for treatment and/or follow-up care were reviewed with the patient was actively involved in the decision making process. Patient verbalized understanding and was in agreement with the plan.    Agreeable to injection injection. Discussed risks and benefits. they are aware of risks such as but not limited to allergy, Soft tissue/ tendon damage, skin hypopigmentation, hyperglycemia, bleeding, and infection.       Large Joint Injection/Arthocentesis: L glenohumeral joint    Date/Time: 1/30/2025 2:30 PM    Performed by: Vale Garcia DO  Authorized by: Vale Garcia DO    Indications:  Pain  Indications comment:  Adhesive capsulitis  Needle Size:  22 G  Guidance: ultrasound    Approach:  Posterior  Location:  Shoulder      Site:  L glenohumeral joint  Medications:  40 mg triamcinolone 40 MG/ML;  4 mL ROPivacaine 5 MG/ML; 3 mL lidocaine 1 %  Outcome:  Tolerated well, no immediate complications  Procedure discussed: discussed risks, benefits, and alternatives    Consent Given by:  Patient  Timeout: timeout called immediately prior to procedure    Prep: patient was prepped and draped in usual sterile fashion     Ultrasound was used to ensure safe and accurate needle placement and injection. Ultrasound images of the procedure were permanently stored.    Upper Extremity Injection/Arthrocentesis: left biceps tendon sheath    Date/Time: 1/30/2025 2:31 PM    Performed by: Vale Garcia DO  Authorized by: Vale Garcia DO    Indications:  Pain and tendon swelling  Needle Size:  25 G  Guidance: ultrasound    Approach:  Lateral   Condition comment:  Biceps tendinitis, left    Medications:  40 mg triamcinolone 40 MG/ML; 1 mL lidocaine 1 %  Outcome:  Tolerated well, no immediate complications  Procedure discussed: discussed risks, benefits, and alternatives    Timeout: timeout called immediately prior to procedure    Prep: patient was prepped and draped in usual sterile fashion     Ultrasound was used to ensure safe and accurate needle placement and injection. Ultrasound images of the procedure were permanently stored.        Vale [facility]        Post-Injection Discharge Instructions    You may shower, however avoid swimming, tub baths or hot tubs for 24 hours following your procedure  You may have a mild to moderate increase in pain for a few days following the injection.  The lidocaine (local numbing medicine) will wear off in several hours. It usually takes 3-5 days for the steroid medication to start working although it may take up to 14 days for full effect.   You may use ice packs for 10-15 minutes, 3 to 4 times a day at the injection site for comfort if needed  You may use extra strength Tylenol for pain control if necessary   If you were  fasting, you may resume your normal diet and medications after the procedure  If you have diabetes, your blood sugar may be higher than normal for 10-14 days following a steroid injection. Contact your doctor who manages your diabetes if your blood sugar is significantly higher than usual    If you experience any of the following, call Sports Medicine @ [phone] or [phone]  -Fever over 100 degrees F  -Swelling, bleeding, redness, drainage, warmth at the injection site  -New or significant worsening pain        Dr. Vale Garcia,   [facility]  Sports Medicine

## 2025-01-30 ENCOUNTER — OFFICE VISIT (OUTPATIENT)
Dept: ORTHOPEDICS | Facility: CLINIC | Age: 50
End: 2025-01-30
Attending: PHYSICIAN ASSISTANT
Payer: COMMERCIAL

## 2025-01-30 VITALS — WEIGHT: 156 LBS | BODY MASS INDEX: 26.63 KG/M2 | HEIGHT: 64 IN

## 2025-01-30 DIAGNOSIS — M75.22 BICEPS TENDONITIS ON LEFT: ICD-10-CM

## 2025-01-30 DIAGNOSIS — M75.02 ADHESIVE CAPSULITIS OF LEFT SHOULDER: ICD-10-CM

## 2025-01-30 RX ORDER — LIDOCAINE HYDROCHLORIDE 10 MG/ML
1 INJECTION, SOLUTION INFILTRATION; PERINEURAL
Status: COMPLETED | OUTPATIENT
Start: 2025-01-30 | End: 2025-01-30

## 2025-01-30 RX ORDER — LIDOCAINE HYDROCHLORIDE 10 MG/ML
3 INJECTION, SOLUTION INFILTRATION; PERINEURAL
Status: COMPLETED | OUTPATIENT
Start: 2025-01-30 | End: 2025-01-30

## 2025-01-30 RX ORDER — ROPIVACAINE HYDROCHLORIDE 5 MG/ML
4 INJECTION, SOLUTION EPIDURAL; INFILTRATION; PERINEURAL
Status: COMPLETED | OUTPATIENT
Start: 2025-01-30 | End: 2025-01-30

## 2025-01-30 RX ORDER — TRIAMCINOLONE ACETONIDE 40 MG/ML
40 INJECTION, SUSPENSION INTRA-ARTICULAR; INTRAMUSCULAR
Status: COMPLETED | OUTPATIENT
Start: 2025-01-30 | End: 2025-01-30

## 2025-01-30 RX ADMIN — ROPIVACAINE HYDROCHLORIDE 4 ML: 5 INJECTION, SOLUTION EPIDURAL; INFILTRATION; PERINEURAL at 14:30

## 2025-01-30 RX ADMIN — LIDOCAINE HYDROCHLORIDE 1 ML: 10 INJECTION, SOLUTION INFILTRATION; PERINEURAL at 14:31

## 2025-01-30 RX ADMIN — LIDOCAINE HYDROCHLORIDE 3 ML: 10 INJECTION, SOLUTION INFILTRATION; PERINEURAL at 14:30

## 2025-01-30 RX ADMIN — TRIAMCINOLONE ACETONIDE 40 MG: 40 INJECTION, SUSPENSION INTRA-ARTICULAR; INTRAMUSCULAR at 14:31

## 2025-01-30 RX ADMIN — TRIAMCINOLONE ACETONIDE 40 MG: 40 INJECTION, SUSPENSION INTRA-ARTICULAR; INTRAMUSCULAR at 14:30

## 2025-01-30 NOTE — LETTER
1/30/2025      Cailin Burns  [address]  [place] [address]      Dear Colleague,    Thank you for referring your patient, Cailin Burns, to the [facility]ICINE [facility]IC [facility]. Please see a copy of my visit note below.    Sports Medicine Procedure Note    Cailin was seen today for pain.    Diagnoses and all orders for this visit:    Adhesive capsulitis of left shoulder  -     Orthopedic  Referral  -     Large Joint Injection/Arthocentesis: L glenohumeral joint  -     Upper Extremity Injection/Arthrocentesis: left biceps tendon sheath    Proximal Biceps tendonitis on left  -     Orthopedic  Referral  -     Large Joint Injection/Arthocentesis: L glenohumeral joint  -     Upper Extremity Injection/Arthrocentesis: left biceps tendon sheath    Other orders  -     Cancel: Large Joint Injection/Arthocentesis        Cailin Burns is a/an 49 year old female who is seen for Corticosteroid injection of left GHJ and biceps tendon sheath.   Last injection: Has never had injection in left shoulder.    -Biceps and GHJ injection completed today. Tolerated well. Will follow-up with Lamont alvarenga in two weeks and start PT. Had improvement in range of motion post injection.   -Post-injection instructions were provided to the patient.   -Return > 3 months for repeat cortisone injection sooner if develops new or worsening symptoms.    Options for treatment and/or follow-up care were reviewed with the patient was actively involved in the decision making process. Patient verbalized understanding and was in agreement with the plan.    Agreeable to injection injection. Discussed risks and benefits. they are aware of risks such as but not limited to allergy, Soft tissue/ tendon damage, skin hypopigmentation, hyperglycemia, bleeding, and infection.       Large Joint Injection/Arthocentesis: L glenohumeral joint    Date/Time: 1/30/2025 2:30 PM    Performed by: Vale Garcia,    Authorized by: Vale Garcia DO    Indications:  Pain  Indications comment:  Adhesive capsulitis  Needle Size:  22 G  Guidance: ultrasound    Approach:  Posterior  Location:  Shoulder      Site:  L glenohumeral joint  Medications:  40 mg triamcinolone 40 MG/ML; 4 mL ROPivacaine 5 MG/ML; 3 mL lidocaine 1 %  Outcome:  Tolerated well, no immediate complications  Procedure discussed: discussed risks, benefits, and alternatives    Consent Given by:  Patient  Timeout: timeout called immediately prior to procedure    Prep: patient was prepped and draped in usual sterile fashion     Ultrasound was used to ensure safe and accurate needle placement and injection. Ultrasound images of the procedure were permanently stored.    Upper Extremity Injection/Arthrocentesis: left biceps tendon sheath    Date/Time: 1/30/2025 2:31 PM    Performed by: Vale Garcia DO  Authorized by: Vale Garcia DO    Indications:  Pain and tendon swelling  Needle Size:  25 G  Guidance: ultrasound    Approach:  Lateral   Condition comment:  Biceps tendinitis, left    Medications:  40 mg triamcinolone 40 MG/ML; 1 mL lidocaine 1 %  Outcome:  Tolerated well, no immediate complications  Procedure discussed: discussed risks, benefits, and alternatives    Timeout: timeout called immediately prior to procedure    Prep: patient was prepped and draped in usual sterile fashion     Ultrasound was used to ensure safe and accurate needle placement and injection. Ultrasound images of the procedure were permanently stored.        Vale [facility] MEDIC[facility] [place]        Post-Injection Discharge Instructions    You may shower, however avoid swimming, tub baths or hot tubs for 24 hours following your procedure  You may have a mild to moderate increase in pain for a few days following the injection.  The lidocaine (local numbing medicine) will wear off in several hours. It usually takes 3-5 days for the  steroid medication to start working although it may take up to 14 days for full effect.   You may use ice packs for 10-15 minutes, 3 to 4 times a day at the injection site for comfort if needed  You may use extra strength Tylenol for pain control if necessary   If you were fasting, you may resume your normal diet and medications after the procedure  If you have diabetes, your blood sugar may be higher than normal for 10-14 days following a steroid injection. Contact your doctor who manages your diabetes if your blood sugar is significantly higher than usual    If you experience any of the following, call Sports Medicine @ [phone] or [phone]  -Fever over 100 degrees F  -Swelling, bleeding, redness, drainage, warmth at the injection site  -New or significant worsening pain        Dr. Vale Garcia DO  [facility]  Sports Medicine       Again, thank you for allowing me to participate in the care of your patient.        Sincerely,        Vale Garcia DO    Electronically signed

## 2025-02-05 ENCOUNTER — MYC MEDICAL ADVICE (OUTPATIENT)
Dept: ORTHOPEDICS | Facility: CLINIC | Age: 50
End: 2025-02-05
Payer: COMMERCIAL

## 2025-03-02 ENCOUNTER — HEALTH MAINTENANCE LETTER (OUTPATIENT)
Age: 50
End: 2025-03-02

## 2025-03-24 ENCOUNTER — HOSPITAL ENCOUNTER (INPATIENT)
Facility: CLINIC | Age: 50
DRG: 640 | End: 2025-03-24
Attending: INTERNAL MEDICINE | Admitting: INTERNAL MEDICINE
Payer: COMMERCIAL

## 2025-03-24 DIAGNOSIS — I50.23 ACUTE ON CHRONIC SYSTOLIC HEART FAILURE (H): Primary | ICD-10-CM

## 2025-03-24 DIAGNOSIS — Z72.0 TOBACCO USE: ICD-10-CM

## 2025-03-24 LAB
ABO + RH BLD: NORMAL
ALBUMIN SERPL BCG-MCNC: 3.7 G/DL (ref 3.5–5.2)
ALP SERPL-CCNC: 49 U/L (ref 40–150)
ALT SERPL W P-5'-P-CCNC: 14 U/L (ref 0–50)
ANION GAP SERPL CALCULATED.3IONS-SCNC: 11 MMOL/L (ref 7–15)
AST SERPL W P-5'-P-CCNC: 16 U/L (ref 0–45)
BILIRUB SERPL-MCNC: 0.3 MG/DL
BLD GP AB SCN SERPL QL: NEGATIVE
BUN SERPL-MCNC: 14.4 MG/DL (ref 6–20)
CA-I BLD-MCNC: 4.2 MG/DL (ref 4.4–5.2)
CALCIUM SERPL-MCNC: 8.4 MG/DL (ref 8.8–10.4)
CHLORIDE SERPL-SCNC: 102 MMOL/L (ref 98–107)
CREAT SERPL-MCNC: 0.75 MG/DL (ref 0.51–0.95)
EGFRCR SERPLBLD CKD-EPI 2021: >90 ML/MIN/1.73M2
ERYTHROCYTE [DISTWIDTH] IN BLOOD BY AUTOMATED COUNT: 14.4 % (ref 10–15)
GLUCOSE BLDC GLUCOMTR-MCNC: 122 MG/DL (ref 70–99)
GLUCOSE SERPL-MCNC: 117 MG/DL (ref 70–99)
HCO3 SERPL-SCNC: 22 MMOL/L (ref 22–29)
HCT VFR BLD AUTO: 37 % (ref 35–47)
HGB BLD-MCNC: 12.4 G/DL (ref 11.7–15.7)
LACTATE SERPL-SCNC: 1.5 MMOL/L (ref 0.7–2)
MAGNESIUM SERPL-MCNC: 1.8 MG/DL (ref 1.7–2.3)
MCH RBC QN AUTO: 29.8 PG (ref 26.5–33)
MCHC RBC AUTO-ENTMCNC: 33.5 G/DL (ref 31.5–36.5)
MCV RBC AUTO: 89 FL (ref 78–100)
PHOSPHATE SERPL-MCNC: 2.9 MG/DL (ref 2.5–4.5)
PLATELET # BLD AUTO: 339 10E3/UL (ref 150–450)
POTASSIUM SERPL-SCNC: 4 MMOL/L (ref 3.4–5.3)
PROT SERPL-MCNC: 6.4 G/DL (ref 6.4–8.3)
RBC # BLD AUTO: 4.16 10E6/UL (ref 3.8–5.2)
SODIUM SERPL-SCNC: 135 MMOL/L (ref 135–145)
SPECIMEN EXP DATE BLD: NORMAL
TROPONIN T SERPL HS-MCNC: 8 NG/L
WBC # BLD AUTO: 12.2 10E3/UL (ref 4–11)

## 2025-03-24 PROCEDURE — 82330 ASSAY OF CALCIUM: CPT | Performed by: PHYSICIAN ASSISTANT

## 2025-03-24 PROCEDURE — 36415 COLL VENOUS BLD VENIPUNCTURE: CPT | Performed by: PHYSICIAN ASSISTANT

## 2025-03-24 PROCEDURE — 99291 CRITICAL CARE FIRST HOUR: CPT | Mod: FS | Performed by: INTERNAL MEDICINE

## 2025-03-24 PROCEDURE — 87640 STAPH A DNA AMP PROBE: CPT | Performed by: PHYSICIAN ASSISTANT

## 2025-03-24 PROCEDURE — 250N000013 HC RX MED GY IP 250 OP 250 PS 637: Performed by: PHYSICIAN ASSISTANT

## 2025-03-24 PROCEDURE — 80053 COMPREHEN METABOLIC PANEL: CPT | Performed by: PHYSICIAN ASSISTANT

## 2025-03-24 PROCEDURE — 86900 BLOOD TYPING SEROLOGIC ABO: CPT | Performed by: PHYSICIAN ASSISTANT

## 2025-03-24 PROCEDURE — 85027 COMPLETE CBC AUTOMATED: CPT | Performed by: PHYSICIAN ASSISTANT

## 2025-03-24 PROCEDURE — 83735 ASSAY OF MAGNESIUM: CPT | Performed by: PHYSICIAN ASSISTANT

## 2025-03-24 PROCEDURE — 99292 CRITICAL CARE ADDL 30 MIN: CPT | Mod: FS | Performed by: INTERNAL MEDICINE

## 2025-03-24 PROCEDURE — 200N000001 HC R&B ICU

## 2025-03-24 PROCEDURE — 84145 PROCALCITONIN (PCT): CPT | Performed by: INTERNAL MEDICINE

## 2025-03-24 PROCEDURE — 250N000009 HC RX 250: Performed by: PHYSICIAN ASSISTANT

## 2025-03-24 PROCEDURE — 84100 ASSAY OF PHOSPHORUS: CPT | Performed by: PHYSICIAN ASSISTANT

## 2025-03-24 PROCEDURE — 83605 ASSAY OF LACTIC ACID: CPT | Performed by: PHYSICIAN ASSISTANT

## 2025-03-24 PROCEDURE — 258N000003 HC RX IP 258 OP 636: Performed by: INTERNAL MEDICINE

## 2025-03-24 PROCEDURE — 84484 ASSAY OF TROPONIN QUANT: CPT | Performed by: PHYSICIAN ASSISTANT

## 2025-03-24 PROCEDURE — 87641 MR-STAPH DNA AMP PROBE: CPT | Performed by: PHYSICIAN ASSISTANT

## 2025-03-24 PROCEDURE — 82533 TOTAL CORTISOL: CPT | Performed by: INTERNAL MEDICINE

## 2025-03-24 RX ORDER — AMOXICILLIN 250 MG
2 CAPSULE ORAL AT BEDTIME
Status: DISCONTINUED | OUTPATIENT
Start: 2025-03-24 | End: 2025-03-28 | Stop reason: HOSPADM

## 2025-03-24 RX ORDER — OXYCODONE HYDROCHLORIDE 5 MG/1
5-10 TABLET ORAL EVERY 4 HOURS PRN
Status: DISCONTINUED | OUTPATIENT
Start: 2025-03-24 | End: 2025-03-28 | Stop reason: HOSPADM

## 2025-03-24 RX ORDER — ACETAMINOPHEN 650 MG/1
650 SUPPOSITORY RECTAL EVERY 4 HOURS PRN
Status: DISCONTINUED | OUTPATIENT
Start: 2025-03-24 | End: 2025-03-28 | Stop reason: HOSPADM

## 2025-03-24 RX ORDER — AMOXICILLIN 250 MG
2 CAPSULE ORAL 2 TIMES DAILY
Status: DISCONTINUED | OUTPATIENT
Start: 2025-03-24 | End: 2025-03-24

## 2025-03-24 RX ORDER — NALOXONE HYDROCHLORIDE 0.4 MG/ML
0.4 INJECTION, SOLUTION INTRAMUSCULAR; INTRAVENOUS; SUBCUTANEOUS
Status: DISCONTINUED | OUTPATIENT
Start: 2025-03-24 | End: 2025-03-28 | Stop reason: HOSPADM

## 2025-03-24 RX ORDER — NICOTINE POLACRILEX 4 MG
15-30 LOZENGE BUCCAL
Status: DISCONTINUED | OUTPATIENT
Start: 2025-03-24 | End: 2025-03-25

## 2025-03-24 RX ORDER — NALOXONE HYDROCHLORIDE 0.4 MG/ML
0.2 INJECTION, SOLUTION INTRAMUSCULAR; INTRAVENOUS; SUBCUTANEOUS
Status: DISCONTINUED | OUTPATIENT
Start: 2025-03-24 | End: 2025-03-28 | Stop reason: HOSPADM

## 2025-03-24 RX ORDER — HYDROMORPHONE HCL IN WATER/PF 6 MG/30 ML
0.2 PATIENT CONTROLLED ANALGESIA SYRINGE INTRAVENOUS
Status: DISCONTINUED | OUTPATIENT
Start: 2025-03-24 | End: 2025-03-28 | Stop reason: HOSPADM

## 2025-03-24 RX ORDER — ISOSORBIDE DINITRATE 10 MG/1
10 TABLET ORAL
Status: ON HOLD | COMMUNITY
End: 2025-03-28

## 2025-03-24 RX ORDER — VENLAFAXINE 100 MG/1
100 TABLET ORAL 2 TIMES DAILY
Status: DISCONTINUED | OUTPATIENT
Start: 2025-03-24 | End: 2025-03-28 | Stop reason: HOSPADM

## 2025-03-24 RX ORDER — GABAPENTIN 100 MG/1
200 CAPSULE ORAL 3 TIMES DAILY
Status: DISCONTINUED | OUTPATIENT
Start: 2025-03-24 | End: 2025-03-28 | Stop reason: HOSPADM

## 2025-03-24 RX ORDER — DEXTROSE MONOHYDRATE 25 G/50ML
25-50 INJECTION, SOLUTION INTRAVENOUS
Status: DISCONTINUED | OUTPATIENT
Start: 2025-03-24 | End: 2025-03-25

## 2025-03-24 RX ORDER — EZETIMIBE 10 MG/1
10 TABLET ORAL DAILY
Status: DISCONTINUED | OUTPATIENT
Start: 2025-03-25 | End: 2025-03-28 | Stop reason: HOSPADM

## 2025-03-24 RX ORDER — GABAPENTIN 100 MG/1
200 CAPSULE ORAL 3 TIMES DAILY
COMMUNITY

## 2025-03-24 RX ORDER — FENOFIBRATE 160 MG/1
160 TABLET ORAL DAILY
Status: DISCONTINUED | OUTPATIENT
Start: 2025-03-25 | End: 2025-03-28 | Stop reason: HOSPADM

## 2025-03-24 RX ORDER — ONDANSETRON 4 MG/1
4 TABLET, ORALLY DISINTEGRATING ORAL EVERY 6 HOURS PRN
Status: DISCONTINUED | OUTPATIENT
Start: 2025-03-24 | End: 2025-03-28 | Stop reason: HOSPADM

## 2025-03-24 RX ORDER — ATORVASTATIN CALCIUM 80 MG/1
80 TABLET, FILM COATED ORAL AT BEDTIME
Status: DISCONTINUED | OUTPATIENT
Start: 2025-03-24 | End: 2025-03-28 | Stop reason: HOSPADM

## 2025-03-24 RX ORDER — POLYETHYLENE GLYCOL 3350 17 G/17G
17 POWDER, FOR SOLUTION ORAL DAILY PRN
COMMUNITY

## 2025-03-24 RX ORDER — GABAPENTIN 100 MG/1
300 CAPSULE ORAL AT BEDTIME
Status: ON HOLD | COMMUNITY
End: 2025-03-24

## 2025-03-24 RX ORDER — BUSPIRONE HYDROCHLORIDE 10 MG/1
10 TABLET ORAL 2 TIMES DAILY
Status: DISCONTINUED | OUTPATIENT
Start: 2025-03-24 | End: 2025-03-28 | Stop reason: HOSPADM

## 2025-03-24 RX ORDER — HEPARIN SODIUM 10000 [USP'U]/100ML
0-5000 INJECTION, SOLUTION INTRAVENOUS CONTINUOUS
Status: DISCONTINUED | OUTPATIENT
Start: 2025-03-24 | End: 2025-03-25

## 2025-03-24 RX ORDER — ONDANSETRON 2 MG/ML
4 INJECTION INTRAMUSCULAR; INTRAVENOUS EVERY 6 HOURS PRN
Status: DISCONTINUED | OUTPATIENT
Start: 2025-03-24 | End: 2025-03-28 | Stop reason: HOSPADM

## 2025-03-24 RX ORDER — ROPIVACAINE IN 0.9% SOD CHL/PF 0.1 %
.01-.2 PLASTIC BAG, INJECTION (ML) EPIDURAL CONTINUOUS
Status: DISCONTINUED | OUTPATIENT
Start: 2025-03-24 | End: 2025-03-25

## 2025-03-24 RX ORDER — ACETAMINOPHEN 325 MG/1
650 TABLET ORAL EVERY 4 HOURS PRN
Status: DISCONTINUED | OUTPATIENT
Start: 2025-03-24 | End: 2025-03-28 | Stop reason: HOSPADM

## 2025-03-24 RX ORDER — ASPIRIN 81 MG/1
81 TABLET ORAL DAILY
Status: DISCONTINUED | OUTPATIENT
Start: 2025-03-25 | End: 2025-03-28 | Stop reason: HOSPADM

## 2025-03-24 RX ADMIN — NOREPINEPHRINE BITARTRATE 0.05 MCG/KG/MIN: 0.02 INJECTION, SOLUTION INTRAVENOUS at 23:40

## 2025-03-24 RX ADMIN — GABAPENTIN 200 MG: 100 CAPSULE ORAL at 23:48

## 2025-03-24 RX ADMIN — SODIUM CHLORIDE 1000 ML: 0.9 INJECTION, SOLUTION INTRAVENOUS at 23:52

## 2025-03-24 RX ADMIN — BUSPIRONE HYDROCHLORIDE 10 MG: 10 TABLET ORAL at 23:49

## 2025-03-24 RX ADMIN — ATORVASTATIN CALCIUM 80 MG: 40 TABLET, FILM COATED ORAL at 23:49

## 2025-03-24 RX ADMIN — OXYCODONE HYDROCHLORIDE 10 MG: 5 TABLET ORAL at 23:49

## 2025-03-24 ASSESSMENT — ACTIVITIES OF DAILY LIVING (ADL): ADLS_ACUITY_SCORE: 61

## 2025-03-25 ENCOUNTER — APPOINTMENT (OUTPATIENT)
Dept: CARDIOLOGY | Facility: CLINIC | Age: 50
DRG: 640 | End: 2025-03-25
Attending: PHYSICIAN ASSISTANT
Payer: COMMERCIAL

## 2025-03-25 ENCOUNTER — APPOINTMENT (OUTPATIENT)
Dept: PHYSICAL THERAPY | Facility: CLINIC | Age: 50
DRG: 640 | End: 2025-03-25
Attending: PHYSICIAN ASSISTANT
Payer: COMMERCIAL

## 2025-03-25 LAB
ALBUMIN UR-MCNC: NEGATIVE MG/DL
ANION GAP SERPL CALCULATED.3IONS-SCNC: 12 MMOL/L (ref 7–15)
APPEARANCE UR: CLEAR
ATRIAL RATE - MUSE: 79 BPM
BASOPHILS # BLD AUTO: 0.1 10E3/UL (ref 0–0.2)
BASOPHILS NFR BLD AUTO: 1 %
BILIRUB UR QL STRIP: NEGATIVE
BUN SERPL-MCNC: 10.8 MG/DL (ref 6–20)
CALCIUM SERPL-MCNC: 8.6 MG/DL (ref 8.8–10.4)
CHLORIDE SERPL-SCNC: 105 MMOL/L (ref 98–107)
COLOR UR AUTO: ABNORMAL
CORTIS SERPL-MCNC: 11.5 UG/DL
CREAT SERPL-MCNC: 0.67 MG/DL (ref 0.51–0.95)
DIASTOLIC BLOOD PRESSURE - MUSE: NORMAL MMHG
EGFRCR SERPLBLD CKD-EPI 2021: >90 ML/MIN/1.73M2
EOSINOPHIL # BLD AUTO: 0.1 10E3/UL (ref 0–0.7)
EOSINOPHIL NFR BLD AUTO: 1 %
ERYTHROCYTE [DISTWIDTH] IN BLOOD BY AUTOMATED COUNT: 14.3 % (ref 10–15)
EST. AVERAGE GLUCOSE BLD GHB EST-MCNC: 148 MG/DL
GLUCOSE BLDC GLUCOMTR-MCNC: 104 MG/DL (ref 70–99)
GLUCOSE BLDC GLUCOMTR-MCNC: 107 MG/DL (ref 70–99)
GLUCOSE BLDC GLUCOMTR-MCNC: 110 MG/DL (ref 70–99)
GLUCOSE BLDC GLUCOMTR-MCNC: 74 MG/DL (ref 70–99)
GLUCOSE SERPL-MCNC: 125 MG/DL (ref 70–99)
GLUCOSE UR STRIP-MCNC: >=1000 MG/DL
HBA1C MFR BLD: 6.8 %
HCO3 SERPL-SCNC: 21 MMOL/L (ref 22–29)
HCT VFR BLD AUTO: 38.3 % (ref 35–47)
HGB BLD-MCNC: 12.6 G/DL (ref 11.7–15.7)
HGB UR QL STRIP: NEGATIVE
IMM GRANULOCYTES # BLD: 0.1 10E3/UL
IMM GRANULOCYTES NFR BLD: 1 %
INTERPRETATION ECG - MUSE: NORMAL
KETONES UR STRIP-MCNC: NEGATIVE MG/DL
LEUKOCYTE ESTERASE UR QL STRIP: NEGATIVE
LVEF ECHO: NORMAL
LYMPHOCYTES # BLD AUTO: 4.4 10E3/UL (ref 0.8–5.3)
LYMPHOCYTES NFR BLD AUTO: 34 %
MCH RBC QN AUTO: 29.2 PG (ref 26.5–33)
MCHC RBC AUTO-ENTMCNC: 32.9 G/DL (ref 31.5–36.5)
MCV RBC AUTO: 89 FL (ref 78–100)
MONOCYTES # BLD AUTO: 0.8 10E3/UL (ref 0–1.3)
MONOCYTES NFR BLD AUTO: 6 %
MRSA DNA SPEC QL NAA+PROBE: NEGATIVE
NEUTROPHILS # BLD AUTO: 7.5 10E3/UL (ref 1.6–8.3)
NEUTROPHILS NFR BLD AUTO: 58 %
NITRATE UR QL: NEGATIVE
NRBC # BLD AUTO: 0 10E3/UL
NRBC BLD AUTO-RTO: 0 /100
P AXIS - MUSE: 54 DEGREES
PH UR STRIP: 6 [PH] (ref 5–7)
PLATELET # BLD AUTO: 356 10E3/UL (ref 150–450)
POTASSIUM SERPL-SCNC: 3.8 MMOL/L (ref 3.4–5.3)
PR INTERVAL - MUSE: 200 MS
PROCALCITONIN SERPL IA-MCNC: 0.03 NG/ML
QRS DURATION - MUSE: 120 MS
QT - MUSE: 416 MS
QTC - MUSE: 477 MS
R AXIS - MUSE: 72 DEGREES
RBC # BLD AUTO: 4.31 10E6/UL (ref 3.8–5.2)
RBC URINE: <1 /HPF
SA TARGET DNA: NEGATIVE
SODIUM SERPL-SCNC: 138 MMOL/L (ref 135–145)
SP GR UR STRIP: 1.03 (ref 1–1.03)
SQUAMOUS EPITHELIAL: 1 /HPF
SYSTOLIC BLOOD PRESSURE - MUSE: NORMAL MMHG
T AXIS - MUSE: 77 DEGREES
TROPONIN T SERPL HS-MCNC: 9 NG/L
UFH PPP CHRO-ACNC: 0.22 IU/ML
UFH PPP CHRO-ACNC: 0.3 IU/ML
UROBILINOGEN UR STRIP-MCNC: NORMAL MG/DL
VENTRICULAR RATE- MUSE: 79 BPM
WBC # BLD AUTO: 12.7 10E3/UL (ref 4–11)
WBC # BLD AUTO: 13 10E3/UL (ref 4–11)
WBC URINE: <1 /HPF

## 2025-03-25 PROCEDURE — 250N000011 HC RX IP 250 OP 636: Performed by: INTERNAL MEDICINE

## 2025-03-25 PROCEDURE — 84484 ASSAY OF TROPONIN QUANT: CPT | Performed by: PHYSICIAN ASSISTANT

## 2025-03-25 PROCEDURE — 85520 HEPARIN ASSAY: CPT | Performed by: PHYSICIAN ASSISTANT

## 2025-03-25 PROCEDURE — 258N000003 HC RX IP 258 OP 636: Performed by: INTERNAL MEDICINE

## 2025-03-25 PROCEDURE — 85014 HEMATOCRIT: CPT | Performed by: PHYSICIAN ASSISTANT

## 2025-03-25 PROCEDURE — 250N000013 HC RX MED GY IP 250 OP 250 PS 637: Performed by: INTERNAL MEDICINE

## 2025-03-25 PROCEDURE — 250N000013 HC RX MED GY IP 250 OP 250 PS 637: Performed by: NURSE PRACTITIONER

## 2025-03-25 PROCEDURE — 85004 AUTOMATED DIFF WBC COUNT: CPT | Performed by: INTERNAL MEDICINE

## 2025-03-25 PROCEDURE — C8929 TTE W OR WO FOL WCON,DOPPLER: HCPCS

## 2025-03-25 PROCEDURE — 250N000011 HC RX IP 250 OP 636: Performed by: PHYSICIAN ASSISTANT

## 2025-03-25 PROCEDURE — 85520 HEPARIN ASSAY: CPT | Performed by: INTERNAL MEDICINE

## 2025-03-25 PROCEDURE — 93306 TTE W/DOPPLER COMPLETE: CPT | Mod: 26 | Performed by: INTERNAL MEDICINE

## 2025-03-25 PROCEDURE — 999N000208 ECHOCARDIOGRAM COMPLETE

## 2025-03-25 PROCEDURE — 250N000013 HC RX MED GY IP 250 OP 250 PS 637: Performed by: PHYSICIAN ASSISTANT

## 2025-03-25 PROCEDURE — 82310 ASSAY OF CALCIUM: CPT | Performed by: PHYSICIAN ASSISTANT

## 2025-03-25 PROCEDURE — 80048 BASIC METABOLIC PNL TOTAL CA: CPT | Performed by: PHYSICIAN ASSISTANT

## 2025-03-25 PROCEDURE — 83036 HEMOGLOBIN GLYCOSYLATED A1C: CPT | Performed by: NURSE PRACTITIONER

## 2025-03-25 PROCEDURE — 81001 URINALYSIS AUTO W/SCOPE: CPT | Performed by: INTERNAL MEDICINE

## 2025-03-25 PROCEDURE — 93005 ELECTROCARDIOGRAM TRACING: CPT

## 2025-03-25 PROCEDURE — 120N000013 HC R&B IMCU

## 2025-03-25 PROCEDURE — 99291 CRITICAL CARE FIRST HOUR: CPT | Performed by: INTERNAL MEDICINE

## 2025-03-25 PROCEDURE — 36415 COLL VENOUS BLD VENIPUNCTURE: CPT | Performed by: INTERNAL MEDICINE

## 2025-03-25 PROCEDURE — 3E043XZ INTRODUCTION OF VASOPRESSOR INTO CENTRAL VEIN, PERCUTANEOUS APPROACH: ICD-10-PCS | Performed by: STUDENT IN AN ORGANIZED HEALTH CARE EDUCATION/TRAINING PROGRAM

## 2025-03-25 PROCEDURE — 97530 THERAPEUTIC ACTIVITIES: CPT | Mod: GP | Performed by: PHYSICAL THERAPIST

## 2025-03-25 PROCEDURE — 93010 ELECTROCARDIOGRAM REPORT: CPT | Performed by: INTERNAL MEDICINE

## 2025-03-25 PROCEDURE — 99222 1ST HOSP IP/OBS MODERATE 55: CPT | Performed by: NURSE PRACTITIONER

## 2025-03-25 PROCEDURE — 36415 COLL VENOUS BLD VENIPUNCTURE: CPT | Performed by: PHYSICIAN ASSISTANT

## 2025-03-25 PROCEDURE — 999N000111 HC STATISTIC OT IP EVAL DEFER

## 2025-03-25 PROCEDURE — 97161 PT EVAL LOW COMPLEX 20 MIN: CPT | Mod: GP | Performed by: PHYSICAL THERAPIST

## 2025-03-25 PROCEDURE — 255N000002 HC RX 255 OP 636: Performed by: PHYSICIAN ASSISTANT

## 2025-03-25 PROCEDURE — 99223 1ST HOSP IP/OBS HIGH 75: CPT | Performed by: INTERNAL MEDICINE

## 2025-03-25 PROCEDURE — 87040 BLOOD CULTURE FOR BACTERIA: CPT | Performed by: NURSE PRACTITIONER

## 2025-03-25 PROCEDURE — 36415 COLL VENOUS BLD VENIPUNCTURE: CPT | Performed by: NURSE PRACTITIONER

## 2025-03-25 RX ORDER — CALCIUM GLUCONATE 20 MG/ML
2 INJECTION, SOLUTION INTRAVENOUS ONCE
Status: COMPLETED | OUTPATIENT
Start: 2025-03-25 | End: 2025-03-25

## 2025-03-25 RX ORDER — LIDOCAINE 40 MG/G
CREAM TOPICAL
Status: DISCONTINUED | OUTPATIENT
Start: 2025-03-25 | End: 2025-03-28 | Stop reason: HOSPADM

## 2025-03-25 RX ORDER — NICOTINE 21 MG/24HR
1 PATCH, TRANSDERMAL 24 HOURS TRANSDERMAL DAILY
Status: DISCONTINUED | OUTPATIENT
Start: 2025-03-25 | End: 2025-03-28 | Stop reason: HOSPADM

## 2025-03-25 RX ORDER — DEXTROSE MONOHYDRATE 25 G/50ML
25-50 INJECTION, SOLUTION INTRAVENOUS
Status: DISCONTINUED | OUTPATIENT
Start: 2025-03-25 | End: 2025-03-25

## 2025-03-25 RX ORDER — POTASSIUM CHLORIDE 1500 MG/1
20 TABLET, EXTENDED RELEASE ORAL ONCE
Status: COMPLETED | OUTPATIENT
Start: 2025-03-25 | End: 2025-03-25

## 2025-03-25 RX ORDER — RANOLAZINE 500 MG/1
500 TABLET, EXTENDED RELEASE ORAL 2 TIMES DAILY
Status: DISCONTINUED | OUTPATIENT
Start: 2025-03-25 | End: 2025-03-28 | Stop reason: HOSPADM

## 2025-03-25 RX ORDER — MAGNESIUM SULFATE HEPTAHYDRATE 40 MG/ML
2 INJECTION, SOLUTION INTRAVENOUS ONCE
Status: COMPLETED | OUTPATIENT
Start: 2025-03-25 | End: 2025-03-25

## 2025-03-25 RX ORDER — NICOTINE POLACRILEX 4 MG
15-30 LOZENGE BUCCAL
Status: DISCONTINUED | OUTPATIENT
Start: 2025-03-25 | End: 2025-03-25

## 2025-03-25 RX ADMIN — CALCIUM GLUCONATE 2 G: 20 INJECTION, SOLUTION INTRAVENOUS at 00:56

## 2025-03-25 RX ADMIN — BUSPIRONE HYDROCHLORIDE 10 MG: 10 TABLET ORAL at 08:17

## 2025-03-25 RX ADMIN — MAGNESIUM SULFATE HEPTAHYDRATE 2 G: 40 INJECTION, SOLUTION INTRAVENOUS at 02:09

## 2025-03-25 RX ADMIN — ONDANSETRON 4 MG: 4 TABLET, ORALLY DISINTEGRATING ORAL at 00:55

## 2025-03-25 RX ADMIN — OXYCODONE HYDROCHLORIDE 10 MG: 5 TABLET ORAL at 03:52

## 2025-03-25 RX ADMIN — PERFLUTREN 10 ML: 6.52 INJECTION, SUSPENSION INTRAVENOUS at 14:07

## 2025-03-25 RX ADMIN — VENLAFAXINE HYDROCHLORIDE 100 MG: 100 TABLET ORAL at 20:23

## 2025-03-25 RX ADMIN — NICOTINE 1 PATCH: 14 PATCH, EXTENDED RELEASE TRANSDERMAL at 16:13

## 2025-03-25 RX ADMIN — VENLAFAXINE HYDROCHLORIDE 100 MG: 100 TABLET ORAL at 08:17

## 2025-03-25 RX ADMIN — BUSPIRONE HYDROCHLORIDE 10 MG: 10 TABLET ORAL at 20:23

## 2025-03-25 RX ADMIN — OXYCODONE HYDROCHLORIDE 10 MG: 5 TABLET ORAL at 12:43

## 2025-03-25 RX ADMIN — OXYCODONE HYDROCHLORIDE 10 MG: 5 TABLET ORAL at 20:27

## 2025-03-25 RX ADMIN — POTASSIUM CHLORIDE 20 MEQ: 1500 TABLET, EXTENDED RELEASE ORAL at 08:16

## 2025-03-25 RX ADMIN — FENOFIBRATE 160 MG: 160 TABLET ORAL at 08:17

## 2025-03-25 RX ADMIN — ATORVASTATIN CALCIUM 80 MG: 40 TABLET, FILM COATED ORAL at 20:23

## 2025-03-25 RX ADMIN — GABAPENTIN 200 MG: 100 CAPSULE ORAL at 16:11

## 2025-03-25 RX ADMIN — EZETIMIBE 10 MG: 10 TABLET ORAL at 08:16

## 2025-03-25 RX ADMIN — TICAGRELOR 90 MG: 90 TABLET ORAL at 16:12

## 2025-03-25 RX ADMIN — OXYCODONE HYDROCHLORIDE 10 MG: 5 TABLET ORAL at 17:24

## 2025-03-25 RX ADMIN — GABAPENTIN 200 MG: 100 CAPSULE ORAL at 08:17

## 2025-03-25 RX ADMIN — SODIUM CHLORIDE, SODIUM LACTATE, POTASSIUM CHLORIDE, AND CALCIUM CHLORIDE 250 ML: .6; .31; .03; .02 INJECTION, SOLUTION INTRAVENOUS at 12:37

## 2025-03-25 RX ADMIN — ASPIRIN 81 MG: 81 TABLET, COATED ORAL at 08:17

## 2025-03-25 RX ADMIN — RANOLAZINE 500 MG: 500 TABLET, FILM COATED, EXTENDED RELEASE ORAL at 20:23

## 2025-03-25 RX ADMIN — OXYCODONE HYDROCHLORIDE 10 MG: 5 TABLET ORAL at 08:32

## 2025-03-25 RX ADMIN — GABAPENTIN 200 MG: 100 CAPSULE ORAL at 20:23

## 2025-03-25 RX ADMIN — SODIUM CHLORIDE, SODIUM LACTATE, POTASSIUM CHLORIDE, AND CALCIUM CHLORIDE 500 ML: .6; .31; .03; .02 INJECTION, SOLUTION INTRAVENOUS at 08:25

## 2025-03-25 RX ADMIN — HEPARIN SODIUM 850 UNITS/HR: 10000 INJECTION, SOLUTION INTRAVENOUS at 00:30

## 2025-03-25 RX ADMIN — VENLAFAXINE HYDROCHLORIDE 100 MG: 100 TABLET ORAL at 00:55

## 2025-03-25 ASSESSMENT — ACTIVITIES OF DAILY LIVING (ADL)
ADLS_ACUITY_SCORE: 68
ADLS_ACUITY_SCORE: 61
ADLS_ACUITY_SCORE: 59
ADLS_ACUITY_SCORE: 63
ADLS_ACUITY_SCORE: 68
ADLS_ACUITY_SCORE: 61
ADLS_ACUITY_SCORE: 63
ADLS_ACUITY_SCORE: 59
ADLS_ACUITY_SCORE: 63
ADLS_ACUITY_SCORE: 61
ADLS_ACUITY_SCORE: 68
ADLS_ACUITY_SCORE: 63
ADLS_ACUITY_SCORE: 61
ADLS_ACUITY_SCORE: 68
ADLS_ACUITY_SCORE: 63
ADLS_ACUITY_SCORE: 66
ADLS_ACUITY_SCORE: 61
ADLS_ACUITY_SCORE: 68
ADLS_ACUITY_SCORE: 59

## 2025-03-25 NOTE — PLAN OF CARE
OT: Order received, chart reviewed and discussed with care team. Per PT, patient is supervision with ambulation and has a good plan for safe completion of I/ADL's with assist from spouse and daughter who live with patient. Patient with no concerns regarding completion of ADL's upon discharge and is aware of limitations. Defer discharge recommendations to care team. Will complete OT orders.

## 2025-03-25 NOTE — CONSULTS
[facility]    Cardiology Consultation     Date of Admission:  3/24/2025    Review of the result(s) of each unique test - Last ECG echocardiogram CBC BMP.     Assessment & Plan   Cailin Burns is a 49 year old female who was admitted on 3/24/2025.    Severe ischemic cardiomyopathy  History of coronary artery disease multiple stents  Primary prevention ICD    Recommendations  Patient does not have any active cardiac symptoms at this time.  Her troponins are negative.  I believe her presentation is noncardiac due to GI issues including nausea vomiting diarrhea.  I agree with ICU attending that the patient was hypovolemic.  Her cardiac medications have been discontinued at this time.    I recommend echocardiogram at this time to evaluate her LV function.  Would recommend resuming beta-blockers and half dose Entresto later this evening or tomorrow morning if clinically stable.  Eventually we need to get her back on all her heart failure medications as soon as possible.  Otherwise she has high risk of decompensation from a cardiac standpoint.    Will follow tomorrow.  Echocardiogram today.  Further noncardiac medication management and decisions per ICU and medicine.    High complexity     LISANDRA Landa  Staff Cardiologist  [facility]    History of Present Illness   Cailin Burns is a 49 year old female who presents with nausea vomiting.  This patient has complicated cardiac history.  She follows up at [facility].    Pertinent to this patient has a history of severe ischemic cardiomyopathy diagnosed in 2022 when she presented with ST elevation myocardial infarction and needed circumflex PCI at that time.  However she was noted to have three-vessel coronary artery disease at that time.  Subsequently viability evaluation was done and there was poor viability.  In any case subsequent discussions at [facility] were held given severe ischemic cardiomyopathy.  Eventually end  of 2022 early 2023, she underwent multivessel  stenting of the right coronary artery LAD and circumflex.  Her EF is around 30 to 35% back in 2024.  She had a primary prevention ICD implanted in May 2023.  Patient continues to smoke.  No alcohol use.    Patient has been following up at [facility] with Dr. Guerin.  At home she currently takes dual antiplatelet therapy with aspirin and Brilinta.  She is on high intensity statin.  She is on 10 mg of Farxiga, 25 mg of metoprolol succinate, ranolazine, full dose Entresto twice daily and spironolactone.    She has NYHA class II sometimes class III heart failure symptoms at baseline.  In any case now presented to the hospital because since yesterday she has had 3-4 episodes of diarrhea and nausea vomiting.  She has been having right-sided chest discomfort that started after she had multiple episodes of vomiting.  She was noted to be dehydrated.  She was hypotensive.  She was admitted to the ICU.  She was briefly placed on norepinephrine infusion.  She has been cultured.  Possible C. difficile rule out.  Her cardiac medications were held.    Patient denies any new cardiovascular symptoms at this time.  She has known anginal sounding right-sided chest discomfort.  She says this is different than her prior CAD related symptoms.  I believe this is GI related.    Past Medical History   Past Medical History:   Diagnosis Date    CAD (coronary artery disease)     Nicotine dependence     Type 2 diabetes mellitus with other circulatory complications (H)        Past Surgical History   Past Surgical History:   Procedure Laterality Date    APPENDECTOMY      COLONOSCOPY N/A 2/9/2024    Procedure: COLONOSCOPY, WITH POLYPECTOMY;  Surgeon: Holland Lyle MD;  Location:  GI    CV CORONARY ANGIOGRAM N/A 12/09/2022    Procedure: Coronary Angiogram;  Surgeon: Liam Bernardo MD;  Location:  HEART CARDIAC CATH LAB    CV PCI N/A 01/02/2023    Procedure:  Percutaneous Coronary Intervention of LCx and LAD;  Surgeon: Liam Bernardo MD;  Location:  HEART CARDIAC CATH LAB    CV PCI CHRONIC TOTAL OCCLUSION N/A 12/09/2022    Procedure: Percutaneous - Coronary Intervention Chronic Total Occlusion;  Surgeon: Liam Bernardo MD;  Location:  HEART CARDIAC CATH LAB    CV RIGHT HEART CATH MEASUREMENTS RECORDED Left 02/06/2024    Procedure: Right Heart Catheterization FYI: there is C/f R subclavian stenosis;  Surgeon: Deja Navarro MD;  Location:  HEART CARDIAC CATH LAB    EP ICD INSERT SINGLE N/A 05/11/2023    Procedure: Implantable Cardioverter Defibrillator Device & Lead Implant Single or Dual;  Surgeon: Miky Garcia MD;  Location:  HEART CARDIAC CATH LAB       Prior to Admission Medications   Prior to Admission Medications   Prescriptions Last Dose Informant Patient Reported? Taking?   Continuous Glucose Sensor (DEXCOM G7 SENSOR) MISC   Yes No   Sig: APPLY NEW SENSOR TO BE USED EVERY 10 DAYS WITH DEXCOM G7   OZEMPIC, 2 MG/DOSE, 8 MG/3ML pen 3/21/2025  Yes Yes   Sig: Inject 2 mg subcutaneously every 7 days. Takes on Fridays   SENEXON-S 8.6-50 MG tablet 3/23/2025 Bedtime  Yes Yes   Sig: Take 2 tablets by mouth at bedtime.   aspirin (ASA) 81 MG EC tablet 3/24/2025 Morning Self Yes Yes   Sig: Take 81 mg by mouth daily   atorvastatin (LIPITOR) 80 MG tablet 3/23/2025 Bedtime Self Yes Yes   Sig: Take 1 tablet by mouth at bedtime.   busPIRone (BUSPAR) 10 MG tablet 3/24/2025 Morning Self Yes Yes   Sig: Take 10 mg by mouth 2 times daily   dapagliflozin (FARXIGA) tablet 3/24/2025 Morning Self Yes Yes   Sig: Take 10 mg by mouth daily   diclofenac (VOLTAREN) 1 % topical gel Unknown  Yes Yes   Sig: Apply topically 4 times daily as needed for moderate pain.   estradiol (ESTRACE) 0.1 MG/GM vaginal cream Unknown  Yes Yes   Sig: Place vaginally as needed (as needed).   ezetimibe (ZETIA) 10 MG tablet 3/23/2025 Bedtime  No Yes   Sig: Take 1 tablet  (10 mg) by mouth daily   fenofibrate (TRIGLIDE/LOFIBRA) 160 MG tablet 3/24/2025 Morning Self Yes Yes   Sig: Take 160 mg by mouth daily   furosemide (LASIX) 20 MG tablet 3/24/2025 Morning Self Yes Yes   Sig: Take 20 mg by mouth daily May also take 1 tablet 1 time per day as needed for weight gain of >2lbs in a day or >5lbs in a day   gabapentin (NEURONTIN) 100 MG capsule 3/24/2025 Morning  Yes Yes   Sig: Take 200 mg by mouth 3 times daily.   isosorbide dinitrate (ISORDIL) 10 MG tablet 3/24/2025 Morning  Yes Yes   Sig: Take 10 mg by mouth 3 times daily.   metFORMIN (GLUCOPHAGE) 1000 MG tablet 3/24/2025 Morning Self Yes Yes   Sig: Take 1,000 mg by mouth 2 times daily (with meals)   metoprolol succinate ER (TOPROL XL) 25 MG 24 hr tablet 3/24/2025 Morning  No Yes   Sig: Take 1 tablet (25 mg) by mouth daily for 11 days   nitroGLYcerin (NITROSTAT) 0.4 MG sublingual tablet 3/24/2025 Self Yes Yes   Sig: Place 0.4 mg under the tongue every 5 minutes as needed   nystatin (MYCOSTATIN) 469441 UNIT/GM external powder Unknown  Yes Yes   Sig: Apply topically as needed for other. Under breasts   oxyCODONE IR (ROXICODONE) 10 MG tablet 3/24/2025 Morning  Yes Yes   Sig: Take 10 mg by mouth 5 times daily.   polyethylene glycol (MIRALAX) 17 GM/Dose powder Unknown  Yes Yes   Sig: Take 17 g by mouth daily as needed for constipation.   ranolazine (RANEXA) 500 MG 12 hr tablet 3/24/2025 Morning Self Yes Yes   Sig: Take 500 mg by mouth 2 times daily   sacubitril-valsartan (ENTRESTO)  MG per tablet 3/24/2025 Morning Self Yes Yes   Sig: Take 1 tablet by mouth 2 times daily   spironolactone (ALDACTONE) 25 MG tablet 3/24/2025 Morning Self Yes Yes   Sig: Take 25 mg by mouth daily.   ticagrelor (BRILINTA) 90 MG tablet 3/24/2025 Morning Self Yes Yes   Sig: Take 90 mg by mouth 2 times daily   venlafaxine (EFFEXOR) 100 MG tablet 3/24/2025 Morning Self Yes Yes   Sig: Take 1 tablet by mouth 2 times daily      Facility-Administered Medications: None      Current Facility-Administered Medications   Medication Dose Route Frequency Provider Last Rate Last Admin    acetaminophen (TYLENOL) tablet 650 mg  650 mg Oral or NG Tube Q4H PRN Amadeo Ladd PA-C        Or    acetaminophen (TYLENOL) Suppository 650 mg  650 mg Rectal Q4H PRN Amadeo Ladd PA-C        aspirin EC tablet 81 mg  81 mg Oral Daily AriasAmadeo Hartman PA-C   81 mg at 03/25/25 0817    atorvastatin (LIPITOR) tablet 80 mg  80 mg Oral or NG Tube At Bedtime Amadeo Gonzales PA-C   80 mg at 03/24/25 2349    busPIRone (BUSPAR) tablet 10 mg  10 mg Oral or NG Tube BID Amadeo Ladd PA-C   10 mg at 03/25/25 0817    glucose gel 15-30 g  15-30 g Oral Q15 Min PRN Amadeo Ladd PA-C        Or    dextrose 50 % injection 25-50 mL  25-50 mL Intravenous Q15 Min PRN Amadeo Ladd PA-C        Or    glucagon injection 1 mg  1 mg Subcutaneous Q15 Min PRN Amadeo Ladd PA-C        ezetimibe (ZETIA) tablet 10 mg  10 mg Oral or NG Tube Daily Amadeo Gonzales PA-C   10 mg at 03/25/25 0816    fenofibrate (TRIGLIDE/LOFIBRA) tablet 160 mg  160 mg Oral or NG Tube Daily Amadeo Gonzales PA-C   160 mg at 03/25/25 0817    gabapentin (NEURONTIN) capsule 200 mg  200 mg Oral or NG Tube TID Amadeo Ladd PA-C   200 mg at 03/25/25 0817    heparin 25,000 units in 0.45% NaCl 250 mL ANTICOAGULANT infusion  0-5,000 Units/hr Intravenous Continuous Amadeo Gonzales PA-C 10 mL/hr at 03/25/25 0821 1,000 Units/hr at 03/25/25 0821    HYDROmorphone (DILAUDID) injection 0.2 mg  0.2 mg Intravenous Q2H PRN Amadeo Ladd PA-C        lactated ringers BOLUS 500 mL  500 mL Intravenous Once Arielle Rouse  mL/hr at 03/25/25 0825 500 mL at 03/25/25 0825    magnesium hydroxide (MILK OF MAGNESIA) suspension 30 mL  30 mL Oral Daily PRN -Amadeo Hartman PA-C        naloxone (NARCAN) injection 0.2 mg  0.2 mg Intravenous Q2 Min PRN Arielle Rouse MD        Or    naloxone (NARCAN) injection 0.4 mg  0.4 mg Intravenous Q2 Min PRN Arielle Rouse MD        Or     naloxone (NARCAN) injection 0.2 mg  0.2 mg Intramuscular Q2 Min PRN Arielle Rouse MD        Or    naloxone (NARCAN) injection 0.4 mg  0.4 mg Intramuscular Q2 Min PRN Arielle Rouse MD        norepinephrine (LEVOPHED) 4 mg in  mL PERIPHERAL infusion  0.01-0.2 mcg/kg/min Intravenous Continuous Amadeo Ladd PA-C 5.3 mL/hr at 03/25/25 1016 0.02 mcg/kg/min at 03/25/25 1016    ondansetron (ZOFRAN ODT) ODT tab 4 mg  4 mg Oral Q6H PRN Amadeo Ladd PA-C   4 mg at 03/25/25 0055    Or    ondansetron (ZOFRAN) injection 4 mg  4 mg Intravenous Q6H PRN Amadeo Ladd PA-C        oxyCODONE (ROXICODONE) tablet 5-10 mg  5-10 mg Oral Q4H PRN Amadeo Ladd PA-C   10 mg at 03/25/25 0832    senna-docusate (SENOKOT-S/PERICOLACE) 8.6-50 MG per tablet 2 tablet  2 tablet Oral or NG Tube At Bedtime Amadeo Ladd PA-C        venlafaxine (EFFEXOR) tablet 100 mg  100 mg Oral or NG Tube BID Amadeo Ladd PA-C   100 mg at 03/25/25 0817     Current Facility-Administered Medications   Medication Dose Route Frequency Provider Last Rate Last Admin    acetaminophen (TYLENOL) tablet 650 mg  650 mg Oral or NG Tube Q4H PRN Amadeo Ladd PA-C        Or    acetaminophen (TYLENOL) Suppository 650 mg  650 mg Rectal Q4H PRN Amadeo Ladd PA-C        aspirin EC tablet 81 mg  81 mg Oral Daily Amadeo Ladd PA-C   81 mg at 03/25/25 0817    atorvastatin (LIPITOR) tablet 80 mg  80 mg Oral or NG Tube At Bedtime Amadeo Ladd PA-C   80 mg at 03/24/25 2349    busPIRone (BUSPAR) tablet 10 mg  10 mg Oral or NG Tube BID Amadeo Ladd PA-C   10 mg at 03/25/25 0817    glucose gel 15-30 g  15-30 g Oral Q15 Min PRN Amadeo Ladd PA-C        Or    dextrose 50 % injection 25-50 mL  25-50 mL Intravenous Q15 Min PRN Amadeo Ladd PA-C        Or    glucagon injection 1 mg  1 mg Subcutaneous Q15 Min PRN Amadeo Ladd PA-C        ezetimibe (ZETIA) tablet 10 mg  10 mg Oral or NG Tube Daily Amadeo Ladd PA-C   10 mg at 03/25/25 0816    fenofibrate  (TRIGLIDE/LOFIBRA) tablet 160 mg  160 mg Oral or NG Tube Daily Amadeo Ladd PA-C   160 mg at 03/25/25 0817    gabapentin (NEURONTIN) capsule 200 mg  200 mg Oral or NG Tube TID Amadeo Ladd PA-C   200 mg at 03/25/25 0817    heparin 25,000 units in 0.45% NaCl 250 mL ANTICOAGULANT infusion  0-5,000 Units/hr Intravenous Continuous Amadeo Ladd PA-C 10 mL/hr at 03/25/25 0821 1,000 Units/hr at 03/25/25 0821    HYDROmorphone (DILAUDID) injection 0.2 mg  0.2 mg Intravenous Q2H PRN Amadeo Ladd PA-C        lactated ringers BOLUS 500 mL  500 mL Intravenous Once Arielle Rouse  mL/hr at 03/25/25 0825 500 mL at 03/25/25 0825    magnesium hydroxide (MILK OF MAGNESIA) suspension 30 mL  30 mL Oral Daily PRN Amadeo Ladd PA-C        naloxone (NARCAN) injection 0.2 mg  0.2 mg Intravenous Q2 Min PRN Arielle Rouse MD        Or    naloxone (NARCAN) injection 0.4 mg  0.4 mg Intravenous Q2 Min PRN Arielle Rouse MD        Or    naloxone (NARCAN) injection 0.2 mg  0.2 mg Intramuscular Q2 Min PRN Arielle Rouse MD        Or    naloxone (NARCAN) injection 0.4 mg  0.4 mg Intramuscular Q2 Min PRN Arielle Rouse MD        norepinephrine (LEVOPHED) 4 mg in  mL PERIPHERAL infusion  0.01-0.2 mcg/kg/min Intravenous Continuous Amadeo Ladd PA-C 5.3 mL/hr at 03/25/25 1016 0.02 mcg/kg/min at 03/25/25 1016    ondansetron (ZOFRAN ODT) ODT tab 4 mg  4 mg Oral Q6H PRN Amadeo Ladd PA-C   4 mg at 03/25/25 0055    Or    ondansetron (ZOFRAN) injection 4 mg  4 mg Intravenous Q6H PRN Amadeo Ladd PA-C        oxyCODONE (ROXICODONE) tablet 5-10 mg  5-10 mg Oral Q4H PRN Amadeo Ladd PA-C   10 mg at 03/25/25 0832    senna-docusate (SENOKOT-S/PERICOLACE) 8.6-50 MG per tablet 2 tablet  2 tablet Oral or NG Tube At Bedtime Amadeo Ladd PA-C        venlafaxine (EFFEXOR) tablet 100 mg  100 mg Oral or NG Tube BID Amadeo Ladd PA-C   100 mg at 03/25/25 0817     Allergies   Allergies   Allergen Reactions    Liraglutide      Other  "Reaction(s): Unknown/Not Verified    Phenytoin Nausea and Vomiting    Rosuvastatin Muscle Pain (Myalgia)    Valproic Acid Unknown     Does not remember       Social History    reports that she has been smoking cigarettes. She started smoking about 37 years ago. She has a 55.8 pack-year smoking history. She has been exposed to tobacco smoke. She has never used smokeless tobacco. She reports that she does not currently use alcohol. She reports that she does not use drugs.    Family History   I have reviewed this patient's family history and updated it with pertinent information if needed.  Family History   Problem Relation Age of Onset    Coronary Artery Disease Early Onset Maternal Grandfather           Review of Systems   A comprehensive review of system was performed and is negative other than that noted in the HPI or here.     Physical Exam   Vital Signs with Ranges  Temp:  [98  F (36.7  C)-98.7  F (37.1  C)] 98  F (36.7  C)  Pulse:  [66-95] 75  Resp:  [0-32] 19  BP: ()/(46-77) 99/76  SpO2:  [93 %-100 %] 96 %  Wt Readings from Last 4 Encounters:   03/24/25 71.8 kg (158 lb 4.6 oz)   01/30/25 70.8 kg (156 lb)   12/05/24 71.2 kg (156 lb 15.5 oz)   11/20/24 71.2 kg (157 lb)     I/O last 3 completed shifts:  In: 1885.71 [P.O.:600; I.V.:135.71; IV Piggyback:1150]  Out: 950 [Urine:950]      Vitals: BP 99/76   Pulse 75   Temp 98  F (36.7  C) (Axillary)   Resp 19   Ht 1.575 m (5' 2\")   Wt 71.8 kg (158 lb 4.6 oz)   SpO2 96%   BMI 28.95 kg/m      Physical Exam:   General - Alert and in no acute distress  Respiratory -clear to auscultation  Cardiovascular -normal regular heart sounds of systolic murmur no rubs or gallops there is no edema JVD is normal  Gastrointestinal - Non distended  Psych - Appropriate affect     No lab results found in last 7 days.    Invalid input(s): \"TROPONINIES\"    Recent Labs   Lab 03/25/25  0827 03/25/25  0517 03/25/25  0351 03/24/25  2300   WBC  --  13.0*  12.7*  --  12.2*   HGB  --  " "12.6  --  12.4   MCV  --  89  --  89   PLT  --  356  --  339   NA  --  138  --  135   POTASSIUM  --  3.8  --  4.0   CHLORIDE  --  105  --  102   CO2  --  21*  --  22   BUN  --  10.8  --  14.4   CR  --  0.67  --  0.75   GFRESTIMATED  --  >90  --  >90   ANIONGAP  --  12  --  11   RON  --  8.6*  --  8.4*   * 125* 104* 117*   ALBUMIN  --   --   --  3.7   PROTTOTAL  --   --   --  6.4   BILITOTAL  --   --   --  0.3   ALKPHOS  --   --   --  49   ALT  --   --   --  14   AST  --   --   --  16     Recent Labs   Lab Test 01/08/25  1452 02/06/24  0614   CHOL 135 170   HDL 32* 32*   LDL 76 110*   TRIG 134 142     Recent Labs   Lab 03/25/25  0517 03/24/25  2300   WBC 13.0*  12.7* 12.2*   HGB 12.6 12.4   HCT 38.3 37.0   MCV 89 89    339     No results for input(s): \"PH\", \"PHV\", \"PO2\", \"PO2V\", \"SAT\", \"PCO2\", \"PCO2V\", \"HCO3\", \"HCO3V\" in the last 168 hours.  No results for input(s): \"NTBNPI\", \"NTBNP\" in the last 168 hours.  No results for input(s): \"DD\" in the last 168 hours.  No results for input(s): \"SED\", \"CRP\" in the last 168 hours.  Recent Labs   Lab 03/25/25  0517 03/24/25  2300    339     No results for input(s): \"TSH\" in the last 168 hours.  Recent Labs   Lab 03/25/25  0042   COLOR Light Yellow   APPEARANCE Clear   URINEGLC >=1000*   URINEBILI Negative   URINEKETONE Negative   SG 1.033   UBLD Negative   URINEPH 6.0   PROTEIN Negative   NITRITE Negative   LEUKEST Negative   RBCU <1   WBCU <1       Imaging:  Recent Results (from the past 48 hours)   XR Chest 2 Views    Narrative    EXAM: DX CHEST AP OR PA AND LATERAL 2 VIEWS    Impression    No focal consolidation or pleural effusion. Normal heart size. Left chest wall cardiac device.   CT Chest Pulmonary Embolism w Contrast    Narrative    EXAM: CT CHEST ANGIOGRAM AND PULMONARY ARTERIES WITH IV CONTRAST   Including 3D image postprocessing with or without AI assistance.    COMPARISON: None    FINDINGS:  Pulmonary embolism: No endoluminal filling defects in " the opacified pulmonary arteries.  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 The left common carotid artery arises immediately adjacent to or possibly from a common origin of the   brachiocephalic artery and also shows severe narrowing proximally (1 to 2 mm in diameter at its narrowest). No definite opacified right vertebral artery seen. Evaluation of much of the right subclavian artery is limited due to streak artifact from   adjacent contrast material in the veins. No definite abnormality seen of the left subclavian artery. No aortic dissection or aortic aneurysm seen.    Lungs and Airways: No focal consolidation, pneumothorax, or pleural fluid collection seen.  Pleural Space: No pleural fluid collection.  Mediastinum and Ya: Partially calcified mediastinal lymph nodes noted.  Osseous Structures and Chest Wall: No aggressive bone lesions.  Medical Devices: Left anterior chest wall cardiac device with a lead terminating in the right ventricle.  Upper Abdomen: Visualized upper abdominal structures are within normal limits.    Impression    No visible pulmonary embolism.    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 No definite contrast opacification of the visible portion of the right vertebral artery. The right subclavian artery is poorly evaluated on   this exam as well due to adjacent streak artifact. The chronicity of these findings is uncertain given the lack of prior exams for comparison. Follow-up with vascular surgery recommended.    I discussed these findings by phone with Dr. Wolff at 5:25 PM 03/24/2025 central time.       Echo:  No results found for this or any previous visit (from the past 4320 hours).      This note was completed in part using dictation via the Dragon voice recognition software. Some word and grammatical  "errors may occur and must be interpreted in the appropriate clinical context.  If there are any questions pertaining to this issue, please contact me for further clarification.    Clinically Significant Risk Factors Present on Admission           # Hypocalcemia: Lowest Ca = 8.4 mg/dL in last 2 days, will monitor and replace as appropriate       # Drug Induced Platelet Defect: home medication list includes an antiplatelet medication    # End stage heart failure: Ventricular assist device (VAD) present  # Circulatory Shock: required vasopressors within past 24 hours            # Overweight: Estimated body mass index is 28.95 kg/m  as calculated from the following:    Height as of this encounter: 1.575 m (5' 2\").    Weight as of this encounter: 71.8 kg (158 lb 4.6 oz).        # ICD device present      Native vessel CAD      Hypovolemia                                    "

## 2025-03-25 NOTE — PLAN OF CARE
Problem: Adult Inpatient Plan of Care  Goal: Absence of Hospital-Acquired Illness or Injury  Intervention: Prevent Skin Injury  Recent Flowsheet Documentation  Taken 3/25/2025 0600 by Mari Greco RN  Body Position:   position changed independently   heels elevated   neutral head position   upper extremity elevated  Taken 3/25/2025 0400 by Mari Greco RN  Body Position:   position changed independently   heels elevated   upper extremity elevated  Taken 3/25/2025 0200 by Mari Greco RN  Body Position:   position changed independently   heels elevated  Taken 3/25/2025 0000 by Mari Greco RN  Body Position:   position changed independently   heels elevated  Taken 3/24/2025 2245 by Mari Greco RN  Body Position:   position changed independently   heels elevated     Problem: Adult Inpatient Plan of Care  Goal: Optimal Comfort and Wellbeing  3/25/2025 0728 by Mari Greco RN  Outcome: Progressing  3/25/2025 0727 by Mari Greco RN  Outcome: Progressing  Intervention: Monitor Pain and Promote Comfort  Recent Flowsheet Documentation  Taken 3/25/2025 0352 by Mari Greco RN  Pain Management Interventions: medication (see MAR)  Taken 3/24/2025 2349 by Mari Greco RN  Pain Management Interventions:   medication (see MAR)   care clustered   emotional support   environmental changes   pillow support provided   repositioned   rest  Intervention: Provide Person-Centered Care  Recent Flowsheet Documentation  Taken 3/25/2025 0400 by Mari Greco RN  Trust Relationship/Rapport:   care explained   choices provided   questions encouraged   emotional support provided   empathic listening provided   questions answered   reassurance provided   thoughts/feelings acknowledged  Taken 3/25/2025 0000 by Mari Greco RN  Trust Relationship/Rapport:   care explained   choices provided   questions encouraged   emotional support provided   empathic listening provided   questions  answered   reassurance provided   thoughts/feelings acknowledged  Taken 3/24/2025 2245 by Mari Greco RN  Trust Relationship/Rapport:   care explained   choices provided   questions encouraged   emotional support provided   empathic listening provided   questions answered   reassurance provided   thoughts/feelings acknowledged   Goal Outcome Evaluation:                 Outcome Evaluation: Pt is alert, oriented x 4, able to GO.  She is up to BSC with minimal assist.  NSR 80s, afebrile.  On levophed at 0.03 this am.  On room air, sats are 100%, lungs CTA, no cough.  Clear liquid diet, nausea and small emesis episode, treated with Zofran with good relief.  Voiding large amounts, UA sent.  No BM today, pt on Enteric precautions for diarrhea x 3 yesterday am.  Skin intact except for bruising to R-FA.  PIV x 3.  Heparin gtt infusing at 1000 units/hour, no therapeutic level yet.  On Oxycodone at home for back pain, she took 10 mg oxycodone x 2 this shift with good relief of back pain.

## 2025-03-25 NOTE — PROGRESS NOTES
"   03/25/25 1003   Appointment Info   Signing Clinician's Name / Credentials (PT) Jacy Bloom PT, DPT   Living Environment   People in Home spouse;child(angi), adult;other (see comments)  (dog, Ekta)   Current Living Arrangements apartment   Home Accessibility no concerns;stairs to enter home;stairs within home   Number of Stairs, Main Entrance greater than 10 stairs   Stair Railings, Main Entrance railings on both sides of stairs;railings safe and in good condition   Transportation Anticipated car, drives self;family or friend will provide   Living Environment Comments Pt does drive, but states her family usually does most of it.   Self-Care   Usual Activity Tolerance moderate   Regular Exercise Yes   Activity/Exercise Type walking;other (see comments)  (Takes the stairs purposefully)   Exercise Amount/Frequency 3-5 times/wk;10 mins   Equipment Currently Used at Home none   Fall history within last six months no   Activity/Exercise/Self-Care Comment Sets up her own meds; doesn't cook and clean much, family helps.  Walks the dog and walks on a TM 3x per week-hasn't been feeling great the last few weeks so hasn't walked as much. States her  or her daughter are always with her when she exercises. Reports they are home to assist with ADLs as needed. Mostly \"I do what I can and they help if I need it.\"   General Information   Onset of Illness/Injury or Date of Surgery 03/24/25   Referring Physician Her-Amadeo Hartman PA-C   Patient/Family Therapy Goals Statement (PT) Eager to go home. \"I just want outta here.\"   Pertinent History of Current Problem (include personal factors and/or comorbidities that impact the POC) 50 yo female presented to OSH ED with complaints of chest pain, n/v and diarrhea. Took nitro without relief. PMH includes an extensive heart history icluding STEMI in 2022 with PCI, LVEF 27% , ICD placement in May 2023 and chronic pain.   Existing Precautions/Restrictions cardiac;fall  (Pt on levo " "at time of PT evaluation; however, note pt states her pressures run 80-90/60-70 at home.)   Cognition   Affect/Mental Status (Cognition) flat/blunted affect;low arousal/lethargic   Orientation Status (Cognition) oriented x 4   Follows Commands (Cognition) WNL   Cognitive Status Comments No deficits noted, not formally assessed. pt very aware.   Integumentary/Edema   Integumentary/Edema Comments Multiple IV sites; bruising and some puffy appearance over the R cubital fossa.   Posture    Posture Comments Forward head   Range of Motion (ROM)   ROM Comment B LEs and UEs WFL   Strength (Manual Muscle Testing)   Strength Comments Demonstrates antigravity strength, impaired act alice-however, per pt her activity tolerance is't \"great\" at baseline. States she has been working to get to 15 min continious activity on her TM. \"Not there yet, but I keep at it.\"   Bed Mobility   Comment, (Bed Mobility) independent   Transfers   Comment, (Transfers) supervision for line management only   Gait/Stairs (Locomotion)   Comment, (Gait/Stairs) Bedside gait fowrard and back, L and R, no LOB. Cues only for line managment.   Balance   Balance Comments Sitting balance and standing balance; able to reach to her feet, able to stand without sway. No LOB with mild blance challeges sitting and standing at the EOB.   Sensory Examination   Sensory Perception Comments B LEs intact to light touch. Denies; n/t.   Coordination   Coordination Comments No deficits noted.   Clinical Impression   Criteria for Skilled Therapeutic Intervention Yes, treatment indicated   PT Diagnosis (PT) Impaired functional activity   Influenced by the following impairments Generalized weakness, fatigue   Functional limitations due to impairments Decreased functional independence with functional mobility and ADLs.   Clinical Presentation (PT Evaluation Complexity) stable   Clinical Presentation Rationale see MR   Clinical Decision Making (Complexity) low complexity   Planned " "Therapy Interventions (PT) home exercise program;patient/family education;stair training;strengthening;stretching;transfer training;home program guidelines;progressive activity/exercise   Risk & Benefits of therapy have been explained evaluation/treatment results reviewed;care plan/treatment goals reviewed;risks/benefits reviewed;current/potential barriers reviewed;participants voiced agreement with care plan;participants included;patient   PT Total Evaluation Time   PT Eval, Low Complexity Minutes (46694) 10   Physical Therapy Goals   PT Frequency One time eval and treatment only   PT Predicted Duration/Target Date for Goal Attainment 03/25/25   PT Goals Bed Mobility;Transfers;Gait   PT: Bed Mobility Independent;Supine to/from sit   PT: Transfers Independent;Sit to/from stand;Bed to/from chair   PT: Gait Supervision/stand-by assist;Greater than 200 feet   Interventions   Interventions Quick Adds Therapeutic Activity   Therapeutic Activity   Therapeutic Activities: dynamic activities to improve functional performance Minutes (11102) 25   Treatment Detail/Skilled Intervention Pt ambulated in the room for optimal functional performance benefit. Completed laps in the room bed>window>door>window x 2. Cued only for IV pole managemnet. Pt with r/o for c-difficile; therefore, did not leave the room secondary to pt just ate for the first time and \"not sure\" about need for the toilet. VS assessed pre, during and post activity. Stable BP-pt's normal with a good MAP. 80s/60s thorughout. HR 80s with activity. Reveiwed sings and sx of activity intolerance, bt able to verbalize-very familiar. Edu on benefit of OOB and walks with staff or family when able. Pt currently on a heparin drip with scheduled US, per RN-therefore; chair alarm on, all needs in reach at PT exit. Discussed with pt and RN no need for continued PT or OT as pt with a good understanding of her exercise needs and HEP. Able to state energy conservation techniques. " "NO further skilled PT or OT needs identified.   PT Discharge Planning   PT Plan Disch from PT, no acute skilled needs identified.   PT Discharge Recommendation (DC Rec) home with assist   PT Rationale for DC Rec Pt appears to be at or near baseline functional moblity. Pt feels she is \"more than ready,\" to return home. Denies chest pain with activity; states she feels  \"pretty normal,\" now. Pt has assist of  and occasionally daughter at home for cooking, cleaning and supervision wtih exercise. No futher acute skilled PT or OT needs indentified. Pt in agreement.   PT Brief overview of current status Ind to Supervision secondary to lines. Goals of therapy will be to address safe mobility and make recs for d/c to next level of care. Pt and RN will continue to follow all falls risk precautions as documented by RN staff while hospitalized.   PT Total Distance Amb During Session (feet) 200   Physical Therapy Time and Intention   Timed Code Treatment Minutes 25   Total Session Time (sum of timed and untimed services) 35     "

## 2025-03-25 NOTE — PROGRESS NOTES
Critical Care Progress Note      03/25/2025    Name: Cailin Burns MRN#: [id number]   Age: 49 year old YOB: 1975     Hsptl Day# 1  ICU DAY # 1    MV DAY # 0             Problem List:   Active Problems:    Heart failure (H)          Assessment and plan :     ASSESSMENT: Cailin Burns is a 49 year old female with PMH with past medical for tobacco smoking, type 2 diabetes mellitus, PAD with mixed claudication, STEMI September 2022 with three-vessel disease status post staged PCI due to poor bypass target (PCI of RCA  and MOHAN x1 to mLAD and MOHAN x1 to mLCx on 01/2023), ischemic cardiomyopathy LVEF 27% status post ICD May 2023,  dyslipidemia, chronic pain, who is transferred to [facility] for additional management of shock.      Patient reports she had episode of emesis after morning medications, .  She reports she had several episodes of diarrheas 3-4 also. Later in the am,  she developed chest pain in the right side of her chest (which she can pinpoint) and took a dose of her home nitroglycerin and following this she got very sweaty and clammy felt short of breath and presented to ED for evaluation. . She reports her pain has not changed since, no radiation of her pain or worsening of her symptoms.      CT PE was performed and negative for, CXR clear of opacities, and  EKG was negative for ST changes, Troponin x3, , lactate negative per OSH records.     In ICU for shock/norepi ggt usage.      PLAN:  Neuro/ pain/ sedation:  # Anxiety and depression   # hx of panic attacks   # Acute postoperative pain  -  tylenol prn   - PRN: oxycodone, dilaudid IV for pain   - resume home Buspar and wellbuytrin      Pulmonary care:   # hx of tobacco use  - Goal SpO2 > 90%  - patient reports she smokes 10 cigarettes per day- start nicotine patch 14 mcg- okay for patient to refuse  - Acapella/encourage IS.      Cardiovascular:    # Shock- etiology unclear suspect hypovolemia  # Hx HTN  # Ischemic cardiomyopathy, EF  30-35%   # s/p ICD placement   # PAD a with claudication   # CAD, Angina   # hx of STEMI   - Peripheral Norepi with MAP Goal MAP >65; baseline BP in multiple family medicine appt 100-120s/60-80's so keep MAP goal 65  - Likely under resuscitation at Osh ED; Ngsxo3F overnight and an additional 750cc in total since 7am with able to wean off norepi   - Hold home diuretics, hold beta blocker, entresto, farxiga, ranolazine,  isordil, lasix and spironolactone   - resume home Zetia, lipitor and ASA   - hold Brilinta while on heparin gtt   - Echo    - cards heartfailure consult placed - appreciate them following and guiding how to add back on cardiac meds     GI care / Nutrition:   # hx of constipation due to narcotic use   # diarrhea   - pt reports diarrhea of 1 day onset, send Enteric panel and C diff - but no further bowel movements since admission  - PPI for GI prophylaxis  - Bowel regimen hold for now   - Advance diet      Renal / Fluids / Electrolytes:   # hypovolemia   BL creatine range appears to be ~ creat 0.66, on sharif admit 0.88   - Strict I/O, daily weights  - Replete lytes PRN per protocol     Endocrine:    # Diabetes Mellitus Type 2, on oral agents and injectables   Preop A1c 10.6  Random cortisol sent- 11- intermediate level   - hypoglycemia protocol in place.    - hold oral agents and injectable  metformin and Ozempic for now   -may need to start sliding scale insulin as advancing diet     ID / Antibiotics:  # concern for infectious diarrhea   - send enteric panel and c diff, patient reports 3-4 loose stools in past 24 hours but no further bowel movements since admission  - Continue to monitor fever curve, WBC, and inflammatory markers as appropriate     Heme:     # no acute issue      MSK / Skin:  # Sternotomy and Surgical Incision  # Weakness and deconditioning    - Sternal precautions  - Postoperative incision management per protocol  - PT/OT/CR     General cares and Prophylaxis:     - Mechanical ppx  for DVT  - Chemical DVT ppx: heparin   - PPI  - Bowel regimen: PPI, MiraLAX/senna prn     Lines / Tubes / Drains:  - PIV's   - Pur-WIC ext catheter/ to bed side commode         Disposition:  - [facility]               Key Medications:     Current Facility-Administered Medications   Medication Dose Route Frequency Provider Last Rate Last Admin    aspirin EC tablet 81 mg  81 mg Oral Daily Amadeo Ladd PA-C   81 mg at 03/25/25 0817    atorvastatin (LIPITOR) tablet 80 mg  80 mg Oral or NG Tube At Bedtime Amadeo Ladd PA-C   80 mg at 03/24/25 2349    busPIRone (BUSPAR) tablet 10 mg  10 mg Oral or NG Tube BID Amadeo Ladd PA-C   10 mg at 03/25/25 0817    ezetimibe (ZETIA) tablet 10 mg  10 mg Oral or NG Tube Daily Amadeo Ladd PA-C   10 mg at 03/25/25 0816    fenofibrate (TRIGLIDE/LOFIBRA) tablet 160 mg  160 mg Oral or NG Tube Daily Amadeo Ladd PA-C   160 mg at 03/25/25 0817    gabapentin (NEURONTIN) capsule 200 mg  200 mg Oral or NG Tube TID Amadeo Ladd PA-C   200 mg at 03/25/25 0817    senna-docusate (SENOKOT-S/PERICOLACE) 8.6-50 MG per tablet 2 tablet  2 tablet Oral or NG Tube At Bedtime Amadeo Ladd PA-C        venlafaxine (EFFEXOR) tablet 100 mg  100 mg Oral or NG Tube BID Amadeo Ladd PA-C   100 mg at 03/25/25 0817     Current Facility-Administered Medications   Medication Dose Route Frequency Provider Last Rate Last Admin    heparin 25,000 units in 0.45% NaCl 250 mL ANTICOAGULANT infusion  0-5,000 Units/hr Intravenous Continuous Amadeo Ladd PA-C 10 mL/hr at 03/25/25 0821 1,000 Units/hr at 03/25/25 0821    norepinephrine (LEVOPHED) 4 mg in  mL PERIPHERAL infusion  0.01-0.2 mcg/kg/min Intravenous Continuous Amadeo Ladd PA-C   Stopped at 03/25/25 1109               Physical Examination:   Temp:  [98  F (36.7  C)-98.7  F (37.1  C)] 98  F (36.7  C)  Pulse:  [66-95] 80  Resp:  [0-32] 18  BP: ()/(46-77) 92/46  SpO2:   [93 %-100 %] 96 %    Intake/Output Summary (Last 24 hours) at 3/25/2025 1509  Last data filed at 3/25/2025 1400  Gross per 24 hour   Intake 3500.53 ml   Output 1425 ml   Net 2075.53 ml     Wt Readings from Last 4 Encounters:   03/24/25 71.8 kg (158 lb 4.6 oz)   01/30/25 70.8 kg (156 lb)   12/05/24 71.2 kg (156 lb 15.5 oz)   11/20/24 71.2 kg (157 lb)     BP - Mean:  [52-90] 64  Resp: 18  No lab results found in last 7 days.    General: sitting up in bed, awake, alert and interactive   HEENT: PERRLA.  Pupils 3mm and reactive.   Neuro: A&O x3, follow simple commands correctly and consistently   Resp: BBS, lungs clear and coarse, no wheezes, rales or rhonchi, breathing unlabored, on room air   CV: S1, S2, RRR, no m/r/g. Left chest with ICD in place, pocket without tenderness   Abdomen: abdomen distended but soft and compressible. No epigastric or right upper quadrant tenderness.   Extremities: warm and well perfused, calves soft and compressible. Pulse 2+          Data:   All data and imaging reviewed     ROUTINE ICU LABS (Last four results)  CMP  Recent Labs   Lab 03/25/25  1239 03/25/25  0827 03/25/25  0517 03/25/25  0351 03/24/25  2300   NA  --   --  138  --  135   POTASSIUM  --   --  3.8  --  4.0   CHLORIDE  --   --  105  --  102   CO2  --   --  21*  --  22   ANIONGAP  --   --  12  --  11   GLC 74 107* 125* 104* 117*   BUN  --   --  10.8  --  14.4   CR  --   --  0.67  --  0.75   GFRESTIMATED  --   --  >90  --  >90   RON  --   --  8.6*  --  8.4*   MAG  --   --   --   --  1.8   PHOS  --   --   --   --  2.9   PROTTOTAL  --   --   --   --  6.4   ALBUMIN  --   --   --   --  3.7   BILITOTAL  --   --   --   --  0.3   ALKPHOS  --   --   --   --  49   AST  --   --   --   --  16   ALT  --   --   --   --  14     CBC  Recent Labs   Lab 03/25/25  0517 03/24/25  2300   WBC 13.0*  12.7* 12.2*   RBC 4.31 4.16   HGB 12.6 12.4   HCT 38.3 37.0   MCV 89 89   MCH 29.2 29.8   MCHC 32.9 33.5   RDW 14.3 14.4    339     INRNo lab  "results found in last 7 days.  Arterial Blood GasNo lab results found in last 7 days.    All cultures:  No results for input(s): \"CULT\" in the last 168 hours.  Recent Results (from the past 24 hours)   XR Chest 2 Views    Narrative    EXAM: DX CHEST AP OR PA AND LATERAL 2 VIEWS    Impression    No focal consolidation or pleural effusion. Normal heart size. Left chest wall cardiac device.   CT Chest Pulmonary Embolism w Contrast    Narrative    EXAM: CT CHEST ANGIOGRAM AND PULMONARY ARTERIES WITH IV CONTRAST   Including 3D image postprocessing with or without AI assistance.    COMPARISON: None    FINDINGS:  Pulmonary embolism: No endoluminal filling defects in the opacified pulmonary arteries.  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 The left common carotid artery arises immediately adjacent to or possibly from a common origin of the   brachiocephalic artery and also shows severe narrowing proximally (1 to 2 mm in diameter at its narrowest). No definite opacified right vertebral artery seen. Evaluation of much of the right subclavian artery is limited due to streak artifact from   adjacent contrast material in the veins. No definite abnormality seen of the left subclavian artery. No aortic dissection or aortic aneurysm seen.    Lungs and Airways: No focal consolidation, pneumothorax, or pleural fluid collection seen.  Pleural Space: No pleural fluid collection.  Mediastinum and Ya: Partially calcified mediastinal lymph nodes noted.  Osseous Structures and Chest Wall: No aggressive bone lesions.  Medical Devices: Left anterior chest wall cardiac device with a lead terminating in the right ventricle.  Upper Abdomen: Visualized upper abdominal structures are within normal limits.    Impression    No visible pulmonary embolism.    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 No definite contrast opacification of the visible portion of the right vertebral artery. The right subclavian artery is poorly evaluated on   this exam as well due to adjacent streak artifact. The chronicity of these findings is uncertain given the lack of prior exams for comparison. Follow-up with vascular surgery recommended.    I discussed these findings by phone with Dr. Wolff at 5:25 PM 03/24/2025 central time.         [facility]  CRITICAL CARE STAFF NOTE    I am managing these acute and ongoing critical issues resulting in critical condition that impairs one or more vital organ systems, incur a high probability of imminent or life-threatening deterioration in the patient's condition and providing frequent personal assessment and manipulation of medications and life support equipment.     1. Shock- hypovolemia  2. GI loss/ likely gastroenteritis    3. Chronic non-ischemic CM with rEF      ICU Interventions: parenteral medications necessitating continuous monitoring including vasoactive medications, medication for heart rhythm control, insulin infusion, and/or sedative/opiates  and interpretation of lab values, cardiac output, cxr, pulse oximetry, blood gases, and/or information/data stored in computers     We have discussed the patient in detail and I agree with the findings, assessment, and plan as documented when this note was cosigned on this day. The plan was formulated in conjunction with pharmacy, ICU nurses, and respiratory therapist. I have evaluated all laboratory values and imaging studies for the past 24 hours. I have reviewed all the consults that have been ordered and are active for this patient.      Critical Care Time: 50 min.  I spent this time (excluding procedures) personally providing and directing critical care services at the bedside and on the critical care unit.      Pedrito Ross MD  [facility],  of  "Medicine  Pulmonary/Critical Care Medicine  Pager: [phone]  March 25, 2025      Clinically Significant Risk Factors Present on Admission           # Hypocalcemia: Lowest Ca = 8.4 mg/dL in last 2 days, will monitor and replace as appropriate       # Drug Induced Platelet Defect: home medication list includes an antiplatelet medication    # End stage heart failure: Ventricular assist device (VAD) present  # Circulatory Shock: required vasopressors within past 24 hours            # Overweight: Estimated body mass index is 28.95 kg/m  as calculated from the following:    Height as of this encounter: 1.575 m (5' 2\").    Weight as of this encounter: 71.8 kg (158 lb 4.6 oz).        # ICD device present                 "

## 2025-03-25 NOTE — H&P
CV ICU H&P    CO-MORBIDITIES:   Patient Active Problem List   Diagnosis    Chronic left-sided low back pain with left-sided sciatica    Diabetes mellitus without complication (H)    Enlarged thyroid    Hyperlipidemia    STEMI (ST elevation myocardial infarction) (H)    Strain of flexor muscle of hip, right, initial encounter    Tobacco use disorder    Acute systolic heart failure (H)    Status post coronary angiogram    Heart failure (H)       ASSESSMENT: Cailin Burns is a 49 year old female with PMH with past medical for tobacco smoking, type 2 diabetes mellitus, PAD with mixed claudication, STEMI September 2022 with three-vessel disease status post staged PCI due to poor bypass target (PCI of RCA  and MOHAN x1 to mLAD and MOHAN x1 to mLCx on 01/2023), ischemic cardiomyopathy LVEF 27% status post ICD May 2023,  dyslipidemia, chronic pain, who is transferred to [facility] for additional management.     Patient reports she had episode of emesis after morning medications, .  She reports she had several episodes of diarrheas 3-4 also. Later in the am,  she developed chest pain in the right side of her chest (which she can pinpoint) and took a dose of her home nitroglycerin and following this she got very sweaty and clammy felt short of breath and presented to ED for evaluation. . She reports her pain has not changed since, no radiation of her pain or worsening of her symptoms.     CT PE was performed and negative for, CXR clear of opacities, and  EKG was negative for ST changes, Troponin x3, , lactate negative per OSH records.       PLAN:  Neuro/ pain/ sedation:  # Anxiety and depression   # hx of panic attacks   # Acute postoperative pain  -  tylenol prn   - PRN: oxycodone, dilaudid IV for pain   - resume home Buspar and wellbuytrin     Pulmonary care:   # hx of tobacco use  - Goal SpO2 > 90%  - patient reports she smokes 10 cigarettes per day.   - Acapella/encourage IS.     Cardiovascular:    # Shock- etiology  unclear suspect hypovolemia vs shock  # Hx HTN  # Ischemic cardiomyopathy, EF 30-35%   # s/p ICD placement   # PAD a with claudication   # CAD, Angina   # hx of STEMI   - Peripheral Norepi with MAP Goal MAP >65  - give additional liter now   - Hold home diuretics, hold beta blocker, entresto, farxiga, ranolazine,  isordil, lasix and spironolactone   - resume home Zetia, lipitor and ASA   - hold Brilinta while on heparin gtt   - EKG, Echo and Trop to be collected   - cards heartfailure consult     GI care / Nutrition:   # hx of constipation due to narcotic use   # diarrhea   - pt reports diarrhea of 1 day onset, send Enteric panel and C diff   - PPI for GI prophylaxis  - Bowel regimen hold for now     Renal / Fluids / Electrolytes:   # hypovolemia   BL creatine range appears to be ~ creat 0.66, on sharif admit 0.88   - Strict I/O, daily weights  - Replete lytes PRN per protocol    Endocrine:    # Diabetes Mellitus Type 2, on oral agents and injectables   Preop A1c 10.6  - hypoglycemia protocol in place.    - hold oral agents and injectable  metformin and Ozempic for now   - send random cortisol     ID / Antibiotics:  # concern for infectious diarrhea   - send enteric panel and c diff, patient reports 3-4 loose stools in past 24 hours   - Continue to monitor fever curve, WBC, and inflammatory markers as appropriate    Heme:     # no acute issue     MSK / Skin:  # Sternotomy and Surgical Incision  # Weakness and deconditioning    - Sternal precautions  - Postoperative incision management per protocol  - PT/OT/CR    General cares and Prophylaxis:     - Mechanical ppx for DVT  - Chemical DVT ppx: heparin   - PPI  - Bowel regimen: PPI, MiraLAX, senna    Lines / Tubes / Drains:  - PIV's   - Pur-WIC ext catheter       Disposition:  - ICU    Patient  findings and plan discussed with ICU staff.    Total Critical Care time spent by me, excluding procedures, was  60minutes.    Amadeo Ladd PA-C      Clinically Significant  Risk Factors Present on Admission                 # Drug Induced Platelet Defect: home medication list includes an antiplatelet medication    # End stage heart failure: Ventricular assist device (VAD) present               # ICD device present               ====================================    HPI:  Cailin Burns is a 49 year old female with PMH with past medical for tobacco use~ smoke 10 cigarettes per day,  type 2 diabetes mellitus, PAD with mixed claudication, STEMI September 2022 with three-vessel disease status post staged PCI due to poor bypass target (PCI of RCA  and MOHAN x1 to mLAD and MOHAN x1 to mLCx on 01/2023), ischemic cardiomyopathy LVEF 30-35%, status post ICD May 2023,  dyslipidemia, chronic pain,  who presented to [facility] ED with complaints of chest pain. She reports she had nausea after her morning medications and threw up.   She reports she had several episodes of diarrheas 3-4 also. Later in the am,  she developed chest pain in the right side of her chest (which she can pinpoint  to her right breast). She took a dose of her home nitroglycerin and following this she got very sweaty and clammy felt short of breath and presented to the ED.. She reports her pain has not changed since, no radiation of her pain or worsening of her symptoms, no back pain or pain in shoulders or back. She denies dyspnea, tachypnea or trouble breathing. She reports feeling hungry and thirsty currently. She denies any recent sick contacts.       PAST MEDICAL HISTORY:   Past Medical History:   Diagnosis Date    CAD (coronary artery disease)     Nicotine dependence     Type 2 diabetes mellitus with other circulatory complications (H)        PAST SURGICAL HISTORY:   Past Surgical History:   Procedure Laterality Date    APPENDECTOMY      COLONOSCOPY N/A 2/9/2024    Procedure: COLONOSCOPY, WITH POLYPECTOMY;  Surgeon: Holland Lyle MD;  Location: [facility] GI    CV CORONARY ANGIOGRAM N/A 12/09/2022    Procedure: Coronary  Angiogram;  Surgeon: Liam Bernardo MD;  Location: [facility] CARDIAC CATH LAB    CV PCI N/A 01/02/2023    Procedure: Percutaneous Coronary Intervention of LCx and LAD;  Surgeon: Liam Bernardo MD;  Location: [facility] CARDIAC CATH LAB    CV PCI CHRONIC TOTAL OCCLUSION N/A 12/09/2022    Procedure: Percutaneous - Coronary Intervention Chronic Total Occlusion;  Surgeon: Liam Bernardo MD;  Location: [facility] CARDIAC CATH LAB    CV RIGHT HEART CATH MEASUREMENTS RECORDED Left 02/06/2024    Procedure: Right Heart Catheterization FYI: there is C/f R subclavian stenosis;  Surgeon: Deja Navarro MD;  Location: [facility] CARDIAC CATH LAB    EP ICD INSERT SINGLE N/A 05/11/2023    Procedure: Implantable Cardioverter Defibrillator Device & Lead Implant Single or Dual;  Surgeon: Miky Garcia MD;  Location: [facility] CARDIAC CATH LAB       FAMILY HISTORY:   Family History   Problem Relation Age of Onset    Coronary Artery Disease Early Onset Maternal Grandfather        SOCIAL HISTORY:   Social History     Tobacco Use    Smoking status: Some Days     Current packs/day: 1.50     Average packs/day: 1.5 packs/day for 37.2 years (55.8 ttl pk-yrs)     Types: Cigarettes     Start date: 1988     Passive exposure: Past    Smokeless tobacco: Never    Tobacco comments:     Trying to quit, wearing patch -    Substance Use Topics    Alcohol use: Not Currently         OBJECTIVE:  1. VITAL SIGNS:               2. INTAKE/ OUTPUT:   No intake/output data recorded.      3. PHYSICAL EXAMINATION:   General: sitting up in bed, awake, alert and interactive   HEENT: PERRLA.  Pupils 3mm and reactive.   Neuro: A&O x3, follow simple commands correctly and consistently   Resp: BBS, lungs clear and coarse, no wheezes, rales or rhonchi, breathing unlabored, on room air   CV: S1, S2, RRR, no m/r/g. Left chest with ICD in place, pocket without tenderness   Abdomen: abdomen distended but soft and compressible.  No epigastric or right upper quadrant tenderness.   Extremities: warm and well perfused, calves soft and compressible. Pulse 2+       4. INVESTIGATIONS:   Arterial Blood Gases   No lab results found in last 7 days.  Complete Blood Count   No lab results found in last 7 days.  Basic Metabolic Panel  No lab results found in last 7 days.  Liver Function Tests  No lab results found in last 7 days.  Pancreatic Enzymes  No lab results found in last 7 days.  Coagulation Profile  No lab results found in last 7 days.      5. RADIOLOGY:   Recent Results (from the past 24 hours)   XR Chest 2 Views    Narrative    EXAM: DX CHEST AP OR PA AND LATERAL 2 VIEWS    Impression    No focal consolidation or pleural effusion. Normal heart size. Left chest wall cardiac device.   CT Chest Pulmonary Embolism w Contrast    Narrative    EXAM: CT CHEST ANGIOGRAM AND PULMONARY ARTERIES WITH IV CONTRAST   Including 3D image postprocessing with or without AI assistance.    COMPARISON: None    FINDINGS:  Pulmonary embolism: No endoluminal filling defects in the opacified pulmonary arteries.  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 The left common carotid artery arises immediately adjacent to or possibly from a common origin of the   brachiocephalic artery and also shows severe narrowing proximally (1 to 2 mm in diameter at its narrowest). No definite opacified right vertebral artery seen. Evaluation of much of the right subclavian artery is limited due to streak artifact from   adjacent contrast material in the veins. No definite abnormality seen of the left subclavian artery. No aortic dissection or aortic aneurysm seen.    Lungs and Airways: No focal consolidation, pneumothorax, or pleural fluid collection seen.  Pleural Space: No pleural fluid collection.  Mediastinum and Ya: Partially calcified mediastinal lymph nodes  noted.  Osseous Structures and Chest Wall: No aggressive bone lesions.  Medical Devices: Left anterior chest wall cardiac device with a lead terminating in the right ventricle.  Upper Abdomen: Visualized upper abdominal structures are within normal limits.    Impression    No visible pulmonary embolism.    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 No definite contrast opacification of the visible portion of the right vertebral artery. The right subclavian artery is poorly evaluated on   this exam as well due to adjacent streak artifact. The chronicity of these findings is uncertain given the lack of prior exams for comparison. Follow-up with vascular surgery recommended.    I discussed these findings by phone with Dr. Wolff at 5:25 PM 03/24/2025 central time.       =========================================

## 2025-03-25 NOTE — PLAN OF CARE
Problem: Fall Injury Risk  Goal: Absence of Fall and Fall-Related Injury  Intervention: Promote Injury-Free Environment  Flowsheets  Taken 3/25/2025 1821  Safety Promotion/Fall Prevention:   activity supervised   assistive device/personal items within reach   clutter free environment maintained   lighting adjusted   nonskid shoes/slippers when out of bed   patient and family education   safety round/check completed   supervised activity   treat reversible contributory factors   treat underlying cause  Taken 3/25/2025 1600  Safety Promotion/Fall Prevention:   activity supervised   clutter free environment maintained   assistive device/personal items within reach   lighting adjusted   nonskid shoes/slippers when out of bed   patient and family education   safety round/check completed   supervised activity   treat reversible contributory factors   treat underlying cause  Taken 3/25/2025 1200  Safety Promotion/Fall Prevention:   activity supervised   clutter free environment maintained   assistive device/personal items within reach   lighting adjusted   nonskid shoes/slippers when out of bed   patient and family education   safety round/check completed   supervised activity   treat reversible contributory factors   treat underlying cause  Taken 3/25/2025 0800  Safety Promotion/Fall Prevention:   activity supervised   clutter free environment maintained   assistive device/personal items within reach   lighting adjusted   nonskid shoes/slippers when out of bed   patient and family education   safety round/check completed   supervised activity   treat reversible contributory factors   treat underlying cause  Goal Outcome Evaluation:  As noted above and charted in flowsheets, interventions completed to promote an injury-free environment. Pt did not have a fall during this shift. Goal is progressing.

## 2025-03-25 NOTE — CONSULTS
[facility]  Consult note - Hospitalist Service       Date of Admission:  3/24/2025  PRIMARY CARE PROVIDER:    No Ref-Primary, Physician    Assessment & Plan   Cailin Burns is a 49 year old female with a PMH significant for tobacco use disorder, type 2 diabetes mellitus, PAD with mixed claudication, CAD s/p STEMI September 2022 with three-vessel disease status post staged PCI due to poor bypass target (PCI of RCA  and MOHAN x1 to mLAD and MOHAN x1 to mLCx on 01/2023), ischemic cardiomyopathy with HFrEF, LVEF 27% status post ICD May 2023,  dyslipidemia, chronic pain, who was admitted to ICU 3/24/25 for hypovolemic shock and is being transferred to hospitalist service 03/25/25.     Shock state now resolved, likely hypovolemic type from commendation of GI losses from vomiting diarrhea coupled with diuretic medication use  * baseline BP at clinic appointments and home /70s-80s  *Status post~1750 mL fluid expansion during the hospitalization  -Encourage p.o. intake  -Frequency of BPs as noted elsewhere    NYHA class II HF, systolic, chronic, compensated, at times class III  symptoms  ischemic cardiomyopathy LVEF 27% status post ICD May 2023  *CAD s/p STEMI September 2022 with three-vessel disease status post staged PCI due to poor bypass target (PCI of RCA  and MOHAN x1 to mLAD and MOHAN x1 to mLCx on 01/2023)  *TTE 3/25/2025 showing LV is normal in size. The visual ejection fraction is  estimated at 45%. Mid-distal inferior, anteroseptal, and apical hypokinesis.  right ventricle is normal in structure, function and size.  No valve disease.  Echo 2-5-24 showed EF 35% with similar WMA  *PTA GDMT includes dapagliflozin 10 mg daily, Lasix 20 mg daily, isosorbide dinitrate 10 mg 3 times daily, metoprolol extended release 20 mg daily, Entresto 97-23 mg twice daily, spironolactone 25 mg daily  -[facility] [place] [facility]  - Every 2 hour intake and output and vital signs  - Daily weights  - Low-sodium  diet  -Appreciate cardiology recommendations to resume beta-blockers and half dose Entresto on the evening 3/25/2025 versus a.m. 3/26/2025  -Since just came off of pressors few hours ago will plan to start in the a.m.  -Can slowly reintroduce the remainder of her she GDMT heart failure meds ASAP  -Continue PTA DAPT with aspirin and Brilinta  - Can resume PTA Ranexa    Recent diarrheal illness without any further episodes of such since admission  -if recurs, send enteric panel    Leukocytosis 2/2 unclear etiology aside from recent diarrheal illness, CT chest 3/24/25 w/o any evidence of pneumonia, ROS negative for any infectious history, well-appearing, only complaint is fatigue; procal neg, UA neg  -obtain bld cx  -if febrile can start broad spectrum atbx  -enteric panel, cdiff if diarrhea recurs  -CBC in the a.m.    T2DM last A1c was 1 year PTA, was uncontrolled at that time  Recent Labs   Lab 03/25/25  1239 03/25/25  0827 03/25/25  0517 03/25/25  0351 03/24/25  2300 03/24/25  2243   GLC 74 107* 125* 104* 117* 122*     -Hold PTA metformin, Ozempic  -Okay for patient to use her continuous glucose monitor, correlate twice daily w/ POC testing  -Mod carb diet  -check A1c  -tid ac/hs gluc checks and med dose sliding scale insulin w/ 1unit: 15g CHO while off of metformin  -Hypoglycemia protocol is in place    tobacco use disorder, ongoing  -Encourage cessation  -NicoDerm patch    PAD with mixed claudication  -DAPT continued    Dyslipidemia  -Continue PTA Lipitor, Zetia, fenofibrate    chronic pain  -Continue PTA gabapentin, venlafaxine  -As needed acetaminophen, hydromorphone oxycodone    Clinically Significant Risk Factors Present on Admission           # Hypocalcemia: Lowest Ca = 8.4 mg/dL in last 2 days, will monitor and replace as appropriate       # Drug Induced Platelet Defect: home medication list includes an antiplatelet medication    # End stage heart failure: Ventricular assist device (VAD) present  #  "Circulatory Shock: required vasopressors within past 24 hours            # Overweight: Estimated body mass index is 28.95 kg/m  as calculated from the following:    Height as of this encounter: 1.575 m (5' 2\").    Weight as of this encounter: 71.8 kg (158 lb 4.6 oz).        # ICD device present     --increases all cause morbidity and mortality  -OP follow up re: weight loss & lifestyle changes          Diet: Low Saturated Fat Na <2400 mg    DVT Prophylaxis: Pneumatic Compression Devices  Cuevas Catheter: Not present  Lines: None     Cardiac Monitoring: ACTIVE order. Indication: ICU  Code Status: Full Code           Disposition Plan      Expected Discharge Date: 04/01/2025             Entered: CARLOTTA Young CNP 03/25/2025, 3:28 PM     The patient's care was discussed with the Attending Physician, Dr. Browning and Patient.    Medically Ready for Discharge: Anticipated in 2-4 Days pending hemodynamic stability following reinitiation of her extensive guideline directed medical therapy heart failure regimen    CARLOTTA Young CNP  [facility]  Securely message with [employer] (more info)  Text page via [employer] Paging/Directory     ______________________________________________________________________    Chief Complaint /reason for consult  Assume primary management of patient on transfer out of ICU    History is obtained from the patient and EMR    History of Present Illness   Cailin Burns is a 49 year old female with a PMH significant for tobacco use disorder, type 2 diabetes mellitus, PAD with mixed claudication, CAD s/p STEMI September 2022 with three-vessel disease status post staged PCI due to poor bypass target (PCI of RCA  and MOHAN x1 to mLAD and MOHAN x1 to mLCx on 01/2023), ischemic cardiomyopathy with HFrEF, LVEF 27% status post ICD May 2023,  dyslipidemia, chronic pain, who was admitted to ICU 3/24/25 for hypovolemic shock and is being transferred to hospitalist service " 03/25/25.     On the a.m. of 3/24/2025 patient had an episode of diarrhea and a short while later emesis after morning medications.  She otherwise had been in her usual state of health up until then reports sudden onset of the symptoms but then had several more episodes of diarrheas 3-4. Later in the am,  she developed chest pain in the right side of her chest (which she can pinpoint) and took a dose of her home nitroglycerin and following this she got very sweaty and clammy felt short of breath and presented to [facility] ED for evaluation.  In the ED at [facility] she ruled out for PE, was started on norepinephrine infusion, EKG was negative for ST changes, Troponin x3, , lactate negative per OSH records.  Patient was transferred to [facility] ICU for further evaluation management.  Her home guideline directed medical therapy agents were held and she was volume expanded approximately 1750 mL since admission..  Enteric panel and C. difficile are pending completion.  Patient was able to wean off of norepinephrine on the a.m. of 3/25/2025.  Patient seen in consultation by cardiology who felt that her presentation was noncardiac etiology and it was more likely hypovolemic shock as opposed to cardiogenic shock.      Patient denies any fevers chills dysuria recent ill contacts, URI symptoms anorexia bleeding.  She follows a low-salt diet checks her weight daily has continuous glucose monitor.  She has been taking her medications as prescribed no new medications.  No one else in the household was ill.  She reports her home BPs run 90-1 06 over 70s to 80s.  She denies any dizziness lightheadedness presyncope.  She is voiding.  She endorses fatigue but is otherwise feeling okay is wondering about when she can leave the hospital.  Denies any chest pain or dyspnea.    I've reviewed the ICU course including VS, meds, diagnostics which were significant for TTE 3/25/2025 showing LV is normal in size. The visual  ejection fraction is  estimated at 45%. Mid-distal inferior, anteroseptal, and apical hypokinesis.  right ventricle is normal in structure, function and size.  No valve disease.  Echo 2-5-24 showed EF 35% with similar WMAand discussed the pt's ICU course w/ the ICU attending physician and CHRIS.      Past Medical History    I have reviewed this patient's medical history and updated it with pertinent information if needed.   Past Medical History:   Diagnosis Date    CAD (coronary artery disease)     Nicotine dependence     Type 2 diabetes mellitus with other circulatory complications (H)    HFrEF, chronic systolic  Dyslipidemia    Prior to Admission Medications   Prior to Admission Medications   Prescriptions Last Dose Informant Patient Reported? Taking?   Continuous Glucose Sensor (DEXCOM G7 SENSOR) MISC   Yes No   Sig: APPLY NEW SENSOR TO BE USED EVERY 10 DAYS WITH DEXCOM G7   OZEMPIC, 2 MG/DOSE, 8 MG/3ML pen 3/21/2025  Yes Yes   Sig: Inject 2 mg subcutaneously every 7 days. Takes on Fridays   SENEXON-S 8.6-50 MG tablet 3/23/2025 Bedtime  Yes Yes   Sig: Take 2 tablets by mouth at bedtime.   aspirin (ASA) 81 MG EC tablet 3/24/2025 Morning Self Yes Yes   Sig: Take 81 mg by mouth daily   atorvastatin (LIPITOR) 80 MG tablet 3/23/2025 Bedtime Self Yes Yes   Sig: Take 1 tablet by mouth at bedtime.   busPIRone (BUSPAR) 10 MG tablet 3/24/2025 Morning Self Yes Yes   Sig: Take 10 mg by mouth 2 times daily   dapagliflozin (FARXIGA) tablet 3/24/2025 Morning Self Yes Yes   Sig: Take 10 mg by mouth daily   diclofenac (VOLTAREN) 1 % topical gel Unknown  Yes Yes   Sig: Apply topically 4 times daily as needed for moderate pain.   estradiol (ESTRACE) 0.1 MG/GM vaginal cream Unknown  Yes Yes   Sig: Place vaginally as needed (as needed).   ezetimibe (ZETIA) 10 MG tablet 3/23/2025 Bedtime  No Yes   Sig: Take 1 tablet (10 mg) by mouth daily   fenofibrate (TRIGLIDE/LOFIBRA) 160 MG tablet 3/24/2025 Morning Self Yes Yes   Sig: Take 160 mg by  mouth daily   furosemide (LASIX) 20 MG tablet 3/24/2025 Morning Self Yes Yes   Sig: Take 20 mg by mouth daily May also take 1 tablet 1 time per day as needed for weight gain of >2lbs in a day or >5lbs in a day   gabapentin (NEURONTIN) 100 MG capsule 3/24/2025 Morning  Yes Yes   Sig: Take 200 mg by mouth 3 times daily.   isosorbide dinitrate (ISORDIL) 10 MG tablet 3/24/2025 Morning  Yes Yes   Sig: Take 10 mg by mouth 3 times daily.   metFORMIN (GLUCOPHAGE) 1000 MG tablet 3/24/2025 Morning Self Yes Yes   Sig: Take 1,000 mg by mouth 2 times daily (with meals)   metoprolol succinate ER (TOPROL XL) 25 MG 24 hr tablet 3/24/2025 Morning  No Yes   Sig: Take 1 tablet (25 mg) by mouth daily for 11 days   nitroGLYcerin (NITROSTAT) 0.4 MG sublingual tablet 3/24/2025 Self Yes Yes   Sig: Place 0.4 mg under the tongue every 5 minutes as needed   nystatin (MYCOSTATIN) 930793 UNIT/GM external powder Unknown  Yes Yes   Sig: Apply topically as needed for other. Under breasts   oxyCODONE IR (ROXICODONE) 10 MG tablet 3/24/2025 Morning  Yes Yes   Sig: Take 10 mg by mouth 5 times daily.   polyethylene glycol (MIRALAX) 17 GM/Dose powder Unknown  Yes Yes   Sig: Take 17 g by mouth daily as needed for constipation.   ranolazine (RANEXA) 500 MG 12 hr tablet 3/24/2025 Morning Self Yes Yes   Sig: Take 500 mg by mouth 2 times daily   sacubitril-valsartan (ENTRESTO)  MG per tablet 3/24/2025 Morning Self Yes Yes   Sig: Take 1 tablet by mouth 2 times daily   spironolactone (ALDACTONE) 25 MG tablet 3/24/2025 Morning Self Yes Yes   Sig: Take 25 mg by mouth daily.   ticagrelor (BRILINTA) 90 MG tablet 3/24/2025 Morning Self Yes Yes   Sig: Take 90 mg by mouth 2 times daily   venlafaxine (EFFEXOR) 100 MG tablet 3/24/2025 Morning Self Yes Yes   Sig: Take 1 tablet by mouth 2 times daily      Facility-Administered Medications: None     Allergies   Allergies   Allergen Reactions    Liraglutide      Other Reaction(s): Unknown/Not Verified    Phenytoin  Nausea and Vomiting    Rosuvastatin Muscle Pain (Myalgia)    Valproic Acid Unknown     Does not remember       Physical Exam   Vital Signs: Temp: 98  F (36.7  C) Temp src: Axillary BP: 92/46 Pulse: 80   Resp: 18 SpO2: 96 % O2 Device: None (Room air)    Weight: 158 lbs 4.64 oz    Constitutional: vs as per EMR, BP 95/56  General:  adult pt lying in bed without acute distress  Neuro: +follows commands wiggle toes and show 2 fingers bilat, face symmetric, tongue midline, speech fluent  Head, ENT & mouth: NC/AT,  mouth moist oral mucosa  Neck: supple  CV S1S2 no murmurs  resp: CTAB upper and lower lobes no rales  gi:normoactive bowel sounds, soft, nontender, nondisteded  Ext: no edema or mottling  Skin: no rashes on exposed skin  Musculoskeletal no bony joint deformities      Medical Decision Making       65 MINUTES SPENT BY ME on the date of service doing chart review, history, exam, documentation & further activities per the note.         Data   Data reviewed today: I reviewed all medications, new labs and imaging results over the last 24 hours. I personally reviewed no images or EKG's today.      I have personally reviewed the following data over the past 24 hrs:    12.7 (H); 13.0 (H)  \   12.6   / 356     138 105 10.8 /  110 (H)   3.8 21 (L) 0.67 \     ALT: 14 AST: 16 AP: 49 TBILI: 0.3   ALB: 3.7 TOT PROTEIN: 6.4 LIPASE: N/A     Trop: 9 BNP: N/A     Procal: 0.03 CRP: N/A Lactic Acid: 1.5         Imaging results reviewed over the past 24 hrs:   Recent Results (from the past 24 hours)   CT Chest Pulmonary Embolism w Contrast    Narrative    EXAM: CT CHEST ANGIOGRAM AND PULMONARY ARTERIES WITH IV CONTRAST   Including 3D image postprocessing with or without AI assistance.    COMPARISON: None    FINDINGS:  Pulmonary embolism: No endoluminal filling defects in the opacified pulmonary arteries.  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 The left common carotid artery arises immediately adjacent to or possibly from a common origin of the   brachiocephalic artery and also shows severe narrowing proximally (1 to 2 mm in diameter at its narrowest). No definite opacified right vertebral artery seen. Evaluation of much of the right subclavian artery is limited due to streak artifact from   adjacent contrast material in the veins. No definite abnormality seen of the left subclavian artery. No aortic dissection or aortic aneurysm seen.    Lungs and Airways: No focal consolidation, pneumothorax, or pleural fluid collection seen.  Pleural Space: No pleural fluid collection.  Mediastinum and Ya: Partially calcified mediastinal lymph nodes noted.  Osseous Structures and Chest Wall: No aggressive bone lesions.  Medical Devices: Left anterior chest wall cardiac device with a lead terminating in the right ventricle.  Upper Abdomen: Visualized upper abdominal structures are within normal limits.    Impression    No visible pulmonary embolism.    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 No definite contrast opacification of the visible portion of the right vertebral artery. The right subclavian artery is poorly evaluated on   this exam as well due to adjacent streak artifact. The chronicity of these findings is uncertain given the lack of prior exams for comparison. Follow-up with vascular surgery recommended.    I discussed these findings by phone with Dr. Wolff at 5:25 PM 2025 central time.   Echocardiogram Complete   Result Value    LVEF  45%    Narrative    [id number]  [id number]  [id number]  [id number]^HERNANDEZ^[facility]  Echocardiography Laboratory  [address] [place] [address]     Name: MARTHA SETHI  MRN: [id number]  : 1975  Study Date: 2025 01:13 PM  Age: 49 yrs  Gender: Female  Patient Location:  [facility]  Reason For Study: Chest Pain  Ordering Physician: JIMBO NG  Referring Physician: JIMBO NG  Performed By: Cb Hankins RDCS     BSA: 1.7 m2  Height: 62 in  Weight: 158 lb  HR: 72  BP: 65/53 mmHg  ______________________________________________________________________________  Procedure  Echocardiogram with two-dimensional, color and spectral Doppler.  ______________________________________________________________________________  Interpretation Summary     1. The left ventricle is normal in size. The visual ejection fraction is  estimated at 45%. Mid-distal inferior, anteroseptal, and apical hypokinesis.  2. The right ventricle is normal in structure, function and size.  3. No valve disease.     Echo 2-5-24 showed EF 35% with similar WMA.  ______________________________________________________________________________  Left Ventricle  The left ventricle is normal in size. There is normal left ventricular wall  thickness. The visual ejection fraction is estimated at 45%. Left ventricular  diastolic function is indeterminate. Mid-distal inferior, anteroseptal, and  apical hypokinesis. There is no thrombus seen in the left ventricle.     Right Ventricle  The right ventricle is normal in structure, function and size.     Atria  The left atrium is mildly dilated. Right atrial size is normal. There is no  atrial shunt seen.     Mitral Valve  There is mild (1+) mitral regurgitation.     Aortic Valve  There is mild (1+) aortic regurgitation.     Pulmonic Valve  The pulmonic valve is not well visualized.     Vessels  Normal ascending, transverse (arch), and descending aorta. The inferior vena  cava was normal in size with preserved respiratory variability.     Pericardium  There is no pericardial effusion.     Rhythm  The rhythm was sinus with wide QRS.     ______________________________________________________________________________  MMode/2D Measurements & Calculations  IVSd: 0.92 cm  LVIDd: 5.6  cm  LVIDs: 3.7 cm  LVPWd: 0.80 cm  FS: 33.0 %  LV mass(C)d: 179.1 grams  LV mass(C)dI: 103.6 grams/m2     Ao root diam: 3.2 cm  asc Aorta Diam: 2.9 cm  Ao root diam index Ht(cm/m): 2.1  Ao root diam index BSA (cm/m2): 1.9  Asc Ao diam index BSA (cm/m2): 1.7  Asc Ao diam index Ht(cm/m): 1.8  EF Biplane: 52.4 %  LA Volume (BP): 66.9 ml  LA Volume Index (BP): 38.7 ml/m2  RWT: 0.29     TAPSE: 2.0 cm     Doppler Measurements & Calculations  MV E max madi: 100.0 cm/sec  MV A max madi: 116.0 cm/sec  MV E/A: 0.86  MV dec slope: 364.5 cm/sec2  MV dec time: 0.27 sec  PA acc time: 0.11 sec  E/E' av.3  Lateral E/e': 15.8  Medial E/e': 16.7  RV S Madi: 9.7 cm/sec     ______________________________________________________________________________  Report approved by: Mark eHnry MD on 2025 03:18 PM

## 2025-03-25 NOTE — PHARMACY-ADMISSION MEDICATION HISTORY
Pharmacist Admission Medication History    Admission medication history is complete. The information provided in this note is only as accurate as the sources available at the time of the update.    Information Source(s): Patient and CareEverywhere/SureScripts via in-person    Pertinent Information: Patient sometimes takes only 4 doses of Oxycodone a day    Changes made to PTA medication list:  Added: None  Deleted:  nicotine patch, ozempic 1mg  Changed: spironolactone 12.5mg to 25mg, senna to at bedtime, gabapentin dose, diclofenac    Allergies reviewed with patient and updates made in EHR: yes    Medication History Completed By: Joel Gaines RPH 3/24/2025 11:00 PM    PTA Med List   Medication Sig Last Dose/Taking    aspirin (ASA) 81 MG EC tablet Take 81 mg by mouth daily 3/24/2025 Morning    atorvastatin (LIPITOR) 80 MG tablet Take 1 tablet by mouth at bedtime. 3/23/2025 Bedtime    busPIRone (BUSPAR) 10 MG tablet Take 10 mg by mouth 2 times daily 3/24/2025 Morning    dapagliflozin (FARXIGA) tablet Take 10 mg by mouth daily 3/24/2025 Morning    diclofenac (VOLTAREN) 1 % topical gel Apply topically 4 times daily as needed for moderate pain. Unknown    estradiol (ESTRACE) 0.1 MG/GM vaginal cream Place vaginally as needed (as needed). Unknown    ezetimibe (ZETIA) 10 MG tablet Take 1 tablet (10 mg) by mouth daily 3/23/2025 Bedtime    fenofibrate (TRIGLIDE/LOFIBRA) 160 MG tablet Take 160 mg by mouth daily 3/24/2025 Morning    furosemide (LASIX) 20 MG tablet Take 20 mg by mouth daily May also take 1 tablet 1 time per day as needed for weight gain of >2lbs in a day or >5lbs in a day 3/24/2025 Morning    gabapentin (NEURONTIN) 100 MG capsule Take 200 mg by mouth 3 times daily. 3/24/2025 Morning    isosorbide dinitrate (ISORDIL) 10 MG tablet Take 10 mg by mouth 3 times daily. 3/24/2025 Morning    metFORMIN (GLUCOPHAGE) 1000 MG tablet Take 1,000 mg by mouth 2 times daily (with meals) 3/24/2025 Morning    metoprolol  succinate ER (TOPROL XL) 25 MG 24 hr tablet Take 1 tablet (25 mg) by mouth daily for 11 days 3/24/2025 Morning    nitroGLYcerin (NITROSTAT) 0.4 MG sublingual tablet Place 0.4 mg under the tongue every 5 minutes as needed 3/24/2025    nystatin (MYCOSTATIN) [id number] UNIT/GM external powder Apply topically as needed for other. Under breasts Unknown    oxyCODONE IR (ROXICODONE) 10 MG tablet Take 10 mg by mouth 5 times daily. 3/24/2025 Morning    OZEMPIC, 2 MG/DOSE, 8 MG/3ML pen Inject 2 mg subcutaneously every 7 days. Takes on Fridays 3/21/2025    polyethylene glycol (MIRALAX) 17 GM/Dose powder Take 17 g by mouth daily as needed for constipation. Unknown    ranolazine (RANEXA) 500 MG 12 hr tablet Take 500 mg by mouth 2 times daily 3/24/2025 Morning    sacubitril-valsartan (ENTRESTO)  MG per tablet Take 1 tablet by mouth 2 times daily 3/24/2025 Morning    SENEXON-S 8.6-50 MG tablet Take 2 tablets by mouth at bedtime. 3/23/2025 Bedtime    spironolactone (ALDACTONE) 25 MG tablet Take 25 mg by mouth daily. 3/24/2025 Morning    ticagrelor (BRILINTA) 90 MG tablet Take 90 mg by mouth 2 times daily 3/24/2025 Morning    venlafaxine (EFFEXOR) 100 MG tablet Take 1 tablet by mouth 2 times daily 3/24/2025 Morning

## 2025-03-26 LAB
ANION GAP SERPL CALCULATED.3IONS-SCNC: 7 MMOL/L (ref 7–15)
BUN SERPL-MCNC: 8.4 MG/DL (ref 6–20)
CALCIUM SERPL-MCNC: 8.4 MG/DL (ref 8.8–10.4)
CHLORIDE SERPL-SCNC: 108 MMOL/L (ref 98–107)
CREAT SERPL-MCNC: 0.56 MG/DL (ref 0.51–0.95)
CRP SERPL-MCNC: <3 MG/L
EGFRCR SERPLBLD CKD-EPI 2021: >90 ML/MIN/1.73M2
ERYTHROCYTE [DISTWIDTH] IN BLOOD BY AUTOMATED COUNT: 14.6 % (ref 10–15)
GLUCOSE BLDC GLUCOMTR-MCNC: 134 MG/DL (ref 70–99)
GLUCOSE SERPL-MCNC: 91 MG/DL (ref 70–99)
HCO3 SERPL-SCNC: 23 MMOL/L (ref 22–29)
HCT VFR BLD AUTO: 34.7 % (ref 35–47)
HGB BLD-MCNC: 11.5 G/DL (ref 11.7–15.7)
LACTATE SERPL-SCNC: 0.8 MMOL/L (ref 0.7–2)
MAGNESIUM SERPL-MCNC: 2.2 MG/DL (ref 1.7–2.3)
MCH RBC QN AUTO: 29.6 PG (ref 26.5–33)
MCHC RBC AUTO-ENTMCNC: 33.1 G/DL (ref 31.5–36.5)
MCV RBC AUTO: 89 FL (ref 78–100)
NT-PROBNP SERPL-MCNC: 667 PG/ML (ref 0–900)
PHOSPHATE SERPL-MCNC: 3.2 MG/DL (ref 2.5–4.5)
PLATELET # BLD AUTO: 312 10E3/UL (ref 150–450)
POTASSIUM SERPL-SCNC: 4.2 MMOL/L (ref 3.4–5.3)
PROCALCITONIN SERPL IA-MCNC: <0.02 NG/ML
RBC # BLD AUTO: 3.89 10E6/UL (ref 3.8–5.2)
SODIUM SERPL-SCNC: 138 MMOL/L (ref 135–145)
WBC # BLD AUTO: 7.3 10E3/UL (ref 4–11)

## 2025-03-26 PROCEDURE — 99232 SBSQ HOSP IP/OBS MODERATE 35: CPT | Mod: FS | Performed by: NURSE PRACTITIONER

## 2025-03-26 PROCEDURE — 200N000001 HC R&B ICU

## 2025-03-26 PROCEDURE — 250N000013 HC RX MED GY IP 250 OP 250 PS 637: Performed by: NURSE PRACTITIONER

## 2025-03-26 PROCEDURE — 82310 ASSAY OF CALCIUM: CPT | Performed by: NURSE PRACTITIONER

## 2025-03-26 PROCEDURE — 99291 CRITICAL CARE FIRST HOUR: CPT | Performed by: NURSE PRACTITIONER

## 2025-03-26 PROCEDURE — 250N000009 HC RX 250: Performed by: NURSE PRACTITIONER

## 2025-03-26 PROCEDURE — 83605 ASSAY OF LACTIC ACID: CPT | Performed by: INTERNAL MEDICINE

## 2025-03-26 PROCEDURE — 258N000003 HC RX IP 258 OP 636: Performed by: STUDENT IN AN ORGANIZED HEALTH CARE EDUCATION/TRAINING PROGRAM

## 2025-03-26 PROCEDURE — P9045 ALBUMIN (HUMAN), 5%, 250 ML: HCPCS | Performed by: NURSE PRACTITIONER

## 2025-03-26 PROCEDURE — 36415 COLL VENOUS BLD VENIPUNCTURE: CPT | Performed by: NURSE PRACTITIONER

## 2025-03-26 PROCEDURE — 250N000011 HC RX IP 250 OP 636: Performed by: NURSE PRACTITIONER

## 2025-03-26 PROCEDURE — 86140 C-REACTIVE PROTEIN: CPT | Performed by: INTERNAL MEDICINE

## 2025-03-26 PROCEDURE — 258N000003 HC RX IP 258 OP 636: Performed by: HOSPITALIST

## 2025-03-26 PROCEDURE — 85018 HEMOGLOBIN: CPT | Performed by: NURSE PRACTITIONER

## 2025-03-26 PROCEDURE — 84145 PROCALCITONIN (PCT): CPT | Performed by: INTERNAL MEDICINE

## 2025-03-26 PROCEDURE — 36415 COLL VENOUS BLD VENIPUNCTURE: CPT | Performed by: INTERNAL MEDICINE

## 2025-03-26 PROCEDURE — 84100 ASSAY OF PHOSPHORUS: CPT | Performed by: NURSE PRACTITIONER

## 2025-03-26 PROCEDURE — 258N000003 HC RX IP 258 OP 636: Performed by: NURSE PRACTITIONER

## 2025-03-26 PROCEDURE — 99291 CRITICAL CARE FIRST HOUR: CPT | Mod: FS | Performed by: PHYSICIAN ASSISTANT

## 2025-03-26 PROCEDURE — 83880 ASSAY OF NATRIURETIC PEPTIDE: CPT | Performed by: STUDENT IN AN ORGANIZED HEALTH CARE EDUCATION/TRAINING PROGRAM

## 2025-03-26 PROCEDURE — 99233 SBSQ HOSP IP/OBS HIGH 50: CPT | Performed by: STUDENT IN AN ORGANIZED HEALTH CARE EDUCATION/TRAINING PROGRAM

## 2025-03-26 PROCEDURE — 85041 AUTOMATED RBC COUNT: CPT | Performed by: NURSE PRACTITIONER

## 2025-03-26 PROCEDURE — 80048 BASIC METABOLIC PNL TOTAL CA: CPT | Performed by: NURSE PRACTITIONER

## 2025-03-26 PROCEDURE — 83735 ASSAY OF MAGNESIUM: CPT | Performed by: NURSE PRACTITIONER

## 2025-03-26 RX ORDER — ROPIVACAINE IN 0.9% SOD CHL/PF 0.1 %
.01-.2 PLASTIC BAG, INJECTION (ML) EPIDURAL CONTINUOUS
Status: DISCONTINUED | OUTPATIENT
Start: 2025-03-26 | End: 2025-03-27

## 2025-03-26 RX ORDER — MIDODRINE HYDROCHLORIDE 2.5 MG/1
2.5 TABLET ORAL
Status: DISCONTINUED | OUTPATIENT
Start: 2025-03-26 | End: 2025-03-26

## 2025-03-26 RX ORDER — GUAIFENESIN 200 MG/10ML
300 LIQUID ORAL EVERY 4 HOURS PRN
Status: DISCONTINUED | OUTPATIENT
Start: 2025-03-26 | End: 2025-03-28 | Stop reason: HOSPADM

## 2025-03-26 RX ORDER — PIPERACILLIN SODIUM, TAZOBACTAM SODIUM 4; .5 G/20ML; G/20ML
4.5 INJECTION, POWDER, LYOPHILIZED, FOR SOLUTION INTRAVENOUS EVERY 6 HOURS
Status: DISCONTINUED | OUTPATIENT
Start: 2025-03-26 | End: 2025-03-26

## 2025-03-26 RX ADMIN — SODIUM CHLORIDE, SODIUM LACTATE, POTASSIUM CHLORIDE, AND CALCIUM CHLORIDE 500 ML: .6; .31; .03; .02 INJECTION, SOLUTION INTRAVENOUS at 10:47

## 2025-03-26 RX ADMIN — OXYCODONE HYDROCHLORIDE 10 MG: 5 TABLET ORAL at 00:23

## 2025-03-26 RX ADMIN — GABAPENTIN 200 MG: 100 CAPSULE ORAL at 21:02

## 2025-03-26 RX ADMIN — BUSPIRONE HYDROCHLORIDE 10 MG: 10 TABLET ORAL at 21:02

## 2025-03-26 RX ADMIN — GABAPENTIN 200 MG: 100 CAPSULE ORAL at 09:18

## 2025-03-26 RX ADMIN — SODIUM CHLORIDE 250 ML: 0.9 INJECTION, SOLUTION INTRAVENOUS at 09:19

## 2025-03-26 RX ADMIN — VENLAFAXINE HYDROCHLORIDE 100 MG: 100 TABLET ORAL at 09:18

## 2025-03-26 RX ADMIN — BUSPIRONE HYDROCHLORIDE 10 MG: 10 TABLET ORAL at 09:18

## 2025-03-26 RX ADMIN — SENNOSIDES AND DOCUSATE SODIUM 2 TABLET: 50; 8.6 TABLET ORAL at 21:08

## 2025-03-26 RX ADMIN — VENLAFAXINE HYDROCHLORIDE 100 MG: 100 TABLET ORAL at 21:02

## 2025-03-26 RX ADMIN — MIDODRINE HYDROCHLORIDE 2.5 MG: 2.5 TABLET ORAL at 12:47

## 2025-03-26 RX ADMIN — RANOLAZINE 500 MG: 500 TABLET, FILM COATED, EXTENDED RELEASE ORAL at 21:02

## 2025-03-26 RX ADMIN — MIDODRINE HYDROCHLORIDE 2.5 MG: 2.5 TABLET ORAL at 09:18

## 2025-03-26 RX ADMIN — RANOLAZINE 500 MG: 500 TABLET, FILM COATED, EXTENDED RELEASE ORAL at 09:18

## 2025-03-26 RX ADMIN — ALBUMIN HUMAN 25 G: 0.05 INJECTION, SOLUTION INTRAVENOUS at 13:07

## 2025-03-26 RX ADMIN — OXYCODONE HYDROCHLORIDE 10 MG: 5 TABLET ORAL at 14:48

## 2025-03-26 RX ADMIN — EZETIMIBE 10 MG: 10 TABLET ORAL at 09:18

## 2025-03-26 RX ADMIN — SODIUM CHLORIDE 250 ML: 0.9 INJECTION, SOLUTION INTRAVENOUS at 06:21

## 2025-03-26 RX ADMIN — GABAPENTIN 200 MG: 100 CAPSULE ORAL at 16:21

## 2025-03-26 RX ADMIN — ATORVASTATIN CALCIUM 80 MG: 40 TABLET, FILM COATED ORAL at 21:02

## 2025-03-26 RX ADMIN — OXYCODONE HYDROCHLORIDE 10 MG: 5 TABLET ORAL at 09:17

## 2025-03-26 RX ADMIN — OXYCODONE HYDROCHLORIDE 10 MG: 5 TABLET ORAL at 19:21

## 2025-03-26 RX ADMIN — TICAGRELOR 90 MG: 90 TABLET ORAL at 09:18

## 2025-03-26 RX ADMIN — ASPIRIN 81 MG: 81 TABLET, COATED ORAL at 09:18

## 2025-03-26 RX ADMIN — FENOFIBRATE 160 MG: 160 TABLET ORAL at 09:18

## 2025-03-26 RX ADMIN — TICAGRELOR 90 MG: 90 TABLET ORAL at 21:02

## 2025-03-26 RX ADMIN — NICOTINE 1 PATCH: 14 PATCH, EXTENDED RELEASE TRANSDERMAL at 16:22

## 2025-03-26 RX ADMIN — NOREPINEPHRINE BITARTRATE 0.03 MCG/KG/MIN: 0.02 INJECTION, SOLUTION INTRAVENOUS at 13:21

## 2025-03-26 ASSESSMENT — ACTIVITIES OF DAILY LIVING (ADL)
ADLS_ACUITY_SCORE: 61
ADLS_ACUITY_SCORE: 62
ADLS_ACUITY_SCORE: 61
ADLS_ACUITY_SCORE: 61
ADLS_ACUITY_SCORE: 62
ADLS_ACUITY_SCORE: 62
ADLS_ACUITY_SCORE: 61
ADLS_ACUITY_SCORE: 62
ADLS_ACUITY_SCORE: 61
ADLS_ACUITY_SCORE: 62
ADLS_ACUITY_SCORE: 61
ADLS_ACUITY_SCORE: 62
ADLS_ACUITY_SCORE: 61

## 2025-03-26 NOTE — PROGRESS NOTES
ICU PROGRESS NOTE  03/26/2025        Date of Service (when I saw the patient): 03/26/2025    ASSESSMENT:  Cailin Burns is a 49 year old female with a PMH significant for tobacco use disorder, type 2 diabetes mellitus, PAD with mixed claudication, CAD s/p STEMI September 2022 with three-vessel disease status post staged PCI due to poor bypass target (PCI of RCA  and MOHAN x1 to mLAD and MOHAN x1 to mLCx on 01/2023), ischemic cardiomyopathy with HFrEF, LVEF 27% status post ICD May 2023,  dyslipidemia, chronic pain, who was admitted to ICU 3/24/25 for hypovolemic shock, transferred out 3/25/25, returned to ICU for hypotension needing norepi gtt after RRT today 3/26/25     CHANGES and MAJOR THINGS TODAY:   Stop Zosyn   Follow lactic, CRP and procal results   Stop Zosyn   Patient care order to check BPs on left only-- RIght steal syndrome   Change diet to high carb to allow patient more food options     PLAN:    Neuro/ pain/ sedation:  # Anxiety and depression   # hx of panic attacks   - tylenol prn   - PRN: oxycodone, dilaudid IV for pain   - resume home Buspar and wellbuytrin      Pulmonary care:   # current tobacco use  - Goal SpO2 > 90%  - patient reports she smokes 10 cigarettes per day. Nicotine patch ordered   - Mucinex to help thin secretions   - Acapella/encourage IS.      Cardiovascular:    # Hx HTN  # Ischemic cardiomyopathy, EF 30-35%   # s/p ICD placement   # PAD  with claudication   # CAD, Angina   # hx of STEMI   # hypotension  - Hold home diuretics, hold beta blocker, entresto, farxiga, ranolazine,  isordil, lasix and spironolactone   - resume home Zetia, lipitor and ASA and Brillinta   - EKG, Echo and Trop within normal limits   - cards heartfailure consult, appreciate cares and recommendations    - will reintroduce   GDMT medications as able    -    - weaned off Norepi gtt  in ICU      # Right Vertebral steal syndrome   # High grade brachiocephalic stenosis.   Discrepancy of blood pressure,  which is lower on the right side. Avoid using right arm for blood pressure measurement (patient care order placed)  Arterial Duplex 2/2024 with  right subclavian stenosis and possible brachiocephalic artery  stenosis.   CTA 02/24 which showed high grade stenosis of the of right innominate stenosis  with near occlusion.   - will need continue vascular followup     GI care / Nutrition:   # hx of constipation due to narcotic use   # diarrhea, resolved   - pt reports diarrhea of 1 day onset at time of admission not had BM since.     Renal / Fluids / Electrolytes:   # appears euvolemic   BL creatine range appears to be ~ creat 0.66,  - Strict I/O, daily weights  - Replete lytes PRN per protocol  - RN replacement protocol ordered      Endocrine:    # Diabetes Mellitus Type 2, on oral agents and injectables   Preop A1c 10.6  - hypoglycemia protocol in place.    - hold oral agents and injectable  metformin and Ozempic for now   - send random cortisol      ID / Antibiotics:  # concern for sepsis    - procalcintonin  negative.lactic negative. CRP negative. WBC negative. Patient without signs/symptoms of infections, will stop Zosyn      Heme:     # no acute issue     General cares and Prophylaxis:     - Mechanical ppx for DVT  - Chemical DVT ppx: ASA and Brillinta.   - PPI--none needed   - Bowel regimen: PPI, MiraLAX, senna      Patient seen, findings and plan discussed with ICU staff     Time spent on this Encounter   Billing:  I spent 35 minutes bedside and on the inpatient unit today managing the critical care of Cailin Burns, excluding procedures in relation to the issues listed in this note.      Amadeo Ladd PA-C    ====================================  INTERVAL HISTORY:  Patient seen in ICU. Pt reports she feel well. Unsure why her BP was low, did not have any symptoms of dizziness/sweating or feeling lightheaded. Would like to order more fruit but limited by cafeteria. No chest pain, abdominal pain or  diarrhea       OBJECTIVE:   1. VITAL SIGNS:   Temp:  [97.5  F (36.4  C)-98.1  F (36.7  C)] 98.1  F (36.7  C)  Pulse:  [62-96] 76  Resp:  [10-20] 12  BP: ()/(36-81) 137/62  SpO2:  [16 %-100 %] 98 %  Resp: 12    2. INTAKE/ OUTPUT:   I/O last 3 completed shifts:  In: 1836.77 [P.O.:1060; I.V.:26.77; IV Piggyback:750]  Out: 0     3. PHYSICAL EXAMINATION:  General: sitting up on bed, awake. Alert and interactive   HEENT: PERRLA   Neuro: A&Ox3, NAD, pleasant and conversant   Pulm/Resp: Clear breath sounds bilaterally without rhonchi, crackles or wheezes on room air.   CV: RRR, S1/S2   Abdomen: Soft, non-distended, non-tender,  no rebound tenderness or guarding, no masses  : voiding on own  Incisions/Skin: ecchymosis on right forearm from prior PIV site.   MSK/Extremities: no  peripheral edema, moving all extremities, peripheral pulses intact, calves soft and compressible,  extremities well perfused, pulses 2+.    4. INVESTIGATIONS:   Arterial Blood Gases   No lab results found in last 7 days.  Complete Blood Count   Recent Labs   Lab 03/26/25  0511 03/25/25  0517 03/24/25  2300   WBC 7.3 13.0*  12.7* 12.2*   HGB 11.5* 12.6 12.4    356 339     Basic Metabolic Panel  Recent Labs   Lab 03/26/25  1413 03/26/25  0511 03/25/25  1618 03/25/25  1239 03/25/25  0827 03/25/25  0517 03/25/25  0351 03/24/25  2300   NA  --  138  --   --   --  138  --  135   POTASSIUM  --  4.2  --   --   --  3.8  --  4.0   CHLORIDE  --  108*  --   --   --  105  --  102   CO2  --  23  --   --   --  21*  --  22   BUN  --  8.4  --   --   --  10.8  --  14.4   CR  --  0.56  --   --   --  0.67  --  0.75   * 91 110* 74   < > 125*   < > 117*    < > = values in this interval not displayed.     Liver Function Tests  Recent Labs   Lab 03/24/25  2300   AST 16   ALT 14   ALKPHOS 49   BILITOTAL 0.3   ALBUMIN 3.7

## 2025-03-26 NOTE — PROGRESS NOTES
Orientations: A/O x4  Vitals/Pain: Hypotensive otherwise VSS on RA. LS are dim. Pain managed w/ ice pack and PRN oxycodone  Tele: SR  Lines/Drains: PIV x2  Skin/Wounds: L ankle scab and RUE bruise  GI/: Voiding adequately. No BM during shift.  Ambulation/Assist: SBA  Diet: Mod carb/cardiac diet, tolerating well    RRT called at 0812 for asymptomatic hypotension. Please see house NP's note for details    Pt transferred to ICU, handoff given to FRANCIS Almazan

## 2025-03-26 NOTE — PROVIDER NOTIFICATION
"MD Notification    Notified Person: MD    Notified Person Name: Mukesh Rich    Notification Date/Time: 3/26/25 1041    Notification Interaction: elly    Purpose of Notification: \"Do you want LR bolus or albumin infusion first?    Orders Received:    Comments: Give LR bolus first, then albumin infusion. Notify if MAP <60. Check BP's every 15 min      Addendum 3/26/25 1736 \"FYI BP is 75/56 MAP 63, has about 5 minutes of LR, then will start albumin  "

## 2025-03-26 NOTE — PROGRESS NOTES
[facility]    Medicine Progress Note - Hospitalist Service    Date of Admission:  3/24/2025    Assessment & Plan    Cailin Burns is a 49 year old female with a PMH significant for tobacco use disorder, type 2 diabetes mellitus, PAD with mixed claudication, CAD s/p STEMI September 2022 with three-vessel disease status post staged PCI due to poor bypass target (PCI of RCA  and MOHAN x1 to mLAD and MOHAN x1 to mLCx on 01/2023), ischemic cardiomyopathy with HFrEF, LVEF 27% status post ICD May 2023,  dyslipidemia, chronic pain, who was admitted to ICU 3/24/25 for hypovolemic shock and is being transferred to hospitalist service 03/25/25.      Hypotension, likely hypovolemic  Borderline shock state  Prior to patient's presentation, she had GI losses from diarrhea and vomiting. This is in addition to the diuretic medications she takes for her HF.  Shock state now resolved, likely hypovolemic type from commendation of GI losses from vomiting diarrhea coupled with diuretic medication use  *baseline BP at clinic appointments and home /70s-80s  Patient was initially in ICU due to vasopressor requirement. Weaned off vasopressors on 3/25 and transferred from ICU early in the morning 3/26 with BP in the 80s/50s. Overnight hospitalist gave 250 mL over 2 hours. BP has continued to be low in the 70s/50s and RRT was called later in the morning 3/26. House staff ordered albumin 5% 25g,  mL. Also given another dose of 500 mL LR  -Transferred to ICU today after being placed back on vasopressors  -In ICU, BP improved and now back off vasopressors. Lactate is normal.  -Maintain in ICU overnight and can transfer out tomorrow. Noted that BP is improved on L arm likely due to history of R subclavian and brachiocephalic artery stenosis.     NYHA class II HF, systolic, chronic, compensated, at times class III  symptoms  ischemic cardiomyopathy LVEF 27% status post ICD May 2023. EF 45% on 3/24/2025  TTE  *CAD s/p STEMI September 2022 with three-vessel disease status post staged PCI due to poor bypass target (PCI of RCA  and MOHAN x1 to mLAD and MOHAN x1 to mLCx on 01/2023)  *TTE 3/25/2025 showing LV is normal in size. The visual ejection fraction is  estimated at 45%. Mid-distal inferior, anteroseptal, and apical hypokinesis.  right ventricle is normal in structure, function and size.  No valve disease.  Echo 2-5-24 showed EF 35% with similar WMA  *PTA GDMT includes dapagliflozin 10 mg daily, Lasix 20 mg daily, isosorbide dinitrate 10 mg 3 times daily, metoprolol extended release 20 mg daily, Entresto 97-23 mg twice daily, spironolactone 25 mg daily  -Cardiology following  -Continue to hold HF medications given persistent hypotension  - Every 2 hour intake and output and vital signs  - Daily weights  - Low-sodium diet  -Appreciate cardiology recommendations to resume beta-blockers and half dose Entresto on 3/27/2025 in the AM  -Can slowly reintroduce the remainder of her she GDMT heart failure meds in clinic  -Continue PTA DAPT with aspirin and Brilinta  - Can resume PTA Ranexa     Recent diarrheal illness without any further episodes of such since admission  -if recurs, send enteric panel     Leukocytosis 2/2 unclear etiology aside from recent diarrheal illness, CT chest 3/24/25 w/o any evidence of pneumonia, ROS negative for any infectious history, well-appearing, only complaint is fatigue; procal neg, UA neg  -if febrile can start broad spectrum atbx  -enteric panel, cdiff if diarrhea recurs     T2DM last A1c was 1 year PTA, was uncontrolled at that time  -Hold PTA metformin, Ozempic  -Okay for patient to use her continuous glucose monitor, correlate twice daily w/ POC testing  -Mod carb diet  -check A1c  -tid ac/hs gluc checks and med dose sliding scale insulin w/ 1unit: 15g CHO while off of metformin  -Hypoglycemia protocol is in place     tobacco use disorder, ongoing  -Encourage cessation  -NicoDerm  "patch     PAD with mixed claudication  -DAPT continued     Dyslipidemia  -Continue PTA Lipitor, Zetia, fenofibrate     chronic pain  -Continue PTA gabapentin, venlafaxine  -As needed acetaminophen, hydromorphone oxycodone          Diet: Consistent Carbohydrate Diet Moderate Consistent Carb (60 g CHO per Meal) Diet    DVT Prophylaxis: Pneumatic Compression Devices  Cuevas Catheter: Not present  Lines: None     Cardiac Monitoring: ACTIVE order. Indication: resolving shock state  Code Status: Full Code      Clinically Significant Risk Factors          # Hyperchloremia: Highest Cl = 108 mmol/L in last 2 days, will monitor as appropriate      # Hypocalcemia: Lowest Ca = 8.4 mg/dL in last 2 days, will monitor and replace as appropriate          # End stage heart failure: Ventricular assist device (VAD) present          # DMII: A1C = 6.8 % (Ref range: <5.7 %) within past 6 months, PRESENT ON ADMISSION  # Overweight: Estimated body mass index is 28.91 kg/m  as calculated from the following:    Height as of this encounter: 1.575 m (5' 2\").    Weight as of this encounter: 71.7 kg (158 lb 1.1 oz)., PRESENT ON ADMISSION      # ICD device present       Social Drivers of Health    Food Insecurity: Food Insecurity Present (4/2/2024)    Received from [facility]    Hunger Vital Sign     Worried About Running Out of Food in the Last Year: Often true     Ran Out of Food in the Last Year: Often true   Housing Stability: High Risk (2/1/2024)    Received from [facility], [facility]    Housing Stability Vital Sign     Unable to Pay for Housing in the Last Year: Yes     Number of Places Lived in the Last Year: 1     Unstable Housing in the Last Year: No   Tobacco Use: High Risk (3/24/2025)    Received from [facility]    Patient History     Smoking Tobacco Use: Every Day     Smokeless Tobacco Use: Unknown   Financial Resource Strain: High Risk (10/14/2022)    [facility] from [facility], [facility] and [facility]    Overall Financial Resource Strain (CARDIA)     Difficulty of Paying Living Expenses: Very hard          Disposition Plan     Medically Ready for Discharge: Anticipated in 2-4 Days             Cheng Rich MD  Hospitalist Service  [facility]  Securely message with Zmags (more info)  Text page via [facility] Paging/Directory   ______________________________________________________________________    Interval History   Early this morning, patient's blood pressure became low again.  Nocturnist gave small fluid bolus.  Patient persistently hypotensive today and RRT was called.  Patient given further IV fluids and albumin without improvement in blood pressure. Throughout this, patient feels ok with no major complaints or concerns. Discussed case with tele-ICU who agreed to transfer back to ICU for vasopressors.    Physical Exam   Vital Signs: Temp: 97.5  F (36.4  C) Temp src: Oral BP: (!) 77/54 Pulse: 68   Resp: 12 SpO2: 100 % O2 Device: None (Room air)    Weight: 158 lbs 1.12 oz  Constitutional: Awake, alert, cooperative, no apparent distress.    Pulmonary: No increased work of breathing, good air exchange, clear to auscultation bilaterally, no crackles or wheezing.  Cardiovascular: Regular rate and rhythm, normal S1 and S2, no S3 or S4. No murmurs, rubs, or gallops noted.  GI: Normal bowel sounds, soft, non-distended, non-tender.  No guarding or rebound.  Skin/Integumen: No cyanosis or jaundice. No rashes noted.  Extremities: No lower extremity edema.  Neuro: A&Ox4. Conversant. Responds appropriately in conversation. Moves all extremities with no focal deficit identified.           Medical Decision Making       52 MINUTES SPENT BY ME on the date of service doing chart review, history, exam, documentation & further activities per the note.      Data   ------------------------- PAST 24 HR DATA REVIEWED  -----------------------------------------------    I have personally reviewed the following data over the past 24 hrs:    7.3  \   11.5 (L)   / 312     138 108 (H) 8.4 /  134 (H)   4.2 23 0.56 \     Trop: N/A BNP: 667     TSH: N/A T4: N/A A1C: N/A     Procal: <0.02 CRP: <3.00 Lactic Acid: 0.8         Imaging results reviewed over the past 24 hrs:   No results found for this or any previous visit (from the past 24 hours).

## 2025-03-26 NOTE — PLAN OF CARE
"[id number]    Neuro: A&O x4. Follows commands, able to make needs known.     CV: Tele SR, Levo titrated off @ 1533 to maintain MAP >65.     Resp: LS diminished/clear. O2 stable on RA.     GI: BS active, no BM this shift. Diet: high consistent carb. Bowel sounds active.    : Voiding in bedside commode.     Skin: R forearm bruise, L ankle scab.     Pain: Chronic back pain; PRN oxycodone/scheduled gabapentin.     Activity: SBA     Additional: BP's only on L arm.     Plan: Continue with plan of care, probable transfer out of ICU tomorrow.       Problem: Adult Inpatient Plan of Care  Goal: Plan of Care Review  Description: The Plan of Care Review/Shift note should be completed every shift.  The Outcome Evaluation is a brief statement about your assessment that the patient is improving, declining, or no change.  This information will be displayed automatically on your shiftnote.  Outcome: Progressing  Goal: Patient-Specific Goal (Individualized)  Description: You can add care plan individualizations to a care plan. Examples of Individualization might be:  \"Parent requests to be called daily at 9am for status\", \"I have a hard time hearing out of my right ear\", or \"Do not touch me to wake me up as it startlesme\".  Outcome: Progressing  Goal: Absence of Hospital-Acquired Illness or Injury  Outcome: Progressing  Intervention: Identify and Manage Fall Risk  Recent Flowsheet Documentation  Taken 3/26/2025 1600 by Norah Albert RN  Safety Promotion/Fall Prevention:   activity supervised   assistive device/personal items within reach   clutter free environment maintained   lighting adjusted   mobility aid in reach   nonskid shoes/slippers when out of bed   patient and family education   room near nurse's station   room organization consistent   safety round/check completed   supervised activity  Taken 3/26/2025 1400 by Norah Albert RN  Safety Promotion/Fall Prevention:   activity supervised   assistive " device/personal items within reach   clutter free environment maintained   lighting adjusted   mobility aid in reach   nonskid shoes/slippers when out of bed   patient and family education   room near nurse's station   room organization consistent   safety round/check completed   supervised activity  Intervention: Prevent Skin Injury  Recent Flowsheet Documentation  Taken 3/26/2025 1600 by Norah Albert RN  Body Position: position changed independently  Skin Protection:   adhesive use limited   tubing/devices free from skin contact  Taken 3/26/2025 1415 by Norah Albert RN  Body Position: position changed independently  Taken 3/26/2025 1400 by Norah Albert RN  Skin Protection:   adhesive use limited   tubing/devices free from skin contact  Intervention: Prevent and Manage VTE (Venous Thromboembolism) Risk  Recent Flowsheet Documentation  Taken 3/26/2025 1600 by Norah Albert RN  VTE Prevention/Management: SCDs off (sequential compression devices)  Taken 3/26/2025 1400 by Norah Albert RN  VTE Prevention/Management: SCDs off (sequential compression devices)  Intervention: Prevent Infection  Recent Flowsheet Documentation  Taken 3/26/2025 1600 by Norah Albert RN  Infection Prevention:   cohorting utilized   environmental surveillance performed   equipment surfaces disinfected   hand hygiene promoted   rest/sleep promoted   single patient room provided  Taken 3/26/2025 1400 by Norah Albert RN  Infection Prevention:   cohorting utilized   environmental surveillance performed   equipment surfaces disinfected   hand hygiene promoted   rest/sleep promoted   single patient room provided  Goal: Optimal Comfort and Wellbeing  Outcome: Progressing  Intervention: Monitor Pain and Promote Comfort  Recent Flowsheet Documentation  Taken 3/26/2025 1415 by Norah Albert RN  Pain Management Interventions:   medication (see MAR)   rest  Intervention: Provide  Person-Centered Care  Recent Flowsheet Documentation  Taken 3/26/2025 1600 by Norah Albert RN  Trust Relationship/Rapport:   care explained   choices provided   questions answered   questions encouraged   thoughts/feelings acknowledged   reassurance provided  Taken 3/26/2025 1400 by Norah Albert RN  Trust Relationship/Rapport:   care explained   choices provided   questions answered   questions encouraged   thoughts/feelings acknowledged   reassurance provided  Goal: Readiness for Transition of Care  Outcome: Progressing     Problem: Risk for Delirium  Goal: Optimal Coping  Outcome: Progressing  Intervention: Optimize Psychosocial Adjustment to Delirium  Recent Flowsheet Documentation  Taken 3/26/2025 1600 by Norah Albert RN  Supportive Measures:   active listening utilized   decision-making supported   self-care encouraged   verbalization of feelings encouraged   positive reinforcement provided  Taken 3/26/2025 1400 by Norah Albert RN  Supportive Measures:   active listening utilized   decision-making supported   self-care encouraged   verbalization of feelings encouraged   positive reinforcement provided  Goal: Improved Behavioral Control  Outcome: Progressing  Intervention: Prevent and Manage Agitation  Recent Flowsheet Documentation  Taken 3/26/2025 1600 by Norah Albert RN  Environment Familiarity/Consistency: daily routine followed  Taken 3/26/2025 1400 by Norah Albert RN  Environment Familiarity/Consistency: daily routine followed  Intervention: Minimize Safety Risk  Recent Flowsheet Documentation  Taken 3/26/2025 1600 by Norah Albert RN  Enhanced Safety Measures:   pain management   patient/family teach back on injury risk   review medications for side effects with activity  Trust Relationship/Rapport:   care explained   choices provided   questions answered   questions encouraged   thoughts/feelings acknowledged   reassurance provided  Taken  3/26/2025 1400 by Norah Albert RN  Enhanced Safety Measures:   pain management   patient/family teach back on injury risk   review medications for side effects with activity  Trust Relationship/Rapport:   care explained   choices provided   questions answered   questions encouraged   thoughts/feelings acknowledged   reassurance provided  Goal: Improved Attention and Thought Clarity  Outcome: Progressing  Intervention: Maximize Cognitive Function  Recent Flowsheet Documentation  Taken 3/26/2025 1600 by Norah Albert RN  Sensory Stimulation Regulation:   care clustered   lighting decreased   quiet environment promoted  Reorientation Measures:   calendar in view   clock in view  Taken 3/26/2025 1400 by Norah Albert RN  Sensory Stimulation Regulation:   care clustered   lighting decreased   quiet environment promoted  Reorientation Measures:   calendar in view   clock in view  Goal: Improved Sleep  Outcome: Progressing     Problem: Skin Injury Risk Increased  Goal: Skin Health and Integrity  Outcome: Progressing  Intervention: Optimize Skin Protection  Recent Flowsheet Documentation  Taken 3/26/2025 1600 by Norah Albert RN  Pressure Reduction Techniques: frequent weight shift encouraged  Skin Protection:   adhesive use limited   tubing/devices free from skin contact  Activity Management:   activity adjusted per tolerance   activity encouraged  Head of Bed (HOB) Positioning: HOB at 30-45 degrees  Taken 3/26/2025 1415 by Norah Albert RN  Activity Management:   activity adjusted per tolerance   activity encouraged  Head of Bed (HOB) Positioning: HOB at 30-45 degrees  Taken 3/26/2025 1400 by Norah Albert RN  Pressure Reduction Techniques: frequent weight shift encouraged  Skin Protection:   adhesive use limited   tubing/devices free from skin contact  Activity Management:   activity adjusted per tolerance   activity encouraged     Problem: Pain Acute  Goal: Optimal Pain  Control and Function  Outcome: Progressing  Intervention: Optimize Psychosocial Wellbeing  Recent Flowsheet Documentation  Taken 3/26/2025 1600 by Norah Albert RN  Spiritual Activities Assistance: affirmation provided  Supportive Measures:   active listening utilized   decision-making supported   self-care encouraged   verbalization of feelings encouraged   positive reinforcement provided  Taken 3/26/2025 1400 by Norah Albert RN  Spiritual Activities Assistance: affirmation provided  Supportive Measures:   active listening utilized   decision-making supported   self-care encouraged   verbalization of feelings encouraged   positive reinforcement provided  Intervention: Develop Pain Management Plan  Recent Flowsheet Documentation  Taken 3/26/2025 1415 by Norah Albert RN  Pain Management Interventions:   medication (see MAR)   rest  Intervention: Prevent or Manage Pain  Recent Flowsheet Documentation  Taken 3/26/2025 1600 by Norah Albert RN  Sensory Stimulation Regulation:   care clustered   lighting decreased   quiet environment promoted  Bowel Elimination Promotion: adequate fluid intake promoted  Medication Review/Management: medications reviewed  Taken 3/26/2025 1400 by Norah Albert RN  Sensory Stimulation Regulation:   care clustered   lighting decreased   quiet environment promoted  Bowel Elimination Promotion: adequate fluid intake promoted  Medication Review/Management: medications reviewed     Problem: Fall Injury Risk  Goal: Absence of Fall and Fall-Related Injury  Outcome: Progressing  Intervention: Identify and Manage Contributors  Recent Flowsheet Documentation  Taken 3/26/2025 1600 by Norah Albert RN  Medication Review/Management: medications reviewed  Taken 3/26/2025 1400 by Norah Albert RN  Medication Review/Management: medications reviewed  Intervention: Promote Injury-Free Environment  Recent Flowsheet Documentation  Taken 3/26/2025 1600  by Norah Albert RN  Safety Promotion/Fall Prevention:   activity supervised   assistive device/personal items within reach   clutter free environment maintained   lighting adjusted   mobility aid in reach   nonskid shoes/slippers when out of bed   patient and family education   room near nurse's station   room organization consistent   safety round/check completed   supervised activity  Taken 3/26/2025 1400 by Norah Albert RN  Safety Promotion/Fall Prevention:   activity supervised   assistive device/personal items within reach   clutter free environment maintained   lighting adjusted   mobility aid in reach   nonskid shoes/slippers when out of bed   patient and family education   room near nurse's station   room organization consistent   safety round/check completed   supervised activity   Goal Outcome Evaluation:

## 2025-03-26 NOTE — SIGNIFICANT EVENT
Significant Event Note    Time of event: 6:09 AM March 26, 2025    Description of event:  Hypotension    Ms Burns became hypotensive in the morning of 3/26. She was recently transferred out of the ICU after being treated for hypovolemic shock secondary to GI losses from vomiting and dirrhea. She was weaned of pressors on 3/25. She has had no further diarrhea since transfer but her blood pressure has been trending down with last bp of 76/54. She remains asymptomatic.     Plan:  NS 250ml bolus over two hours.     Discussed with: bedside nurse    Malik Chun MD

## 2025-03-26 NOTE — PROGRESS NOTES
[facility] Progress Note  Date of Service: 03/26/2025    Primary Cardiologist: Dr. Truong Burns is a 49 year old female with past medical history of ischemic cardiomyopathy, HFrEF s/p ICD 2023, 3v CAD with poor bypass targets s/p multivessel PCI RCA  and MOHAN to LAD 1/2023, residual Lcx disease, DM2, PAD, Right subclavian stenosis, brachiocephalic stenosis (avoid using right arm), active tobacco use who was admitted on 3/24/2025 with nausea, vomiting, diarrhea. Was hypotensive on arrival, placed on Norepi infusion. Cardiac medications were held. Cardiology was consulted for further input. Iniital WBC mildly elevated at 13.0. HS troponin negative. BNP normal. Pro calcitonin normal. CRP negative. Lactate normal. Normal LFTs.    Interim History: transferred back to the ICU for hypotension 03/26/25.     Subjective: Resting in bed. No dizziness. No chest pain. No fever.       Assessment:  Hypotension with recurrence   Nausea, vomiting, diarrhea  PAD  Right subclavian stenosis. Over 40 point systolic discrepancy between right arm and left arm on recheck at bedside. Currently BP on left arm 130-140 systolic. Weak radial pulse on right arm  ICM, chronic HFrEF  ICD in place 5/2023  3v CAD with PCI to RCA and LAD 2023  Tobacco abuse    Plan:   Echocardiogram is stable with slightly improved LVEF 45% with similar WMA.  No clear etiology for hypotension so far with negative workup, has been appropriately volume replaced with 1L fluid today. Suspect inaccurate blood pressure readings.   Use left arm for blood pressures   Wean Norepi off and can resume home medications tomorrow, can start with half dose Entresto and increase back up as tolerated along with resuming metoprolol. Then can resume isordil, spironolactone and if no further GI symptoms dapagliflozin.   Continue aspirin, brilinta, ranexa, lipitor, zetia   Follows with advanced heart failure outpatient     Plan discussed with   Brittanie and THELMA Loepz at bedside.    Cardiology will sign-off. Thank you for allowing us to participate in the care of this patient. Please do not hesitate to contact us if we can be of further assistance.      CARLOTTA Whatley CNP  [facility]  Page via [employer]   __________________________________________________________________________  Physical Exam   Temp: 98.1  F (36.7  C) Temp src: Oral BP: 137/62 Pulse: 76   Resp: 12 SpO2: 98 % O2 Device: None (Room air)    Vitals:    03/24/25 2245 03/26/25 0000 03/26/25 1415   Weight: 71.8 kg (158 lb 4.6 oz) 71.7 kg (158 lb 1.1 oz) 73.7 kg (162 lb 7.7 oz)       GENERAL:  The patient is in no apparent distress.   PULMONARY:  There is a normal respiratory effort.   CARDIOVASCULAR:  RRR, normal S1 S2, no m/r/g.  GI:  Non tender abdomen  EXTREMITIES:  No clubbing, cyanosis or edema.  VASCULAR: Weak radial pulse on Right ; left 2+    Medications   Current Facility-Administered Medications   Medication Dose Route Frequency Provider Last Rate Last Admin    norepinephrine (LEVOPHED) 4 mg in  mL PERIPHERAL infusion  0.01-0.2 mcg/kg/min Intravenous Continuous Lawanda Grider APRN CNP   Paused at 03/26/25 1533     Current Facility-Administered Medications   Medication Dose Route Frequency Provider Last Rate Last Admin    aspirin EC tablet 81 mg  81 mg Oral Daily Vani Funes APRN CNP   81 mg at 03/26/25 0918    atorvastatin (LIPITOR) tablet 80 mg  80 mg Oral or NG Tube At Bedtime Vani Funes APRN CNP   80 mg at 03/25/25 2023    busPIRone (BUSPAR) tablet 10 mg  10 mg Oral or NG Tube BID Vani Funes APRN CNP   10 mg at 03/26/25 0918    ezetimibe (ZETIA) tablet 10 mg  10 mg Oral or NG Tube Daily Vani Funes APRN CNP   10 mg at 03/26/25 0918    fenofibrate (TRIGLIDE/LOFIBRA) tablet 160 mg  160 mg Oral or NG Tube Daily Vani Funes APRN CNP   160 mg at 03/26/25 0918    gabapentin (NEURONTIN) capsule 200 mg  200 mg Oral or NG  Tube TID Vani Funes APRN CNP   200 mg at 03/26/25 0918    nicotine (NICODERM CQ) 14 MG/24HR 24 hr patch 1 patch  1 patch Transdermal Daily Vani Funes APRN CNP   1 patch at 03/25/25 1613    piperacillin-tazobactam (ZOSYN) 4.5 g vial to attach to  mL bag  4.5 g Intravenous Q6H Pedrito Ross MD        ranolazine (RANEXA) 12 hr tablet 500 mg  500 mg Oral BID Vani Funes APRN CNP   500 mg at 03/26/25 0918    senna-docusate (SENOKOT-S/PERICOLACE) 8.6-50 MG per tablet 2 tablet  2 tablet Oral or NG Tube At Bedtime Vani Funes APRN CNP        sodium chloride (PF) 0.9% PF flush 3 mL  3 mL Intracatheter Q8H LUZ ELENA Vani Funes APRN CNP        ticagrelor (BRILINTA) tablet 90 mg  90 mg Oral BID Vani Funes APRN CNP   90 mg at 03/26/25 0918    venlafaxine (EFFEXOR) tablet 100 mg  100 mg Oral or NG Tube BID Vani Funes APRN CNP   100 mg at 03/26/25 0918       Data   Echo: 3/25/2025   Summary  1. The left ventricle is normal in size. The visual ejection fraction is  estimated at 45%. Mid-distal inferior, anteroseptal, and apical hypokinesis.  2. The right ventricle is normal in structure, function and size.  3. No valve disease.  Echo 2-5-24 showed EF 35% with similar WMA.      Arterial duplex : [facility] 2/2024  Right proximal subclavian artery appears stenotic/post-stenotic based on   turbulent waveform, unable to insonate more proximally to determine ratio.     Turbulent flow also noted in right proximal common carotid artery   suggestive of brachiocephalic stenosis.   Monophasic flow noted throughout remainder of right upper extremity   arteries.   Right arm pressure likely does not reflect true blood pressure readings   given above.     Most Recent 3 CBC's:  Recent Labs   Lab Test 03/26/25  0511 03/25/25  0517 03/24/25  2300   WBC 7.3 13.0*  12.7* 12.2*   HGB 11.5* 12.6 12.4   MCV 89 89 89    356 339     Most Recent 3 BMP's:  Recent Labs   Lab Test 03/26/25  1413 03/26/25  0511  03/25/25  1618 03/25/25  0827 03/25/25  0517 03/25/25  0351 03/24/25  2300   NA  --  138  --   --  138  --  135   POTASSIUM  --  4.2  --   --  3.8  --  4.0   CHLORIDE  --  108*  --   --  105  --  102   CO2  --  23  --   --  21*  --  22   BUN  --  8.4  --   --  10.8  --  14.4   CR  --  0.56  --   --  0.67  --  0.75   ANIONGAP  --  7  --   --  12  --  11   RON  --  8.4*  --   --  8.6*  --  8.4*   * 91 110*   < > 125*   < > 117*    < > = values in this interval not displayed.     Most Recent 2 LFT's:  Recent Labs   Lab Test 03/24/25  2300 02/06/24  0614   AST 16 25   ALT 14 25   ALKPHOS 49 55   BILITOTAL 0.3 0.3     Most Recent Cholesterol Panel:  Recent Labs   Lab Test 01/08/25  1452   CHOL 135   LDL 76   HDL 32*   TRIG 134     Most Recent ESR & CRP:  Recent Labs   Lab Test 03/26/25  0511   CRPI <3.00

## 2025-03-26 NOTE — PLAN OF CARE
Patient arrived from ICU at 0530. MD paged regarding low BP in the 70's systolic, Small bolus running. No stools since admission. Adequate urine output. Up stand by assist in room. Tele normal sinus with BBB.

## 2025-03-26 NOTE — CODE/RAPID RESPONSE
[facility]    RRT Note  3/26/2025   Time Called: 0812    Code Status: Full Code    I was called to evaluate Cailin Burns for hypotension, who is a 49 year old female with a PMH significant for tobacco use disorder, type 2 diabetes mellitus, PAD with mixed claudication, CAD s/p STEMI September 2022 with three-vessel disease status post staged PCI due to poor bypass target (PCI of RCA  and MOHAN x1 to mLAD and MOHAN x1 to mLCx on 01/2023), ischemic cardiomyopathy with HFrEF, LVEF 27% status post ICD May 2023,  dyslipidemia, chronic pain, who was admitted to ICU 3/24/25 for hypovolemic shock and [place] [facility] [place] on 3/25/2025 noting b/p at that time 87/66,81/59,98/61,88/50. Throughout the rest of the day she did have generally improved pressures maintaining in the 90s systolic, however, [place] [facility] [place] at 0530 am of 3/26/2025 with SBP in the 70s.    This morning, pressures since 0500 with Systolics in the 70s and asymptomatic. She did receive a 250ml bolus of NS at 0621 without improvement. Output record is not accurate. Renal function is normal with creatinine of 0.56,  with clear lung sounds.    Additional bolus 250 ML ordered as she does appear a bit clinically dry. Will add albumin. Midodrine 2.5mg TID and compression stocking as well.     Assessment & Plan     Addendum  03/26/2025 1300   Despite efforts to include fluids, albumin, midodrine patient's b/p continued to have SBP in the 70s with MAP of <65. Norepinephrine will be started, transfer to ICU. Discussed with [facility] hospitalist who spoke with tele Intensivist.     Lawanda Grider DNP APRN CNP    Additional 30 min of critical care time for continued hypotension, transfer to ICU, and initiation of norepinephrine     Asymptomatic Hypotension  RRT was called due to persistent hypotension. Report [place] [facility] [place] is that patient was transferred from ICU on 3/25/2025 after being  weaned from norepinephrine at 1109 am on 3/25/2025      INTERVENTIONS:  -NS 250Ml IV bolus x1 now  -Albumin 25g IV x1 now   -Midodrine 2.5mg TID   -Compression stockings initially ordered, however patient PAD and claudication therefore discontinued.   -advised bedside nursing staff that if the above interventions are not effective to maintain patient's SBP >80 and MAP >65 we will need to initiate pressors and transfer back to ICU  -Labs without concern of fluid overload, lung sounds are clear. Patient could handle additional fluid bolus if necessary.       Discussed with bedside nursing staff and Dr. Layla short hospitalist      Lawanda Grider, MARY LAGUERRE [facility]  Securely message with the Vocera Web Console (learn more here)  Text page via [employer] Paging/Directory          Physical Exam   Vital Signs with Ranges:  Temp:  [97.5  F (36.4  C)-98.8  F (37.1  C)] 97.5  F (36.4  C)  Pulse:  [62-89] 70  Resp:  [5-23] 12  BP: ()/(40-76) 73/51  SpO2:  [16 %-100 %] 99 %  I/O last 3 completed shifts:  In: 1886.32 [P.O.:996; I.V.:140.32; IV Piggyback:750]  Out: 475 [Urine:475]    Orthostatic:                Physical Exam  Constitutional:       Appearance: Normal appearance.   HENT:      Mouth/Throat:      Mouth: Mucous membranes are moist.   Eyes:      Pupils: Pupils are equal, round, and reactive to light.   Cardiovascular:      Rate and Rhythm: Normal rate and regular rhythm.      Pulses: Normal pulses.      Heart sounds: Normal heart sounds.   Pulmonary:      Effort: Pulmonary effort is normal.      Breath sounds: Normal breath sounds.   Abdominal:      General: Abdomen is flat.      Palpations: Abdomen is soft.   Musculoskeletal:         General: Normal range of motion.      Right lower leg: No edema.      Left lower leg: No edema.   Skin:     General: Skin is warm and dry.   Neurological:      General: No focal deficit present.      Mental Status: She is alert and oriented to  person, place, and time.   Psychiatric:         Mood and Affect: Mood normal.          Data       IMAGING: (X-ray/CT/MRI)   Recent Results (from the past 24 hours)   Echocardiogram Complete   Result Value    LVEF  45%    [facility]    [id number]  [id number]  [id number]^HERNANDEZ^JIMBO     [facility]  Echocardiography Laboratory  [address] [place] [address]     Name: MARTHA SETHI  MRN: [id number]  : 1975  Study Date: 2025 01:13 PM  Age: 49 yrs  Gender: Female  Patient Location: [facility]  Reason For Study: Chest Pain  Ordering Physician: JIMBO NG  Referring Physician: JIMBO NG  Performed By: Cb Hankins RDCS     BSA: 1.7 m2  Height: 62 in  Weight: 158 lb  HR: 72  BP: 65/53 mmHg  ______________________________________________________________________________  Procedure  Echocardiogram with two-dimensional, color and spectral Doppler.  ______________________________________________________________________________  Interpretation Summary     1. The left ventricle is normal in size. The visual ejection fraction is  estimated at 45%. Mid-distal inferior, anteroseptal, and apical hypokinesis.  2. The right ventricle is normal in structure, function and size.  3. No valve disease.     Echo 24 showed EF 35% with similar WMA.  ______________________________________________________________________________  Left Ventricle  The left ventricle is normal in size. There is normal left ventricular wall  thickness. The visual ejection fraction is estimated at 45%. Left ventricular  diastolic function is indeterminate. Mid-distal inferior, anteroseptal, and  apical hypokinesis. There is no thrombus seen in the left ventricle.     Right Ventricle  The right ventricle is normal in structure, function and size.     Atria  The left atrium is mildly dilated. Right atrial size is normal. There is no  atrial shunt seen.     Mitral Valve  There is mild (1+) mitral regurgitation.    "  Aortic Valve  There is mild (1+) aortic regurgitation.     Pulmonic Valve  The pulmonic valve is not well visualized.     Vessels  Normal ascending, transverse (arch), and descending aorta. The inferior vena  cava was normal in size with preserved respiratory variability.     Pericardium  There is no pericardial effusion.     Rhythm  The rhythm was sinus with wide QRS.     ______________________________________________________________________________  MMode/2D Measurements & Calculations  IVSd: 0.92 cm  LVIDd: 5.6 cm  LVIDs: 3.7 cm  LVPWd: 0.80 cm  FS: 33.0 %  LV mass(C)d: 179.1 grams  LV mass(C)dI: 103.6 grams/m2     Ao root diam: 3.2 cm  asc Aorta Diam: 2.9 cm  Ao root diam index Ht(cm/m): 2.1  Ao root diam index BSA (cm/m2): 1.9  Asc Ao diam index BSA (cm/m2): 1.7  Asc Ao diam index Ht(cm/m): 1.8  EF Biplane: 52.4 %  LA Volume (BP): 66.9 ml  LA Volume Index (BP): 38.7 ml/m2  RWT: 0.29     TAPSE: 2.0 cm     Doppler Measurements & Calculations  MV E max madi: 100.0 cm/sec  MV A max madi: 116.0 cm/sec  MV E/A: 0.86  MV dec slope: 364.5 cm/sec2  MV dec time: 0.27 sec  PA acc time: 0.11 sec  E/E' av.3  Lateral E/e': 15.8  Medial E/e': 16.7  RV S Madi: 9.7 cm/sec     ______________________________________________________________________________  Report approved by: Mark Henry MD on 2025 03:18 PM             CBC with Diff:  Recent Labs   Lab Test 25  0511 24  0547 24  0614   WBC 7.3   < > 7.2   HGB 11.5*   < > 13.1   MCV 89   < > 91      < > 298   INR  --   --  0.99    < > = values in this interval not displayed.      No results found for: \"RETICABSCT\"  No results found for: \"RETP\"    Comprehensive Metabolic Panel:  Recent Labs   Lab 25  0511 25  0351 25  2300      < > 135   POTASSIUM 4.2   < > 4.0   CHLORIDE 108*   < > 102   CO2 23   < > 22   ANIONGAP 7   < > 11   GLC 91   < > 117*   BUN 8.4   < > 14.4   CR 0.56   < > 0.75   GFRESTIMATED >90   < > >90 "   RON 8.4*   < > 8.4*   MAG 2.2  --  1.8   PHOS 3.2  --  2.9   PROTTOTAL  --   --  6.4   ALBUMIN  --   --  3.7   BILITOTAL  --   --  0.3   ALKPHOS  --   --  49   AST  --   --  16   ALT  --   --  14    < > = values in this interval not displayed.         Time Spent on this Encounter       CRITICAL CARE TIME  I spent 41 minutes of critical care time on the unit/floor managing the care of Cailin Burns. Upon evaluation, this patient had a high probability of imminent or life-threatening deterioration due to hypotension which required my direct attention, intervention, and personal management. 100% of my time was spent at the bedside counseling the patient and/or coordinating care regarding services listed in this note.

## 2025-03-27 VITALS
HEIGHT: 62 IN | RESPIRATION RATE: 18 BRPM | DIASTOLIC BLOOD PRESSURE: 56 MMHG | OXYGEN SATURATION: 98 % | HEART RATE: 71 BPM | BODY MASS INDEX: 29.98 KG/M2 | SYSTOLIC BLOOD PRESSURE: 104 MMHG | WEIGHT: 162.92 LBS | TEMPERATURE: 98 F

## 2025-03-27 LAB
ANION GAP SERPL CALCULATED.3IONS-SCNC: 11 MMOL/L (ref 7–15)
BACTERIA BLD CULT: NORMAL
BACTERIA BLD CULT: NORMAL
BUN SERPL-MCNC: 11.6 MG/DL (ref 6–20)
CALCIUM SERPL-MCNC: 8.5 MG/DL (ref 8.8–10.4)
CHLORIDE SERPL-SCNC: 108 MMOL/L (ref 98–107)
CREAT SERPL-MCNC: 0.62 MG/DL (ref 0.51–0.95)
EGFRCR SERPLBLD CKD-EPI 2021: >90 ML/MIN/1.73M2
ERYTHROCYTE [DISTWIDTH] IN BLOOD BY AUTOMATED COUNT: 14.6 % (ref 10–15)
GLUCOSE BLDC GLUCOMTR-MCNC: 101 MG/DL (ref 70–99)
GLUCOSE BLDC GLUCOMTR-MCNC: 121 MG/DL (ref 70–99)
GLUCOSE BLDC GLUCOMTR-MCNC: 126 MG/DL (ref 70–99)
GLUCOSE BLDC GLUCOMTR-MCNC: 128 MG/DL (ref 70–99)
GLUCOSE BLDC GLUCOMTR-MCNC: 67 MG/DL (ref 70–99)
GLUCOSE BLDC GLUCOMTR-MCNC: 70 MG/DL (ref 70–99)
GLUCOSE SERPL-MCNC: 99 MG/DL (ref 70–99)
HCO3 SERPL-SCNC: 18 MMOL/L (ref 22–29)
HCT VFR BLD AUTO: 31.5 % (ref 35–47)
HGB BLD-MCNC: 10.3 G/DL (ref 11.7–15.7)
MAGNESIUM SERPL-MCNC: 2 MG/DL (ref 1.7–2.3)
MCH RBC QN AUTO: 29.3 PG (ref 26.5–33)
MCHC RBC AUTO-ENTMCNC: 32.7 G/DL (ref 31.5–36.5)
MCV RBC AUTO: 90 FL (ref 78–100)
PHOSPHATE SERPL-MCNC: 3.3 MG/DL (ref 2.5–4.5)
PLATELET # BLD AUTO: 295 10E3/UL (ref 150–450)
POTASSIUM SERPL-SCNC: 4.2 MMOL/L (ref 3.4–5.3)
POTASSIUM SERPL-SCNC: 4.3 MMOL/L (ref 3.4–5.3)
RBC # BLD AUTO: 3.51 10E6/UL (ref 3.8–5.2)
SODIUM SERPL-SCNC: 137 MMOL/L (ref 135–145)
WBC # BLD AUTO: 7.8 10E3/UL (ref 4–11)

## 2025-03-27 PROCEDURE — 80048 BASIC METABOLIC PNL TOTAL CA: CPT | Performed by: STUDENT IN AN ORGANIZED HEALTH CARE EDUCATION/TRAINING PROGRAM

## 2025-03-27 PROCEDURE — 84132 ASSAY OF SERUM POTASSIUM: CPT | Performed by: STUDENT IN AN ORGANIZED HEALTH CARE EDUCATION/TRAINING PROGRAM

## 2025-03-27 PROCEDURE — 250N000011 HC RX IP 250 OP 636: Performed by: INTERNAL MEDICINE

## 2025-03-27 PROCEDURE — 99232 SBSQ HOSP IP/OBS MODERATE 35: CPT | Performed by: STUDENT IN AN ORGANIZED HEALTH CARE EDUCATION/TRAINING PROGRAM

## 2025-03-27 PROCEDURE — 250N000013 HC RX MED GY IP 250 OP 250 PS 637: Performed by: STUDENT IN AN ORGANIZED HEALTH CARE EDUCATION/TRAINING PROGRAM

## 2025-03-27 PROCEDURE — 120N000001 HC R&B MED SURG/OB

## 2025-03-27 PROCEDURE — 83735 ASSAY OF MAGNESIUM: CPT | Performed by: STUDENT IN AN ORGANIZED HEALTH CARE EDUCATION/TRAINING PROGRAM

## 2025-03-27 PROCEDURE — 250N000013 HC RX MED GY IP 250 OP 250 PS 637: Performed by: NURSE PRACTITIONER

## 2025-03-27 PROCEDURE — 85027 COMPLETE CBC AUTOMATED: CPT | Performed by: NURSE PRACTITIONER

## 2025-03-27 PROCEDURE — 36415 COLL VENOUS BLD VENIPUNCTURE: CPT | Performed by: STUDENT IN AN ORGANIZED HEALTH CARE EDUCATION/TRAINING PROGRAM

## 2025-03-27 PROCEDURE — 84100 ASSAY OF PHOSPHORUS: CPT | Performed by: STUDENT IN AN ORGANIZED HEALTH CARE EDUCATION/TRAINING PROGRAM

## 2025-03-27 RX ORDER — MAGNESIUM SULFATE HEPTAHYDRATE 40 MG/ML
2 INJECTION, SOLUTION INTRAVENOUS ONCE
Status: COMPLETED | OUTPATIENT
Start: 2025-03-27 | End: 2025-03-27

## 2025-03-27 RX ORDER — METOPROLOL SUCCINATE 25 MG/1
25 TABLET, EXTENDED RELEASE ORAL DAILY
Status: DISCONTINUED | OUTPATIENT
Start: 2025-03-27 | End: 2025-03-28 | Stop reason: HOSPADM

## 2025-03-27 RX ADMIN — GABAPENTIN 200 MG: 100 CAPSULE ORAL at 09:01

## 2025-03-27 RX ADMIN — BUSPIRONE HYDROCHLORIDE 10 MG: 10 TABLET ORAL at 21:44

## 2025-03-27 RX ADMIN — VENLAFAXINE HYDROCHLORIDE 100 MG: 100 TABLET ORAL at 21:43

## 2025-03-27 RX ADMIN — BUSPIRONE HYDROCHLORIDE 10 MG: 10 TABLET ORAL at 09:01

## 2025-03-27 RX ADMIN — VENLAFAXINE HYDROCHLORIDE 100 MG: 100 TABLET ORAL at 09:00

## 2025-03-27 RX ADMIN — FENOFIBRATE 160 MG: 160 TABLET ORAL at 09:00

## 2025-03-27 RX ADMIN — METOPROLOL SUCCINATE 25 MG: 25 TABLET, EXTENDED RELEASE ORAL at 11:02

## 2025-03-27 RX ADMIN — RANOLAZINE 500 MG: 500 TABLET, FILM COATED, EXTENDED RELEASE ORAL at 09:00

## 2025-03-27 RX ADMIN — EZETIMIBE 10 MG: 10 TABLET ORAL at 09:01

## 2025-03-27 RX ADMIN — GABAPENTIN 200 MG: 100 CAPSULE ORAL at 15:32

## 2025-03-27 RX ADMIN — MAGNESIUM SULFATE HEPTAHYDRATE 2 G: 40 INJECTION, SOLUTION INTRAVENOUS at 06:48

## 2025-03-27 RX ADMIN — OXYCODONE HYDROCHLORIDE 10 MG: 5 TABLET ORAL at 00:39

## 2025-03-27 RX ADMIN — OXYCODONE HYDROCHLORIDE 10 MG: 5 TABLET ORAL at 15:31

## 2025-03-27 RX ADMIN — SACUBITRIL AND VALSARTAN 1 TABLET: 24; 26 TABLET, FILM COATED ORAL at 21:43

## 2025-03-27 RX ADMIN — OXYCODONE HYDROCHLORIDE 10 MG: 5 TABLET ORAL at 05:39

## 2025-03-27 RX ADMIN — SACUBITRIL AND VALSARTAN 1 TABLET: 24; 26 TABLET, FILM COATED ORAL at 11:02

## 2025-03-27 RX ADMIN — TICAGRELOR 90 MG: 90 TABLET ORAL at 21:44

## 2025-03-27 RX ADMIN — GABAPENTIN 200 MG: 100 CAPSULE ORAL at 21:44

## 2025-03-27 RX ADMIN — OXYCODONE HYDROCHLORIDE 10 MG: 5 TABLET ORAL at 21:49

## 2025-03-27 RX ADMIN — NICOTINE 1 PATCH: 14 PATCH, EXTENDED RELEASE TRANSDERMAL at 16:36

## 2025-03-27 RX ADMIN — OXYCODONE HYDROCHLORIDE 10 MG: 5 TABLET ORAL at 10:05

## 2025-03-27 RX ADMIN — TICAGRELOR 90 MG: 90 TABLET ORAL at 09:01

## 2025-03-27 RX ADMIN — ASPIRIN 81 MG: 81 TABLET, COATED ORAL at 09:01

## 2025-03-27 RX ADMIN — ATORVASTATIN CALCIUM 80 MG: 40 TABLET, FILM COATED ORAL at 21:43

## 2025-03-27 RX ADMIN — RANOLAZINE 500 MG: 500 TABLET, FILM COATED, EXTENDED RELEASE ORAL at 21:44

## 2025-03-27 ASSESSMENT — ACTIVITIES OF DAILY LIVING (ADL)
ADLS_ACUITY_SCORE: 34
ADLS_ACUITY_SCORE: 61
ADLS_ACUITY_SCORE: 35
ADLS_ACUITY_SCORE: 35
ADLS_ACUITY_SCORE: 61
ADLS_ACUITY_SCORE: 35
ADLS_ACUITY_SCORE: 61

## 2025-03-27 NOTE — PLAN OF CARE
"[id number]    Neuro: A &O X4. Follows commands, able to make needs known.     CV: Tele SR, BBB.     Resp: LS diminished/clear, O2 stable on RA.     GI: BS active, no BM this shift. BS active, declines PRNs at this times. Diet: High consistent carb.     : Voiding in bedside commode.     Skin: R forearm bruise. L ankle scab. Nicotine patch L shoulder.     Pain: Chronic back pain; PRN oxycodone given.     Activity: SBA     Plan: Continue with plan of care. Transferring to 6th floor. Report given to RN.       Problem: Adult Inpatient Plan of Care  Goal: Plan of Care Review  Description: The Plan of Care Review/Shift note should be completed every shift.  The Outcome Evaluation is a brief statement about your assessment that the patient is improving, declining, or no change.  This information will be displayed automatically on your shiftnote.  Outcome: Progressing  Goal: Patient-Specific Goal (Individualized)  Description: You can add care plan individualizations to a care plan. Examples of Individualization might be:  \"Parent requests to be called daily at 9am for status\", \"I have a hard time hearing out of my right ear\", or \"Do not touch me to wake me up as it startlesme\".  Outcome: Progressing  Goal: Absence of Hospital-Acquired Illness or Injury  Outcome: Progressing  Intervention: Identify and Manage Fall Risk  Recent Flowsheet Documentation  Taken 3/27/2025 1200 by Norah Albert RN  Safety Promotion/Fall Prevention:   activity supervised   assistive device/personal items within reach   clutter free environment maintained   lighting adjusted   nonskid shoes/slippers when out of bed   patient and family education   safety round/check completed   supervised activity   treat reversible contributory factors   treat underlying cause  Taken 3/27/2025 0800 by Norah Albert RN  Safety Promotion/Fall Prevention:   activity supervised   assistive device/personal items within reach   clutter free environment " maintained   lighting adjusted   nonskid shoes/slippers when out of bed   patient and family education   safety round/check completed   supervised activity   treat reversible contributory factors   treat underlying cause  Intervention: Prevent Skin Injury  Recent Flowsheet Documentation  Taken 3/27/2025 1200 by Norah Albert RN  Body Position: position changed independently  Skin Protection:   adhesive use limited   incontinence pads utilized  Taken 3/27/2025 1000 by Norah Albert RN  Body Position: position changed independently  Taken 3/27/2025 0800 by Norah Albert RN  Body Position: position changed independently  Skin Protection:   adhesive use limited   incontinence pads utilized  Intervention: Prevent and Manage VTE (Venous Thromboembolism) Risk  Recent Flowsheet Documentation  Taken 3/27/2025 1200 by Norah Albert RN  VTE Prevention/Management: SCDs off (sequential compression devices)  Taken 3/27/2025 0800 by Norah Albert RN  VTE Prevention/Management: SCDs off (sequential compression devices)  Intervention: Prevent Infection  Recent Flowsheet Documentation  Taken 3/27/2025 1200 by Norah Albert RN  Infection Prevention:   cohorting utilized   environmental surveillance performed   equipment surfaces disinfected   hand hygiene promoted   rest/sleep promoted   single patient room provided  Taken 3/27/2025 0800 by Norah Albert RN  Infection Prevention:   cohorting utilized   environmental surveillance performed   equipment surfaces disinfected   hand hygiene promoted   rest/sleep promoted   single patient room provided  Goal: Optimal Comfort and Wellbeing  Outcome: Progressing  Intervention: Provide Person-Centered Care  Recent Flowsheet Documentation  Taken 3/27/2025 1200 by Norah Albert RN  Trust Relationship/Rapport:   care explained   choices provided   questions answered   questions encouraged   reassurance provided   thoughts/feelings  acknowledged  Taken 3/27/2025 0800 by Norah Albert RN  Trust Relationship/Rapport:   care explained   choices provided   questions answered   questions encouraged   reassurance provided   thoughts/feelings acknowledged  Goal: Readiness for Transition of Care  Outcome: Progressing     Problem: Risk for Delirium  Goal: Optimal Coping  Outcome: Progressing  Intervention: Optimize Psychosocial Adjustment to Delirium  Recent Flowsheet Documentation  Taken 3/27/2025 1200 by Norah Albert RN  Supportive Measures: active listening utilized  Taken 3/27/2025 0800 by Norah Albert RN  Supportive Measures: active listening utilized  Goal: Improved Behavioral Control  Outcome: Progressing  Intervention: Prevent and Manage Agitation  Recent Flowsheet Documentation  Taken 3/27/2025 1200 by Norah Albert RN  Environment Familiarity/Consistency: daily routine followed  Taken 3/27/2025 0800 by Norah Albert RN  Environment Familiarity/Consistency: daily routine followed  Intervention: Minimize Safety Risk  Recent Flowsheet Documentation  Taken 3/27/2025 1200 by Norah Albert RN  Enhanced Safety Measures: assistive devices when indicated  Trust Relationship/Rapport:   care explained   choices provided   questions answered   questions encouraged   reassurance provided   thoughts/feelings acknowledged  Taken 3/27/2025 0800 by Norah Albert RN  Enhanced Safety Measures: assistive devices when indicated  Trust Relationship/Rapport:   care explained   choices provided   questions answered   questions encouraged   reassurance provided   thoughts/feelings acknowledged  Goal: Improved Attention and Thought Clarity  Outcome: Progressing  Intervention: Maximize Cognitive Function  Recent Flowsheet Documentation  Taken 3/27/2025 1200 by Norah Albert RN  Sensory Stimulation Regulation:   auditory stimulation minimized   care clustered   lighting decreased   quiet environment  promoted  Reorientation Measures:   calendar in view   clock in view   reorientation provided  Taken 3/27/2025 0800 by Norah Albert RN  Sensory Stimulation Regulation:   auditory stimulation minimized   care clustered   lighting decreased   quiet environment promoted  Reorientation Measures:   calendar in view   clock in view   reorientation provided  Goal: Improved Sleep  Outcome: Progressing     Problem: Skin Injury Risk Increased  Goal: Skin Health and Integrity  Outcome: Progressing  Intervention: Plan: Nurse Driven Intervention: Moisture Management  Recent Flowsheet Documentation  Taken 3/27/2025 1200 by Norah Albert RN  Moisture Interventions: Encourage regular toileting  Taken 3/27/2025 0800 by Norah Albert RN  Moisture Interventions: Encourage regular toileting  Intervention: Optimize Skin Protection  Recent Flowsheet Documentation  Taken 3/27/2025 1200 by Norah Albert RN  Pressure Reduction Techniques: frequent weight shift encouraged  Skin Protection:   adhesive use limited   incontinence pads utilized  Activity Management:   activity adjusted per tolerance   activity encouraged  Head of Bed (HOB) Positioning: HOB at 30-45 degrees  Taken 3/27/2025 1000 by Norah Albert RN  Activity Management:   activity adjusted per tolerance   activity encouraged  Head of Bed (HOB) Positioning: HOB at 30-45 degrees  Taken 3/27/2025 0800 by Norah Albert RN  Pressure Reduction Techniques: frequent weight shift encouraged  Skin Protection:   adhesive use limited   incontinence pads utilized  Activity Management:   activity adjusted per tolerance   activity encouraged  Intervention: Promote and Optimize Oral Intake  Recent Flowsheet Documentation  Taken 3/27/2025 1200 by Norah Albert RN  Oral Nutrition Promotion: calorie-dense foods provided  Taken 3/27/2025 0800 by Norah Albert RN  Oral Nutrition Promotion: calorie-dense foods provided     Problem: Pain  Acute  Goal: Optimal Pain Control and Function  Outcome: Progressing  Intervention: Optimize Psychosocial Wellbeing  Recent Flowsheet Documentation  Taken 3/27/2025 1200 by Norah Albert RN  Spiritual Activities Assistance: affirmation provided  Supportive Measures: active listening utilized  Taken 3/27/2025 0800 by Norah Albert RN  Spiritual Activities Assistance: affirmation provided  Supportive Measures: active listening utilized  Intervention: Prevent or Manage Pain  Recent Flowsheet Documentation  Taken 3/27/2025 1200 by Norah Albert RN  Sensory Stimulation Regulation:   auditory stimulation minimized   care clustered   lighting decreased   quiet environment promoted  Bowel Elimination Promotion: adequate fluid intake promoted  Medication Review/Management: medications reviewed  Taken 3/27/2025 0800 by Norah Albert RN  Sensory Stimulation Regulation:   auditory stimulation minimized   care clustered   lighting decreased   quiet environment promoted  Bowel Elimination Promotion: adequate fluid intake promoted  Medication Review/Management: medications reviewed     Problem: Fall Injury Risk  Goal: Absence of Fall and Fall-Related Injury  Outcome: Progressing  Intervention: Identify and Manage Contributors  Recent Flowsheet Documentation  Taken 3/27/2025 1200 by Norah Albert RN  Medication Review/Management: medications reviewed  Taken 3/27/2025 0800 by Norah Albert RN  Medication Review/Management: medications reviewed  Intervention: Promote Injury-Free Environment  Recent Flowsheet Documentation  Taken 3/27/2025 1200 by Norah Albert RN  Safety Promotion/Fall Prevention:   activity supervised   assistive device/personal items within reach   clutter free environment maintained   lighting adjusted   nonskid shoes/slippers when out of bed   patient and family education   safety round/check completed   supervised activity   treat reversible contributory  factors   treat underlying cause  Taken 3/27/2025 0800 by Norah Albert RN  Safety Promotion/Fall Prevention:   activity supervised   assistive device/personal items within reach   clutter free environment maintained   lighting adjusted   nonskid shoes/slippers when out of bed   patient and family education   safety round/check completed   supervised activity   treat reversible contributory factors   treat underlying cause   Goal Outcome Evaluation:

## 2025-03-27 NOTE — PROGRESS NOTES
[facility]    Medicine Progress Note - Hospitalist Service    Date of Admission:  3/24/2025    Assessment & Plan    Cailin Burns is a 49 year old female with a PMH significant for tobacco use disorder, type 2 diabetes mellitus, PAD with mixed claudication, CAD s/p STEMI September 2022 with three-vessel disease status post staged PCI due to poor bypass target (PCI of RCA  and MOHAN x1 to mLAD and MOHAN x1 to mLCx on 01/2023), ischemic cardiomyopathy with HFrEF, LVEF 27% status post ICD May 2023,  dyslipidemia, chronic pain, who was admitted to ICU 3/24/25 for hypovolemic shock and is being transferred to hospitalist service 03/25/25.      Hypotension, likely hypovolemic  Borderline shock state  Prior to patient's presentation, she had GI losses from diarrhea and vomiting. This is in addition to the diuretic medications she takes for her HF.  Shock state now resolved, likely hypovolemic type from commendation of GI losses from vomiting diarrhea coupled with diuretic medication use  *baseline BP at clinic appointments and home /70s-80s  *Noted to be hypotensive on 3/27/2025 and received multiple boluses of IV fluids. Transferred briefly back to ICU for norepinephrine. BP was rechecked on both arms and patient's BP was noted to be markedly improved in L arm. Patient is noted to have history of R subclavian and brachiocephalic artery stenosis so BP should only be checked on L arm.    NYHA class II HF, systolic, chronic, compensated, at times class III  symptoms  ischemic cardiomyopathy LVEF 27% status post ICD May 2023. EF 45% on 3/24/2025 TTE  *CAD s/p STEMI September 2022 with three-vessel disease status post staged PCI due to poor bypass target (PCI of RCA  and MOHAN x1 to mLAD and MOHAN x1 to mLCx on 01/2023)  *TTE 3/25/2025 showing LV is normal in size. The visual ejection fraction is  estimated at 45%. Mid-distal inferior, anteroseptal, and apical hypokinesis.  right ventricle is  normal in structure, function and size.  No valve disease.  Echo 2-5-24 showed EF 35% with similar WMA  *PTA GDMT includes dapagliflozin 10 mg daily, Lasix 20 mg daily, isosorbide dinitrate 10 mg 3 times daily, metoprolol extended release 20 mg daily, Entresto 97-23 mg twice daily, spironolactone 25 mg daily  -Cardiology signed off  -Restart Entresto at reduced dose today  - Restart PTA Metoprolol  -Continue to hold other HF medications. Patient could start Lasix in a few days if she's doing ok at home. Plan to discharge tomorrow. Patient should followup with cardiology in clinic and have her HF medications slowly titrated back up under close monitoring with her cardiology clinic.  - strict I/O  - Daily weights  - Low-sodium diet  - Continue PTA DAPT with aspirin and Brilinta  - Can resume PTA Ranexa     Recent diarrheal illness without any further episodes of such since admission  -if recurs, send enteric panel     Leukocytosis 2/2 unclear etiology aside from recent diarrheal illness, CT chest 3/24/25 w/o any evidence of pneumonia, ROS negative for any infectious history, well-appearing, only complaint is fatigue; procal neg, UA neg  -if febrile can start broad spectrum atbx  -enteric panel, cdiff if diarrhea recurs     T2DM last A1c was 1 year PTA, was uncontrolled at that time  -Hold PTA metformin, Ozempic  -Okay for patient to use her continuous glucose monitor, correlate twice daily w/ POC testing  -Mod carb diet  -check A1c  -tid ac/hs gluc checks and med dose sliding scale insulin w/ 1unit: 15g CHO while off of metformin  -Hypoglycemia protocol is in place     tobacco use disorder, ongoing  -Encourage cessation  -NicoDerm patch     PAD with mixed claudication  -DAPT continued     Dyslipidemia  -Continue PTA Lipitor, Zetia, fenofibrate     chronic pain  -Continue PTA gabapentin, venlafaxine  -As needed acetaminophen, hydromorphone oxycodone          Diet: High Consistent Carb (75 g CHO per Meal) Diet    DVT  "Prophylaxis: Pneumatic Compression Devices  Cuevas Catheter: Not present  Lines: None     Cardiac Monitoring: None  Code Status: Full Code      Clinically Significant Risk Factors          # Hyperchloremia: Highest Cl = 108 mmol/L in last 2 days, will monitor as appropriate                 # End stage heart failure: Ventricular assist device (VAD) present          # DMII: A1C = 6.8 % (Ref range: <5.7 %) within past 6 months, PRESENT ON ADMISSION  # Overweight: Estimated body mass index is 29.8 kg/m  as calculated from the following:    Height as of this encounter: 1.575 m (5' 2\").    Weight as of this encounter: 73.9 kg (162 lb 14.7 oz)., PRESENT ON ADMISSION      # ICD device present       Social Drivers of Health    Food Insecurity: Food Insecurity Present (4/2/2024)    Received from [facility]    Hunger Vital Sign     Worried About Running Out of Food in the Last Year: Often true     Ran Out of Food in the Last Year: Often true   Housing Stability: High Risk (2/1/2024)    Received from [facility], [facility]    Housing Stability Vital Sign     Unable to Pay for Housing in the Last Year: Yes     Number of Places Lived in the Last Year: 1     Unstable Housing in the Last Year: No   Tobacco Use: High Risk (3/24/2025)    Received from [facility]    Patient History     Smoking Tobacco Use: Every Day     Smokeless Tobacco Use: Unknown   Financial Resource Strain: High Risk (10/14/2022)    Received from [facility], [facility]    Overall Financial Resource Strain (CARDIA)     Difficulty of Paying Living Expenses: Very hard          Disposition Plan     Medically Ready for Discharge: Anticipated Tomorrow if vitals stable overnight with restarting Entresto and Metoprolol             Cheng Rich MD  Hospitalist Service  [facility]  Securely message with [employer] (more info)  Text page via [employer] " Osito/ananth   ______________________________________________________________________    Interval History   NAEO. NNR. BP stable overnight with checking BP on L arm. Patient is feeling well. Discussed care with cardiology Betsy, cardiology NP, who recommended starting low-dose Entresto today and restarting PTA metoprolol with plan to titrate GDMT as outpatient. Patient agreeable to this plan. Discussed she will likely be able to discharge home tomorrow.        Physical Exam   Vital Signs: Temp: 97.6  F (36.4  C) Temp src: Oral BP: 108/63 Pulse: 65   Resp: 18 SpO2: 99 % O2 Device: None (Room air)    Weight: 162 lbs 14.72 oz  Constitutional: Awake, alert, cooperative, no apparent distress.    Pulmonary: No increased work of breathing, good air exchange, clear to auscultation bilaterally, no crackles or wheezing.  Cardiovascular: Regular rate and rhythm, normal S1 and S2, no S3 or S4. No murmurs, rubs, or gallops noted.  GI: Normal bowel sounds, soft, non-distended, non-tender.  No guarding or rebound.  Skin/Integumen: No cyanosis or jaundice. No rashes noted.  Extremities: No lower extremity edema.  Neuro: A&Ox4. Conversant. Responds appropriately in conversation. Moves all extremities with no focal deficit identified.           Medical Decision Making       42 MINUTES SPENT BY ME on the date of service doing chart review, history, exam, documentation & further activities per the note.      Data   ------------------------- PAST 24 HR DATA REVIEWED -----------------------------------------------    I have personally reviewed the following data over the past 24 hrs:    7.8  \   10.3 (L)   / 295     137 108 (H) 11.6 /  128 (H)   4.2; 4.3 18 (L) 0.62 \       Imaging results reviewed over the past 24 hrs:   No results found for this or any previous visit (from the past 24 hours).

## 2025-03-27 NOTE — PLAN OF CARE
Goal Outcome Evaluation:    Summary: Transfer from ICU, admitted on 3/24 w/ nausea, vomiting and diarrhea. Found to be Hypotension/Hypovolemia shock.  Hx of DM2, PAD, chronic HF, CAD s/p STEMI Sept 2022, cardiomyopathy, chronic back pain    DATE & TIME: 3/27/25 7617-3612  Cognitive Concerns/ Orientation : A&Ox4, calm and cooperative.  BEHAVIOR & AGGRESSION TOOL COLOR: Green  ABNL VS/O2: VSS, on RA, BP only to be done on L. Arm d/t discrepancy's w/BP's in both arms.  MOBILITY: Independent  PAIN MANAGMENT: Oxy given for chronic back pain  DIET: High carb  BOWEL/BLADDER: Continent  ABNL LAB/BG: K- 4.3, Mg-2.0, Phos-3.3 rechecks ordered in am  DRAIN/DEVICES: PIV L arm x2 SL  TELEMETRY RHYTHM: NA  SKIN: Large bruise to R. Arm from IV, scab  TESTS/PROCEDURES: None  D/C DATE/GOALS/PLACE: Possible discharge tomorrow if vitals remain stable w/ restarting Entresto and Metoprolol  OTHER IMPORTANT INFO: Showered today. Denies N&V. Nicotine patch to R. Arm.

## 2025-03-27 NOTE — PLAN OF CARE
Goal Outcome Evaluation:      Plan of Care Reviewed With: patient    Overall Patient Progress: improvingOverall Patient Progress: improving    Outcome Evaluation: A&O. Off Pressors. Pain managed with PRN oxy. Up SBA. Voiding w/o difficulty. RA. LIkely transfer pending bed availability.    Problem: Adult Inpatient Plan of Care  Goal: Plan of Care Review  Description: The Plan of Care Review/Shift note should be completed every shift.  The Outcome Evaluation is a brief statement about your assessment that the patient is improving, declining, or no change.  This information will be displayed automatically on your shiftnote.  Outcome: Progressing  Flowsheets (Taken 3/27/2025 0525)  Outcome Evaluation: A&O. Off Pressors. Pain managed with PRN oxy. Up SBA. Voiding w/o difficulty. RA. LIkely transfer pending bed availability.  Plan of Care Reviewed With: patient  Overall Patient Progress: improving  Goal: Absence of Hospital-Acquired Illness or Injury  Intervention: Identify and Manage Fall Risk  Recent Flowsheet Documentation  Taken 3/26/2025 2000 by Charli Gonsalez RN  Safety Promotion/Fall Prevention:   activity supervised   assistive device/personal items within reach   clutter free environment maintained   lighting adjusted   nonskid shoes/slippers when out of bed   patient and family education   safety round/check completed   supervised activity   treat reversible contributory factors   treat underlying cause  Intervention: Prevent Skin Injury  Recent Flowsheet Documentation  Taken 3/26/2025 2000 by Charli Gonsalez RN  Body Position: position changed independently  Skin Protection:   adhesive use limited   incontinence pads utilized  Goal: Optimal Comfort and Wellbeing  Intervention: Monitor Pain and Promote Comfort  Recent Flowsheet Documentation  Taken 3/27/2025 0039 by Charli Gonsalez RN  Pain Management Interventions: medication (see MAR)  Intervention: Provide Person-Centered Care  Recent Flowsheet  Documentation  Taken 3/27/2025 0400 by Charli Gonsalez RN  Trust Relationship/Rapport:   care explained   choices provided   questions answered   questions encouraged   reassurance provided   thoughts/feelings acknowledged  Taken 3/27/2025 0000 by Charli Gonsalez RN  Trust Relationship/Rapport:   care explained   choices provided   questions answered   questions encouraged   reassurance provided   thoughts/feelings acknowledged  Taken 3/26/2025 2000 by Charli Gonsalez RN  Trust Relationship/Rapport:   care explained   choices provided   questions answered   questions encouraged   reassurance provided   thoughts/feelings acknowledged     Problem: Risk for Delirium  Goal: Optimal Coping  Intervention: Optimize Psychosocial Adjustment to Delirium  Recent Flowsheet Documentation  Taken 3/27/2025 0400 by Charli Gonsalez RN  Supportive Measures: active listening utilized  Taken 3/27/2025 0000 by Charli Gonsalez RN  Supportive Measures: active listening utilized  Taken 3/26/2025 2000 by Charli Gonsalez RN  Supportive Measures: active listening utilized  Goal: Improved Behavioral Control  Intervention: Prevent and Manage Agitation  Recent Flowsheet Documentation  Taken 3/27/2025 0400 by Charli Gonsalez RN  Environment Familiarity/Consistency: daily routine followed  Taken 3/27/2025 0000 by Charli Gonsalez RN  Environment Familiarity/Consistency: daily routine followed  Taken 3/26/2025 2000 by Charli Gonsalez RN  Environment Familiarity/Consistency: daily routine followed  Intervention: Minimize Safety Risk  Recent Flowsheet Documentation  Taken 3/27/2025 0400 by Charli Gonsalez RN  Trust Relationship/Rapport:   care explained   choices provided   questions answered   questions encouraged   reassurance provided   thoughts/feelings acknowledged  Taken 3/27/2025 0000 by Charli Gonsalez RN  Trust Relationship/Rapport:   care explained   choices provided   questions answered   questions encouraged   reassurance  provided   thoughts/feelings acknowledged  Taken 3/26/2025 2000 by Charli Gonsalez RN  Enhanced Safety Measures: assistive devices when indicated  Trust Relationship/Rapport:   care explained   choices provided   questions answered   questions encouraged   reassurance provided   thoughts/feelings acknowledged  Goal: Improved Attention and Thought Clarity  Intervention: Maximize Cognitive Function  Recent Flowsheet Documentation  Taken 3/27/2025 0400 by Charli Gonsalez RN  Sensory Stimulation Regulation:   auditory stimulation minimized   care clustered   lighting decreased   quiet environment promoted  Reorientation Measures:   calendar in view   clock in view   reorientation provided  Taken 3/27/2025 0000 by Charli Gonsalez RN  Sensory Stimulation Regulation:   auditory stimulation minimized   care clustered   lighting decreased   quiet environment promoted  Reorientation Measures:   calendar in view   clock in view   reorientation provided  Taken 3/26/2025 2000 by Charli Gonsalez RN  Sensory Stimulation Regulation:   auditory stimulation minimized   care clustered   lighting decreased   quiet environment promoted  Reorientation Measures:   calendar in view   clock in view   reorientation provided  Goal: Improved Sleep  Intervention: Promote Sleep  Recent Flowsheet Documentation  Taken 3/26/2025 2000 by Charli Gonsalez RN  Sleep/Rest Enhancement:   awakenings minimized   consistent schedule promoted     Problem: Skin Injury Risk Increased  Goal: Skin Health and Integrity  Intervention: Plan: Nurse Driven Intervention: Moisture Management  Recent Flowsheet Documentation  Taken 3/26/2025 2000 by Charli Gonsalez RN  Moisture Interventions: Encourage regular toileting  Bathing/Skin Care:   bath, complete   electrode patches/site rotation   linen changed  Intervention: Optimize Skin Protection  Recent Flowsheet Documentation  Taken 3/26/2025 2000 by Charli Gonsalez RN  Pressure Reduction Techniques: frequent  weight shift encouraged  Skin Protection:   adhesive use limited   incontinence pads utilized  Activity Management:   activity adjusted per tolerance   activity encouraged  Head of Bed (HOB) Positioning: HOB at 30-45 degrees     Problem: Pain Acute  Goal: Optimal Pain Control and Function  Intervention: Optimize Psychosocial Wellbeing  Recent Flowsheet Documentation  Taken 3/27/2025 0400 by Charli Gonsalez RN  Spiritual Activities Assistance: affirmation provided  Supportive Measures: active listening utilized  Taken 3/27/2025 0000 by Charli Gonsalez RN  Spiritual Activities Assistance: affirmation provided  Supportive Measures: active listening utilized  Taken 3/26/2025 2000 by Charli Gonsalez RN  Spiritual Activities Assistance: affirmation provided  Supportive Measures: active listening utilized  Intervention: Develop Pain Management Plan  Recent Flowsheet Documentation  Taken 3/27/2025 0039 by Charli Gonsalez RN  Pain Management Interventions: medication (see MAR)  Intervention: Prevent or Manage Pain  Recent Flowsheet Documentation  Taken 3/27/2025 0400 by Charli Gonsalez RN  Sensory Stimulation Regulation:   auditory stimulation minimized   care clustered   lighting decreased   quiet environment promoted  Taken 3/27/2025 0000 by Charli Gonsalez RN  Sensory Stimulation Regulation:   auditory stimulation minimized   care clustered   lighting decreased   quiet environment promoted  Taken 3/26/2025 2000 by Charli Gonsalez RN  Sensory Stimulation Regulation:   auditory stimulation minimized   care clustered   lighting decreased   quiet environment promoted  Sleep/Rest Enhancement:   awakenings minimized   consistent schedule promoted  Medication Review/Management: medications reviewed     Problem: Fall Injury Risk  Goal: Absence of Fall and Fall-Related Injury  Intervention: Identify and Manage Contributors  Recent Flowsheet Documentation  Taken 3/26/2025 2000 by Charli Gonsalez RN  Medication  Review/Management: medications reviewed  Intervention: Promote Injury-Free Environment  Recent Flowsheet Documentation  Taken 3/26/2025 2000 by Charli Gonsalez RN  Safety Promotion/Fall Prevention:   activity supervised   assistive device/personal items within reach   clutter free environment maintained   lighting adjusted   nonskid shoes/slippers when out of bed   patient and family education   safety round/check completed   supervised activity   treat reversible contributory factors   treat underlying cause

## 2025-03-28 VITALS
BODY MASS INDEX: 29.98 KG/M2 | DIASTOLIC BLOOD PRESSURE: 66 MMHG | TEMPERATURE: 97.9 F | WEIGHT: 162.92 LBS | HEART RATE: 76 BPM | OXYGEN SATURATION: 96 % | RESPIRATION RATE: 18 BRPM | HEIGHT: 62 IN | SYSTOLIC BLOOD PRESSURE: 112 MMHG

## 2025-03-28 LAB
ANION GAP SERPL CALCULATED.3IONS-SCNC: 9 MMOL/L (ref 7–15)
BUN SERPL-MCNC: 13.2 MG/DL (ref 6–20)
CALCIUM SERPL-MCNC: 8.9 MG/DL (ref 8.8–10.4)
CHLORIDE SERPL-SCNC: 101 MMOL/L (ref 98–107)
CREAT SERPL-MCNC: 0.56 MG/DL (ref 0.51–0.95)
EGFRCR SERPLBLD CKD-EPI 2021: >90 ML/MIN/1.73M2
ERYTHROCYTE [DISTWIDTH] IN BLOOD BY AUTOMATED COUNT: 14.6 % (ref 10–15)
GLUCOSE BLDC GLUCOMTR-MCNC: 100 MG/DL (ref 70–99)
GLUCOSE SERPL-MCNC: 98 MG/DL (ref 70–99)
HCO3 SERPL-SCNC: 23 MMOL/L (ref 22–29)
HCT VFR BLD AUTO: 35.1 % (ref 35–47)
HGB BLD-MCNC: 11.5 G/DL (ref 11.7–15.7)
MAGNESIUM SERPL-MCNC: 1.8 MG/DL (ref 1.7–2.3)
MCH RBC QN AUTO: 29.3 PG (ref 26.5–33)
MCHC RBC AUTO-ENTMCNC: 32.8 G/DL (ref 31.5–36.5)
MCV RBC AUTO: 89 FL (ref 78–100)
PHOSPHATE SERPL-MCNC: 3.3 MG/DL (ref 2.5–4.5)
PLATELET # BLD AUTO: 304 10E3/UL (ref 150–450)
POTASSIUM SERPL-SCNC: 4.1 MMOL/L (ref 3.4–5.3)
RBC # BLD AUTO: 3.93 10E6/UL (ref 3.8–5.2)
SODIUM SERPL-SCNC: 133 MMOL/L (ref 135–145)
WBC # BLD AUTO: 9.3 10E3/UL (ref 4–11)

## 2025-03-28 PROCEDURE — 80048 BASIC METABOLIC PNL TOTAL CA: CPT | Performed by: STUDENT IN AN ORGANIZED HEALTH CARE EDUCATION/TRAINING PROGRAM

## 2025-03-28 PROCEDURE — 99239 HOSP IP/OBS DSCHRG MGMT >30: CPT | Performed by: STUDENT IN AN ORGANIZED HEALTH CARE EDUCATION/TRAINING PROGRAM

## 2025-03-28 PROCEDURE — 250N000013 HC RX MED GY IP 250 OP 250 PS 637: Performed by: NURSE PRACTITIONER

## 2025-03-28 PROCEDURE — 85014 HEMATOCRIT: CPT | Performed by: NURSE PRACTITIONER

## 2025-03-28 PROCEDURE — 84100 ASSAY OF PHOSPHORUS: CPT | Performed by: INTERNAL MEDICINE

## 2025-03-28 PROCEDURE — 83735 ASSAY OF MAGNESIUM: CPT | Performed by: INTERNAL MEDICINE

## 2025-03-28 PROCEDURE — 250N000013 HC RX MED GY IP 250 OP 250 PS 637: Performed by: STUDENT IN AN ORGANIZED HEALTH CARE EDUCATION/TRAINING PROGRAM

## 2025-03-28 PROCEDURE — 36415 COLL VENOUS BLD VENIPUNCTURE: CPT | Performed by: STUDENT IN AN ORGANIZED HEALTH CARE EDUCATION/TRAINING PROGRAM

## 2025-03-28 RX ORDER — NICOTINE 21 MG/24HR
1 PATCH, TRANSDERMAL 24 HOURS TRANSDERMAL DAILY
Qty: 30 PATCH | Refills: 0 | Status: SHIPPED | OUTPATIENT
Start: 2025-03-28

## 2025-03-28 RX ORDER — MAGNESIUM OXIDE 400 MG/1
400 TABLET ORAL EVERY 4 HOURS
Status: DISCONTINUED | OUTPATIENT
Start: 2025-03-28 | End: 2025-03-28 | Stop reason: HOSPADM

## 2025-03-28 RX ADMIN — METOPROLOL SUCCINATE 25 MG: 25 TABLET, EXTENDED RELEASE ORAL at 10:00

## 2025-03-28 RX ADMIN — OXYCODONE HYDROCHLORIDE 10 MG: 5 TABLET ORAL at 05:57

## 2025-03-28 RX ADMIN — EZETIMIBE 10 MG: 10 TABLET ORAL at 08:00

## 2025-03-28 RX ADMIN — SACUBITRIL AND VALSARTAN 1 TABLET: 24; 26 TABLET, FILM COATED ORAL at 10:00

## 2025-03-28 RX ADMIN — OXYCODONE HYDROCHLORIDE 10 MG: 5 TABLET ORAL at 09:58

## 2025-03-28 RX ADMIN — TICAGRELOR 90 MG: 90 TABLET ORAL at 08:00

## 2025-03-28 RX ADMIN — OXYCODONE HYDROCHLORIDE 10 MG: 5 TABLET ORAL at 01:52

## 2025-03-28 RX ADMIN — GABAPENTIN 200 MG: 100 CAPSULE ORAL at 08:00

## 2025-03-28 RX ADMIN — OXYCODONE HYDROCHLORIDE 10 MG: 5 TABLET ORAL at 13:57

## 2025-03-28 RX ADMIN — ASPIRIN 81 MG: 81 TABLET, COATED ORAL at 08:01

## 2025-03-28 RX ADMIN — RANOLAZINE 500 MG: 500 TABLET, FILM COATED, EXTENDED RELEASE ORAL at 08:00

## 2025-03-28 RX ADMIN — VENLAFAXINE HYDROCHLORIDE 100 MG: 100 TABLET ORAL at 08:00

## 2025-03-28 RX ADMIN — FENOFIBRATE 160 MG: 160 TABLET ORAL at 08:00

## 2025-03-28 RX ADMIN — BUSPIRONE HYDROCHLORIDE 10 MG: 10 TABLET ORAL at 08:00

## 2025-03-28 RX ADMIN — Medication 400 MG: at 12:06

## 2025-03-28 ASSESSMENT — ACTIVITIES OF DAILY LIVING (ADL)
ADLS_ACUITY_SCORE: 34

## 2025-03-28 NOTE — PLAN OF CARE
Goal Outcome Evaluation:     Summary: Transfer from ICU, admitted on 3/24 w/ nausea, vomiting and diarrhea. Found to be Hypotension/Hypovolemia shock.  Hx of DM2, PAD, chronic HF, CAD s/p STEMI Sept 2022, cardiomyopathy, chronic back pain     DATE & TIME: 3/28/25 6973-5003  Cognitive Concerns/ Orientation : A&O x4, calm and cooperative.  BEHAVIOR & AGGRESSION TOOL COLOR: Green  ABNL VS/O2: VSS on RA. BP must be taken on L arm due to R arm subclavian stenosis.   MOBILITY: Independent  PAIN MANAGMENT: Chronic back pain managed with PRN 10mg Oxy q4h for 6/10 back pain. Scheduled gabapentin. Receiving home pain regimen  DIET: High carb  BOWEL/BLADDER: Continent  DRAIN/DEVICES: PIV removed for discharge  SKIN: Bruised   TESTS/PROCEDURES: None  D/C DATE/GOALS/PLACE: Today with , waiting for discharge meds

## 2025-03-28 NOTE — PLAN OF CARE
Goal Outcome Evaluation:    Summary: Transfer from ICU, admitted on 3/24 w/ nausea, vomiting and diarrhea. Found to be Hypotension/Hypovolemia shock.  Hx of DM2, PAD, chronic HF, CAD s/p STEMI Sept 2022, cardiomyopathy, chronic back pain     DATE & TIME: 3/28/25 3691-6088  Cognitive Concerns/ Orientation : A&O x4, calm and cooperative.  BEHAVIOR & AGGRESSION TOOL COLOR: Green  ABNL VS/O2: VSS on RA. BP must be taken on L arm due to R arm subclavian stenosis.   MOBILITY: Independent  PAIN MANAGMENT: Chronic back pain managed with PRN 10mg Oxy q4h for 6/10 back pain. Scheduled gabapentin. Receiving home pain regimen  DIET: High carb  BOWEL/BLADDER: Continent  DRAIN/DEVICES: PIV removed for discharge  SKIN: Bruised   TESTS/PROCEDURES: None  D/C DATE/GOALS/PLACE: Today with , waiting for discharge meds

## 2025-03-28 NOTE — PLAN OF CARE
Goal Outcome Evaluation:      Plan of Care Reviewed With: patient    Overall Patient Progress: improving    DATE & TIME: 3/27/25 0130-9976  Cognitive Concerns/ Orientation : A&O x4, calm and cooperative.  BEHAVIOR & AGGRESSION TOOL COLOR: Green  ABNL VS/O2: VSS on RA. BP must be taken on L arm due to R arm subclavian stenosis.   MOBILITY: Independent  PAIN MANAGMENT: Chronic back pain managed with PRN 10mg Oxy q4h for 6/10 back pain. Scheduled gabapentin. Receiving home pain regimen  DIET: High carb  BOWEL/BLADDER: Continent  ABNL LAB/BG:  & 100. Has CGM  DRAIN/DEVICES: L PIV x2 SL  SKIN: Bruised   TESTS/PROCEDURES: None  D/C DATE/GOALS/PLACE: Friday morning if vitals remain stable w/ restarting Entresto and Metoprolol. Pt's  will pick pt up.  OTHER IMPORTANT INFO:

## 2025-03-28 NOTE — PROGRESS NOTES
Discharge    Patient discharged to home via private vehicle with .  Care plan note: AVS reviewed with patient, medications supplied from discharge pharmacy.    Listed belongings gathered and given to patient (including from security/pharmacy). Yes  Care Plan and Patient education resolved: Yes  Prescriptions if needed, hard copies sent with patient  NA  Medication Bin checked and emptied on discharge Yes  SW/care coordinator/charge RN aware of discharge: Yes

## 2025-03-28 NOTE — PROVIDER NOTIFICATION
MD Notification    Notified Person: MD    Notified Person Name: Birgit    Notification Date/Time: 3/27/25 1925    Notification Interaction: Vocera    Purpose of Notification: Pt transferred from ICU today and the hospitalist did not switch the vitals order. Pt cannot be q2h VS on medical unit. Can I switch them from q2h to q8h? VSS on RA.     Orders Received: Ok to switch to q8h    Comments:

## 2025-03-29 ENCOUNTER — HEALTH MAINTENANCE LETTER (OUTPATIENT)
Age: 50
End: 2025-03-29

## 2025-03-30 LAB
BACTERIA BLD CULT: NO GROWTH
BACTERIA BLD CULT: NO GROWTH

## 2025-03-31 ENCOUNTER — DOCUMENTATION ONLY (OUTPATIENT)
Dept: CARDIOLOGY | Facility: CLINIC | Age: 50
End: 2025-03-31
Payer: COMMERCIAL

## 2025-03-31 DIAGNOSIS — I50.22 CHRONIC SYSTOLIC HEART FAILURE (H): Primary | ICD-10-CM

## 2025-03-31 NOTE — PROGRESS NOTES
Cailin Burns has an upcoming lab appointment:    Future Appointments   Date Time Provider Department Center   4/2/2025  9:00 AM LAB FIRST FLOOR [facility] [place]   4/2/2025  9:20 AM Ora Youngblood APRN CNP CARD [place]   5/27/2025  7:45 AM UC LAB [facility] [place]   5/27/2025  8:00 AM  CV DEVICE 1 [facility] [place]   5/27/2025  8:30 AM Kurt Guerin MD [facility]   8/27/2025 12:00 AM UC ICD REMOTE [facility] [place]     Patient is scheduled for the following lab(s): Pt is requesting labs on 4/2/25 from Ora with no orders in chart.    Thank you, Cyndy

## 2025-04-01 NOTE — DISCHARGE SUMMARY
"[facility]  Hospitalist Discharge Summary      Date of Admission:  3/24/2025  Date of Discharge:  4/1/2025  Discharging Provider: Cheng Rich MD  Discharge Service: Hospitalist Service    Discharge Diagnoses   Hypotension, likely hypovolemic  Borderline shock state  NYHA class II HF, systolic, chronic, compensated, at times class III  symptoms  ischemic cardiomyopathy LVEF 27% status post ICD May 2023. EF 45% on 3/24/2025 TTE  CAD  Recent diarrheal illness   Leukocytosis 2/2 unclear etiology aside from recent diarrheal illness   T2DM   tobacco use disorder, ongoing  PAD with mixed claudication  Dyslipidemia  chronic pain                Clinically Significant Risk Factors     # DMII: A1C = 6.8 % (Ref range: <5.7 %) within past 6 months  # Overweight: Estimated body mass index is 29.8 kg/m  as calculated from the following:    Height as of this encounter: 1.575 m (5' 2\").    Weight as of this encounter: 73.9 kg (162 lb 14.7 oz).       Follow-ups Needed After Discharge   Follow-up Appointments       Follow Up      - Follow up your primary care doctor in the next 1-2 weeks for post-hospitalization followup.  - Follow up with your heart failure doctor. They will call you to schedule this appointment.              Unresulted Labs Ordered in the Past 30 Days of this Admission       No orders found from 2/22/2025 to 3/25/2025.          Discharge Disposition   Discharged to home  Condition at discharge: Stable    Hospital Course   Cailin Burns is a 49 year old female with a PMH significant for tobacco use disorder, type 2 diabetes mellitus, PAD with mixed claudication, CAD s/p STEMI September 2022 with three-vessel disease status post staged PCI due to poor bypass target (PCI of RCA  and MOHAN x1 to mLAD and MOHAN x1 to mLCx on 01/2023), ischemic cardiomyopathy with HFrEF, LVEF 27% status post ICD May 2023,  dyslipidemia, chronic pain, who was admitted to ICU 3/24/25 for hypovolemic shock and " is being transferred to hospitalist service 03/25/25.      Hypotension, likely hypovolemic  Prior to patient's presentation, she had GI losses from diarrhea and vomiting. This is in addition to the diuretic medications she takes for her HF.  Shock state now resolved, likely hypovolemic type from combination of GI losses from vomiting diarrhea coupled with diuretic medication use  *baseline BP at clinic appointments and home /70s-80s  *Noted to be hypotensive on 3/27/2025 and received multiple boluses of IV fluids. Transferred briefly back to ICU for norepinephrine. BP was rechecked on both arms and patient's BP was noted to be markedly improved in L arm. Patient is noted to have history of R subclavian and brachiocephalic artery stenosis so BP should only be checked on L arm. BP at baseline at time of discharge.     NYHA class II HF, systolic, chronic, compensated, at times class III  symptoms  ischemic cardiomyopathy LVEF 27% status post ICD May 2023. EF 45% on 3/24/2025 TTE  *CAD s/p STEMI September 2022 with three-vessel disease status post staged PCI due to poor bypass target (PCI of RCA  and MOHAN x1 to mLAD and MOHAN x1 to mLCx on 01/2023)  *TTE 3/25/2025 showing LV is normal in size. The visual ejection fraction is  estimated at 45%. Mid-distal inferior, anteroseptal, and apical hypokinesis.  right ventricle is normal in structure, function and size.  No valve disease.  Echo 2-5-24 showed EF 35% with similar WMA  *PTA GDMT includes dapagliflozin 10 mg daily, Lasix 20 mg daily, isosorbide dinitrate 10 mg 3 times daily, metoprolol extended release 20 mg daily, Entresto 97-23 mg twice daily, spironolactone 25 mg daily  -Cardiology signed off  -Restarted Entresto at reduced dose from PTA dose  -Restarted PTA Metoprolol  -Continue to hold other HF medications on discharge. Per cardiology, patient could start Lasix in a few days if she's doing ok at home. She was advised to monitor her BP and weight at home.  If her weight begins trending up in the next 2-3 days, she should start taking her PTA Lasix again and let her outpatient cardiology team know.  -Pt should followup with cardiology outpatient  - Continue PTA DAPT with aspirin and Brilinta  -PTA Ranexa     Recent diarrheal illness without any further episodes of such since admission     Leukocytosis 2/2 unclear etiology aside from recent diarrheal illness, CT chest 3/24/25 w/o any evidence of pneumonia, ROS negative for any infectious history, well-appearing, only complaint is fatigue; procal neg, UA neg     T2DM A1C 6.8%  -restart PTA metformin, Ozempic on discharge  -Mod carb diet     tobacco use disorder, ongoing  -Encourage cessation     PAD with mixed claudication  -DAPT continued     Dyslipidemia  -Continue PTA Lipitor, Zetia, fenofibrate     chronic pain  -Continue PTA gabapentin, venlafaxine, pain regimen        Consultations This Hospital Stay   PHYSICAL THERAPY ADULT IP CONSULT  OCCUPATIONAL THERAPY ADULT IP CONSULT  PHARMACY IP CONSULT  CARDIOLOGY IP CONSULT  INTENSIVIST IP CONSULT  VASCULAR ACCESS ADULT IP CONSULT    Code Status   Prior    Time Spent on this Encounter   I, Cheng Rich MD, personally saw the patient today and spent greater than 30 minutes discharging this patient.       Cheng Rich MD  [place] [id number] [facility]  [address] [place] [address]  Phone: [phone]  ______________________________________________________________________    Physical Exam   Vital Signs:                    Weight: 162 lbs 14.72 oz  Constitutional: Awake, alert, cooperative, no apparent distress.    Pulmonary: No increased work of breathing, good air exchange, clear to auscultation bilaterally, no crackles or wheezing.  Cardiovascular: Regular rate and rhythm, normal S1 and S2, no S3 or S4. No murmurs, rubs, or gallops noted.  GI: Normal bowel sounds, soft, non-distended, non-tender.  No guarding or  rebound.  Skin/Integumen: No cyanosis or jaundice. No rashes noted.  Extremities: No lower extremity edema.  Neuro: A&Ox4. Conversant. Responds appropriately in conversation. Moves all extremities with no focal deficit identified.                  Primary Care Physician   Physician No Ref-Primary    Discharge Orders      Follow-Up with Cardiology [facility] Heart Failure Discharge      Reason for your hospital stay    Low BP in setting of home heart failure medications and fluid loss     Activity    Your activity upon discharge: activity as tolerated     Follow Up    - Follow up your primary care doctor in the next 1-2 weeks for post-hospitalization followup.  - Follow up with your heart failure doctor. They will call you to schedule this appointment.     Diet    Follow this diet upon discharge: Current Diet:Orders Placed This Encounter      High Consistent Carb (75 g CHO per Meal) Diet       Significant Results and Procedures   Most Recent 3 CBC's:  Recent Labs   Lab Test 03/28/25  1105 03/27/25  0511 03/26/25  0511   WBC 9.3 7.8 7.3   HGB 11.5* 10.3* 11.5*   MCV 89 90 89    295 312     Most Recent 3 BMP's:  Recent Labs   Lab Test 03/28/25  1105 03/28/25  0419 03/27/25  2143 03/27/25  0739 03/27/25  0511 03/26/25  1413 03/26/25  0511   *  --   --   --  137  --  138   POTASSIUM 4.1  --   --   --  4.3  4.2  --  4.2   CHLORIDE 101  --   --   --  108*  --  108*   CO2 23  --   --   --  18*  --  23   BUN 13.2  --   --   --  11.6  --  8.4   CR 0.56  --   --   --  0.62  --  0.56   ANIONGAP 9  --   --   --  11  --  7   RON 8.9  --   --   --  8.5*  --  8.4*   GLC 98 100* 101*   < > 99   < > 91    < > = values in this interval not displayed.     Most Recent 2 LFT's:  Recent Labs   Lab Test 03/24/25  2300 02/06/24  0614   AST 16 25   ALT 14 25   ALKPHOS 49 55   BILITOTAL 0.3 0.3     Most Recent 3 BNP's:  Recent Labs   Lab Test 03/26/25  0511 02/06/24  0614 02/04/24  2100   NTBNPI [phone]   ,   Results for orders  placed or performed during the hospital encounter of 25   Echocardiogram Complete     Value    LVEF  45%    [facility]    [id number]  [id number]  [id number]  [id number]^HERNANDEZ^JIMBO     [facility]  Echocardiography Laboratory  [address] [place] [id number]     Name: MARTHA SETHI  MRN: [id number]  : 1975  Study Date: 2025 01:13 PM  Age: 49 yrs  Gender: Female  Patient Location: [facility]  Reason For Study: Chest Pain  Ordering Physician: JIMBO NG  Referring Physician: JIMBO NG  Performed By: Cb Hankins RDCS     BSA: 1.7 m2  Height: 62 in  Weight: 158 lb  HR: 72  BP: 65/53 mmHg  ______________________________________________________________________________  Procedure  Echocardiogram with two-dimensional, color and spectral Doppler.  ______________________________________________________________________________  Interpretation Summary     1. The left ventricle is normal in size. The visual ejection fraction is  estimated at 45%. Mid-distal inferior, anteroseptal, and apical hypokinesis.  2. The right ventricle is normal in structure, function and size.  3. No valve disease.     Echo 24 showed EF 35% with similar WMA.  ______________________________________________________________________________  Left Ventricle  The left ventricle is normal in size. There is normal left ventricular wall  thickness. The visual ejection fraction is estimated at 45%. Left ventricular  diastolic function is indeterminate. Mid-distal inferior, anteroseptal, and  apical hypokinesis. There is no thrombus seen in the left ventricle.     Right Ventricle  The right ventricle is normal in structure, function and size.     Atria  The left atrium is mildly dilated. Right atrial size is normal. There is no  atrial shunt seen.     Mitral Valve  There is mild (1+) mitral regurgitation.     Aortic Valve  There is mild (1+) aortic regurgitation.     Pulmonic Valve  The pulmonic valve is not well  visualized.     Vessels  Normal ascending, transverse (arch), and descending aorta. The inferior vena  cava was normal in size with preserved respiratory variability.     Pericardium  There is no pericardial effusion.     Rhythm  The rhythm was sinus with wide QRS.     ______________________________________________________________________________  MMode/2D Measurements & Calculations  IVSd: 0.92 cm  LVIDd: 5.6 cm  LVIDs: 3.7 cm  LVPWd: 0.80 cm  FS: 33.0 %  LV mass(C)d: 179.1 grams  LV mass(C)dI: 103.6 grams/m2     Ao root diam: 3.2 cm  asc Aorta Diam: 2.9 cm  Ao root diam index Ht(cm/m): 2.1  Ao root diam index BSA (cm/m2): 1.9  Asc Ao diam index BSA (cm/m2): 1.7  Asc Ao diam index Ht(cm/m): 1.8  EF Biplane: 52.4 %  LA Volume (BP): 66.9 ml  LA Volume Index (BP): 38.7 ml/m2  RWT: 0.29     TAPSE: 2.0 cm     Doppler Measurements & Calculations  MV E max madi: 100.0 cm/sec  MV A max madi: 116.0 cm/sec  MV E/A: 0.86  MV dec slope: 364.5 cm/sec2  MV dec time: 0.27 sec  PA acc time: 0.11 sec  E/E' av.3  Lateral E/e': 15.8  Medial E/e': 16.7  RV S Madi: 9.7 cm/sec     ______________________________________________________________________________  Report approved by: Mark Henry MD on 2025 03:18 PM             Discharge Medications   Discharge Medication List as of 3/28/2025 12:15 PM        START taking these medications    Details   nicotine (NICODERM CQ) 14 MG/24HR 24 hr patch Place 1 patch over 24 hours onto the skin daily.Disp-30 patch, [id number]      nicotine (NICORETTE) 2 MG gum Place 1 each (2 mg) inside cheek every hour as needed for nicotine withdrawal symptoms., Disp-60 each, R-0, E-Prescribe      sacubitril-valsartan (ENTRESTO) 24-26 MG per tablet Take 1 tablet by mouth 2 times daily., Disp-30 tablet, R-0, E-Prescribe           CONTINUE these medications which have NOT CHANGED    Details   aspirin (ASA) 81 MG EC tablet Take 81 mg by mouth daily, Historical      atorvastatin (LIPITOR) 80 MG  tablet Take 1 tablet by mouth at bedtime., Historical      busPIRone (BUSPAR) 10 MG tablet Take 10 mg by mouth 2 times daily, Historical      diclofenac (VOLTAREN) 1 % topical gel Apply topically 4 times daily as needed for moderate pain., Historical      estradiol (ESTRACE) 0.1 MG/GM vaginal cream Place vaginally as needed (as needed).Historical      ezetimibe (ZETIA) 10 MG tablet Take 1 tablet (10 mg) by mouth daily, Disp-90 tablet, R-3, E-Prescribe      fenofibrate (TRIGLIDE/LOFIBRA) 160 MG tablet Take 160 mg by mouth daily, Historical      gabapentin (NEURONTIN) 100 MG capsule Take 200 mg by mouth 3 times daily., Historical      metFORMIN (GLUCOPHAGE) 1000 MG tablet Take 1,000 mg by mouth 2 times daily (with meals), Historical      metoprolol succinate ER (TOPROL XL) 25 MG 24 hr tablet Take 1 tablet (25 mg) by mouth daily for 11 days, Disp-11 tablet, R-0, E-Prescribe      nitroGLYcerin (NITROSTAT) 0.4 MG sublingual tablet Place 0.4 mg under the tongue every 5 minutes as needed, Historical      nystatin (MYCOSTATIN) 874360 UNIT/GM external powder Apply topically as needed for other. Under breastsHistorical      oxyCODONE IR (ROXICODONE) 10 MG tablet Take 10 mg by mouth 5 times daily., Historical      OZEMPIC, 2 MG/DOSE, 8 MG/3ML pen Inject 2 mg subcutaneously every 7 days. Takes on Fridays, MORIAH, Historical      polyethylene glycol (MIRALAX) 17 GM/Dose powder Take 17 g by mouth daily as needed for constipation., Historical      ranolazine (RANEXA) 500 MG 12 hr tablet Take 500 mg by mouth 2 times daily, Historical      SENEXON-S 8.6-50 MG tablet Take 2 tablets by mouth at bedtime., MORIAH, Historical      ticagrelor (BRILINTA) 90 MG tablet Take 90 mg by mouth 2 times daily, Historical      venlafaxine (EFFEXOR) 100 MG tablet Take 1 tablet by mouth 2 times daily, Historical      Continuous Glucose Sensor (DEXCOM G7 SENSOR) MISC APPLY NEW SENSOR TO BE USED EVERY 10 DAYS WITH DEXCOM G7, Historical           STOP taking  these medications       dapagliflozin (FARXIGA) tablet Comments:   Reason for Stopping:         furosemide (LASIX) 20 MG tablet Comments:   Reason for Stopping:         isosorbide dinitrate (ISORDIL) 10 MG tablet Comments:   Reason for Stopping:         sacubitril-valsartan (ENTRESTO)  MG per tablet Comments:   Reason for Stopping:         spironolactone (ALDACTONE) 25 MG tablet Comments:   Reason for Stopping:             Allergies   Allergies   Allergen Reactions    Liraglutide      Other Reaction(s): Unknown/Not Verified    Phenytoin Nausea and Vomiting    Rosuvastatin Muscle Pain (Myalgia)    Valproic Acid Unknown     Does not remember

## 2025-04-02 ENCOUNTER — LAB (OUTPATIENT)
Dept: LAB | Facility: CLINIC | Age: 50
End: 2025-04-02
Payer: COMMERCIAL

## 2025-04-02 ENCOUNTER — OFFICE VISIT (OUTPATIENT)
Dept: CARDIOLOGY | Facility: CLINIC | Age: 50
End: 2025-04-02
Payer: COMMERCIAL

## 2025-04-02 ENCOUNTER — DOCUMENTATION ONLY (OUTPATIENT)
Dept: CARDIOLOGY | Facility: CLINIC | Age: 50
End: 2025-04-02

## 2025-04-02 VITALS
BODY MASS INDEX: 28.98 KG/M2 | HEIGHT: 62 IN | WEIGHT: 157.5 LBS | HEART RATE: 81 BPM | OXYGEN SATURATION: 99 % | DIASTOLIC BLOOD PRESSURE: 68 MMHG | SYSTOLIC BLOOD PRESSURE: 101 MMHG

## 2025-04-02 DIAGNOSIS — I10 BENIGN ESSENTIAL HYPERTENSION: ICD-10-CM

## 2025-04-02 DIAGNOSIS — I50.22 CHRONIC SYSTOLIC HEART FAILURE (H): Primary | ICD-10-CM

## 2025-04-02 DIAGNOSIS — I25.5 ISCHEMIC CARDIOMYOPATHY: ICD-10-CM

## 2025-04-02 DIAGNOSIS — I25.10 CORONARY ARTERY DISEASE INVOLVING NATIVE CORONARY ARTERY OF NATIVE HEART WITHOUT ANGINA PECTORIS: ICD-10-CM

## 2025-04-02 DIAGNOSIS — I50.23 ACUTE ON CHRONIC SYSTOLIC HEART FAILURE (H): ICD-10-CM

## 2025-04-02 DIAGNOSIS — I50.22 CHRONIC SYSTOLIC HEART FAILURE (H): ICD-10-CM

## 2025-04-02 DIAGNOSIS — E11.9 TYPE 2 DIABETES MELLITUS WITHOUT COMPLICATION, WITH LONG-TERM CURRENT USE OF INSULIN (H): ICD-10-CM

## 2025-04-02 DIAGNOSIS — Z95.810 S/P ICD (INTERNAL CARDIAC DEFIBRILLATOR) PROCEDURE: ICD-10-CM

## 2025-04-02 DIAGNOSIS — Z79.4 TYPE 2 DIABETES MELLITUS WITHOUT COMPLICATION, WITH LONG-TERM CURRENT USE OF INSULIN (H): ICD-10-CM

## 2025-04-02 LAB
ANION GAP SERPL CALCULATED.3IONS-SCNC: 12 MMOL/L (ref 7–15)
BUN SERPL-MCNC: 12.8 MG/DL (ref 6–20)
CALCIUM SERPL-MCNC: 9.4 MG/DL (ref 8.8–10.4)
CHLORIDE SERPL-SCNC: 104 MMOL/L (ref 98–107)
CREAT SERPL-MCNC: 0.62 MG/DL (ref 0.51–0.95)
EGFRCR SERPLBLD CKD-EPI 2021: >90 ML/MIN/1.73M2
GLUCOSE SERPL-MCNC: 104 MG/DL (ref 70–99)
HCO3 SERPL-SCNC: 20 MMOL/L (ref 22–29)
NT-PROBNP SERPL-MCNC: 472 PG/ML (ref 0–900)
POTASSIUM SERPL-SCNC: 4.6 MMOL/L (ref 3.4–5.3)
SODIUM SERPL-SCNC: 136 MMOL/L (ref 135–145)

## 2025-04-02 PROCEDURE — 3078F DIAST BP <80 MM HG: CPT | Performed by: NURSE PRACTITIONER

## 2025-04-02 PROCEDURE — 1126F AMNT PAIN NOTED NONE PRSNT: CPT | Performed by: NURSE PRACTITIONER

## 2025-04-02 PROCEDURE — 80048 BASIC METABOLIC PNL TOTAL CA: CPT

## 2025-04-02 PROCEDURE — 83880 ASSAY OF NATRIURETIC PEPTIDE: CPT

## 2025-04-02 PROCEDURE — 3074F SYST BP LT 130 MM HG: CPT | Performed by: NURSE PRACTITIONER

## 2025-04-02 PROCEDURE — 99214 OFFICE O/P EST MOD 30 MIN: CPT | Performed by: NURSE PRACTITIONER

## 2025-04-02 PROCEDURE — 36415 COLL VENOUS BLD VENIPUNCTURE: CPT

## 2025-04-02 RX ORDER — SACUBITRIL AND VALSARTAN 49; 51 MG/1; MG/1
1 TABLET, FILM COATED ORAL 2 TIMES DAILY
Qty: 180 TABLET | Refills: 3 | Status: SHIPPED | OUTPATIENT
Start: 2025-04-02

## 2025-04-02 ASSESSMENT — PAIN SCALES - GENERAL: PAINLEVEL_OUTOF10: NO PAIN (0)

## 2025-04-02 NOTE — NURSING NOTE
"Chief Complaint   Patient presents with    Follow Up     Return CORE - post hospital discharge follow up       Initial /68 (BP Location: Left arm, Patient Position: Sitting, Cuff Size: Adult Regular)   Pulse 81   Ht 1.575 m (5' 2\")   Wt 71.4 kg (157 lb 8 oz)   SpO2 99%   BMI 28.81 kg/m   Estimated body mass index is 28.81 kg/m  as calculated from the following:    Height as of this encounter: 1.575 m (5' 2\").    Weight as of this encounter: 71.4 kg (157 lb 8 oz).  BP completed using cuff size: regular    Medication refills needed?: Navdeep Matias, EMT  "

## 2025-04-02 NOTE — PROGRESS NOTES
HPI: Cailin is a 49 year old White female with a past medical history of with a past medical history notable for PMH of DM, HLD, obesity, tobacco use disorder, sciatica .  Recent inpatient stay- hypovolemia ( GI losses - diarrhea and vomiting) Shock state now resolved, likely hypovolemic type from combination of GI losses from vomiting diarrhea coupled with diuretic medication use, baseline BP at clinic appointments and home /70s-80s.  Noted to be hypotensive on 3/27/2025 and received multiple boluses of IV fluids. Transferred briefly back to ICU for norepinephrine. BP was rechecked on both arms and patient's BP was noted to be markedly improved in L arm. Patient is noted to have history of R subclavian and brachiocephalic artery stenosis     Patient states she has no SOB at rest. She is able to walk further did feel a little GONZALES walking in.  No LE edema. No distention in the abdomen. Her appetite has been normal.  Weight at home 159 lbs, 156 is more normal. She has been smoking she says- but is trying to stop. Recent move to [place] so she is closer for her healthcare needs she says. She met with vascular specialist. She had to see the surgeon about the PAD - which would affect the LVAD.  She watches the sodium and is careful with the diet. 100-110 systolic.  No more diarrhea and/or vomiting.     Denies SOB, GONZALES, PND, orthopnea, edema, weight gain, chest pain, palpitations, lightheadedness, dizziness, near syncopal/syncopal episodes. Cailin has been following salt and fluid restrictions.      PAST MEDICAL HISTORY:  Past Medical History:   Diagnosis Date    CAD (coronary artery disease)     Nicotine dependence     Type 2 diabetes mellitus with other circulatory complications (H)        FAMILY HISTORY:  Family History   Problem Relation Age of Onset    Coronary Artery Disease Early Onset Maternal Grandfather        SOCIAL HISTORY:  Social History     Tobacco Use    Smoking status: Some Days     Current  packs/day: 1.50     Average packs/day: 1.5 packs/day for 37.2 years (55.9 ttl pk-yrs)     Types: Cigarettes     Start date: 1988     Passive exposure: Past    Smokeless tobacco: Never    Tobacco comments:     Trying to quit, wearing patch -    Substance Use Topics    Alcohol use: Not Currently    Drug use: Never           CURRENT MEDICATIONS:  Current Outpatient Medications   Medication Sig Dispense Refill    aspirin (ASA) 81 MG EC tablet Take 81 mg by mouth daily      atorvastatin (LIPITOR) 80 MG tablet Take 1 tablet by mouth at bedtime.      busPIRone (BUSPAR) 10 MG tablet Take 10 mg by mouth 2 times daily      Continuous Glucose Sensor (DEXCOM G7 SENSOR) MISC APPLY NEW SENSOR TO BE USED EVERY 10 DAYS WITH DEXCOM G7      diclofenac (VOLTAREN) 1 % topical gel Apply topically 4 times daily as needed for moderate pain.      estradiol (ESTRACE) 0.1 MG/GM vaginal cream Place vaginally as needed (as needed).      ezetimibe (ZETIA) 10 MG tablet Take 1 tablet (10 mg) by mouth daily 90 tablet 3    fenofibrate (TRIGLIDE/LOFIBRA) 160 MG tablet Take 160 mg by mouth daily      gabapentin (NEURONTIN) 100 MG capsule Take 200 mg by mouth 3 times daily.      metFORMIN (GLUCOPHAGE) 1000 MG tablet Take 1,000 mg by mouth 2 times daily (with meals)      metoprolol succinate ER (TOPROL XL) 25 MG 24 hr tablet Take 1 tablet (25 mg) by mouth daily for 11 days 11 tablet 0    nicotine (NICODERM CQ) 14 MG/24HR 24 hr patch Place 1 patch over 24 hours onto the skin daily. 30 patch 0    nitroGLYcerin (NITROSTAT) 0.4 MG sublingual tablet Place 0.4 mg under the tongue every 5 minutes as needed      nystatin (MYCOSTATIN) 053162 UNIT/GM external powder Apply topically as needed for other. Under breasts      oxyCODONE IR (ROXICODONE) 10 MG tablet Take 10 mg by mouth 5 times daily.      OZEMPIC, 2 MG/DOSE, 8 MG/3ML pen Inject 2 mg subcutaneously every 7 days. Takes on Fridays      polyethylene glycol (MIRALAX) 17 GM/Dose powder Take 17 g by mouth  "daily as needed for constipation.      ranolazine (RANEXA) 500 MG 12 hr tablet Take 500 mg by mouth 2 times daily      sacubitril-valsartan (ENTRESTO) 24-26 MG per tablet Take 1 tablet by mouth 2 times daily. 30 tablet 0    SENEXON-S 8.6-50 MG tablet Take 2 tablets by mouth at bedtime.      ticagrelor (BRILINTA) 90 MG tablet Take 90 mg by mouth 2 times daily      venlafaxine (EFFEXOR) 100 MG tablet Take 1 tablet by mouth 2 times daily      nicotine (NICORETTE) 2 MG gum Place 1 each (2 mg) inside cheek every hour as needed for nicotine withdrawal symptoms. (Patient not taking: Reported on 4/2/2025) 60 each 0       ROS:  Per HPI        EXAM:   General: alert, articulate, and in no acute distress.  HEENT: normocephalic, atraumatic, anicteric sclera, EOMI, mucosa moist, no cyanosis.   Neck: supple, no  JVD  Chest: Diminished at the bases   Ext: no LE edema       Labs:  CBC RESULTS:  Lab Results   Component Value Date    WBC 9.3 03/28/2025    RBC 3.93 03/28/2025    HGB 11.5 (L) 03/28/2025    HCT 35.1 03/28/2025    MCV 89 03/28/2025    MCH 29.3 03/28/2025    MCHC 32.8 03/28/2025    RDW 14.6 03/28/2025     03/28/2025       CMP RESULTS:  Lab Results   Component Value Date     04/02/2025    POTASSIUM 4.6 04/02/2025    CHLORIDE 104 04/02/2025    CHLORIDE 102 05/14/2024    CO2 20 (L) 04/02/2025    ANIONGAP 12 04/02/2025     (H) 04/02/2025     (H) 03/28/2025    BUN 12.8 04/02/2025    CR 0.62 04/02/2025    GFRESTIMATED >90 04/02/2025    RON 9.4 04/02/2025    BILITOTAL 0.3 03/24/2025    ALBUMIN 3.7 03/24/2025    ALKPHOS 49 03/24/2025    ALT 14 03/24/2025    AST 16 03/24/2025        INR RESULTS:  Lab Results   Component Value Date    INR 0.99 02/06/2024       No components found for: \"CK\"  Lab Results   Component Value Date    MAG 1.8 03/28/2025     Lab Results   Component Value Date    NTBNP 472 04/02/2025     @BRIEFLABR (dig)@    Most recent echocardiogram:  No results found for this or any previous " visit (from the past 8760 hours).      Assessment and Plan:    49 year old lady with a  including DM which is reasonably better controlled at the moment as well as recent STEMI back in September 2022 in the setting of atypical symptoms with high peak and troponin at around 64. She does have multivessel coronary artery disease only the LCx vessel was intervened however she has significant disease in the small LAD as well as  of the RCA.  She was found to be not a surgical candidate in [place].      Overall she has show improvement in the last echo with a ef of 45%. She takes her medications as prescribed she is normotensive. Last inpatient admission was due to hypovolemia due to GI losses and taking diuretics. We will need to slowly up re-introduce her HF medications.     1.  Chronic systolic heart failure secondary to ICM  Stage D  NYHA Class III  ACEi/ARB/ARNI: yes- low dose Entresto due to hypotension( was on max dose before last admission)-  Isordil held due to hypotension   BB: yes  Aldosterone antagonist: yes- 12.5 mg daily- held due to hypovolemia  SGLT2i- Farxiga - held due to hypovolemia  SCD prophylaxis: ICD  Fluid status: euvolemic  Anticoagulation:   Antiplatelet:  ASA dose   Sleep apnea:not discussed today  NSAID use:  Contraindicated.  Avoid use.  Remote Monitoring:none  ISORDIL 10 mg TID- held due hypovolemia     Plan:  Increase Entresto 49/51 mg BID   In 2 weeks to if doing well - restart Farxiga  [facility] in July           Ora LAGUERRE, CNP  [facility]

## 2025-04-02 NOTE — PATIENT INSTRUCTIONS
Take your medicines every day, as directed     Changes made today:    Increase Entresto 49/51 mg BID   In 2 weeks to if doing well - restart Farxiga       Monitor Your Weight and Symptoms     Contact us if you:     Gain 2 pounds in one day or 5 pounds in one week  Feel more short of breath  Notice more leg swelling  Feel lightheadeded    Change your lifestyle     Limit Salt or Sodium:  2000 mg  Limit Fluids:  2000 mL or approximately 64 ounces  Eat a Heart Healthy Diet  Low in saturated fats  Stay Active:  Aim to move at least 150 minutes every  week            To Contact us     During Business Hours:  [phone], option # 1      After hours, weekends or holidays:   [phone], Option #4  Ask to speak to the On-Call Cardiologist. Inform them you are a CORE/heart failure patient at the [place].       Use [facility] allows you to communicate directly with your heart team through secure messaging.  FRUCT can be accessed any time on your phone, computer, or tablet.  If you need assistance, we'd be happy to help!             Keep your Heart Appointments:     [facility] in July  Lab in 2 weeks      Please consider attending our virtual support group which is held monthly. Please reach out to [place] at [phone] for more information if you are interested in attending.

## 2025-04-02 NOTE — NURSING NOTE
To Do    [facility] in July  Increase Entresto to 49/51 BID  Restart Farxiga in 2 weeks if feeling well    Kaye Castillo RN

## 2025-04-08 ENCOUNTER — TELEPHONE (OUTPATIENT)
Dept: VASCULAR SURGERY | Facility: CLINIC | Age: 50
End: 2025-04-08
Payer: COMMERCIAL

## 2025-04-08 NOTE — TELEPHONE ENCOUNTER
LVMTCB #1 to schedule 6 month follow up ultrasounds and to see Dr. Yang in July [phone]      Smiley Zamora RN  [facility]  Needs 6 month follow up around 7/8/25 with Dr. Yang and MANAN and duplex prior.  6 month follow up PAD with claudication.

## 2025-04-17 ENCOUNTER — MYC MEDICAL ADVICE (OUTPATIENT)
Dept: CARDIOLOGY | Facility: CLINIC | Age: 50
End: 2025-04-17
Payer: COMMERCIAL

## 2025-04-17 DIAGNOSIS — I50.22 CHRONIC SYSTOLIC HEART FAILURE (H): ICD-10-CM

## 2025-04-17 DIAGNOSIS — I25.5 ISCHEMIC CARDIOMYOPATHY: Primary | ICD-10-CM

## 2025-04-17 RX ORDER — DAPAGLIFLOZIN 10 MG/1
10 TABLET, FILM COATED ORAL DAILY
Qty: 90 TABLET | Refills: 3 | Status: SHIPPED | OUTPATIENT
Start: 2025-04-17

## 2025-05-26 NOTE — PROGRESS NOTES
May 27, 2025     Cailin Burns is a 49 year old female  with a past medical history notable for DM, HLD, obesity, tobacco use disorder, sciatica who was initially admitted to [place] ICU. She initially presented to urgent care with new onset chest pain, was found to have STEMI and was transferred to [facility] for immediate further evaluation and angiogram on 9/25/22. It was significant for 3 vessel disease. Case was immediately discussed with Dr Leal for potential urgent CABG considerations but ultimately the decision was made to proceed with PCI due to poor bypass targets. Intervention to LCx was made. Prior to further PCI PET viability was pursued prior to considering further interventions. Unfortunately viability did not show enough and thus medical management was recommended. She did have short runs of NSVT while inpatient. ECHO showed EF 20% and thus she was discharged with LifeVest. She was discharged on mimimal medical therapy due to associated hypotension and not being able to tolerate higher doses. She also has PRN lasix for weight gain and PRN nitro. She is following up at the CHF clinic at [place] and was also referred to [facility] for potential future advanced heart failure therapies evaluation.      After discussion with her and the interventional and surgical teams at [facility], the decision was made to proceed with staged PCI of the CTOs. These were performed. She also follows in [place] and attempts were made to increase XL to 50mg daily. In addition, she continued to have the LifeVest and potential referral for device was discussed. ICD had been implanted 5/11/23.  She was recently admitted to [facility] for for hypovolemia in the setting of recurrent emesis as well as diarrhea in the setting of diuretic use.  Initially she was noted to have hypotension however it turns out that she does have significant right subclavian and brachiocephalic artery stenosis.  Once her blood pressure was measured on  the light time her pressures were significantly better.  Ultimately she was discharged but noted to have significant peripheral artery disease as well.  Ultrasounds have been done.  Unfortunately she noted at the time that she continued to smoke which she is trying to stop.  And she recently moved to [facility] so she can get closer to her healthcare needs.  Most recently in heart failure clinic medication adjustments have been made including increasing Entresto and restarting Farxiga as well as metoprolol 25 mg XL.  Overall she has been doing significantly better since hospital discharge.  Her blood pressure has been better controlled denies any dizziness lightheadedness or palpitation.  Takes her medication as prescribed.  There were some arrhythmias on the device that was interrogated most likely consistent with atrial arrhythmia.  No other issues.     PAST MEDICAL HISTORY:  Past Medical History:   Diagnosis Date    CAD (coronary artery disease)     Nicotine dependence     Type 2 diabetes mellitus with other circulatory complications (H)      FAMILY HISTORY:  Family History   Problem Relation Age of Onset    Coronary Artery Disease Early Onset Maternal Grandfather      SOCIAL HISTORY:  Social History     Socioeconomic History    Marital status:    Tobacco Use    Smoking status: Some Days     Current packs/day: 1.50     Average packs/day: 1.5 packs/day for 37.4 years (56.1 ttl pk-yrs)     Types: Cigarettes     Start date: 1988     Passive exposure: Past    Smokeless tobacco: Never    Tobacco comments:     Trying to quit, wearing patch -    Substance and Sexual Activity    Alcohol use: Not Currently    Drug use: Never     Social Drivers of Health     Financial Resource Strain: Low Risk  (3/27/2025)    Financial Resource Strain     Within the past 12 months, have you or your family members you live with been unable to get utilities (heat, electricity) when it was really needed?: No   Food Insecurity: Low Risk   (3/27/2025)    Food Insecurity     Within the past 12 months, did you worry that your food would run out before you got money to buy more?: No     Within the past 12 months, did the food you bought just not last and you didn t have money to get more?: No   Transportation Needs: Low Risk  (3/27/2025)    Transportation Needs     Within the past 12 months, has lack of transportation kept you from medical appointments, getting your medicines, non-medical meetings or appointments, work, or from getting things that you need?: No   Interpersonal Safety: Low Risk  (3/27/2025)    Interpersonal Safety     Do you feel physically and emotionally safe where you currently live?: Yes     Within the past 12 months, have you been hit, slapped, kicked or otherwise physically hurt by someone?: No     Within the past 12 months, have you been humiliated or emotionally abused in other ways by your partner or ex-partner?: No   Housing Stability: Low Risk  (3/27/2025)    Housing Stability     Do you have housing? : Yes     Are you worried about losing your housing?: No     CURRENT MEDICATIONS:  Current Outpatient Medications   Medication Sig Dispense Refill    aspirin (ASA) 81 MG EC tablet Take 81 mg by mouth daily      atorvastatin (LIPITOR) 80 MG tablet Take 1 tablet by mouth at bedtime.      busPIRone (BUSPAR) 10 MG tablet Take 10 mg by mouth 2 times daily      Continuous Glucose Sensor (DEXCOM G7 SENSOR) MISC APPLY NEW SENSOR TO BE USED EVERY 10 DAYS WITH DEXCOM G7      dapagliflozin (FARXIGA) 10 MG TABS tablet Take 1 tablet (10 mg) by mouth daily. 90 tablet 3    diclofenac (VOLTAREN) 1 % topical gel Apply topically 4 times daily as needed for moderate pain.      estradiol (ESTRACE) 0.1 MG/GM vaginal cream Place vaginally as needed (as needed).      ezetimibe (ZETIA) 10 MG tablet Take 1 tablet (10 mg) by mouth daily. 90 tablet 0    fenofibrate (TRIGLIDE/LOFIBRA) 160 MG tablet Take 160 mg by mouth daily      gabapentin (NEURONTIN) 100  MG capsule Take 200 mg by mouth 3 times daily.      metFORMIN (GLUCOPHAGE) 1000 MG tablet Take 1,000 mg by mouth 2 times daily (with meals)      metoprolol succinate ER (TOPROL XL) 25 MG 24 hr tablet Take 1 tablet (25 mg) by mouth daily for 11 days 11 tablet 0    nicotine (NICODERM CQ) 14 MG/24HR 24 hr patch Place 1 patch over 24 hours onto the skin daily. 30 patch 0    nicotine (NICORETTE) 2 MG gum Place 1 each (2 mg) inside cheek every hour as needed for nicotine withdrawal symptoms. (Patient not taking: Reported on 4/2/2025) 60 each 0    nitroGLYcerin (NITROSTAT) 0.4 MG sublingual tablet Place 0.4 mg under the tongue every 5 minutes as needed      nystatin (MYCOSTATIN) 795429 UNIT/GM external powder Apply topically as needed for other. Under breasts      oxyCODONE IR (ROXICODONE) 10 MG tablet Take 10 mg by mouth 5 times daily.      OZEMPIC, 2 MG/DOSE, 8 MG/3ML pen Inject 2 mg subcutaneously every 7 days. Takes on Fridays      polyethylene glycol (MIRALAX) 17 GM/Dose powder Take 17 g by mouth daily as needed for constipation.      ranolazine (RANEXA) 500 MG 12 hr tablet Take 500 mg by mouth 2 times daily      sacubitril-valsartan (ENTRESTO) 49-51 MG per tablet Take 1 tablet by mouth 2 times daily. 180 tablet 3    SENEXON-S 8.6-50 MG tablet Take 2 tablets by mouth at bedtime.      ticagrelor (BRILINTA) 90 MG tablet Take 90 mg by mouth 2 times daily      venlafaxine (EFFEXOR) 100 MG tablet Take 1 tablet by mouth 2 times daily       No current facility-administered medications for this visit.     EXAM:  /68 (BP Location: Left arm, Patient Position: Chair, Cuff Size: Adult Regular)   Pulse 78   Wt 70.9 kg (156 lb 4.8 oz)   SpO2 97%   BMI 28.59 kg/m    General: appears comfortable, no distress   Head: normocephalic, atraumatic  Eyes: anicteric sclera, EOMI  Neck: no adenopathy  Orophyarynx: clear and moist   Heart:  normal rate  Lungs: clear, no rales or wheezing  Abdomen: soft, non-tender, non-distended, no  hepatosplenomegaly  Extremities: no clubbing, cyanosis, edema  Neurological: normal speech and affect, no gross motor deficits     Labs:  Lab Results   Component Value Date    WBC 9.3 03/28/2025    HGB 11.5 (L) 03/28/2025    HCT 35.1 03/28/2025     03/28/2025     04/02/2025    POTASSIUM 4.6 04/02/2025    CHLORIDE 104 04/02/2025    CO2 20 (L) 04/02/2025    BUN 12.8 04/02/2025    CR 0.62 04/02/2025     (H) 04/02/2025    NTBNPI 667 03/26/2025    NTBNP 472 04/02/2025    AST 16 03/24/2025    ALT 14 03/24/2025    ALKPHOS 49 03/24/2025    BILITOTAL 0.3 03/24/2025    INR 0.99 02/06/2024   [facility] - 9/25/22: significant 3VD,  - LCx - mid, 99%, culprit - s/p PCI with a 2.75*26 mm [place] MOHAN  - LAD - diffusely diseased, small caliber, 90% mid & distal - not amenable to PCI due to small caliber  - RCA - mid, 100%, , fills distally with R-R collaterals, small caliber.   Discussed presentation with Dr Leal for consideration for CABG, before proceeding with PCI, it was decided to proceed with PCI-LCx & assess for CABG later.     TTE 9/25/22:    Left Ventricle: The left ventricle is dilated. Severely reduced left  ventricular systolic function. The EF is visually estimated to be 20%. No  thrombus seen -contrast images also reviewed See diagram for wall motion details.     Right Ventricle: The right ventricle appears normal in size. The tricuspid jet envelope definition is inadequate for estimation of RV systolic pressure.     Mitral Valve: The leaflets are thickened. There is mild regurgitation.     Tricuspid Valve: There is no regurgitation.     This is an abnormal study.      PET viability 9/28/22:    Metabolic Viability Conclusion: The left ventricle has potentially viable myocardium.  The total viable myocardium is 39%, total scar burden is 32%     Resting Perfusion Details: Large perfusion defect at rest involving the entire anteroseptal, inferoseptal, inferior and inferolateral segments.   This includes the  entire RCA territory and parts of the LAD and possibly LCx territories.     Metabolic Viability Defect: The mid inferoseptal, mid inferior, mid inferolateral, distal septal and distal inferior segments are nonviable. The LCx territory appears to be mostly viable at 95% total viability. The LAD territory is predominantly viable with 75% viability. The RCA territory appears to be 50% viable with no perfusion at rest     Coronary angiogram 12/9//2022:  Severe three vessel coronary artery disease (mRCA , pLAD, mLcx stenosis)     Coronary angiogram 1/2/2023:  Severe three vessel CAD status post PCI to the RCA  and residual LAD and LCx disease.  Successful PCI of midLAD with MOHAN x1 (2.5 x 32 mm [place] MOHAN) and mid-LCx (2.75 x 16 mm Synergy MOHAN).     TTE 4/11/23    Left Ventricle: The left ventricle is dilated. Severely reduced left  ventricular systolic function. The EF is visually estimated to be 25-30%. Global hypokinesis of the left ventricle with akinetic apical segments.     Right Ventricle: The right ventricle appears normal in size. Systolic function is normal.     Aortic Valve: There is mild-to-moderate regurgitation.      TTE 1/4/2024:    Left Ventricle: Severely reduced left ventricular systolic function. LVEF 27%.    Right Ventricle: Systolic function is normal.     Aortic Valve: There is mild-to-moderate regurgitation.     RHC 2/24/24:    Right sided filling pressures are normal.    Left sided filling pressures are normal.    Normal PA pressures.    Normal cardiac output level.     TTE 2/5/24:  Technically difficult study.Extremely difficult acoustic windows despite the use of contrast for endcardial border definition. Left ventricular function is decreased. The ejection fraction is 30-35% (moderately reduced). Apical wall akinesis is present.  Global right ventricular function is normal.   Mild mitral insufficiency is present. Mild aortic insufficiency is present. Pulmonary artery systolic pressure  cannot be assessed.  IVC diameter <2.1 cm collapsing >50% with sniff suggests a normal RA pressure of 3 mmHg. No pericardial effusion is present.    TTE 3/25/25:  1. The left ventricle is normal in size. The visual ejection fraction is estimated at 45%. Mid-distal inferior, anteroseptal, and apical hypokinesis.  2. The right ventricle is normal in structure, function and size.  3. No valve disease.  Echo 2-5-24 showed EF 35% with similar WMA.    Device interrogation 05/27/2025  Device: [employer] [id number] [place] XT VR MRI  Normal device function.  Mode: VVI 40 bpm  : <0.1%  Intrinsic rhythm: Regular VS 75 bpm  Thoracic Impedance: Below reference line, suggests possible intrathoracic fluid accumulation.  Short V-V intervals: 0  Lead Trends Appear Stable.  Estimated battery longevity to RRT = 12 years. Battery voltage = 2.99 V.   Atrial Arrhythmia: None.  AF [place]: 0%  Anticoagulant: None.  Ventricular Arrhythmia: 3 Monitored VT episodes lasting 20 sec - 7 min 35 sec. Morphology similar to intrinsic rhythm, suggestive of atrial in origination.  Setting Changes: None.    ASSESSMENT AND PLAN:  49year old lady with a  including DM which is reasonably better controlled at the moment as well as recent STEMI back in September 2022 in the setting of atypical symptoms with high peak and troponin at around 64. She does have multivessel coronary artery disease only the LCx vessel was intervened however she has significant disease in the small LAD as well as  of the RCA.  She was found to be not a surgical candidate in [place].  She was started on medications now the blood pressure is better at 120/80.  Patient at home and as such losartan was started.  Images were reviewed and options discussed extensively with surgical as well as interventional colleagues at [facility]. Ultimately the decision was made to proceed with staged PCI of the CTOs. She did have this done early December 2022 and then January 2023 and tolerated the  procedures well.   Overall she is doing significantly better after this hospital admission for dehydration.  She required pressors however the might have been related to checking the blood pressure on the arm which has significantly reduced pressures in the setting of peripheral artery disease.  She is very well aware that this is an she will mention every time it is needed.  At this time she is on medications that are being titrated.  At this time we are continuing Entresto at the current dose, continue Farxiga.  In addition we will increase metoprolol XL to 50 mg daily given that there was some arrhythmia most likely atrial on the device interrogation.  In addition we will add spironolactone 25 mg once a day for long-term benefits.  Reviewed repeat labs in about a week.  She already has follow-up appointment with core clinic in early June which she will keep.  See her back in a year.  I would be cautious with titrating Entresto as the next step.    I appreciate the opportunity to participate in the care of Cailin Burns . Please do not hesitate to contact me with any further questions.  I have spent a total of 40 minutes today reviewing labs, imaging studies, discussing with colleagues, face-to-face time with patient and documentation in the medical record.  The longitudinal plan of care for the diagnosis(es)/condition(s) as documented were addressed during this visit. Due to the added complexity in care, I will continue to support Cailin in the subsequent management and with ongoing continuity of care.    Sincerely,   Kurt Guerin MD  [facility] Division of Cardiology

## 2025-05-27 ENCOUNTER — OFFICE VISIT (OUTPATIENT)
Dept: CARDIOLOGY | Facility: CLINIC | Age: 50
End: 2025-05-27
Attending: INTERNAL MEDICINE
Payer: COMMERCIAL

## 2025-05-27 ENCOUNTER — LAB (OUTPATIENT)
Dept: LAB | Facility: CLINIC | Age: 50
End: 2025-05-27
Attending: INTERNAL MEDICINE
Payer: COMMERCIAL

## 2025-05-27 VITALS
DIASTOLIC BLOOD PRESSURE: 68 MMHG | HEART RATE: 78 BPM | WEIGHT: 156.3 LBS | OXYGEN SATURATION: 97 % | BODY MASS INDEX: 28.59 KG/M2 | SYSTOLIC BLOOD PRESSURE: 104 MMHG

## 2025-05-27 DIAGNOSIS — I25.5 ISCHEMIC CARDIOMYOPATHY: ICD-10-CM

## 2025-05-27 DIAGNOSIS — Z95.810 ICD (IMPLANTABLE CARDIOVERTER-DEFIBRILLATOR), SINGLE, IN SITU: ICD-10-CM

## 2025-05-27 DIAGNOSIS — I50.21 ACUTE SYSTOLIC HEART FAILURE (H): ICD-10-CM

## 2025-05-27 DIAGNOSIS — I50.22 CHRONIC SYSTOLIC HEART FAILURE (H): Primary | ICD-10-CM

## 2025-05-27 DIAGNOSIS — I73.9 PAD (PERIPHERAL ARTERY DISEASE): ICD-10-CM

## 2025-05-27 DIAGNOSIS — I50.20 HEART FAILURE WITH REDUCED EJECTION FRACTION, NYHA CLASS III (H): ICD-10-CM

## 2025-05-27 DIAGNOSIS — Z79.4 TYPE 2 DIABETES MELLITUS WITHOUT COMPLICATION, WITH LONG-TERM CURRENT USE OF INSULIN (H): ICD-10-CM

## 2025-05-27 DIAGNOSIS — Z95.810 S/P ICD (INTERNAL CARDIAC DEFIBRILLATOR) PROCEDURE: ICD-10-CM

## 2025-05-27 DIAGNOSIS — I25.10 CORONARY ARTERY DISEASE INVOLVING NATIVE CORONARY ARTERY OF NATIVE HEART WITHOUT ANGINA PECTORIS: ICD-10-CM

## 2025-05-27 DIAGNOSIS — E11.9 TYPE 2 DIABETES MELLITUS WITHOUT COMPLICATION, WITH LONG-TERM CURRENT USE OF INSULIN (H): ICD-10-CM

## 2025-05-27 DIAGNOSIS — I50.22 CHRONIC SYSTOLIC HEART FAILURE (H): ICD-10-CM

## 2025-05-27 DIAGNOSIS — I10 BENIGN ESSENTIAL HYPERTENSION: ICD-10-CM

## 2025-05-27 LAB
ALBUMIN SERPL BCG-MCNC: 4.1 G/DL (ref 3.5–5.2)
ALP SERPL-CCNC: 54 U/L (ref 40–150)
ALT SERPL W P-5'-P-CCNC: 11 U/L (ref 0–50)
ANION GAP SERPL CALCULATED.3IONS-SCNC: 11 MMOL/L (ref 7–15)
AST SERPL W P-5'-P-CCNC: 16 U/L (ref 0–45)
BILIRUB SERPL-MCNC: 0.2 MG/DL
BUN SERPL-MCNC: 9 MG/DL (ref 6–20)
CALCIUM SERPL-MCNC: 8.9 MG/DL (ref 8.8–10.4)
CHLORIDE SERPL-SCNC: 106 MMOL/L (ref 98–107)
CREAT SERPL-MCNC: 0.65 MG/DL (ref 0.51–0.95)
EGFRCR SERPLBLD CKD-EPI 2021: >90 ML/MIN/1.73M2
ERYTHROCYTE [DISTWIDTH] IN BLOOD BY AUTOMATED COUNT: 13.9 % (ref 10–15)
GLUCOSE SERPL-MCNC: 123 MG/DL (ref 70–99)
HCO3 SERPL-SCNC: 22 MMOL/L (ref 22–29)
HCT VFR BLD AUTO: 40.7 % (ref 35–47)
HGB BLD-MCNC: 13.1 G/DL (ref 11.7–15.7)
MCH RBC QN AUTO: 29.2 PG (ref 26.5–33)
MCHC RBC AUTO-ENTMCNC: 32.2 G/DL (ref 31.5–36.5)
MCV RBC AUTO: 91 FL (ref 78–100)
NT-PROBNP SERPL-MCNC: 221 PG/ML (ref 0–192)
PLATELET # BLD AUTO: 329 10E3/UL (ref 150–450)
POTASSIUM SERPL-SCNC: 4.1 MMOL/L (ref 3.4–5.3)
PROT SERPL-MCNC: 6.7 G/DL (ref 6.4–8.3)
RBC # BLD AUTO: 4.49 10E6/UL (ref 3.8–5.2)
SODIUM SERPL-SCNC: 139 MMOL/L (ref 135–145)
WBC # BLD AUTO: 9.3 10E3/UL (ref 4–11)

## 2025-05-27 PROCEDURE — 80053 COMPREHEN METABOLIC PANEL: CPT | Performed by: PATHOLOGY

## 2025-05-27 PROCEDURE — 36415 COLL VENOUS BLD VENIPUNCTURE: CPT | Performed by: PATHOLOGY

## 2025-05-27 PROCEDURE — 93282 PRGRMG EVAL IMPLANTABLE DFB: CPT | Performed by: INTERNAL MEDICINE

## 2025-05-27 PROCEDURE — 85027 COMPLETE CBC AUTOMATED: CPT | Performed by: PATHOLOGY

## 2025-05-27 PROCEDURE — G0463 HOSPITAL OUTPT CLINIC VISIT: HCPCS | Performed by: INTERNAL MEDICINE

## 2025-05-27 PROCEDURE — 83880 ASSAY OF NATRIURETIC PEPTIDE: CPT | Performed by: PATHOLOGY

## 2025-05-27 RX ORDER — METOPROLOL SUCCINATE 50 MG/1
50 TABLET, EXTENDED RELEASE ORAL DAILY
Qty: 90 TABLET | Refills: 4 | Status: SHIPPED | OUTPATIENT
Start: 2025-05-27

## 2025-05-27 RX ORDER — SPIRONOLACTONE 25 MG/1
25 TABLET ORAL DAILY
Qty: 90 TABLET | Refills: 4 | Status: SHIPPED | OUTPATIENT
Start: 2025-05-27

## 2025-05-27 ASSESSMENT — PAIN SCALES - GENERAL: PAINLEVEL_OUTOF10: NO PAIN (0)

## 2025-05-27 NOTE — LETTER
5/27/2025      RE: Cailin Burns  [address]  [place] [address]       Dear Colleague,    Thank you for the opportunity to participate in the care of your patient, Cailin Burns, at the [facility] HEART [facility]. Please see a copy of my visit note below.    May 27, 2025     Cailin Burns is a 49 year old female  with a past medical history notable for DM, HLD, obesity, tobacco use disorder, sciatica who was initially admitted to [place] ICU. She initially presented to urgent care with new onset chest pain, was found to have STEMI and was transferred to [facility] for immediate further evaluation and angiogram on 9/25/22. It was significant for 3 vessel disease. Case was immediately discussed with Dr Leal for potential urgent CABG considerations but ultimately the decision was made to proceed with PCI due to poor bypass targets. Intervention to LCx was made. Prior to further PCI PET viability was pursued prior to considering further interventions. Unfortunately viability did not show enough and thus medical management was recommended. She did have short runs of NSVT while inpatient. ECHO showed EF 20% and thus she was discharged with LifeVest. She was discharged on mimimal medical therapy due to associated hypotension and not being able to tolerate higher doses. She also has PRN lasix for weight gain and PRN nitro. She is following up at the CHF clinic at [place] and was also referred to [facility] for potential future advanced heart failure therapies evaluation.      After discussion with her and the interventional and surgical teams at [facility], the decision was made to proceed with staged PCI of the CTOs. These were performed. She also follows in [place] and attempts were made to increase XL to 50mg daily. In addition, she continued to have the LifeVest and potential referral for device was discussed. ICD had been implanted 5/11/23.  She  was recently admitted to [facility] for for hypovolemia in the setting of recurrent emesis as well as diarrhea in the setting of diuretic use.  Initially she was noted to have hypotension however it turns out that she does have significant right subclavian and brachiocephalic artery stenosis.  Once her blood pressure was measured on the light time her pressures were significantly better.  Ultimately she was discharged but noted to have significant peripheral artery disease as well.  Ultrasounds have been done.  Unfortunately she noted at the time that she continued to smoke which she is trying to stop.  And she recently moved to [facility] so she can get closer to her healthcare needs.  Most recently in heart failure clinic medication adjustments have been made including increasing Entresto and restarting Farxiga as well as metoprolol 25 mg XL.  Overall she has been doing significantly better since hospital discharge.  Her blood pressure has been better controlled denies any dizziness lightheadedness or palpitation.  Takes her medication as prescribed.  There were some arrhythmias on the device that was interrogated most likely consistent with atrial arrhythmia.  No other issues.     PAST MEDICAL HISTORY:  Past Medical History:   Diagnosis Date     CAD (coronary artery disease)      Nicotine dependence      Type 2 diabetes mellitus with other circulatory complications (H)      FAMILY HISTORY:  Family History   Problem Relation Age of Onset     Coronary Artery Disease Early Onset Maternal Grandfather      SOCIAL HISTORY:  Social History     Socioeconomic History     Marital status:    Tobacco Use     Smoking status: Some Days     Current packs/day: 1.50     Average packs/day: 1.5 packs/day for 37.4 years (56.1 ttl pk-yrs)     Types: Cigarettes     Start date: 1988     Passive exposure: Past     Smokeless tobacco: Never     Tobacco comments:     Trying to quit, wearing patch -    Substance and  Sexual Activity     Alcohol use: Not Currently     Drug use: Never     Social Drivers of Health     Financial Resource Strain: Low Risk  (3/27/2025)    Financial Resource Strain      Within the past 12 months, have you or your family members you live with been unable to get utilities (heat, electricity) when it was really needed?: No   Food Insecurity: Low Risk  (3/27/2025)    Food Insecurity      Within the past 12 months, did you worry that your food would run out before you got money to buy more?: No      Within the past 12 months, did the food you bought just not last and you didn t have money to get more?: No   Transportation Needs: Low Risk  (3/27/2025)    Transportation Needs      Within the past 12 months, has lack of transportation kept you from medical appointments, getting your medicines, non-medical meetings or appointments, work, or from getting things that you need?: No   Interpersonal Safety: Low Risk  (3/27/2025)    Interpersonal Safety      Do you feel physically and emotionally safe where you currently live?: Yes      Within the past 12 months, have you been hit, slapped, kicked or otherwise physically hurt by someone?: No      Within the past 12 months, have you been humiliated or emotionally abused in other ways by your partner or ex-partner?: No   Housing Stability: Low Risk  (3/27/2025)    Housing Stability      Do you have housing? : Yes      Are you worried about losing your housing?: No     CURRENT MEDICATIONS:  Current Outpatient Medications   Medication Sig Dispense Refill     aspirin (ASA) 81 MG EC tablet Take 81 mg by mouth daily       atorvastatin (LIPITOR) 80 MG tablet Take 1 tablet by mouth at bedtime.       busPIRone (BUSPAR) 10 MG tablet Take 10 mg by mouth 2 times daily       Continuous Glucose Sensor (DEXCOM G7 SENSOR) MISC APPLY NEW SENSOR TO BE USED EVERY 10 DAYS WITH DEXCOM G7       dapagliflozin (FARXIGA) 10 MG TABS tablet Take 1 tablet (10 mg) by mouth daily. 90 tablet 3      diclofenac (VOLTAREN) 1 % topical gel Apply topically 4 times daily as needed for moderate pain.       estradiol (ESTRACE) 0.1 MG/GM vaginal cream Place vaginally as needed (as needed).       ezetimibe (ZETIA) 10 MG tablet Take 1 tablet (10 mg) by mouth daily. 90 tablet 0     fenofibrate (TRIGLIDE/LOFIBRA) 160 MG tablet Take 160 mg by mouth daily       gabapentin (NEURONTIN) 100 MG capsule Take 200 mg by mouth 3 times daily.       metFORMIN (GLUCOPHAGE) 1000 MG tablet Take 1,000 mg by mouth 2 times daily (with meals)       metoprolol succinate ER (TOPROL XL) 25 MG 24 hr tablet Take 1 tablet (25 mg) by mouth daily for 11 days 11 tablet 0     nicotine (NICODERM CQ) 14 MG/24HR 24 hr patch Place 1 patch over 24 hours onto the skin daily. 30 patch 0     nicotine (NICORETTE) 2 MG gum Place 1 each (2 mg) inside cheek every hour as needed for nicotine withdrawal symptoms. (Patient not taking: Reported on 4/2/2025) 60 each 0     nitroGLYcerin (NITROSTAT) 0.4 MG sublingual tablet Place 0.4 mg under the tongue every 5 minutes as needed       nystatin (MYCOSTATIN) 759773 UNIT/GM external powder Apply topically as needed for other. Under breasts       oxyCODONE IR (ROXICODONE) 10 MG tablet Take 10 mg by mouth 5 times daily.       OZEMPIC, 2 MG/DOSE, 8 MG/3ML pen Inject 2 mg subcutaneously every 7 days. Takes on Fridays       polyethylene glycol (MIRALAX) 17 GM/Dose powder Take 17 g by mouth daily as needed for constipation.       ranolazine (RANEXA) 500 MG 12 hr tablet Take 500 mg by mouth 2 times daily       sacubitril-valsartan (ENTRESTO) 49-51 MG per tablet Take 1 tablet by mouth 2 times daily. 180 tablet 3     SENEXON-S 8.6-50 MG tablet Take 2 tablets by mouth at bedtime.       ticagrelor (BRILINTA) 90 MG tablet Take 90 mg by mouth 2 times daily       venlafaxine (EFFEXOR) 100 MG tablet Take 1 tablet by mouth 2 times daily       No current facility-administered medications for this visit.     EXAM:  /68 (BP Location:  Left arm, Patient Position: Chair, Cuff Size: Adult Regular)   Pulse 78   Wt 70.9 kg (156 lb 4.8 oz)   SpO2 97%   BMI 28.59 kg/m    General: appears comfortable, no distress   Head: normocephalic, atraumatic  Eyes: anicteric sclera, EOMI  Neck: no adenopathy  Orophyarynx: clear and moist   Heart:  normal rate  Lungs: clear, no rales or wheezing  Abdomen: soft, non-tender, non-distended, no hepatosplenomegaly  Extremities: no clubbing, cyanosis, edema  Neurological: normal speech and affect, no gross motor deficits     Labs:  Lab Results   Component Value Date    WBC 9.3 03/28/2025    HGB 11.5 (L) 03/28/2025    HCT 35.1 03/28/2025     03/28/2025     04/02/2025    POTASSIUM 4.6 04/02/2025    CHLORIDE 104 04/02/2025    CO2 20 (L) 04/02/2025    BUN 12.8 04/02/2025    CR 0.62 04/02/2025     (H) 04/02/2025    NTBNPI 667 03/26/2025    NTBNP 472 04/02/2025    AST 16 03/24/2025    ALT 14 03/24/2025    ALKPHOS 49 03/24/2025    BILITOTAL 0.3 03/24/2025    INR 0.99 02/06/2024   [facility] - 9/25/22: significant 3VD,  - LCx - mid, 99%, culprit - s/p PCI with a 2.75*26 mm [place] MOHAN  - LAD - diffusely diseased, small caliber, 90% mid & distal - not amenable to PCI due to small caliber  - RCA - mid, 100%, , fills distally with R-R collaterals, small caliber.   Discussed presentation with Dr Leal for consideration for CABG, before proceeding with PCI, it was decided to proceed with PCI-LCx & assess for CABG later.     TTE 9/25/22:     Left Ventricle: The left ventricle is dilated. Severely reduced left  ventricular systolic function. The EF is visually estimated to be 20%. No  thrombus seen -contrast images also reviewed See diagram for wall motion details.      Right Ventricle: The right ventricle appears normal in size. The tricuspid jet envelope definition is inadequate for estimation of RV systolic pressure.      Mitral Valve: The leaflets are thickened. There is mild regurgitation.      Tricuspid Valve:  There is no regurgitation.      This is an abnormal study.      PET viability 9/28/22:     Metabolic Viability Conclusion: The left ventricle has potentially viable myocardium.  The total viable myocardium is 39%, total scar burden is 32%      Resting Perfusion Details: Large perfusion defect at rest involving the entire anteroseptal, inferoseptal, inferior and inferolateral segments.   This includes the entire RCA territory and parts of the LAD and possibly LCx territories.      Metabolic Viability Defect: The mid inferoseptal, mid inferior, mid inferolateral, distal septal and distal inferior segments are nonviable. The LCx territory appears to be mostly viable at 95% total viability. The LAD territory is predominantly viable with 75% viability. The RCA territory appears to be 50% viable with no perfusion at rest     Coronary angiogram 12/9//2022:  Severe three vessel coronary artery disease (mRCA , pLAD, mLcx stenosis)     Coronary angiogram 1/2/2023:  Severe three vessel CAD status post PCI to the RCA  and residual LAD and LCx disease.  Successful PCI of midLAD with MOHAN x1 (2.5 x 32 mm Synergy MOHAN) and mid-LCx (2.75 x 16 mm Synergy MOHAN).     TTE 4/11/23     Left Ventricle: The left ventricle is dilated. Severely reduced left  ventricular systolic function. The EF is visually estimated to be 25-30%. Global hypokinesis of the left ventricle with akinetic apical segments.      Right Ventricle: The right ventricle appears normal in size. Systolic function is normal.      Aortic Valve: There is mild-to-moderate regurgitation.      TTE 1/4/2024:     Left Ventricle: Severely reduced left ventricular systolic function. LVEF 27%.     Right Ventricle: Systolic function is normal.      Aortic Valve: There is mild-to-moderate regurgitation.     RHC 2/24/24:     Right sided filling pressures are normal.     Left sided filling pressures are normal.     Normal PA pressures.     Normal cardiac output level.     TTE  2/5/24:  Technically difficult study.Extremely difficult acoustic windows despite the use of contrast for endcardial border definition. Left ventricular function is decreased. The ejection fraction is 30-35% (moderately reduced). Apical wall akinesis is present.  Global right ventricular function is normal.   Mild mitral insufficiency is present. Mild aortic insufficiency is present. Pulmonary artery systolic pressure cannot be assessed.  IVC diameter <2.1 cm collapsing >50% with sniff suggests a normal RA pressure of 3 mmHg. No pericardial effusion is present.    TTE 3/25/25:  1. The left ventricle is normal in size. The visual ejection fraction is estimated at 45%. Mid-distal inferior, anteroseptal, and apical hypokinesis.  2. The right ventricle is normal in structure, function and size.  3. No valve disease.  Echo 2-5-24 showed EF 35% with similar WMA.    Device interrogation 05/27/2025  Device: [email or web address] [id number] [place] XT VR MRI  Normal device function.  Mode: VVI 40 bpm  : <0.1%  Intrinsic rhythm: Regular VS 75 bpm  Thoracic Impedance: Below reference line, suggests possible intrathoracic fluid accumulation.  Short V-V intervals: 0  Lead Trends Appear Stable.  Estimated battery longevity to RRT = 12 years. Battery voltage = 2.99 V.   Atrial Arrhythmia: None.  AF [place]: 0%  Anticoagulant: None.  Ventricular Arrhythmia: 3 Monitored VT episodes lasting 20 sec - 7 min 35 sec. Morphology similar to intrinsic rhythm, suggestive of atrial in origination.  Setting Changes: None.    ASSESSMENT AND PLAN:  49year old lady with a  including DM which is reasonably better controlled at the moment as well as recent STEMI back in September 2022 in the setting of atypical symptoms with high peak and troponin at around 64. She does have multivessel coronary artery disease only the LCx vessel was intervened however she has significant disease in the small LAD as well as  of the RCA.  She was found to be not a surgical  candidate in [place].  She was started on medications now the blood pressure is better at 120/80.  Patient at home and as such losartan was started.  Images were reviewed and options discussed extensively with surgical as well as interventional colleagues at [facility]. Ultimately the decision was made to proceed with staged PCI of the CTOs. She did have this done early December 2022 and then January 2023 and tolerated the procedures well.   Overall she is doing significantly better after this hospital admission for dehydration.  She required pressors however the might have been related to checking the blood pressure on the arm which has significantly reduced pressures in the setting of peripheral artery disease.  She is very well aware that this is an she will mention every time it is needed.  At this time she is on medications that are being titrated.  At this time we are continuing Entresto at the current dose, continue Farxiga.  In addition we will increase metoprolol XL to 50 mg daily given that there was some arrhythmia most likely atrial on the device interrogation.  In addition we will add spironolactone 25 mg once a day for long-term benefits.  Reviewed repeat labs in about a week.  She already has follow-up appointment with core clinic in early June which she will keep.  See her back in a year.  I would be cautious with titrating Entresto as the next step.    I appreciate the opportunity to participate in the care of Cailin Burns . Please do not hesitate to contact me with any further questions.  I have spent a total of 40 minutes today reviewing labs, imaging studies, discussing with colleagues, face-to-face time with patient and documentation in the medical record.  The longitudinal plan of care for the diagnosis(es)/condition(s) as documented were addressed during this visit. Due to the added complexity in care, I will continue to support Cailin in the subsequent management and with ongoing continuity of  care.    Sincerely,   Kurt Guerin MD  [facility] Division of Cardiology         Please do not hesitate to contact me if you have any questions/concerns.     Sincerely,     Kurt Guerin MD

## 2025-05-27 NOTE — NURSING NOTE
Chief Complaint   Patient presents with    Follow Up     RETURN HEART FAILURE       Vitals were taken, medications reconciled     Neri Weaver, Clinic Assistant     8:44 AM

## 2025-05-27 NOTE — PATIENT INSTRUCTIONS
Dr. Guerin recommends:    Take Metoprolol XL 50 MG once daily.    Take Spironolactone 25 MG once daily.    Lab appointment in one week. (BMP)  Please fax results to Dr. Guerin at [phone]    Follow up clinic visit with Dr. Guerin in one year with labs prior.    Thank you for your visit today.  Please call me with any questions or concerns.   Donovan Rendon RN  Cardiology Care Coordinator  [phone]

## 2025-05-28 LAB
MDC_IDC_EPISODE_DTM: NORMAL
MDC_IDC_EPISODE_DURATION: 20 S
MDC_IDC_EPISODE_DURATION: 455 S
MDC_IDC_EPISODE_DURATION: 60 S
MDC_IDC_EPISODE_ID: 2
MDC_IDC_EPISODE_ID: 3
MDC_IDC_EPISODE_ID: 4
MDC_IDC_EPISODE_TYPE: NORMAL
MDC_IDC_LEAD_CONNECTION_STATUS: NORMAL
MDC_IDC_LEAD_IMPLANT_DT: NORMAL
MDC_IDC_LEAD_LOCATION: NORMAL
MDC_IDC_LEAD_LOCATION_DETAIL_1: NORMAL
MDC_IDC_LEAD_MFG: NORMAL
MDC_IDC_LEAD_MODEL: NORMAL
MDC_IDC_LEAD_POLARITY_TYPE: NORMAL
MDC_IDC_LEAD_SERIAL: NORMAL
MDC_IDC_LEAD_SPECIAL_FUNCTION: NORMAL
MDC_IDC_MSMT_BATTERY_DTM: NORMAL
MDC_IDC_MSMT_BATTERY_REMAINING_LONGEVITY: 144 MO
MDC_IDC_MSMT_BATTERY_RRT_TRIGGER: NORMAL
MDC_IDC_MSMT_BATTERY_STATUS: NORMAL
MDC_IDC_MSMT_BATTERY_VOLTAGE: 2.98 V
MDC_IDC_MSMT_CAP_CHARGE_DTM: NORMAL
MDC_IDC_MSMT_CAP_CHARGE_ENERGY: 18 J
MDC_IDC_MSMT_CAP_CHARGE_TIME: 3.7 S
MDC_IDC_MSMT_CAP_CHARGE_TYPE: NORMAL
MDC_IDC_MSMT_LEADCHNL_RV_IMPEDANCE_VALUE: 342 OHM
MDC_IDC_MSMT_LEADCHNL_RV_IMPEDANCE_VALUE: 418 OHM
MDC_IDC_MSMT_LEADCHNL_RV_PACING_THRESHOLD_AMPLITUDE: 0.62 V
MDC_IDC_MSMT_LEADCHNL_RV_PACING_THRESHOLD_AMPLITUDE: 0.75 V
MDC_IDC_MSMT_LEADCHNL_RV_PACING_THRESHOLD_PULSEWIDTH: 0.4 MS
MDC_IDC_MSMT_LEADCHNL_RV_PACING_THRESHOLD_PULSEWIDTH: 0.4 MS
MDC_IDC_MSMT_LEADCHNL_RV_SENSING_INTR_AMPL: 31.62 MV
MDC_IDC_PG_IMPLANT_DTM: NORMAL
MDC_IDC_PG_MFG: NORMAL
MDC_IDC_PG_MODEL: NORMAL
MDC_IDC_PG_SERIAL: NORMAL
MDC_IDC_PG_TYPE: NORMAL
MDC_IDC_SESS_CLINIC_NAME: NORMAL
MDC_IDC_SESS_DTM: NORMAL
MDC_IDC_SESS_TYPE: NORMAL
MDC_IDC_SET_BRADY_HYSTRATE: NORMAL
MDC_IDC_SET_BRADY_LOWRATE: 40 {BEATS}/MIN
MDC_IDC_SET_BRADY_MODE: NORMAL
MDC_IDC_SET_LEADCHNL_RA_SENSING_SENSITIVITY: NORMAL
MDC_IDC_SET_LEADCHNL_RV_PACING_AMPLITUDE: 2 V
MDC_IDC_SET_LEADCHNL_RV_PACING_ANODE_ELECTRODE_1: NORMAL
MDC_IDC_SET_LEADCHNL_RV_PACING_ANODE_LOCATION_1: NORMAL
MDC_IDC_SET_LEADCHNL_RV_PACING_CAPTURE_MODE: NORMAL
MDC_IDC_SET_LEADCHNL_RV_PACING_CATHODE_ELECTRODE_1: NORMAL
MDC_IDC_SET_LEADCHNL_RV_PACING_CATHODE_LOCATION_1: NORMAL
MDC_IDC_SET_LEADCHNL_RV_PACING_POLARITY: NORMAL
MDC_IDC_SET_LEADCHNL_RV_PACING_PULSEWIDTH: 0.4 MS
MDC_IDC_SET_LEADCHNL_RV_SENSING_ANODE_ELECTRODE_1: NORMAL
MDC_IDC_SET_LEADCHNL_RV_SENSING_ANODE_LOCATION_1: NORMAL
MDC_IDC_SET_LEADCHNL_RV_SENSING_CATHODE_ELECTRODE_1: NORMAL
MDC_IDC_SET_LEADCHNL_RV_SENSING_CATHODE_LOCATION_1: NORMAL
MDC_IDC_SET_LEADCHNL_RV_SENSING_POLARITY: NORMAL
MDC_IDC_SET_LEADCHNL_RV_SENSING_SENSITIVITY: 0.3 MV
MDC_IDC_SET_ZONE_DETECTION_BEATS_DENOMINATOR: 16 {BEATS}
MDC_IDC_SET_ZONE_DETECTION_BEATS_DENOMINATOR: 32 {BEATS}
MDC_IDC_SET_ZONE_DETECTION_BEATS_DENOMINATOR: 40 {BEATS}
MDC_IDC_SET_ZONE_DETECTION_BEATS_NUMERATOR: 16 {BEATS}
MDC_IDC_SET_ZONE_DETECTION_BEATS_NUMERATOR: 30 {BEATS}
MDC_IDC_SET_ZONE_DETECTION_BEATS_NUMERATOR: 32 {BEATS}
MDC_IDC_SET_ZONE_DETECTION_INTERVAL: 270 MS
MDC_IDC_SET_ZONE_DETECTION_INTERVAL: 320 MS
MDC_IDC_SET_ZONE_DETECTION_INTERVAL: 400 MS
MDC_IDC_SET_ZONE_STATUS: NORMAL
MDC_IDC_SET_ZONE_TYPE: NORMAL
MDC_IDC_SET_ZONE_VENDOR_TYPE: NORMAL
MDC_IDC_STAT_AT_BURDEN_PERCENT: 0 %
MDC_IDC_STAT_AT_DTM_END: NORMAL
MDC_IDC_STAT_AT_DTM_START: NORMAL
MDC_IDC_STAT_BRADY_DTM_END: NORMAL
MDC_IDC_STAT_BRADY_DTM_START: NORMAL
MDC_IDC_STAT_BRADY_RA_PERCENT_PACED: NORMAL
MDC_IDC_STAT_BRADY_RV_PERCENT_PACED: 0.01 %
MDC_IDC_STAT_EPISODE_RECENT_COUNT: 0
MDC_IDC_STAT_EPISODE_RECENT_COUNT: 4
MDC_IDC_STAT_EPISODE_RECENT_COUNT_DTM_END: NORMAL
MDC_IDC_STAT_EPISODE_RECENT_COUNT_DTM_START: NORMAL
MDC_IDC_STAT_EPISODE_TOTAL_COUNT: 0
MDC_IDC_STAT_EPISODE_TOTAL_COUNT: 4
MDC_IDC_STAT_EPISODE_TOTAL_COUNT_DTM_END: NORMAL
MDC_IDC_STAT_EPISODE_TOTAL_COUNT_DTM_START: NORMAL
MDC_IDC_STAT_EPISODE_TYPE: NORMAL
MDC_IDC_STAT_TACHYTHERAPY_ATP_DELIVERED_RECENT: 0
MDC_IDC_STAT_TACHYTHERAPY_ATP_DELIVERED_TOTAL: 0
MDC_IDC_STAT_TACHYTHERAPY_RECENT_DTM_END: NORMAL
MDC_IDC_STAT_TACHYTHERAPY_RECENT_DTM_START: NORMAL
MDC_IDC_STAT_TACHYTHERAPY_SHOCKS_ABORTED_RECENT: 0
MDC_IDC_STAT_TACHYTHERAPY_SHOCKS_ABORTED_TOTAL: 0
MDC_IDC_STAT_TACHYTHERAPY_SHOCKS_DELIVERED_RECENT: 0
MDC_IDC_STAT_TACHYTHERAPY_SHOCKS_DELIVERED_TOTAL: 0
MDC_IDC_STAT_TACHYTHERAPY_TOTAL_DTM_END: NORMAL
MDC_IDC_STAT_TACHYTHERAPY_TOTAL_DTM_START: NORMAL

## 2025-06-25 DIAGNOSIS — I50.22 CHRONIC SYSTOLIC HEART FAILURE (H): Primary | ICD-10-CM

## 2025-07-02 ENCOUNTER — OFFICE VISIT (OUTPATIENT)
Dept: CARDIOLOGY | Facility: CLINIC | Age: 50
End: 2025-07-02
Payer: COMMERCIAL

## 2025-07-02 ENCOUNTER — APPOINTMENT (OUTPATIENT)
Dept: LAB | Facility: CLINIC | Age: 50
End: 2025-07-02
Payer: COMMERCIAL

## 2025-07-02 VITALS
BODY MASS INDEX: 28.71 KG/M2 | OXYGEN SATURATION: 98 % | DIASTOLIC BLOOD PRESSURE: 61 MMHG | HEIGHT: 62 IN | SYSTOLIC BLOOD PRESSURE: 92 MMHG | WEIGHT: 156 LBS | HEART RATE: 75 BPM

## 2025-07-02 DIAGNOSIS — Z95.810 S/P ICD (INTERNAL CARDIAC DEFIBRILLATOR) PROCEDURE: ICD-10-CM

## 2025-07-02 DIAGNOSIS — Z79.4 TYPE 2 DIABETES MELLITUS WITHOUT COMPLICATION, WITH LONG-TERM CURRENT USE OF INSULIN (H): ICD-10-CM

## 2025-07-02 DIAGNOSIS — E11.9 TYPE 2 DIABETES MELLITUS WITHOUT COMPLICATION, WITH LONG-TERM CURRENT USE OF INSULIN (H): ICD-10-CM

## 2025-07-02 DIAGNOSIS — I25.5 ISCHEMIC CARDIOMYOPATHY: ICD-10-CM

## 2025-07-02 DIAGNOSIS — I50.22 CHRONIC SYSTOLIC HEART FAILURE (H): Primary | ICD-10-CM

## 2025-07-02 DIAGNOSIS — I10 BENIGN ESSENTIAL HYPERTENSION: ICD-10-CM

## 2025-07-02 ASSESSMENT — PAIN SCALES - GENERAL: PAINLEVEL_OUTOF10: NO PAIN (0)

## 2025-07-02 NOTE — PROGRESS NOTES
HPI: Cailin is a 49 year old White female with a past medical history of with a past medical history notable for PMH of DM, HLD, obesity, tobacco use disorder, sciatica .  Recent inpatient stay- hypovolemia ( GI losses - diarrhea and vomiting) Shock state now resolved, likely hypovolemic type from combination of GI losses from vomiting diarrhea coupled with diuretic medication use, baseline BP at clinic appointments and home /70s-80s.  Noted to be hypotensive on 3/27/2025 and received multiple boluses of IV fluids. Transferred briefly back to ICU for norepinephrine. BP was rechecked on both arms and patient's BP was noted to be markedly improved in L arm. Patient is noted to have history of R subclavian and brachiocephalic artery stenosis     Patient states she has no SOB at rest. She is able to walk further did feel a little GONZALES walking in.  No LE edema. No distention in the abdomen. Her appetite has been normal.  Weight at home 151 lbs She has been smoking she says- but is trying to stop. Recent move to [place] so she is closer for her healthcare needs she says. No more diarrhea and/or vomiting. At home systolic 90's /60-70.     Denies SOB, GONZALES, PND, orthopnea, edema, weight gain, chest pain, palpitations, lightheadedness, dizziness, near syncopal/syncopal episodes. Cailin has been following salt and fluid restrictions.      PAST MEDICAL HISTORY:  Past Medical History:   Diagnosis Date    CAD (coronary artery disease)     Nicotine dependence     Type 2 diabetes mellitus with other circulatory complications (H)        FAMILY HISTORY:  Family History   Problem Relation Age of Onset    Coronary Artery Disease Early Onset Maternal Grandfather        SOCIAL HISTORY:  Social History     Tobacco Use    Smoking status: Some Days     Current packs/day: 1.50     Average packs/day: 1.5 packs/day for 37.5 years (56.2 ttl pk-yrs)     Types: Cigarettes     Start date: 1988     Passive exposure: Past    Smokeless  tobacco: Never    Tobacco comments:     Trying to quit, wearing patch -    Substance Use Topics    Alcohol use: Not Currently    Drug use: Never           CURRENT MEDICATIONS:  Current Outpatient Medications   Medication Sig Dispense Refill    aspirin (ASA) 81 MG EC tablet Take 81 mg by mouth daily      atorvastatin (LIPITOR) 80 MG tablet Take 1 tablet by mouth at bedtime.      busPIRone (BUSPAR) 10 MG tablet Take 10 mg by mouth 2 times daily      Continuous Glucose Sensor (DEXCOM G7 SENSOR) MISC APPLY NEW SENSOR TO BE USED EVERY 10 DAYS WITH DEXCOM G7      dapagliflozin (FARXIGA) 10 MG TABS tablet Take 1 tablet (10 mg) by mouth daily. 90 tablet 3    diclofenac (VOLTAREN) 1 % topical gel Apply topically 4 times daily as needed for moderate pain.      estradiol (ESTRACE) 0.1 MG/GM vaginal cream Place vaginally as needed (as needed).      ezetimibe (ZETIA) 10 MG tablet Take 1 tablet (10 mg) by mouth daily. 90 tablet 3    fenofibrate (TRIGLIDE/LOFIBRA) 160 MG tablet Take 160 mg by mouth daily      gabapentin (NEURONTIN) 100 MG capsule Take 200 mg by mouth 3 times daily.      metFORMIN (GLUCOPHAGE) 1000 MG tablet Take 1,000 mg by mouth 2 times daily (with meals)      metoprolol succinate ER (TOPROL XL) 50 MG 24 hr tablet Take 1 tablet (50 mg) by mouth daily. 90 tablet 4    nitroGLYcerin (NITROSTAT) 0.4 MG sublingual tablet Place 0.4 mg under the tongue every 5 minutes as needed      nystatin (MYCOSTATIN) 992665 UNIT/GM external powder Apply topically as needed for other. Under breasts      oxyCODONE IR (ROXICODONE) 10 MG tablet Take 10 mg by mouth 5 times daily.      OZEMPIC, 2 MG/DOSE, 8 MG/3ML pen Inject 2 mg subcutaneously every 7 days. Takes on Fridays      polyethylene glycol (MIRALAX) 17 GM/Dose powder Take 17 g by mouth daily as needed for constipation.      ranolazine (RANEXA) 500 MG 12 hr tablet Take 500 mg by mouth 2 times daily      sacubitril-valsartan (ENTRESTO) 49-51 MG per tablet Take 1 tablet by mouth 2  "times daily. 180 tablet 3    SENEXON-S 8.6-50 MG tablet Take 2 tablets by mouth at bedtime.      spironolactone (ALDACTONE) 25 MG tablet Take 1 tablet (25 mg) by mouth daily. 90 tablet 4    ticagrelor (BRILINTA) 90 MG tablet Take 90 mg by mouth 2 times daily      venlafaxine (EFFEXOR) 100 MG tablet Take 1 tablet by mouth 2 times daily      ezetimibe (ZETIA) 10 MG tablet Take 1 tablet (10 mg) by mouth daily. 90 tablet 0    metoprolol succinate ER (TOPROL XL) 25 MG 24 hr tablet Take 1 tablet (25 mg) by mouth daily for 11 days 11 tablet 0    nicotine (NICODERM CQ) 14 MG/24HR 24 hr patch Place 1 patch over 24 hours onto the skin daily. 30 patch 0    nicotine (NICORETTE) 2 MG gum Place 1 each (2 mg) inside cheek every hour as needed for nicotine withdrawal symptoms. (Patient not taking: Reported on 5/27/2025) 60 each 0       ROS:  Per HPI        EXAM:   General: alert, articulate, and in no acute distress.  HEENT: normocephalic, atraumatic, anicteric sclera, EOMI, mucosa moist, no cyanosis.   Neck: supple, no  JVD  Chest: Diminished at the bases   Ext: no LE edema       Labs:  CBC RESULTS:  Lab Results   Component Value Date    WBC 9.3 05/27/2025    RBC 4.49 05/27/2025    HGB 13.1 05/27/2025    HCT 40.7 05/27/2025    MCV 91 05/27/2025    MCH 29.2 05/27/2025    MCHC 32.2 05/27/2025    RDW 13.9 05/27/2025     05/27/2025       CMP RESULTS:  Lab Results   Component Value Date     05/27/2025    POTASSIUM 4.1 05/27/2025    CHLORIDE 106 05/27/2025    CHLORIDE 102 05/14/2024    CO2 22 05/27/2025    ANIONGAP 11 05/27/2025     (H) 05/27/2025     (H) 03/28/2025    BUN 9.0 05/27/2025    CR 0.65 05/27/2025    GFRESTIMATED >90 05/27/2025    RON 8.9 05/27/2025    BILITOTAL 0.2 05/27/2025    ALBUMIN 4.1 05/27/2025    ALKPHOS 54 05/27/2025    ALT 11 05/27/2025    AST 16 05/27/2025        INR RESULTS:  Lab Results   Component Value Date    INR 0.99 02/06/2024       No components found for: \"CK\"  Lab Results "   Component Value Date    MAG 1.8 03/28/2025     Lab Results   Component Value Date    NTBNP 221 (H) 05/27/2025     @BRIEFLABR (dig)@    Most recent echocardiogram:  No results found for this or any previous visit (from the past 8760 hours).      Assessment and Plan:    49 year old lady with a  including DM which is reasonably better controlled at the moment as well as recent STEMI back in September 2022 in the setting of atypical symptoms with high peak and troponin at around 64. She does have multivessel coronary artery disease only the LCx vessel was intervened however she has significant disease in the small LAD as well as  of the RCA.  She was found to be not a surgical candidate in [place].      Overall she has show improvement in the last echo with a ef of 45%. She takes her medications as prescribed she is normotensive. She has been stable in her weight - has some some with the Ozempic.  Her BP's are soft we will not increase the Entresto at this time.     1.  Chronic systolic heart failure secondary to ICM  Stage D  NYHA Class III  ACEi/ARB/ARNI: yes-  Entresto 49/51 mg BID - continue as BP's are soft  BB: yes- 50 mg daily   Aldosterone antagonist: yes- Spironolactone   SGLT2i- Farxiga -   SCD prophylaxis: ICD  Fluid status: euvolemic  Anticoagulation:   Antiplatelet:  ASA dose   Sleep apnea:not discussed today  NSAID use:  Contraindicated.  Avoid use.  Remote Monitoring:none  Ozempic       Plan:    [facility] in 4 months       Ora LAGUERRE CNP  [facility]     I spent a total of 35 minutes on the date of service evaluating this patient which included face to face discussion, performing a physical exam, reviewing of the chart to gain information from other providers to obtain further history, ordering tests and/or medications, and documenting clinical information in the electronic health record.

## 2025-07-02 NOTE — NURSING NOTE
"Chief Complaint   Patient presents with    Follow Up     Return CORE       Initial BP 92/61 (BP Location: Left arm, Patient Position: Sitting, Cuff Size: Adult Regular)   Pulse 75   Ht 1.575 m (5' 2\")   Wt 70.8 kg (156 lb)   SpO2 98%   BMI 28.53 kg/m   Estimated body mass index is 28.53 kg/m  as calculated from the following:    Height as of this encounter: 1.575 m (5' 2\").    Weight as of this encounter: 70.8 kg (156 lb).  BP completed using cuff size: regular    Sunshine Matias, EMT  "

## 2025-07-02 NOTE — PATIENT INSTRUCTIONS
Take your medicines every day, as directed    Changes made today:  No medication changes   Monitor Your Weight and Symptoms    Contact us if you:    Gain 2 pounds in one day or 5 pounds in one week  Feel more short of breath  Notice more leg swelling  Feel lightheadeded   Change your lifestyle    Limit Salt or Sodium:  2000 mg  Limit Fluids:  2000 mL or approximately 64 ounces  Eat a Heart Healthy Diet  Low in saturated fats  Stay Active:  Aim to move at least 150 minutes every  week         To Contact us    During Business Hours:  [phone], option # 1      After hours, weekends or holidays:   [phone], Option #4  Ask to speak to the On-Call Cardiologist. Inform them you are a CORE/heart failure patient at the [facility].     Use [employer] allows you to communicate directly with your heart team through secure messaging.  [employer] can be accessed any time on your phone, computer, or tablet.  If you need assistance, we'd be happy to help!         Keep your Heart Appointments:    CORE in 4 months     Please consider attending our virtual support group which is held monthly. Please reach out to [facility] at [phone] for more information if you are interested in attending.

## 2025-07-02 NOTE — NURSING NOTE
Labs: Patient was given results of the laboratory testing obtained today. Patient demonstrated understanding of this information and agreed to call with further questions or concerns.     Med Reconcile: Reviewed and verified all current medications with the patient. The updated medication list was printed and given to the patient.    Return Appointment: Patient given instructions regarding scheduling next clinic visit. Patient demonstrated understanding of this information and agreed to call with further questions or concerns. CORE in 4 months.    Patient stated she understood all health information given and agreed to call with further questions or concerns.    Niika Ortez RN

## 2025-07-12 ENCOUNTER — HEALTH MAINTENANCE LETTER (OUTPATIENT)
Age: 50
End: 2025-07-12

## 2025-07-24 DIAGNOSIS — I50.22 CHRONIC SYSTOLIC HEART FAILURE (H): ICD-10-CM

## 2025-07-24 RX ORDER — SACUBITRIL AND VALSARTAN 49; 51 MG/1; MG/1
1 TABLET, FILM COATED ORAL 2 TIMES DAILY
Qty: 180 TABLET | Refills: 3 | Status: SHIPPED | OUTPATIENT
Start: 2025-07-24

## 2025-07-24 NOTE — PROGRESS NOTES
Prescription request fax received. Refill of entresto 49/51 mg noted. Follow up appointment with Ora in November 2025.       FRANCIS Harrington

## 2025-08-27 ENCOUNTER — ANCILLARY PROCEDURE (OUTPATIENT)
Dept: CARDIOLOGY | Facility: CLINIC | Age: 50
End: 2025-08-27
Attending: INTERNAL MEDICINE
Payer: COMMERCIAL

## 2025-08-27 DIAGNOSIS — Z95.810 ICD (IMPLANTABLE CARDIOVERTER-DEFIBRILLATOR), SINGLE, IN SITU: ICD-10-CM

## 2025-08-27 PROCEDURE — 93296 REM INTERROG EVL PM/IDS: CPT

## 2025-08-27 PROCEDURE — 93295 DEV INTERROG REMOTE 1/2/MLT: CPT | Performed by: INTERNAL MEDICINE

## 2025-08-30 LAB
MDC_IDC_LEAD_CONNECTION_STATUS: NORMAL
MDC_IDC_LEAD_IMPLANT_DT: NORMAL
MDC_IDC_LEAD_LOCATION: NORMAL
MDC_IDC_LEAD_LOCATION_DETAIL_1: NORMAL
MDC_IDC_LEAD_MFG: NORMAL
MDC_IDC_LEAD_MODEL: NORMAL
MDC_IDC_LEAD_POLARITY_TYPE: NORMAL
MDC_IDC_LEAD_SERIAL: NORMAL
MDC_IDC_LEAD_SPECIAL_FUNCTION: NORMAL
MDC_IDC_MSMT_BATTERY_DTM: NORMAL
MDC_IDC_MSMT_BATTERY_REMAINING_LONGEVITY: 141 MO
MDC_IDC_MSMT_BATTERY_RRT_TRIGGER: NORMAL
MDC_IDC_MSMT_BATTERY_VOLTAGE: 2.98 V
MDC_IDC_MSMT_CAP_CHARGE_DTM: NORMAL
MDC_IDC_MSMT_CAP_CHARGE_ENERGY: 18 J
MDC_IDC_MSMT_CAP_CHARGE_TIME: 3.7 S
MDC_IDC_MSMT_CAP_CHARGE_TYPE: NORMAL
MDC_IDC_MSMT_LEADCHNL_RV_IMPEDANCE_VALUE: 361 OHM
MDC_IDC_MSMT_LEADCHNL_RV_IMPEDANCE_VALUE: 437 OHM
MDC_IDC_MSMT_LEADCHNL_RV_PACING_THRESHOLD_AMPLITUDE: 0.62 V
MDC_IDC_MSMT_LEADCHNL_RV_PACING_THRESHOLD_PULSEWIDTH: 0.4 MS
MDC_IDC_MSMT_LEADCHNL_RV_SENSING_INTR_AMPL: 31.62 MV
MDC_IDC_PG_IMPLANT_DTM: NORMAL
MDC_IDC_PG_MFG: NORMAL
MDC_IDC_PG_MODEL: NORMAL
MDC_IDC_PG_SERIAL: NORMAL
MDC_IDC_PG_TYPE: NORMAL
MDC_IDC_SESS_CLINIC_NAME: NORMAL
MDC_IDC_SESS_DTM: NORMAL
MDC_IDC_SESS_TYPE: NORMAL
MDC_IDC_SET_BRADY_HYSTRATE: NORMAL
MDC_IDC_SET_BRADY_LOWRATE: 40 {BEATS}/MIN
MDC_IDC_SET_BRADY_MODE: NORMAL
MDC_IDC_SET_LEADCHNL_RV_PACING_AMPLITUDE: 2 V
MDC_IDC_SET_LEADCHNL_RV_PACING_ANODE_ELECTRODE_1: NORMAL
MDC_IDC_SET_LEADCHNL_RV_PACING_ANODE_LOCATION_1: NORMAL
MDC_IDC_SET_LEADCHNL_RV_PACING_CAPTURE_MODE: NORMAL
MDC_IDC_SET_LEADCHNL_RV_PACING_CATHODE_ELECTRODE_1: NORMAL
MDC_IDC_SET_LEADCHNL_RV_PACING_CATHODE_LOCATION_1: NORMAL
MDC_IDC_SET_LEADCHNL_RV_PACING_POLARITY: NORMAL
MDC_IDC_SET_LEADCHNL_RV_PACING_PULSEWIDTH: 0.4 MS
MDC_IDC_SET_LEADCHNL_RV_SENSING_ANODE_ELECTRODE_1: NORMAL
MDC_IDC_SET_LEADCHNL_RV_SENSING_ANODE_LOCATION_1: NORMAL
MDC_IDC_SET_LEADCHNL_RV_SENSING_CATHODE_ELECTRODE_1: NORMAL
MDC_IDC_SET_LEADCHNL_RV_SENSING_CATHODE_LOCATION_1: NORMAL
MDC_IDC_SET_LEADCHNL_RV_SENSING_POLARITY: NORMAL
MDC_IDC_SET_LEADCHNL_RV_SENSING_SENSITIVITY: 0.3 MV
MDC_IDC_SET_ZONE_DETECTION_BEATS_DENOMINATOR: 16 {BEATS}
MDC_IDC_SET_ZONE_DETECTION_BEATS_DENOMINATOR: 32 {BEATS}
MDC_IDC_SET_ZONE_DETECTION_BEATS_DENOMINATOR: 40 {BEATS}
MDC_IDC_SET_ZONE_DETECTION_BEATS_NUMERATOR: 16 {BEATS}
MDC_IDC_SET_ZONE_DETECTION_BEATS_NUMERATOR: 30 {BEATS}
MDC_IDC_SET_ZONE_DETECTION_BEATS_NUMERATOR: 32 {BEATS}
MDC_IDC_SET_ZONE_DETECTION_INTERVAL: 270 MS
MDC_IDC_SET_ZONE_DETECTION_INTERVAL: 320 MS
MDC_IDC_SET_ZONE_DETECTION_INTERVAL: 400 MS
MDC_IDC_SET_ZONE_STATUS: NORMAL
MDC_IDC_SET_ZONE_TYPE: NORMAL
MDC_IDC_SET_ZONE_VENDOR_TYPE: NORMAL
MDC_IDC_STAT_AT_BURDEN_PERCENT: 0 %
MDC_IDC_STAT_AT_DTM_END: NORMAL
MDC_IDC_STAT_AT_DTM_START: NORMAL
MDC_IDC_STAT_BRADY_DTM_END: NORMAL
MDC_IDC_STAT_BRADY_DTM_START: NORMAL
MDC_IDC_STAT_BRADY_RA_PERCENT_PACED: NORMAL
MDC_IDC_STAT_BRADY_RV_PERCENT_PACED: 0 %
MDC_IDC_STAT_CRT_DTM_END: NORMAL
MDC_IDC_STAT_CRT_DTM_START: NORMAL
MDC_IDC_STAT_EPISODE_RECENT_COUNT: 0
MDC_IDC_STAT_EPISODE_RECENT_COUNT_DTM_END: NORMAL
MDC_IDC_STAT_EPISODE_RECENT_COUNT_DTM_START: NORMAL
MDC_IDC_STAT_EPISODE_TOTAL_COUNT: 0
MDC_IDC_STAT_EPISODE_TOTAL_COUNT: 4
MDC_IDC_STAT_EPISODE_TOTAL_COUNT_DTM_END: NORMAL
MDC_IDC_STAT_EPISODE_TOTAL_COUNT_DTM_START: NORMAL
MDC_IDC_STAT_EPISODE_TYPE: NORMAL
MDC_IDC_STAT_TACHYTHERAPY_ATP_DELIVERED_RECENT: 0
MDC_IDC_STAT_TACHYTHERAPY_ATP_DELIVERED_TOTAL: 0
MDC_IDC_STAT_TACHYTHERAPY_RECENT_DTM_END: NORMAL
MDC_IDC_STAT_TACHYTHERAPY_RECENT_DTM_START: NORMAL
MDC_IDC_STAT_TACHYTHERAPY_SHOCKS_ABORTED_RECENT: 0
MDC_IDC_STAT_TACHYTHERAPY_SHOCKS_ABORTED_TOTAL: 0
MDC_IDC_STAT_TACHYTHERAPY_SHOCKS_DELIVERED_RECENT: 0
MDC_IDC_STAT_TACHYTHERAPY_SHOCKS_DELIVERED_TOTAL: 0
MDC_IDC_STAT_TACHYTHERAPY_TOTAL_DTM_END: NORMAL
MDC_IDC_STAT_TACHYTHERAPY_TOTAL_DTM_START: NORMAL

## (undated) DEVICE — WATCHDOG HEMOSTASIS VALVE

## (undated) DEVICE — INTRO GLIDESHEATH SLENDER 6FR 10X45CM 60-1060

## (undated) DEVICE — Device

## (undated) DEVICE — PACK HEART LEFT CUSTOM

## (undated) DEVICE — SLEEVE TR BAND RADIAL COMPRESSION DEVICE 24CM TRB24-REG

## (undated) DEVICE — GUIDEWIRE VASC GLADIUS MONGO 0.014IN 190CM 3CM TIP 3GM STR

## (undated) DEVICE — CATH BALLOON NC EMERGE 2.75X12MM H7493926712270

## (undated) DEVICE — KIT DVC ANGIO IBASIXCOMPAK 13INX20ML 3WAY IN4430

## (undated) DEVICE — TUBING PRESSURE 30"

## (undated) DEVICE — CATH BALLOON NC EMERGE 2.50X12MM H7493926712250

## (undated) DEVICE — CATH GUIDE VISTA BRITE AL75CRV 7FRX100CM  588890

## (undated) DEVICE — GUIDEWIRE ASAHI GAIA SECOND 190CM MICRO

## (undated) DEVICE — CATH GUIDE EXT TRAPLINER HYDRPHLC 150CM 6.5FR 5568

## (undated) DEVICE — SHEATH GUIDING R2P DESTINATION 75CM 6FR STERIL GS-R6ST1C75W

## (undated) DEVICE — VALVE HMSTS PHD SM BORE W/ SPR MTL INS TOOL TRQ DVC MAP802

## (undated) DEVICE — INTRO SHEATH 7FRX10CM PINNACLE RSS702

## (undated) DEVICE — KIT RIGHT HEART CATH 60130719

## (undated) DEVICE — CATH BALLOON NC EMERGE 2.00X12MM H7493926712200

## (undated) DEVICE — CATH BALLOON NC EMERGE 3.50X20MM H7493926720350

## (undated) DEVICE — MANIFOLD KIT ANGIO AUTOMATED 014613

## (undated) DEVICE — CATH GUIDING BLUE YELLOW PTFE XB3 6FRX100CM 67005200

## (undated) DEVICE — MANIFOLD CUSTOM KIT FOR CTO PROCEDURE K09-11989

## (undated) DEVICE — CATH ANGIO SUPERTORQUE PLUS JR4 6FRX100CM 533621

## (undated) DEVICE — INTRO SHEATH MICRO PLATINUM TIP 4FRX40CM 7274

## (undated) DEVICE — CATH BALLOON NC EMERGE 3.00X20MM H7493926720300

## (undated) DEVICE — PAD DEFIBRILLATOR ELECTRODE 4.25INX6IN ADULT 2001M-C

## (undated) DEVICE — GUIDEWIRE VASC 0.014INX180CM RUNTHROUGH 25-1011

## (undated) DEVICE — GW MINAMO STRAIGHT 190CM

## (undated) DEVICE — GW 0.014IN (0.01IN TAPER) X 19

## (undated) DEVICE — CATH BALLOON EMERGE 1.5X20MM H7493918920150

## (undated) DEVICE — INTRO SHEATH 8FRX10CM PINNACLE RSS802

## (undated) DEVICE — FASTENER CATH BALLOON CLAMPX2 STATLOCK 0684-00-493

## (undated) DEVICE — PACK HEART RIGHT CUSTOM SAN32RHF18

## (undated) DEVICE — KIT HAND CONTROL ACIST 016795

## (undated) DEVICE — CABLE PACING ALLIGATOR CLIP 12FT 5833SL

## (undated) DEVICE — CATH BALLOON EMERGE 2.0X12MM H7493918912200

## (undated) DEVICE — WIRE GUIDE 0.014"X180CM GRANDSLAM STR TP AG141002

## (undated) DEVICE — RAD CLOSURE ANGIOSEAL 8FR  610131

## (undated) DEVICE — INTRODUCER SHEATH BRITE TIP 8FRX55CM 401855M

## (undated) DEVICE — GW VASC .035IN DIA 260CML 7CML 3 MM RADIUS J CURVE 502455

## (undated) DEVICE — CATH TURNPIKE SPIRAL 135CM 5640

## (undated) DEVICE — 9FR X 13CM SAFESHEATH II TEAR-AWAY VALVED SHEATH SYSTEM, WITH SIDE PORT, 0.038IN GUIDEWIRE, 19.5CM DILATOR

## (undated) RX ORDER — HEPARIN SODIUM 1000 [USP'U]/ML
INJECTION, SOLUTION INTRAVENOUS; SUBCUTANEOUS
Status: DISPENSED
Start: 2022-12-09

## (undated) RX ORDER — FENTANYL CITRATE 50 UG/ML
INJECTION, SOLUTION INTRAMUSCULAR; INTRAVENOUS
Status: DISPENSED
Start: 2022-12-09

## (undated) RX ORDER — NITROGLYCERIN 5 MG/ML
VIAL (ML) INTRAVENOUS
Status: DISPENSED
Start: 2023-01-02

## (undated) RX ORDER — DIPHENHYDRAMINE HYDROCHLORIDE 50 MG/ML
INJECTION INTRAMUSCULAR; INTRAVENOUS
Status: DISPENSED
Start: 2023-05-11

## (undated) RX ORDER — LIDOCAINE HYDROCHLORIDE 20 MG/ML
INJECTION, SOLUTION INFILTRATION; PERINEURAL
Status: DISPENSED
Start: 2023-05-11

## (undated) RX ORDER — FENTANYL CITRATE 50 UG/ML
INJECTION, SOLUTION INTRAMUSCULAR; INTRAVENOUS
Status: DISPENSED
Start: 2023-05-11

## (undated) RX ORDER — SODIUM CHLORIDE 9 MG/ML
INJECTION, SOLUTION INTRAVENOUS
Status: DISPENSED
Start: 2023-05-11

## (undated) RX ORDER — CEFAZOLIN SODIUM 1 G/3ML
INJECTION, POWDER, FOR SOLUTION INTRAMUSCULAR; INTRAVENOUS
Status: DISPENSED
Start: 2023-05-11

## (undated) RX ORDER — LIDOCAINE HYDROCHLORIDE 10 MG/ML
INJECTION, SOLUTION EPIDURAL; INFILTRATION; INTRACAUDAL; PERINEURAL
Status: DISPENSED
Start: 2024-02-06

## (undated) RX ORDER — HEPARIN SODIUM 1000 [USP'U]/ML
INJECTION, SOLUTION INTRAVENOUS; SUBCUTANEOUS
Status: DISPENSED
Start: 2023-01-02

## (undated) RX ORDER — SODIUM CHLORIDE 9 MG/ML
INJECTION, SOLUTION INTRAVENOUS
Status: DISPENSED
Start: 2023-01-02

## (undated) RX ORDER — SIMETHICONE 40MG/0.6ML
SUSPENSION, DROPS(FINAL DOSAGE FORM)(ML) ORAL
Status: DISPENSED
Start: 2024-02-09

## (undated) RX ORDER — SODIUM CHLORIDE 9 MG/ML
INJECTION, SOLUTION INTRAVENOUS
Status: DISPENSED
Start: 2022-12-09

## (undated) RX ORDER — FENTANYL CITRATE 50 UG/ML
INJECTION, SOLUTION INTRAMUSCULAR; INTRAVENOUS
Status: DISPENSED
Start: 2023-01-02

## (undated) RX ORDER — ASPIRIN 81 MG/1
TABLET, CHEWABLE ORAL
Status: DISPENSED
Start: 2023-01-02

## (undated) RX ORDER — OXYCODONE AND ACETAMINOPHEN 5; 325 MG/1; MG/1
TABLET ORAL
Status: DISPENSED
Start: 2023-05-11

## (undated) RX ORDER — NICARDIPINE HCL-0.9% SOD CHLOR 1 MG/10 ML
SYRINGE (ML) INTRAVENOUS
Status: DISPENSED
Start: 2023-01-02

## (undated) RX ORDER — NOREPINEPHRINE BITARTRATE 0.06 MG/ML
INJECTION, SOLUTION INTRAVENOUS
Status: DISPENSED
Start: 2023-01-02

## (undated) RX ORDER — FENTANYL CITRATE 50 UG/ML
INJECTION, SOLUTION INTRAMUSCULAR; INTRAVENOUS
Status: DISPENSED
Start: 2024-02-09

## (undated) RX ORDER — ONDANSETRON 2 MG/ML
INJECTION INTRAMUSCULAR; INTRAVENOUS
Status: DISPENSED
Start: 2024-02-09

## (undated) RX ORDER — LIDOCAINE HYDROCHLORIDE 10 MG/ML
INJECTION, SOLUTION EPIDURAL; INFILTRATION; INTRACAUDAL; PERINEURAL
Status: DISPENSED
Start: 2022-12-09

## (undated) RX ORDER — NICARDIPINE HCL-0.9% SOD CHLOR 1 MG/10 ML
SYRINGE (ML) INTRAVENOUS
Status: DISPENSED
Start: 2022-12-09

## (undated) RX ORDER — NOREPINEPHRINE BITARTRATE 0.06 MG/ML
INJECTION, SOLUTION INTRAVENOUS
Status: DISPENSED
Start: 2022-12-09

## (undated) RX ORDER — ACETAMINOPHEN 325 MG/1
TABLET ORAL
Status: DISPENSED
Start: 2022-12-09

## (undated) RX ORDER — NITROGLYCERIN 5 MG/ML
VIAL (ML) INTRAVENOUS
Status: DISPENSED
Start: 2022-12-09